# Patient Record
Sex: MALE | Race: WHITE | Employment: FULL TIME | URBAN - METROPOLITAN AREA
[De-identification: names, ages, dates, MRNs, and addresses within clinical notes are randomized per-mention and may not be internally consistent; named-entity substitution may affect disease eponyms.]

---

## 2018-05-16 ENCOUNTER — OFFICE VISIT (OUTPATIENT)
Dept: FAMILY MEDICINE CLINIC | Facility: CLINIC | Age: 63
End: 2018-05-16

## 2018-05-16 VITALS
WEIGHT: 236.81 LBS | HEIGHT: 74 IN | HEART RATE: 71 BPM | BODY MASS INDEX: 30.39 KG/M2 | OXYGEN SATURATION: 97 % | DIASTOLIC BLOOD PRESSURE: 72 MMHG | SYSTOLIC BLOOD PRESSURE: 124 MMHG

## 2018-05-16 DIAGNOSIS — Z51.81 ENCOUNTER FOR THERAPEUTIC DRUG MONITORING: ICD-10-CM

## 2018-05-16 DIAGNOSIS — Z95.2 HISTORY OF AORTIC VALVE REPLACEMENT: ICD-10-CM

## 2018-05-16 DIAGNOSIS — Z12.5 PROSTATE CANCER SCREENING: ICD-10-CM

## 2018-05-16 DIAGNOSIS — R10.84 GENERALIZED ABDOMINAL PAIN: Primary | ICD-10-CM

## 2018-05-16 DIAGNOSIS — Z79.01 REQUIRES LIFELONG WARFARIN THERAPY: ICD-10-CM

## 2018-05-16 PROCEDURE — 99203 OFFICE O/P NEW LOW 30 MIN: CPT | Performed by: FAMILY MEDICINE

## 2018-05-16 RX ORDER — LAMOTRIGINE 100 MG/1
100 TABLET ORAL EVERY MORNING
COMMUNITY

## 2018-05-16 RX ORDER — WARFARIN SODIUM 10 MG/1
10 TABLET ORAL NIGHTLY
COMMUNITY
End: 2019-03-24

## 2018-05-16 RX ORDER — FOLIC ACID 1 MG/1
TABLET ORAL DAILY
COMMUNITY
End: 2020-01-15

## 2018-05-16 RX ORDER — AMOXICILLIN 250 MG
500 CAPSULE ORAL DAILY
COMMUNITY
End: 2020-06-17 | Stop reason: DRUGHIGH

## 2018-05-16 RX ORDER — METOPROLOL TARTRATE 37.5 MG/1
TABLET, FILM COATED ORAL
COMMUNITY
End: 2019-01-02

## 2018-05-16 NOTE — PROGRESS NOTES
CC:  Patient presents with:  Stomach Pain: x 1 week   Establish Care      HPI: 61year old male here to establish care and with stomach pain x 1 week. Previous PCP was Dr. Rosemarie Quijano, but switching over here.    Reports he has had stomach pain in his lower abd no dysuria, no hematuria  Dermatologic:  No rashes    Past Medical History:   Diagnosis Date   • Actinic keratosis    • Cyclothymia    • S/P AVR (aortic valve replacement) 2001     Past Surgical History:   Procedure Laterality Date   • Cath transcatheter a palpation, no masses, no guarding, no rebound  Dermatologic:  No rashes or lesions    Assessment and Plan: 61year old male here with improving, generalized abdominal pain likely due to muscle wall strain and also needing blood work for routine monitoring

## 2018-05-21 ENCOUNTER — TELEPHONE (OUTPATIENT)
Dept: FAMILY MEDICINE CLINIC | Facility: CLINIC | Age: 63
End: 2018-05-21

## 2018-05-21 NOTE — TELEPHONE ENCOUNTER
Spoke with patient about his ammonia elevation, notes history of elevation while on Depakote but no known liver disease. Plan to check CMP at his physical and he will also bring old GI records as he was evaluated for this previously.

## 2018-06-12 ENCOUNTER — OFFICE VISIT (OUTPATIENT)
Dept: FAMILY MEDICINE CLINIC | Facility: CLINIC | Age: 63
End: 2018-06-12

## 2018-06-12 VITALS
HEIGHT: 74 IN | WEIGHT: 232 LBS | DIASTOLIC BLOOD PRESSURE: 70 MMHG | HEART RATE: 61 BPM | OXYGEN SATURATION: 98 % | SYSTOLIC BLOOD PRESSURE: 118 MMHG | BODY MASS INDEX: 29.77 KG/M2

## 2018-06-12 DIAGNOSIS — G62.9 NEUROPATHY: ICD-10-CM

## 2018-06-12 DIAGNOSIS — Z11.59 ENCOUNTER FOR HEPATITIS C SCREENING TEST FOR LOW RISK PATIENT: ICD-10-CM

## 2018-06-12 DIAGNOSIS — Z51.81 THERAPEUTIC DRUG MONITORING: ICD-10-CM

## 2018-06-12 DIAGNOSIS — R79.89 INCREASED AMMONIA LEVEL: ICD-10-CM

## 2018-06-12 DIAGNOSIS — Z00.01 ENCOUNTER FOR ROUTINE ADULT HEALTH EXAMINATION WITH ABNORMAL FINDINGS: Primary | ICD-10-CM

## 2018-06-12 DIAGNOSIS — Z11.3 VENEREAL DISEASE SCREENING: ICD-10-CM

## 2018-06-12 PROBLEM — J30.9 ALLERGIC RHINITIS: Status: ACTIVE | Noted: 2018-06-12

## 2018-06-12 PROCEDURE — 99396 PREV VISIT EST AGE 40-64: CPT | Performed by: FAMILY MEDICINE

## 2018-06-12 RX ORDER — SILDENAFIL 100 MG/1
100 TABLET, FILM COATED ORAL
COMMUNITY
End: 2018-12-26

## 2018-06-12 RX ORDER — WARFARIN SODIUM 5 MG/1
TABLET ORAL
Refills: 0 | COMMUNITY
Start: 2018-06-12 | End: 2018-07-10

## 2018-06-12 NOTE — PATIENT INSTRUCTIONS
Prevention Guidelines, Men Ages 48 to 59  Screening tests and vaccines are an important part of managing your health. Health counseling is essential, too. Below are guidelines for these, for men ages 48 to 59.  Talk with your healthcare provider to make s Prostate cancer Starting at age 39, talk to healthcare provider about risks and benefits of digital rectal exam (BLAIR) and prostate-specific antigen (PSA) screening1 At routine exams   Syphilis Men at increased risk for infection – talk with your healthcare pertussis (Td/Tdap) booster All men in this age group Td every 10 years, or a one-time dose of Tdap instead of a Td booster after age 25, then Td every 8 years   Zoster All men ages 61 and older 1 dose   Counseling Who needs it How often   Diet and exerci

## 2018-06-12 NOTE — PROGRESS NOTES
Suzzanna Severin is a 61year old male who presents for a complete physical exam.   HPI:   Patient presents with:  Physical: blood work      Concerns: Often has to pee again right after he pees and goes more often than others he is with.  Notes he pees 2-4 cyrus history of cyclothymia, erratic sleep   Endo: polyuria but no polydipsia       Current Outpatient Prescriptions:  Sildenafil Citrate 100 MG Oral Tab Take 100 mg by mouth daily as needed for Erectile Dysfunction.  Disp:  Rfl:    Warfarin Sodium 5 MG Oral Tab GENERAL: well developed, well nourished, in no apparent distress  SKIN: no rashes, flesh-colored nodule on forehead  HEENT: atraumatic, normocephalic  EYES: PERRLA, EOMI  NECK: supple, no adenopathy   LUNGS: clear to auscultation  CARDIO: RRR without mur cancer screening-up to date  -Prevention of skin cancer  -Healthy diet including adequate intake of vegetables and fruits, appropriate portion sizes, minimizing highly concentrated carbohydrate foods  -Exercising 30 minutes a day most days of the week   -I

## 2018-06-22 ENCOUNTER — TELEPHONE (OUTPATIENT)
Dept: FAMILY MEDICINE CLINIC | Facility: CLINIC | Age: 63
End: 2018-06-22

## 2018-06-22 DIAGNOSIS — Z95.2 H/O AORTIC VALVE REPLACEMENT: Primary | ICD-10-CM

## 2018-06-22 NOTE — TELEPHONE ENCOUNTER
Patient at lab and needs I&R order sent to [facility] at [address] [facility]

## 2018-06-26 ENCOUNTER — PATIENT MESSAGE (OUTPATIENT)
Dept: FAMILY MEDICINE CLINIC | Facility: CLINIC | Age: 63
End: 2018-06-26

## 2018-06-26 DIAGNOSIS — E72.20 HYPERAMMONEMIA (HCC): ICD-10-CM

## 2018-06-26 DIAGNOSIS — Z79.01 ENCOUNTER FOR MONITORING COUMADIN THERAPY: Primary | ICD-10-CM

## 2018-06-26 DIAGNOSIS — Z51.81 ENCOUNTER FOR MONITORING COUMADIN THERAPY: Primary | ICD-10-CM

## 2018-06-27 NOTE — TELEPHONE ENCOUNTER
From: Lisa Zurita  To: Chelle Guerrero DO  Sent: 6/26/2018 10:34 PM CDT  Subject: Test Results Question    I have a question about PROTHROMBIN TIME (PT)     I left my dosage the way it was before.  But I haven't been eating anywhere near the normal amount o

## 2018-07-10 ENCOUNTER — OFFICE VISIT (OUTPATIENT)
Dept: GASTROENTEROLOGY | Facility: CLINIC | Age: 63
End: 2018-07-10

## 2018-07-10 VITALS
HEART RATE: 65 BPM | BODY MASS INDEX: 29.65 KG/M2 | SYSTOLIC BLOOD PRESSURE: 104 MMHG | WEIGHT: 231 LBS | DIASTOLIC BLOOD PRESSURE: 62 MMHG | HEIGHT: 74 IN

## 2018-07-10 DIAGNOSIS — E72.20 HYPERAMMONEMIA (HCC): Primary | ICD-10-CM

## 2018-07-10 PROCEDURE — 99243 OFF/OP CNSLTJ NEW/EST LOW 30: CPT | Performed by: INTERNAL MEDICINE

## 2018-07-10 RX ORDER — LACTULOSE 10 G/15ML
20 SOLUTION ORAL 2 TIMES DAILY
Qty: 1892 ML | Refills: 0 | Status: SHIPPED | OUTPATIENT
Start: 2018-07-10 | End: 2020-02-10 | Stop reason: ALTCHOICE

## 2018-07-10 NOTE — PATIENT INSTRUCTIONS
Elevated ammonia  - LFT have been ok - would advise liver workup and I have placed order for labs and ultrasound  - lactulose 2 x day and recheck with ammonia level in 1 month

## 2018-07-13 NOTE — PROGRESS NOTES
Bria Membreno is a 61year old male. HPI:   Patient presents with:  Elevated Liver Chemistry: No current complaints. C/o twitches in legs at night and shaking in fingers in the morning.     The patient is a 80-year-old male who has a history of aortic va Disp:  Rfl:    Divalproex Sodium (DEPAKOTE OR) Take 1,000 mg by mouth. Disp:  Rfl:    Cobalamine Combinations (B-12) 100-5000 MCG Sublingual SL Tab Place under the tongue. Disp:  Rfl:    Metoprolol Tartrate 37.5 MG Oral Tab Take by mouth.  Take 25 mg qam an ammonia.        Orders This Visit:    Orders Placed This Encounter      Ammonia, Plasma      Alpha-1-antirypsin, serum      Actin (Smooth Muscle) Antibody      Ferritin      Ceruloplasmin      Hepatitis B Surface Antigen      Liver-Kidney Microsomal Ab

## 2018-10-13 ENCOUNTER — PATIENT MESSAGE (OUTPATIENT)
Dept: FAMILY MEDICINE CLINIC | Facility: CLINIC | Age: 63
End: 2018-10-13

## 2018-11-26 ENCOUNTER — TELEPHONE (OUTPATIENT)
Dept: GASTROENTEROLOGY | Facility: CLINIC | Age: 63
End: 2018-11-26

## 2018-11-26 NOTE — TELEPHONE ENCOUNTER
I mailed an overdue results letter to te pt home for the following labs:    Ammonia,Serum. I also sent a letter to the pt via [address]

## 2018-12-11 ENCOUNTER — MED REC SCAN ONLY (OUTPATIENT)
Dept: FAMILY MEDICINE CLINIC | Facility: CLINIC | Age: 63
End: 2018-12-11

## 2018-12-17 ENCOUNTER — PATIENT MESSAGE (OUTPATIENT)
Dept: FAMILY MEDICINE CLINIC | Facility: CLINIC | Age: 63
End: 2018-12-17

## 2018-12-17 DIAGNOSIS — Z51.81 ENCOUNTER FOR THERAPEUTIC DRUG MONITORING: Primary | ICD-10-CM

## 2018-12-17 DIAGNOSIS — Z00.01 ENCOUNTER FOR GENERAL ADULT MEDICAL EXAMINATION WITH ABNORMAL FINDINGS: ICD-10-CM

## 2018-12-18 NOTE — TELEPHONE ENCOUNTER
From: Bria Membreno  To: Zenia Trimble DO  Sent: 12/17/2018 5:12 PM CST  Subject: Non-Urgent Medical Question    Can you please talk to [facility] and put in an order for an INR, ammonia, liver function, and valproic acid?   [facility] on [address] [facility]

## 2018-12-26 ENCOUNTER — TELEPHONE (OUTPATIENT)
Dept: FAMILY MEDICINE CLINIC | Facility: CLINIC | Age: 63
End: 2018-12-26

## 2018-12-26 RX ORDER — DIVALPROEX SODIUM 500 MG/1
500 TABLET, EXTENDED RELEASE ORAL 2 TIMES DAILY
Refills: 8 | COMMUNITY
Start: 2018-12-03 | End: 2020-06-17 | Stop reason: DRUGHIGH

## 2018-12-26 RX ORDER — SILDENAFIL 100 MG/1
100 TABLET, FILM COATED ORAL
Qty: 15 TABLET | Refills: 1 | Status: SHIPPED | OUTPATIENT
Start: 2018-12-26 | End: 2020-06-17

## 2018-12-26 RX ORDER — KETOCONAZOLE 20 MG/G
1 CREAM TOPICAL 2 TIMES DAILY
Refills: 0 | COMMUNITY
Start: 2018-10-05 | End: 2020-01-15

## 2018-12-26 NOTE — TELEPHONE ENCOUNTER
[address] [facility] [phone] calling requesting a refill on viagra

## 2018-12-31 ENCOUNTER — PATIENT MESSAGE (OUTPATIENT)
Dept: FAMILY MEDICINE CLINIC | Facility: CLINIC | Age: 63
End: 2018-12-31

## 2018-12-31 ENCOUNTER — TELEPHONE (OUTPATIENT)
Dept: FAMILY MEDICINE CLINIC | Facility: CLINIC | Age: 63
End: 2018-12-31

## 2018-12-31 NOTE — TELEPHONE ENCOUNTER
Called and LM for [facility] at [phone] to see if an ammonia level was drawn and if not, if they can add it on. Requested a c/b to the office to let us know.

## 2019-01-02 ENCOUNTER — TELEPHONE (OUTPATIENT)
Dept: FAMILY MEDICINE CLINIC | Facility: CLINIC | Age: 64
End: 2019-01-02

## 2019-01-02 RX ORDER — METOPROLOL TARTRATE 37.5 MG/1
37.5 TABLET, FILM COATED ORAL DAILY
Qty: 90 TABLET | Refills: 1 | Status: SHIPPED | OUTPATIENT
Start: 2019-01-02 | End: 2019-06-24

## 2019-01-02 NOTE — TELEPHONE ENCOUNTER
From: America Bauer  To: Chelo Burt DO  Sent: 12/31/2018 2:06 PM CST  Subject: Prescription Question    Nasreen is saying my prescriptions are delayed waiting on doctor approval....     I'm going on vacation next week and need them before I leave     T

## 2019-01-04 ENCOUNTER — TELEPHONE (OUTPATIENT)
Dept: FAMILY MEDICINE CLINIC | Facility: CLINIC | Age: 64
End: 2019-01-04

## 2019-01-04 NOTE — TELEPHONE ENCOUNTER
Spoke with pt and he stated he has currently enough of the Warfarin, no need for refill yet.  Pt will have INR tested within 1-2 weeks

## 2019-02-19 ENCOUNTER — TELEPHONE (OUTPATIENT)
Dept: FAMILY MEDICINE CLINIC | Facility: CLINIC | Age: 64
End: 2019-02-19

## 2019-02-19 NOTE — TELEPHONE ENCOUNTER
Spoke with Benitez Fam at the main office of [address] [place] [address] at [phone] as per local [facility] lab they can't add any labs, it has to go thorough main office ([address], [place], [address], Phone: [phone]).  Per Benitez Fam, no Ammonia has been drawn on

## 2019-02-19 NOTE — TELEPHONE ENCOUNTER
Spoke with pt who states he tried to have Ammonia levels drawn during last blood draw but because he refused other lab tests that were listed along the ammonia, pt was told that [facility] could not do it and threw them out per pt.  New ammonia lab order brijesh

## 2019-02-20 ENCOUNTER — TELEPHONE (OUTPATIENT)
Dept: GASTROENTEROLOGY | Facility: CLINIC | Age: 64
End: 2019-02-20

## 2019-02-20 NOTE — TELEPHONE ENCOUNTER
2nd overdue letter mailed with additional labs/US added.     Labs:  Ammonia, Plasma      Alpha-1-antirypsin, serum      Actin (Smooth Muscle) Antibody      Ferritin      Ceruloplasmin      Hepatitis B Surface Antigen      Liver-Kidney Microsomal Ab      Freddy

## 2019-02-23 LAB
AMMONIA (P): 122 UMOL/L
INR: 1.6
PT: 15.9 SEC (ref 9–11.5)

## 2019-02-25 ENCOUNTER — PATIENT MESSAGE (OUTPATIENT)
Dept: FAMILY MEDICINE CLINIC | Facility: CLINIC | Age: 64
End: 2019-02-25

## 2019-02-26 ENCOUNTER — PATIENT MESSAGE (OUTPATIENT)
Dept: GASTROENTEROLOGY | Facility: CLINIC | Age: 64
End: 2019-02-26

## 2019-02-27 NOTE — PROGRESS NOTES
RN to have the pt follow up in the office to discuss his elevated ammonia level.  Please expedite follow up    Elevated ammonia likely related to his use of chronic Depakote therapy - see my chart communication

## 2019-02-27 NOTE — TELEPHONE ENCOUNTER
I left a complete message on voicemail to call back for results--that Dr Gale Fitch wants to see him in office, and that I put him in for appt tomorrow 2/28 with arrival time 1:30pm and [facility]        Reina Wesley MD 15 hours ago (6:12 PM

## 2019-02-27 NOTE — TELEPHONE ENCOUNTER
From: Andreia Mckeon  To: Kavita Avery MD  Sent: 2/26/2019 9:25 AM CST  Subject: Test Results Question    Hi Dr. Debora Garcia, I forgot to tell you. My liver function was normal in December according to Dr. Florian Tom.  I don't know if you have access to those tests

## 2019-02-28 ENCOUNTER — OFFICE VISIT (OUTPATIENT)
Dept: GASTROENTEROLOGY | Facility: CLINIC | Age: 64
End: 2019-02-28
Payer: COMMERCIAL

## 2019-02-28 VITALS
SYSTOLIC BLOOD PRESSURE: 125 MMHG | HEIGHT: 74 IN | HEART RATE: 71 BPM | DIASTOLIC BLOOD PRESSURE: 76 MMHG | WEIGHT: 237.19 LBS | BODY MASS INDEX: 30.44 KG/M2

## 2019-02-28 DIAGNOSIS — E72.20 HYPERAMMONEMIA (HCC): Primary | ICD-10-CM

## 2019-02-28 PROCEDURE — 99212 OFFICE O/P EST SF 10 MIN: CPT | Performed by: INTERNAL MEDICINE

## 2019-02-28 PROCEDURE — 99213 OFFICE O/P EST LOW 20 MIN: CPT | Performed by: INTERNAL MEDICINE

## 2019-02-28 RX ORDER — DIVALPROEX SODIUM 250 MG/1
1250 TABLET, DELAYED RELEASE ORAL NIGHTLY
COMMUNITY

## 2019-02-28 NOTE — PROGRESS NOTES
Brenden Sainz is a 59year old male. HPI:   Patient presents with:  Abnormal Labs  Constipation: bloating    The patient is a 79-year-old who has been on Depakote for control of psychiatric issues who has known elevation in his ammonia going back years. 2 (two) times daily. Disp:  Rfl:    lamoTRIgine 100 MG Oral Tab Take 100 mg by mouth daily. Disp:  Rfl:    Cobalamine Combinations (B-12) 100-5000 MCG Sublingual SL Tab Place under the tongue. Disp:  Rfl:    folic acid 1 MG Oral Tab Take by mouth daily. trial of lactulose were reviewed.     Plan  - repeat ammonia in March - if continued increase then consider changing out the Depakote for alternative medication  - lab work up   - liver ultrasound  - follow up after above testing         Orders This Visit:

## 2019-02-28 NOTE — PATIENT INSTRUCTIONS
Elevated ammonia  - thought to be related to effect of Depakote and not liver   - rule out underlying liver issue - lab work and check on getting ultrasound  - repeat ammonia in March  - consider Lactulose  - consider changing out Depakote if symptomatic a

## 2019-03-01 ENCOUNTER — TELEPHONE (OUTPATIENT)
Dept: GASTROENTEROLOGY | Facility: CLINIC | Age: 64
End: 2019-03-01

## 2019-03-01 NOTE — TELEPHONE ENCOUNTER
Pt is at [facility] and he states he was told that Dr. Eddy Brown was going to send new labs orders for liver testing . Orders that are at [facility] are the same labs he had done in December. Call transferred to RN.

## 2019-03-01 NOTE — TELEPHONE ENCOUNTER
Patient calling to compare what  Dr. Tayla Henderson ordered back in July in 2018 versus what he ordered yesterday. Information given.

## 2019-03-11 NOTE — TELEPHONE ENCOUNTER
From: Rohit iMnor  To: Elisa Peter DO  Sent: 2/25/2019 7:50 PM CST  Subject: Non-Urgent Medical Question    I have a question about PROTHROMBIN TIME (PT) resulted on 2/23/19, 12:00 PM.    10 mg per day except 5 mg on Monday and Friday    I've increased

## 2019-03-11 NOTE — PROGRESS NOTES
I can see your lab work in the lab section, I had advised liver workup to make sure no underlying liver disease as contributing to the ammonia elevation but my research on this shows due to medication     He had questions about possible duplication of lab

## 2019-03-15 NOTE — TELEPHONE ENCOUNTER
Patient was seen in office 2/28/19. Per Dr. Chun Reasoner documentation, the elevated ammonia levels were addressed.

## 2019-03-16 ENCOUNTER — PATIENT MESSAGE (OUTPATIENT)
Dept: GASTROENTEROLOGY | Facility: CLINIC | Age: 64
End: 2019-03-16

## 2019-03-16 DIAGNOSIS — E72.20 HYPERAMMONEMIA (HCC): Primary | ICD-10-CM

## 2019-03-19 NOTE — TELEPHONE ENCOUNTER
Dr Tee Navas, excuse message below. The only portion I need to know from you is if you want Ammonia repeated, as Dr Walter Grew had one 2/22, but looks like your 2/28 OV says repeat again in March  as it is increasing.   [facility] has specimen left so will also add liver

## 2019-03-19 NOTE — TELEPHONE ENCOUNTER
From: Helena Cordova  To: Rock Kan MD  Sent: 3/16/2019 12:35 PM CDT  Subject: Non-Urgent Medical Question    Sending this to both Birgit Drake and Romario….  (Wish the joe had the capability to send messages to two offices at [facility])    I was trying to sort ou

## 2019-03-20 NOTE — TELEPHONE ENCOUNTER
Dr Emmett Branham states he does want ammonia level drawn. Left message on pt's voicemail to CALL rather than MyChart to review.

## 2019-03-22 ENCOUNTER — PATIENT MESSAGE (OUTPATIENT)
Dept: FAMILY MEDICINE CLINIC | Facility: CLINIC | Age: 64
End: 2019-03-22

## 2019-03-22 LAB
ACTIN (SMOOTH MUSCLE)$ANTIBODY (IGG): <20 U
ALPHA-1-ANTITRYPSIN QN: 131 MG/DL (ref 83–199)
CERULOPLASMIN: 23 MG/DL (ref 18–36)
FERRITIN: 114 NG/ML (ref 20–380)
LKM-1 ANTIBODY (IGG): <20 U
MITOCHONDRIAL AB SCREEN: NEGATIVE

## 2019-03-22 NOTE — TELEPHONE ENCOUNTER
LMTCB. Please transfer to triage. Per Betty's message Dr. Britta Sanches does want ammonia level drawn. Ammonia order generated. Please advise on what diagnosis to use and send. Thank you.

## 2019-03-25 RX ORDER — WARFARIN SODIUM 10 MG/1
TABLET ORAL
Qty: 90 TABLET | Refills: 0 | Status: SHIPPED | OUTPATIENT
Start: 2019-03-25 | End: 2019-06-24

## 2019-03-25 NOTE — TELEPHONE ENCOUNTER
A refill request was received for:  Requested Prescriptions     Pending Prescriptions Disp Refills   • WARFARIN SODIUM 10 MG Oral Tab [Pharmacy Med Name: WARFARIN SOD 10MG (TEN MG) TB WHITE] 90 tablet 0     Sig: TAKE 1 TABLET BY MOUTH ONCE DAILY     Last r

## 2019-03-25 NOTE — TELEPHONE ENCOUNTER
From: Joanne Godwin  To: Leigh Gresham DO  Sent: 3/22/2019 5:32 PM CDT  Subject: Non-Urgent Medical Question    The other thing they keep telling me time and time again is that there is no standing order sent from you guys.  [facility] has been so difficult to d

## 2019-03-26 NOTE — TELEPHONE ENCOUNTER
RN spoke with pt and informed him that [facility] RN faxed standing PT order to [facility]. Copy of standing order mailed to pt today. Advised pt to keep standing order with him when going to [facility] as it appears that they don't keep standing orders on file.  Pt verbal

## 2019-03-26 NOTE — TELEPHONE ENCOUNTER
Patient called back and message from Dr. Aubrey Mera given. Patient would like order mailed to his home. Address confirmed. Order mailed.

## 2019-04-08 RX ORDER — LAMOTRIGINE 100 MG/1
TABLET ORAL
Qty: 30 TABLET | Refills: 0 | OUTPATIENT
Start: 2019-04-08

## 2019-04-08 NOTE — TELEPHONE ENCOUNTER
A refill request was received for:  Requested Prescriptions     Pending Prescriptions Disp Refills   • LAMOTRIGINE 100 MG Oral Tab [Pharmacy Med Name: LAMOTRIGINE 100MG TABLETS] 30 tablet 0     Sig: TAKE 1 TABLET BY MOUTH DAILY     Last refill date:never

## 2019-04-09 NOTE — TELEPHONE ENCOUNTER
Medication declined and mychart message sent as this medication has not bee prescribed by me before and was previously prescribed by his psychiatrist.

## 2019-04-12 ENCOUNTER — TELEPHONE (OUTPATIENT)
Dept: FAMILY MEDICINE CLINIC | Facility: CLINIC | Age: 64
End: 2019-04-12

## 2019-04-12 DIAGNOSIS — Z79.01 REQUIRES LIFELONG WARFARIN THERAPY: Primary | ICD-10-CM

## 2019-04-12 NOTE — TELEPHONE ENCOUNTER
Fax over new orders   The orders they have are expiring  [facility]    Fax [phone]    IF she doesn't get this now she can't do the test today

## 2019-04-29 ENCOUNTER — TELEPHONE (OUTPATIENT)
Dept: GASTROENTEROLOGY | Facility: CLINIC | Age: 64
End: 2019-04-29

## 2019-04-29 ENCOUNTER — PATIENT MESSAGE (OUTPATIENT)
Dept: GASTROENTEROLOGY | Facility: CLINIC | Age: 64
End: 2019-04-29

## 2019-04-29 NOTE — TELEPHONE ENCOUNTER
Message left on patient's personal voicemail. Ammonia level has improved. Thought that this is related to his medication use/depakote. No signs of underlying primary liver disease. His ammonia level has been stable. Patient to call back to review.

## 2019-05-01 NOTE — TELEPHONE ENCOUNTER
From: Lydia Duvall  To: Kwesi Mckenzie MD  Sent: 4/29/2019 6:49 PM CDT  Subject: Non-Urgent Medical Question    I have a question about AMMONIA, PLASMA resulted on 4/13/19, 2:48 PM.    Thanks. .. I thought the guideline was 48. Did that change?

## 2019-05-01 NOTE — TELEPHONE ENCOUNTER
Attempted to contact pt to inquire on how he is feeling and if he is still taking lactulose. [facility] documentation pasted below:    Message left on patient's personal voicemail. Ammonia level has improved.   Thought that this is related to his medication use/

## 2019-06-24 DIAGNOSIS — Z79.01 LONG TERM (CURRENT) USE OF ANTICOAGULANTS: Primary | ICD-10-CM

## 2019-06-24 RX ORDER — AMOXICILLIN 500 MG/1
4 CAPSULE ORAL AS NEEDED
Refills: 0 | COMMUNITY
Start: 2019-05-21 | End: 2019-09-13

## 2019-06-24 RX ORDER — WARFARIN SODIUM 10 MG/1
TABLET ORAL
Qty: 90 TABLET | Refills: 0 | Status: SHIPPED | OUTPATIENT
Start: 2019-06-24 | End: 2019-07-15

## 2019-06-27 NOTE — TELEPHONE ENCOUNTER
Pt notified of need to repeat INR, [facility] and pt states they always say the order is . The current order was written to be standing every 2 weeks but this RN will re-issue an order so there is no complication.  Pt scheduled to see AS for

## 2019-07-08 ENCOUNTER — TELEPHONE (OUTPATIENT)
Dept: FAMILY MEDICINE CLINIC | Facility: CLINIC | Age: 64
End: 2019-07-08

## 2019-07-08 DIAGNOSIS — E72.20 HYPERAMMONEMIA (HCC): Primary | ICD-10-CM

## 2019-07-08 NOTE — TELEPHONE ENCOUNTER
Pt has a standing order for Prothrombin time with [facility] and needs an order sent over for a repeat Ammonia Plasma level that is done every 3 months. Pt goes to the [address] [place] [address] on [address] in [facility]. Order placed and faxed to pt's requested lab.

## 2019-07-09 LAB
AMMONIA (P): 59 UMOL/L
INR: 1.6
PT: 16.5 SEC (ref 9–11.5)

## 2019-07-10 ENCOUNTER — TELEPHONE (OUTPATIENT)
Dept: FAMILY MEDICINE CLINIC | Facility: CLINIC | Age: 64
End: 2019-07-10

## 2019-07-15 RX ORDER — WARFARIN SODIUM 10 MG/1
TABLET ORAL
Qty: 90 TABLET | Refills: 0 | Status: SHIPPED | OUTPATIENT
Start: 2019-07-15 | End: 2019-12-18

## 2019-07-15 NOTE — TELEPHONE ENCOUNTER
A refill request was received for:  Requested Prescriptions     Pending Prescriptions Disp Refills   • METOPROLOL TARTRATE 25 MG Oral Tab [Pharmacy Med Name: METOPROLOL TARTRATE 25MG TABLETS] 135 tablet 0     Sig: TAKE 1 TABLET BY MOUTH EVERY MORNING AND 1

## 2019-07-29 ENCOUNTER — OFFICE VISIT (OUTPATIENT)
Dept: FAMILY MEDICINE CLINIC | Facility: CLINIC | Age: 64
End: 2019-07-29
Payer: COMMERCIAL

## 2019-07-29 VITALS
SYSTOLIC BLOOD PRESSURE: 120 MMHG | WEIGHT: 224 LBS | HEART RATE: 52 BPM | HEIGHT: 74 IN | BODY MASS INDEX: 28.75 KG/M2 | OXYGEN SATURATION: 98 % | DIASTOLIC BLOOD PRESSURE: 70 MMHG

## 2019-07-29 DIAGNOSIS — Z00.01 ENCOUNTER FOR ROUTINE ADULT HEALTH EXAMINATION WITH ABNORMAL FINDINGS: Primary | ICD-10-CM

## 2019-07-29 DIAGNOSIS — Z95.2 HISTORY OF AORTIC VALVE REPLACEMENT: ICD-10-CM

## 2019-07-29 DIAGNOSIS — M21.42 FLAT FEET: ICD-10-CM

## 2019-07-29 DIAGNOSIS — G25.81 RESTLESS LEGS: ICD-10-CM

## 2019-07-29 DIAGNOSIS — M21.41 FLAT FEET: ICD-10-CM

## 2019-07-29 DIAGNOSIS — N40.1 BENIGN PROSTATIC HYPERPLASIA WITH INCOMPLETE BLADDER EMPTYING: ICD-10-CM

## 2019-07-29 DIAGNOSIS — R39.14 BENIGN PROSTATIC HYPERPLASIA WITH INCOMPLETE BLADDER EMPTYING: ICD-10-CM

## 2019-07-29 DIAGNOSIS — R25.1 TREMOR: ICD-10-CM

## 2019-07-29 PROCEDURE — 99396 PREV VISIT EST AGE 40-64: CPT | Performed by: FAMILY MEDICINE

## 2019-07-29 PROCEDURE — 99213 OFFICE O/P EST LOW 20 MIN: CPT | Performed by: FAMILY MEDICINE

## 2019-07-29 NOTE — PATIENT INSTRUCTIONS
-Try Methylcobalamin (B12) 1,000 mcg daily      Prevention Guidelines, Men Ages 48 to 59  Screening tests and vaccines are an important part of managing your health.  A screening test is done to find possible disorders or diseases in people who don't have a least once for hepatitis C.    High cholesterol or triglycerides All men in this age group At least every 5 years   HIV Men at increased risk for infection – talk with your healthcare provider At routine exams   Lung cancer Adults age 54 to [de-identified] who have smok Pneumococcal conjugate vaccine (PCV13) and pneumococcal polysaccharide vaccine (PPSV23) Men at increased risk for infection – talk with your healthcare provider PCV13: 1 dose ages 23 to 72 (protects against 13 types of pneumococcal bacteria)     PPSV23:

## 2019-07-29 NOTE — PROGRESS NOTES
Lynne Bravo is a 59year old male who presents for a complete physical exam.   HPI:   Patient presents with: Annual: pain in lower abdomen    Concerns: Recently missed a few doses of Warfarin but that is rare.  Had been taking 10 mg daily except for 2 da Take 1,200 mg by mouth 2 (two) times daily. Disp:  Rfl:    lamoTRIgine 100 MG Oral Tab Take 100 mg by mouth daily. Disp:  Rfl:    folic acid 1 MG Oral Tab Take by mouth daily. Disp:  Rfl:    Melatonin 3 MG Oral Cap Take by mouth.  Disp:  Rfl:    Clemencia Hernadez clear to auscultation  CARDIO: RRR without murmur  GI: good bowel sounds,no masses, HSM or tenderness  : Mildly enlarged prostate but no discreet masses or nodules, no inguinal hernias   EXTREMITIES: no cyanosis, clubbing or edema  NEURO: Oriented times exercise and weight loss  -Diabetes screening which he desires  -Cholesterol screening which he desires  -Recommendation for yearly influenza vaccine  -Need for Tdap once as an adult and Td booster every 10 years  -Need for Zoster vaccine for patients >= 5

## 2019-08-21 ENCOUNTER — TELEPHONE (OUTPATIENT)
Dept: FAMILY MEDICINE CLINIC | Facility: CLINIC | Age: 64
End: 2019-08-21

## 2019-08-21 NOTE — TELEPHONE ENCOUNTER
Patient requesting an call back states had an head injury but now neck is bothering him will like to discuss
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
## 2019-09-06 LAB
CHOLEST SERPL-MCNC: 193 MG/DL
CHOLEST/HDLC SERPL: NORMAL {RATIO}
HDLC SERPL-MCNC: 40 MG/DL
LDLC SERPL CALC-MCNC: 128 MG/DL
LENGTH OF FAST TIME PATIENT: NORMAL H
NONHDLC SERPL-MCNC: 153 MG/DL
TRIGL SERPL-MCNC: 132 MG/DL
VLDLC SERPL CALC-MCNC: NORMAL MG/DL

## 2019-09-07 LAB
ABSOLUTE BASOPHILS: 42 CELLS/UL (ref 0–200)
ABSOLUTE EOSINOPHILS: 130 CELLS/UL (ref 15–500)
ABSOLUTE LYMPHOCYTES: 1525 CELLS/UL (ref 850–3900)
ABSOLUTE MONOCYTES: 538 CELLS/UL (ref 200–950)
ABSOLUTE NEUTROPHILS: 1966 CELLS/UL (ref 1500–7800)
ALBUMIN/GLOBULIN RATIO: 1.7 (CALC) (ref 1–2.5)
ALBUMIN: 3.9 G/DL (ref 3.6–5.1)
ALKALINE PHOSPHATASE: 73 U/L (ref 40–115)
ALT: 19 U/L (ref 9–46)
AST: 22 U/L (ref 10–35)
BASOPHILS: 1 %
BILIRUBIN, TOTAL: 0.5 MG/DL (ref 0.2–1.2)
BUN: 15 MG/DL (ref 7–25)
CALCIUM: 9.1 MG/DL (ref 8.6–10.3)
CARBON DIOXIDE: 27 MMOL/L (ref 20–32)
CHLORIDE: 107 MMOL/L (ref 98–110)
CHOL/HDLC RATIO: 4.8 (CALC)
CHOLESTEROL, TOTAL: 193 MG/DL
CREATININE: 0.73 MG/DL (ref 0.7–1.25)
EGFR IF AFRICN AM: 114 ML/MIN/1.73M2
EGFR IF NONAFRICN AM: 98 ML/MIN/1.73M2
EOSINOPHILS: 3.1 %
GLOBULIN: 2.3 G/DL (CALC) (ref 1.9–3.7)
GLUCOSE: 86 MG/DL (ref 65–99)
HDL CHOLESTEROL: 40 MG/DL
HEMATOCRIT: 38.6 % (ref 38.5–50)
HEMOGLOBIN A1C: 5.2 % OF TOTAL HGB
HEMOGLOBIN: 13.5 G/DL (ref 13.2–17.1)
INR: 2.6
LDL-CHOLESTEROL: 128 MG/DL (CALC)
LYMPHOCYTES: 36.3 %
MCH: 31.4 PG (ref 27–33)
MCHC: 35 G/DL (ref 32–36)
MCV: 89.8 FL (ref 80–100)
MONOCYTES: 12.8 %
MPV: 12.6 FL (ref 7.5–12.5)
NEUTROPHILS: 46.8 %
NON-HDL CHOLESTEROL: 153 MG/DL (CALC)
PLATELET COUNT: 154 THOUSAND/UL (ref 140–400)
POTASSIUM: 4.3 MMOL/L (ref 3.5–5.3)
PROTEIN, TOTAL: 6.2 G/DL (ref 6.1–8.1)
PT: 25.7 SEC (ref 9–11.5)
RDW: 12.8 % (ref 11–15)
RED BLOOD CELL COUNT: 4.3 MILLION/UL (ref 4.2–5.8)
SODIUM: 139 MMOL/L (ref 135–146)
TRIGLYCERIDES: 132 MG/DL
WHITE BLOOD CELL COUNT: 4.2 THOUSAND/UL (ref 3.8–10.8)

## 2019-09-10 DIAGNOSIS — Z79.01 REQUIRES LIFELONG WARFARIN THERAPY: Primary | ICD-10-CM

## 2019-09-13 ENCOUNTER — OFFICE VISIT (OUTPATIENT)
Dept: FAMILY MEDICINE CLINIC | Facility: CLINIC | Age: 64
End: 2019-09-13
Payer: COMMERCIAL

## 2019-09-13 VITALS
HEIGHT: 74 IN | DIASTOLIC BLOOD PRESSURE: 62 MMHG | SYSTOLIC BLOOD PRESSURE: 122 MMHG | WEIGHT: 232 LBS | BODY MASS INDEX: 29.77 KG/M2 | HEART RATE: 70 BPM | OXYGEN SATURATION: 98 %

## 2019-09-13 DIAGNOSIS — R10.32: Primary | ICD-10-CM

## 2019-09-13 DIAGNOSIS — Z23 NEED FOR VACCINATION: ICD-10-CM

## 2019-09-13 DIAGNOSIS — R39.89 GENITAL SORE: ICD-10-CM

## 2019-09-13 DIAGNOSIS — M54.2 NECK PAIN: ICD-10-CM

## 2019-09-13 PROCEDURE — 90471 IMMUNIZATION ADMIN: CPT | Performed by: FAMILY MEDICINE

## 2019-09-13 PROCEDURE — 90686 IIV4 VACC NO PRSV 0.5 ML IM: CPT | Performed by: FAMILY MEDICINE

## 2019-09-13 PROCEDURE — 99214 OFFICE O/P EST MOD 30 MIN: CPT | Performed by: FAMILY MEDICINE

## 2019-09-13 NOTE — PATIENT INSTRUCTIONS
Neck Pain  There are several possible causes of neck pain when there is no injury:  · You can get a minor ligament sprain or muscle strain from a sudden minor neck movement. Sleeping with your neck in an awkward position can also cause this.   · Some peop · Some people find relief with heat. Heat can be applied with either a warm shower or bath or a moist towel heated in the microwave and massage. Others prefer cold packs.  You can make an ice pack by filling a plastic bag that seals at the top with ice cube © 7254-8359 [employer] [address]. [address] [facility], [address] [place]. All rights reserved. This information is not intended as a substitute for professional medical care. Always follow your healthcare professional's instructions.         Know Ivan Simons
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
## 2019-10-02 ENCOUNTER — PATIENT MESSAGE (OUTPATIENT)
Dept: FAMILY MEDICINE CLINIC | Facility: CLINIC | Age: 64
End: 2019-10-02

## 2019-10-02 NOTE — TELEPHONE ENCOUNTER
From: Kieran Diggs  To: Raman Thapa DO  Sent: 10/2/2019 10:57 AM CDT  Subject: Other    I let the urgent care physician know the medication is indicated for twice a day and she responded , \"I will call them and see if they will give you the additional

## 2019-12-18 RX ORDER — WARFARIN SODIUM 10 MG/1
TABLET ORAL
Qty: 90 TABLET | Refills: 0 | Status: SHIPPED | OUTPATIENT
Start: 2019-12-18 | End: 2020-02-10

## 2019-12-18 NOTE — TELEPHONE ENCOUNTER
A refill request was received for:  Requested Prescriptions     Pending Prescriptions Disp Refills   • WARFARIN SODIUM 10 MG Oral Tab [Pharmacy Med Name: WARFARIN SOD 10MG (TEN MG) TB WHITE] 90 tablet 0     Sig: TAKE 1 TABLET BY MOUTH EVERY DAY     Last re

## 2020-01-15 ENCOUNTER — LAB ENCOUNTER (OUTPATIENT)
Dept: LAB | Facility: REFERENCE LAB | Age: 65
End: 2020-01-15
Attending: FAMILY MEDICINE
Payer: MEDICARE

## 2020-01-15 ENCOUNTER — OFFICE VISIT (OUTPATIENT)
Dept: FAMILY MEDICINE CLINIC | Facility: CLINIC | Age: 65
End: 2020-01-15
Payer: MEDICARE

## 2020-01-15 VITALS
HEIGHT: 74 IN | BODY MASS INDEX: 30.03 KG/M2 | DIASTOLIC BLOOD PRESSURE: 74 MMHG | SYSTOLIC BLOOD PRESSURE: 120 MMHG | WEIGHT: 234 LBS | HEART RATE: 63 BPM | OXYGEN SATURATION: 98 %

## 2020-01-15 DIAGNOSIS — I48.0 PAROXYSMAL ATRIAL FIBRILLATION (HCC): ICD-10-CM

## 2020-01-15 DIAGNOSIS — Z95.2 HISTORY OF AORTIC VALVE REPLACEMENT: ICD-10-CM

## 2020-01-15 DIAGNOSIS — Z79.01 REQUIRES LIFELONG WARFARIN THERAPY: ICD-10-CM

## 2020-01-15 DIAGNOSIS — K62.5 RECTAL BLEEDING: Primary | ICD-10-CM

## 2020-01-15 DIAGNOSIS — B95.5 PERIANAL STREPTOCOCCAL DERMATITIS: ICD-10-CM

## 2020-01-15 DIAGNOSIS — L08.9 PERIANAL STREPTOCOCCAL DERMATITIS: ICD-10-CM

## 2020-01-15 DIAGNOSIS — G25.2 ACTION TREMOR: ICD-10-CM

## 2020-01-15 DIAGNOSIS — R10.32 ABDOMINAL WALL PAIN IN LEFT LOWER QUADRANT: ICD-10-CM

## 2020-01-15 LAB
INR BLD: 3.16 (ref 0.9–1.2)
PROTHROMBIN TIME: 33.1 SECONDS (ref 11.8–14.5)

## 2020-01-15 PROCEDURE — 99214 OFFICE O/P EST MOD 30 MIN: CPT | Performed by: FAMILY MEDICINE

## 2020-01-15 PROCEDURE — 36415 COLL VENOUS BLD VENIPUNCTURE: CPT

## 2020-01-15 PROCEDURE — 85610 PROTHROMBIN TIME: CPT

## 2020-01-15 RX ORDER — MELATONIN
400 DAILY
COMMUNITY

## 2020-01-15 NOTE — PROGRESS NOTES
CC:  Patient presents with:  Rectal Bleeding: on and off after long walks, had hemerrhoids in the past  Rash: bug bites in the past 2 weeks on his arms, legs and back of his neck  Muscle Pain: on his abdominal area that has been going on for a long time an hematuria, rectal bleeding with wiping and itching, no hematochezia or melena  Dermatologic:  No rashes  Neuro: bilateral restless legs, tremor in the fingers of both hands with typing, no resting tremor, no gait abnormalities     Past Medical History:   D Not Asked        Seat Belt: Not Asked        Special Diet: Not Asked        Stress Concern: Not Asked        Weight Concern: Not Asked    Social History Narrative      Not on file      Current Outpatient Medications   Medication Sig Dispense Refill   • fol entry  GI:  Soft, positive bowel sounds, mild left lower abdominal wall tenderness and possible small subcutaneous nodule, no masses, no guarding, no rebound  /rectal: Perianal erythema but no hemorrhoids or fissures, no rectal masses, no prostate enlarg replacement    - Referral to new cardiologist given for routine follow-up and monitoring   - CARDIO - INTERNAL    7.  Paroxysmal atrial fibrillation (HCC)    - Referral to cardiologist given   - CARDIO - INTERNAL    A total of 30 minutes of this visit were

## 2020-01-17 NOTE — PROGRESS NOTES
LM, ok per hippa release, that his INR was a little above goal and that Dr. Jessica Pitts released the results to [facility] with the recommendation to cut back a little on his Warfarin or modify his diet slightly.   Pt encouraged to call the office with more questions

## 2020-02-04 ENCOUNTER — PATIENT MESSAGE (OUTPATIENT)
Dept: FAMILY MEDICINE CLINIC | Facility: CLINIC | Age: 65
End: 2020-02-04

## 2020-02-10 ENCOUNTER — OFFICE VISIT (OUTPATIENT)
Dept: FAMILY MEDICINE CLINIC | Facility: CLINIC | Age: 65
End: 2020-02-10
Payer: MEDICARE

## 2020-02-10 ENCOUNTER — PATIENT MESSAGE (OUTPATIENT)
Dept: GASTROENTEROLOGY | Facility: CLINIC | Age: 65
End: 2020-02-10

## 2020-02-10 ENCOUNTER — TELEPHONE (OUTPATIENT)
Dept: FAMILY MEDICINE CLINIC | Facility: CLINIC | Age: 65
End: 2020-02-10

## 2020-02-10 ENCOUNTER — HOSPITAL ENCOUNTER (OUTPATIENT)
Dept: CT IMAGING | Facility: HOSPITAL | Age: 65
Discharge: HOME | DRG: 392 | End: 2020-02-10
Attending: FAMILY MEDICINE
Payer: MEDICARE

## 2020-02-10 ENCOUNTER — LAB ENCOUNTER (OUTPATIENT)
Dept: LAB | Facility: REFERENCE LAB | Age: 65
DRG: 392 | End: 2020-02-10
Attending: FAMILY MEDICINE
Payer: MEDICARE

## 2020-02-10 VITALS
OXYGEN SATURATION: 98 % | DIASTOLIC BLOOD PRESSURE: 76 MMHG | HEART RATE: 80 BPM | HEIGHT: 74 IN | BODY MASS INDEX: 29.52 KG/M2 | WEIGHT: 230 LBS | SYSTOLIC BLOOD PRESSURE: 120 MMHG

## 2020-02-10 DIAGNOSIS — K57.92 ACUTE DIVERTICULITIS: Primary | ICD-10-CM

## 2020-02-10 DIAGNOSIS — K57.92 ACUTE DIVERTICULITIS: ICD-10-CM

## 2020-02-10 DIAGNOSIS — Z79.01 REQUIRES LIFELONG WARFARIN THERAPY: ICD-10-CM

## 2020-02-10 LAB
ALBUMIN SERPL-MCNC: 3.2 G/DL
ALBUMIN SERPL-MCNC: 3.2 G/DL (ref 3.4–5)
ALBUMIN/GLOB SERPL: 0.8 {RATIO}
ALBUMIN/GLOB SERPL: 0.8 {RATIO} (ref 1–2)
ALP LIVER SERPL-CCNC: 56 U/L (ref 45–117)
ALP SERPL-CCNC: 56 U/L
ALT SERPL-CCNC: 16 U/L
ALT SERPL-CCNC: 16 U/L (ref 16–61)
ANION GAP SERPL CALC-SCNC: 7 MMOL/L
ANION GAP SERPL CALC-SCNC: 7 MMOL/L (ref 0–18)
AST SERPL-CCNC: 19 U/L
AST SERPL-CCNC: 19 U/L (ref 15–37)
BASOPHILS # BLD AUTO: 0.03 X10(3) UL (ref 0–0.2)
BASOPHILS NFR BLD AUTO: 0.5 %
BILIRUB SERPL-MCNC: 0.4 MG/DL
BILIRUB SERPL-MCNC: 0.4 MG/DL (ref 0.1–2)
BUN BLD-MCNC: 8 MG/DL (ref 7–18)
BUN SERPL-MCNC: 8 MG/DL
BUN/CREAT SERPL: 11.8
BUN/CREAT SERPL: 11.8 (ref 10–20)
CALCIUM BLD-MCNC: 8.9 MG/DL (ref 8.5–10.1)
CALCIUM SERPL-MCNC: 8.9 MG/DL
CHLORIDE SERPL-SCNC: 104 MMOL/L
CHLORIDE SERPL-SCNC: 104 MMOL/L (ref 98–112)
CO2 SERPL-SCNC: 28 MMOL/L
CO2 SERPL-SCNC: 28 MMOL/L (ref 21–32)
CREAT BLD-MCNC: 0.68 MG/DL (ref 0.7–1.3)
CREAT BLD-MCNC: 0.7 MG/DL (ref 0.7–1.3)
CREAT SERPL-MCNC: 0.68 MG/DL
DEPRECATED RDW RBC AUTO: 41.5 FL (ref 35.1–46.3)
EOSINOPHIL # BLD AUTO: 0.06 X10(3) UL (ref 0–0.7)
EOSINOPHIL NFR BLD AUTO: 0.9 %
ERYTHROCYTE [DISTWIDTH] IN BLOOD BY AUTOMATED COUNT: 12.5 % (ref 11–15)
GLOBULIN PLAS-MCNC: 3.8 G/DL (ref 2.8–4.4)
GLOBULIN SER-MCNC: 3.8 G/DL
GLUCOSE BLD-MCNC: 87 MG/DL (ref 70–99)
GLUCOSE SERPL-MCNC: 87 MG/DL
HCT VFR BLD AUTO: 38.5 % (ref 39–53)
HGB BLD-MCNC: 13 G/DL (ref 13–17.5)
IMM GRANULOCYTES # BLD AUTO: 0.02 X10(3) UL (ref 0–1)
IMM GRANULOCYTES NFR BLD: 0.3 %
INR BLD: 5.57 (ref 0.9–1.2)
LYMPHOCYTES # BLD AUTO: 1.38 X10(3) UL (ref 1–4)
LYMPHOCYTES NFR BLD AUTO: 21.7 %
M PROTEIN MFR SERPL ELPH: 7 G/DL (ref 6.4–8.2)
MCH RBC QN AUTO: 30.8 PG (ref 26–34)
MCHC RBC AUTO-ENTMCNC: 33.8 G/DL (ref 31–37)
MCV RBC AUTO: 91.2 FL (ref 80–100)
MONOCYTES # BLD AUTO: 0.93 X10(3) UL (ref 0.1–1)
MONOCYTES NFR BLD AUTO: 14.6 %
NEUTROPHILS # BLD AUTO: 3.94 X10 (3) UL (ref 1.5–7.7)
NEUTROPHILS # BLD AUTO: 3.94 X10(3) UL (ref 1.5–7.7)
NEUTROPHILS NFR BLD AUTO: 62 %
OSMOLALITY SERPL CALC.SUM OF ELEC: 286 MOSM/KG (ref 275–295)
PATIENT FASTING Y/N/NP: YES
PLATELET # BLD AUTO: 252 10(3)UL (ref 150–450)
POTASSIUM SERPL-SCNC: 3.6 MMOL/L
POTASSIUM SERPL-SCNC: 3.6 MMOL/L (ref 3.5–5.1)
PROT SERPL-MCNC: 7 G/DL
PROTHROMBIN TIME: 52.5 SECONDS (ref 11.8–14.5)
RBC # BLD AUTO: 4.22 X10(6)UL (ref 3.8–5.8)
SODIUM SERPL-SCNC: 139 MMOL/L
SODIUM SERPL-SCNC: 139 MMOL/L (ref 136–145)
WBC # BLD AUTO: 6.4 X10(3) UL (ref 4–11)

## 2020-02-10 PROCEDURE — 85610 PROTHROMBIN TIME: CPT

## 2020-02-10 PROCEDURE — 82565 ASSAY OF CREATININE: CPT

## 2020-02-10 PROCEDURE — 74177 CT ABD & PELVIS W/CONTRAST: CPT | Performed by: FAMILY MEDICINE

## 2020-02-10 PROCEDURE — 80053 COMPREHEN METABOLIC PANEL: CPT

## 2020-02-10 PROCEDURE — 99213 OFFICE O/P EST LOW 20 MIN: CPT | Performed by: FAMILY MEDICINE

## 2020-02-10 PROCEDURE — 36415 COLL VENOUS BLD VENIPUNCTURE: CPT

## 2020-02-10 PROCEDURE — 85025 COMPLETE CBC W/AUTO DIFF WBC: CPT

## 2020-02-10 RX ORDER — AMOXICILLIN AND CLAVULANATE POTASSIUM 875; 125 MG/1; MG/1
1 TABLET, FILM COATED ORAL 2 TIMES DAILY
Status: ON HOLD | COMMUNITY
Start: 2020-02-04 | End: 2020-02-13

## 2020-02-10 RX ORDER — WARFARIN SODIUM 10 MG/1
10 TABLET ORAL
Qty: 90 TABLET | Refills: 0 | Status: SHIPPED | OUTPATIENT
Start: 2020-02-10 | End: 2020-03-13 | Stop reason: DRUGHIGH

## 2020-02-10 NOTE — PATIENT INSTRUCTIONS
Diverticulitis    Some people get pouches along the wall of the colon as they get older. The pouches, called diverticuli, usually cause no symptoms. If the pouches become blocked, you can get an infection. This infection is called diverticulitis.  It caus Once you have an episode of diverticulitis, you are at risk for having it again. After you have recovered from this episode, you may be able to lower your risk by eating a high-fiber diet (20 gm/day to 35 gm/day of fiber).  This cleans out the colon pouches © 4783-9409 [employer] [address]. [address] [facility], [facility] [place]. All rights reserved. This information is not intended as a substitute for professional medical care. Always follow your healthcare professional's instructions.

## 2020-02-10 NOTE — TELEPHONE ENCOUNTER
Spoke with patient about needing to refill his medications now at [facility] instead. Sent Metoprolol and Warfarin to pharmacy.

## 2020-02-10 NOTE — TELEPHONE ENCOUNTER
Per pt, he no longer uses the pharmacy that requested the med refills. Pt stating he is currently \"investigating\" to find a pharmacy that takes his insurance. This RN informed pt that [facility] can refill his meds today;  Warfarin dosage needs to be discussed wi

## 2020-02-10 NOTE — PROGRESS NOTES
CC:  Patient presents with:  Diverticulitis: follow up      HPI: 72year old male here to follow-up on acute diverticulitis. First developed abdominal pain a week ago and then was having abdominal bloating.  Has had fevers off and on as well and some mild on file      Food insecurity:        Worry: Not on file        Inability: Not on file      Transportation needs:        Medical: Not on file        Non-medical: Not on file    Tobacco Use      Smoking status: Never Smoker      Smokeless tobacco: Never Used daily.     • divalproex Sodium  MG Oral Tablet 24 Hr Take 500 mg by mouth 2 (two) times daily. 8   • Sildenafil Citrate 100 MG Oral Tab Take 1 tablet (100 mg total) by mouth daily as needed for Erectile Dysfunction.  15 tablet 1   • Acetylcysteine

## 2020-02-11 ENCOUNTER — PATIENT MESSAGE (OUTPATIENT)
Dept: GASTROENTEROLOGY | Facility: CLINIC | Age: 65
End: 2020-02-11

## 2020-02-11 ENCOUNTER — HOSPITAL ENCOUNTER (INPATIENT)
Facility: HOSPITAL | Age: 65
LOS: 2 days | Discharge: HOME | DRG: 392 | End: 2020-02-13
Attending: HOSPITALIST | Admitting: HOSPITALIST
Payer: MEDICARE

## 2020-02-11 LAB
ALBUMIN SERPL-MCNC: 2.9 G/DL (ref 3.4–5)
ALP LIVER SERPL-CCNC: 53 U/L (ref 45–117)
ALT SERPL-CCNC: 15 U/L (ref 16–61)
ANION GAP SERPL CALC-SCNC: 6 MMOL/L (ref 0–18)
AST SERPL-CCNC: 21 U/L (ref 15–37)
BASOPHILS # BLD AUTO: 0.04 X10(3) UL (ref 0–0.2)
BASOPHILS NFR BLD AUTO: 0.7 %
BILIRUB DIRECT SERPL-MCNC: 0.1 MG/DL (ref 0–0.2)
BILIRUB SERPL-MCNC: 0.5 MG/DL (ref 0.1–2)
BUN BLD-MCNC: 7 MG/DL (ref 7–18)
BUN/CREAT SERPL: 10.3 (ref 10–20)
CALCIUM BLD-MCNC: 8.4 MG/DL (ref 8.5–10.1)
CHLORIDE SERPL-SCNC: 105 MMOL/L (ref 98–112)
CO2 SERPL-SCNC: 29 MMOL/L (ref 21–32)
CREAT BLD-MCNC: 0.68 MG/DL (ref 0.7–1.3)
DEPRECATED RDW RBC AUTO: 42 FL (ref 35.1–46.3)
EOSINOPHIL # BLD AUTO: 0.11 X10(3) UL (ref 0–0.7)
EOSINOPHIL NFR BLD AUTO: 1.8 %
ERYTHROCYTE [DISTWIDTH] IN BLOOD BY AUTOMATED COUNT: 12.5 % (ref 11–15)
GLUCOSE BLD-MCNC: 91 MG/DL (ref 70–99)
HAV IGM SER QL: 2 MG/DL (ref 1.6–2.6)
HCT VFR BLD AUTO: 35.3 % (ref 39–53)
HGB BLD-MCNC: 12.2 G/DL (ref 13–17.5)
IMM GRANULOCYTES # BLD AUTO: 0.02 X10(3) UL (ref 0–1)
IMM GRANULOCYTES NFR BLD: 0.3 %
INR BLD: 5.71 (ref 0.9–1.2)
LACTATE SERPL-SCNC: 0.8 MMOL/L (ref 0.4–2)
LYMPHOCYTES # BLD AUTO: 1.56 X10(3) UL (ref 1–4)
LYMPHOCYTES NFR BLD AUTO: 25.8 %
M PROTEIN MFR SERPL ELPH: 6.6 G/DL (ref 6.4–8.2)
MCH RBC QN AUTO: 31.8 PG (ref 26–34)
MCHC RBC AUTO-ENTMCNC: 34.6 G/DL (ref 31–37)
MCV RBC AUTO: 91.9 FL (ref 80–100)
MONOCYTES # BLD AUTO: 0.93 X10(3) UL (ref 0.1–1)
MONOCYTES NFR BLD AUTO: 15.4 %
NEUTROPHILS # BLD AUTO: 3.38 X10 (3) UL (ref 1.5–7.7)
NEUTROPHILS # BLD AUTO: 3.38 X10(3) UL (ref 1.5–7.7)
NEUTROPHILS NFR BLD AUTO: 56 %
OSMOLALITY SERPL CALC.SUM OF ELEC: 288 MOSM/KG (ref 275–295)
PLATELET # BLD AUTO: 235 10(3)UL (ref 150–450)
POTASSIUM SERPL-SCNC: 3.4 MMOL/L (ref 3.5–5.1)
PROTHROMBIN TIME: 53.6 SECONDS (ref 11.8–14.5)
RBC # BLD AUTO: 3.84 X10(6)UL (ref 3.8–5.8)
SODIUM SERPL-SCNC: 140 MMOL/L (ref 136–145)
WBC # BLD AUTO: 6 X10(3) UL (ref 4–11)

## 2020-02-11 PROCEDURE — 99223 1ST HOSP IP/OBS HIGH 75: CPT | Performed by: HOSPITALIST

## 2020-02-11 PROCEDURE — 99222 1ST HOSP IP/OBS MODERATE 55: CPT | Performed by: SURGERY

## 2020-02-11 RX ORDER — FLUCONAZOLE 2 MG/ML
200 INJECTION, SOLUTION INTRAVENOUS EVERY 24 HOURS
Status: DISCONTINUED | OUTPATIENT
Start: 2020-02-11 | End: 2020-02-13

## 2020-02-11 RX ORDER — MORPHINE SULFATE 2 MG/ML
2 INJECTION, SOLUTION INTRAMUSCULAR; INTRAVENOUS EVERY 2 HOUR PRN
Status: DISCONTINUED | OUTPATIENT
Start: 2020-02-11 | End: 2020-02-13

## 2020-02-11 RX ORDER — MORPHINE SULFATE 4 MG/ML
4 INJECTION, SOLUTION INTRAMUSCULAR; INTRAVENOUS EVERY 2 HOUR PRN
Status: DISCONTINUED | OUTPATIENT
Start: 2020-02-11 | End: 2020-02-13

## 2020-02-11 RX ORDER — LAMOTRIGINE 100 MG/1
100 TABLET ORAL DAILY
Status: DISCONTINUED | OUTPATIENT
Start: 2020-02-11 | End: 2020-02-13

## 2020-02-11 RX ORDER — POTASSIUM CHLORIDE 20 MEQ/1
40 TABLET, EXTENDED RELEASE ORAL EVERY 4 HOURS
Status: COMPLETED | OUTPATIENT
Start: 2020-02-11 | End: 2020-02-11

## 2020-02-11 RX ORDER — DIVALPROEX SODIUM 250 MG/1
250 TABLET, EXTENDED RELEASE ORAL NIGHTLY
Status: DISCONTINUED | OUTPATIENT
Start: 2020-02-11 | End: 2020-02-12 | Stop reason: ALTCHOICE

## 2020-02-11 RX ORDER — DIVALPROEX SODIUM 500 MG/1
1000 TABLET, EXTENDED RELEASE ORAL NIGHTLY
Status: DISCONTINUED | OUTPATIENT
Start: 2020-02-11 | End: 2020-02-12 | Stop reason: ALTCHOICE

## 2020-02-11 RX ORDER — DIVALPROEX SODIUM 500 MG/1
1000 TABLET, DELAYED RELEASE ORAL NIGHTLY
Status: DISCONTINUED | OUTPATIENT
Start: 2020-02-11 | End: 2020-02-11 | Stop reason: SDUPTHER

## 2020-02-11 RX ORDER — ONDANSETRON 2 MG/ML
4 INJECTION INTRAMUSCULAR; INTRAVENOUS EVERY 6 HOURS PRN
Status: DISCONTINUED | OUTPATIENT
Start: 2020-02-11 | End: 2020-02-13

## 2020-02-11 RX ORDER — DIVALPROEX SODIUM 500 MG/1
500 TABLET, EXTENDED RELEASE ORAL 2 TIMES DAILY
Status: DISCONTINUED | OUTPATIENT
Start: 2020-02-11 | End: 2020-02-11

## 2020-02-11 RX ORDER — IBUPROFEN 400 MG/1
400 TABLET ORAL EVERY 6 HOURS PRN
Status: DISCONTINUED | OUTPATIENT
Start: 2020-02-11 | End: 2020-02-13

## 2020-02-11 RX ORDER — DEXTROSE, SODIUM CHLORIDE, AND POTASSIUM CHLORIDE 5; .45; .15 G/100ML; G/100ML; G/100ML
INJECTION INTRAVENOUS CONTINUOUS
Status: DISCONTINUED | OUTPATIENT
Start: 2020-02-11 | End: 2020-02-13

## 2020-02-11 NOTE — TELEPHONE ENCOUNTER
Pt currently admitted as of 2/11/2020. Please refer to 2/11/2020 [employer] message TE. Thank you.

## 2020-02-11 NOTE — TELEPHONE ENCOUNTER
From: Payal Newman  To: Chris Munoz DO  Sent: 2/4/2020 3:17 PM CST  Subject: Prescription Question    Can we make the INR prescription once every two weeks instead of every week?  I think it would be easier    I got a call from them and I am looking to se

## 2020-02-11 NOTE — PROGRESS NOTES
Surgery Consult    # Recurrent Diverticulitis with small abscess  - IV abx, may need PICC and prolonged Abx  -I will discuss elective resection  -Full consult to follow'      Thanks    RONNIE Lagos md

## 2020-02-11 NOTE — TELEPHONE ENCOUNTER
RN to contact pt to set up colonoscopy - I would like to have him follow up in clinic to arrange as outpatient, he is on coumadin and would need input from cardiology regarding holding /anticoagulation around the time of colonoscopy.

## 2020-02-11 NOTE — TELEPHONE ENCOUNTER
From: Donya Whatley  To: Allen Waggoner.  Rebecca Hackett MD  Sent: 2/11/2020 3:48 AM CST  Subject: Other    Hi Dr Uvaldo French appreciated working with you in the past. Is there anyway I can get you to participate in my treatment here at [place]? I was admitted [facility]

## 2020-02-11 NOTE — PLAN OF CARE
Patient a direct admit to the floor from home. Admitting orders received. Peripheral IV initiated to L FA, tolerated well. Patient NPO, with sips with meds. IVF initiated. Diflucan and Zosyn administered. No complaints of pain. Ambulating indpendently.  Voi

## 2020-02-11 NOTE — TELEPHONE ENCOUNTER
RN to contact pt directly and inform how our inpatient coverage works, the primary doctor must request a consulation from [place] [phone] , Dr. Babita Burger on call this week

## 2020-02-11 NOTE — TELEPHONE ENCOUNTER
From: Sarika Hood  To: Randolph Noriega. Ramon Padron MD  Sent: 2/10/2020 4:02 PM CST  Subject: Other    I've had a reoccurrence of diverticulitis. Last one I had was in October. My doctor wants to have a colonoscopy as soon as I can. When can I get on the schedule?

## 2020-02-11 NOTE — TELEPHONE ENCOUNTER
Dr. Ramon Padron, noted you saw pt in office on 2/28/2019 d/t Hyperammonemia. Please advise if ok to schedule hosp follow up for pt with [facility]. Thank you.   Pt made aware Dr. Hira Whittaker is hospital on call and will see him in the hospital. Please refer to second

## 2020-02-11 NOTE — TELEPHONE ENCOUNTER
Released results on MyChart and patient directly admitted for diverticulitis with abscess formation and greatly elevated INR.

## 2020-02-11 NOTE — CONSULTS
[facility]    PATIENT'S NAME: Genesis Martinez   ATTENDING PHYSICIAN: Max Corey MD   CONSULTING PHYSICIAN: Jayson Malloy MD   PATIENT ACCOUNT#:   [de-identified]    LOCATION:  [address] [place] [address] [address] RECORD #:   [id number]       DATE OF BIRTH:  [birth year] discuss it further. Thank you for this consultation.     Dictated By Chace Dale MD  d: 02/11/2020 14:42:16  t: 02/11/2020 15:04:31  [facility] [id number]  JLEX/

## 2020-02-11 NOTE — H&P
[address] C[address] Patient Status:  Inpatient    1/15/1955 MRN [id number]   Location [facility] 4W/SW/SE Attending Lola Shabazz MD   Hosp Day # 0 PCP Emily Dawkins DO     Date:  2020  Date of Allergies    Home Medications:  Prior to Admission Medications   Prescriptions Last Dose Informant Patient Reported? Taking? Acetylcysteine (NAC OR) Past Week at Unknown time  Yes Yes   Sig: Take 1,200 mg by mouth 2 (two) times daily.      Amoxicillin-Pot Negative. ROS reviewed as documented in chart    Physical Exam:  Temp:  [98.1 °F (36.7 °C)-98.3 °F (36.8 °C)] 98.1 °F (36.7 °C)  Pulse:  [63-74] 63  Resp:  [18] 18  BP: (129-133)/(81-84) 129/81    General:  Alert and oriented.   Diffuse skin problem:  None is evidence for 2 cm diameter abscess formation along the undersurface of the mid sigmoid colon abutting the posterior midline wall of the  urinary bladder.   There is also prominent fat stranding around the diverticulitis and there is fat stranding with st

## 2020-02-11 NOTE — TELEPHONE ENCOUNTER
Dr. Krzysztof Ceballos, please see pts MyChart message below. Pt is currently admitted in [facility]. Thank you.

## 2020-02-12 ENCOUNTER — TELEPHONE (OUTPATIENT)
Dept: GASTROENTEROLOGY | Facility: CLINIC | Age: 65
End: 2020-02-12

## 2020-02-12 LAB
ANION GAP SERPL CALC-SCNC: 2 MMOL/L (ref 0–18)
BUN BLD-MCNC: 3 MG/DL (ref 7–18)
BUN/CREAT SERPL: 4.3 (ref 10–20)
CALCIUM BLD-MCNC: 8.3 MG/DL (ref 8.5–10.1)
CHLORIDE SERPL-SCNC: 112 MMOL/L (ref 98–112)
CO2 SERPL-SCNC: 28 MMOL/L (ref 21–32)
CREAT BLD-MCNC: 0.7 MG/DL (ref 0.7–1.3)
DEPRECATED RDW RBC AUTO: 44.1 FL (ref 35.1–46.3)
ERYTHROCYTE [DISTWIDTH] IN BLOOD BY AUTOMATED COUNT: 12.8 % (ref 11–15)
GLUCOSE BLD-MCNC: 98 MG/DL (ref 70–99)
HCT VFR BLD AUTO: 36 % (ref 39–53)
HGB BLD-MCNC: 12.3 G/DL (ref 13–17.5)
INR BLD: 4.99 (ref 0.9–1.2)
MCH RBC QN AUTO: 31.9 PG (ref 26–34)
MCHC RBC AUTO-ENTMCNC: 34.2 G/DL (ref 31–37)
MCV RBC AUTO: 93.5 FL (ref 80–100)
OSMOLALITY SERPL CALC.SUM OF ELEC: 291 MOSM/KG (ref 275–295)
PLATELET # BLD AUTO: 243 10(3)UL (ref 150–450)
POTASSIUM SERPL-SCNC: 3.9 MMOL/L (ref 3.5–5.1)
PROTHROMBIN TIME: 48 SECONDS (ref 11.8–14.5)
RBC # BLD AUTO: 3.85 X10(6)UL (ref 3.8–5.8)
SODIUM SERPL-SCNC: 142 MMOL/L (ref 136–145)
WBC # BLD AUTO: 4.7 X10(3) UL (ref 4–11)

## 2020-02-12 PROCEDURE — 99223 1ST HOSP IP/OBS HIGH 75: CPT | Performed by: INTERNAL MEDICINE

## 2020-02-12 PROCEDURE — 99233 SBSQ HOSP IP/OBS HIGH 50: CPT | Performed by: HOSPITALIST

## 2020-02-12 NOTE — CONSULTS
[facility]   Gastroenterology Consultation Note    Lisa Zurita  Patient Status:    Inpatient  Date of Admission:         2/11/2020, Hospital day #1  Attending:   Filippo Tello MD  PCP:     Abdelrahman Brooks DO    Reason for Consultation: Onset   • Cancer Father         lung cancer   • Stroke Mother       reports that he has never smoked. He has never used smokeless tobacco. He reports that he does not drink alcohol or use drugs.     Allergies:  No Known Allergies    Medications:    Current psychotic behavior    Physical Exam:    Blood pressure 123/79, pulse 56, temperature 97.4 °F (36.3 °C), temperature source Oral, resp. rate 17, weight 223 lb 3.2 oz (101.2 kg), SpO2 100 %. Body mass index is 28.66 kg/m².     Gen: patient appears comfortable 4.  Bilateral renal scar. 5.  Cholelithiasis. No biliary ductal dilatation.     Dictated by (CST): Deann Carver MD on 2/10/2020 at 18:39     Approved by (CST): Deann Carver MD on 2/10/2020 at 18:45            Assessment & Plan   Recurrent complicated diver

## 2020-02-12 NOTE — TELEPHONE ENCOUNTER
Dr Jayda Li, I contacted pt--Dr Zack Harrison was in his hospital room at the time--she said to hold off on below, she is going to contact you. Thanks. Please close this encounter when done.     Lesia Ruelas MD           2/11/20 5:43 PM   Note      RN to contact admitted tonight because I had an episode of diverticulitis that did not get better with just the oral anabiotic's. When they did the CT scan they found an abscess. So the theory is we would do an IV antibiotic.      I am hoping you can help me    Edyta Armas

## 2020-02-12 NOTE — PROGRESS NOTES
[facility]    Progress Note    Barbara Weiner Patient Status:  Inpatient    1/15/1955 MRN [id number]   Location [facility] 4W/SW/SE Attending Jamil Sorensen MD   [address] Day # 1 PCP Omari Vásquez DO       Subjective:   Barbara Weiner sits melatonin  5 mg Oral Nightly   • metoprolol Tartrate  25 mg Oral QAM   • metoprolol Tartrate  12.5 mg Oral QPM       Current PRN Inpatient Meds:      ondansetron HCl, morphINE sulfate **OR** morphINE sulfate, ibuprofen    Results:     Lab Results   Compone diverticulitis involving the proximal-mid sigmoid colon. There is evidence for 2 cm diameter abscess formation along the undersurface of the mid sigmoid colon abutting the posterior midline wall of the  urinary bladder.   There is also prominent fat strand

## 2020-02-12 NOTE — PROGRESS NOTES
Patient alert and oriented X4, vitals stable. Morphine this am for headache, now improved. Ambulating independently in room. Clear liquid diet initiated and tolerating it well. Voiding freely. Continues with IVFs and IV abx. No distress noted.

## 2020-02-12 NOTE — PLAN OF CARE
Clears tolerated. IVF/IV abx infusing. No complaints of pain. VSS. Ambulating independently throughout room. Will continue to monitor.      Problem: Patient/Family Goals  Goal: Patient/Family Long Term Goal  Description  Patient's Long Term Goal: Patient wi

## 2020-02-12 NOTE — PROGRESS NOTES
Surgery    No new issues. Continues to improve. Labs- normal on Zosyn. Discussed with Dr Trisha Patel. No Cscope to be done as his last was <5 years. At some point will need repeat CT to see if abscess is resolved prior to surgery.     Plan-  Advance to full

## 2020-02-12 NOTE — TELEPHONE ENCOUNTER
Noted, nothing has been scheduled. Pt contacted, relayed information. He will keep his 3/26 office appt with Dr Alis Mackay.

## 2020-02-12 NOTE — PROGRESS NOTES
Lulu Freire made visit at patient request. Patient alert and sitting on bed  O- Patient requested guided imagery, he shared his Worship beliefs   A-  offered a guided imagery meditation for patient  P- Patient was grateful, follow-up at patient r

## 2020-02-12 NOTE — TELEPHONE ENCOUNTER
Discussed with Dr. Jose Tavera, she reports colonoscopy done within the last 3 years and does not feel needs to be repeated. Ok to cancel.

## 2020-02-12 NOTE — PLAN OF CARE
Alert and orientated. VSS, tolerating soft diet, denies pain, ambulating independently. IVF continued. frequent rounding. All needs met at this time.  Problem: Patient/Family Goals  Goal: Patient/Family Long Term Goal  Description  Patient's Long Term Goal: activity and pre-medicate as appropriate  Outcome: Progressing     Problem: RISK FOR INFECTION - ADULT  Goal: Absence of fever/infection during anticipated neutropenic period  Description  INTERVENTIONS  - Monitor WBC  - Administer growth factors as ordere vomiting  Description  INTERVENTIONS:  - Maintain adequate hydration with IV or PO as ordered and tolerated  - Nasogastric tube to low intermittent suction as ordered  - Evaluate effectiveness of ordered antiemetic medications  - Provide nonpharmacologic c

## 2020-02-13 ENCOUNTER — TELEPHONE (OUTPATIENT)
Dept: INTERNAL MEDICINE UNIT | Facility: HOSPITAL | Age: 65
End: 2020-02-13

## 2020-02-13 VITALS
TEMPERATURE: 97 F | BODY MASS INDEX: 29 KG/M2 | SYSTOLIC BLOOD PRESSURE: 112 MMHG | HEART RATE: 58 BPM | DIASTOLIC BLOOD PRESSURE: 73 MMHG | WEIGHT: 223.19 LBS | OXYGEN SATURATION: 96 % | RESPIRATION RATE: 16 BRPM

## 2020-02-13 PROBLEM — K57.80 DIVERTICULITIS OF INTESTINE WITH ABSCESS: Status: ACTIVE | Noted: 2020-02-13

## 2020-02-13 LAB
ANION GAP SERPL CALC-SCNC: 4 MMOL/L (ref 0–18)
ANION GAP SERPL CALC-SCNC: NORMAL MMOL/L
BUN BLD-MCNC: 3 MG/DL (ref 7–18)
BUN SERPL-MCNC: 3 MG/DL
BUN/CREAT SERPL: 4.1 (ref 10–20)
BUN/CREAT SERPL: NORMAL
CALCIUM BLD-MCNC: 8.7 MG/DL (ref 8.5–10.1)
CALCIUM SERPL-MCNC: 8.7 MG/DL
CHLORIDE SERPL-SCNC: 112 MMOL/L
CHLORIDE SERPL-SCNC: 112 MMOL/L (ref 98–112)
CO2 SERPL-SCNC: 27 MMOL/L (ref 21–32)
CO2 SERPL-SCNC: NORMAL MMOL/L
CREAT BLD-MCNC: 0.74 MG/DL (ref 0.7–1.3)
CREAT SERPL-MCNC: 0.74 MG/DL
DEPRECATED RDW RBC AUTO: 43.7 FL (ref 35.1–46.3)
ERYTHROCYTE [DISTWIDTH] IN BLOOD BY AUTOMATED COUNT: 12.8 % (ref 11–15)
GLUCOSE BLD-MCNC: 96 MG/DL (ref 70–99)
GLUCOSE SERPL-MCNC: 96 MG/DL
HCT VFR BLD AUTO: 39.1 % (ref 39–53)
HGB BLD-MCNC: 12.8 G/DL (ref 13–17.5)
INR BLD: 3.68 (ref 0.9–1.2)
LENGTH OF FAST TIME PATIENT: NORMAL H
MCH RBC QN AUTO: 30.4 PG (ref 26–34)
MCHC RBC AUTO-ENTMCNC: 32.7 G/DL (ref 31–37)
MCV RBC AUTO: 92.9 FL (ref 80–100)
OSMOLALITY SERPL CALC.SUM OF ELEC: 292 MOSM/KG (ref 275–295)
PLATELET # BLD AUTO: 264 10(3)UL (ref 150–450)
POTASSIUM SERPL-SCNC: 4.1 MMOL/L
POTASSIUM SERPL-SCNC: 4.1 MMOL/L (ref 3.5–5.1)
PROTHROMBIN TIME: 37.5 SECONDS (ref 11.8–14.5)
RBC # BLD AUTO: 4.21 X10(6)UL (ref 3.8–5.8)
SODIUM SERPL-SCNC: 143 MMOL/L
SODIUM SERPL-SCNC: 143 MMOL/L (ref 136–145)
WBC # BLD AUTO: 5.6 X10(3) UL (ref 4–11)

## 2020-02-13 PROCEDURE — 99231 SBSQ HOSP IP/OBS SF/LOW 25: CPT | Performed by: SURGERY

## 2020-02-13 PROCEDURE — 99239 HOSP IP/OBS DSCHRG MGMT >30: CPT | Performed by: HOSPITALIST

## 2020-02-13 RX ORDER — METRONIDAZOLE 500 MG/1
500 TABLET ORAL 3 TIMES DAILY
Qty: 42 TABLET | Refills: 0 | Status: SHIPPED | OUTPATIENT
Start: 2020-02-13 | End: 2020-02-27

## 2020-02-13 RX ORDER — CIPROFLOXACIN 500 MG/1
750 TABLET, FILM COATED ORAL 2 TIMES DAILY
Qty: 42 TABLET | Refills: 0 | Status: SHIPPED | OUTPATIENT
Start: 2020-02-13 | End: 2020-02-27

## 2020-02-13 NOTE — TELEPHONE ENCOUNTER
Call from Anderson Clark to alert Dr Virginie Miguel of potential interaction between Warfarin, ciprofloxacin and metronidazole (potential for increased risk of bleeding).  RN notified Dr Belen Daniel whom states he spoke to the patient and advised close INR follow up wi

## 2020-02-13 NOTE — PROGRESS NOTES
[facility]    Progress Note    Payal Spore Patient Status:  Inpatient    1/15/1955 MRN [id number]   Location [facility] 4W/SW/SE Attending Ilene Burns MD   [facility] Day # 2 PCP Chris Munoz DO     Assessment and Plan:     Mild pain

## 2020-02-13 NOTE — DISCHARGE SUMMARY
[facility]    Discharge Summary    Nazia Gamboa Patient Status:  Inpatient    1/15/1955 MRN [id number]   Location [facility] 4W/SW/SE Attending Gloria Rosen MD   [facility] Day # 2 PCP Darvin Ahumada, DO     Date of Admission: 2020 Diflucan (2/11-2/13) -> Cipro and Flagyl 2 weeks at discharge  - Patient afebrile without leukocytosis, pain much improved.  He is agreeable to go home and follow up Dr. Shashi Zayas from Surgery in 2 weeks.     #Aortic valve replacement, metal valve, 2000  #Supra with acute/recent diverticulitis involving the proximal-mid sigmoid colon. There is evidence for 2 cm diameter abscess formation along the undersurface of the mid sigmoid colon abutting the posterior midline wall of the  urinary bladder.   There is also pr 7 3* 3*   CREATSERUM 0.68*  --  0.68* 0.70 0.74   GFRAA 116   < > 116 114 112   GFRNAA 100   < > 100 99 97   CA 8.9  --  8.4* 8.3* 8.7   ALB 3.2*  --  2.9*  --   --      --  140 142 143   K 3.6  --  3.4* 3.9 4.1     --  105 112 112   CO2 28.0 known as:  LOPRESSOR      TAKE 1 TABLET BY MOUTH EVERY MORNING AND ONE-HALF TABLET BY MOUTH EVERY EVENING   Quantity:  135 tablet  Refills:  2     mupirocin 2 % Oint  Commonly known as:  BACTROBAN      Apply 1 Application topically 2 (two) times daily.    Q

## 2020-02-13 NOTE — DIETARY NOTE
NUTRITION EDUCATION NOTE    Received consult for nutrition education. Appropriate education and handout provided. See education section of Epic for specifics.     Carole Smith RDN, LDN   Clinical Nutrition  Ext [phone]

## 2020-02-13 NOTE — PLAN OF CARE
Alert and orientated. VSS. Denies pain. Tolerating LFS diet. Ambulating independently. No surgery at this time. Patient to discharge home with oral ABT. Patient to have PT/INR drawn every other day per MD. Patient is aware.  Patient informed to f/u with PCP response  - Implement non-pharmacological measures as appropriate and evaluate response  - Consider cultural and social influences on pain and pain management  - Manage/alleviate anxiety  - Utilize distraction and/or relaxation techniques  - Monitor for op to be responsible for managing their own health  - Refer to Case Management Department for coordinating discharge planning if the patient needs post-hospital services based on physician/LIP order or complex needs related to functional status, cognitive jessie Monitor I&O, WT and lab values  - Obtain nutritional consult as needed  - Evaluate psychosocial factors contributing to over-consumption  Outcome: Adequate for Discharge

## 2020-02-13 NOTE — PROGRESS NOTES
[facility] Pharmacy Note: Antimicrobial Weight Based Dose Adjustment for: piperacillin/tazobactam (Sophia Garcia)    Donya Whatley is a 72year old male who has been prescribed piperacillin/tazobactam (ZOSYN) 3375 mg every 8 hours. Estimated Creatinine Clearance: 115.

## 2020-02-13 NOTE — PLAN OF CARE
Problem: Patient/Family Goals  Goal: Patient/Family Long Term Goal  Description  Patient's Long Term Goal: Patient will return home and tolerating at least a soft diet    Interventions:  - IV abx  -IVF  -Monitor labs  - See additional Care Plan goals for anticipated neutropenic period  Description  INTERVENTIONS  - Monitor WBC  - Administer growth factors as ordered  - Implement neutropenic guidelines  Outcome: Progressing     Problem: SAFETY ADULT - FALL  Goal: Free from fall injury  Description  INTERVEN Evaluate effectiveness of ordered antiemetic medications  - Provide nonpharmacologic comfort measures as appropriate  - Advance diet as tolerated, if ordered  - Obtain nutritional consult as needed  - Evaluate fluid balance  Outcome: Progressing  Goal: Audelia Castaneda

## 2020-02-14 ENCOUNTER — TELEPHONE (OUTPATIENT)
Dept: FAMILY MEDICINE CLINIC | Facility: CLINIC | Age: 65
End: 2020-02-14

## 2020-02-14 ENCOUNTER — PATIENT OUTREACH (OUTPATIENT)
Dept: CASE MANAGEMENT | Age: 65
End: 2020-02-14

## 2020-02-14 DIAGNOSIS — Z02.9 ENCOUNTERS FOR UNSPECIFIED ADMINISTRATIVE PURPOSE: ICD-10-CM

## 2020-02-14 DIAGNOSIS — Z79.01 REQUIRES LIFELONG WARFARIN THERAPY: Primary | ICD-10-CM

## 2020-02-14 DIAGNOSIS — Z79.01 LONG TERM (CURRENT) USE OF ANTICOAGULANTS: ICD-10-CM

## 2020-02-14 DIAGNOSIS — K57.92 ACUTE DIVERTICULITIS: ICD-10-CM

## 2020-02-14 DIAGNOSIS — K62.5 RECTAL BLEEDING: ICD-10-CM

## 2020-02-14 DIAGNOSIS — K57.20 DIVERTICULITIS OF LARGE INTESTINE WITH ABSCESS WITHOUT BLEEDING: ICD-10-CM

## 2020-02-14 PROCEDURE — 1111F DSCHRG MED/CURRENT MED MERGE: CPT

## 2020-02-14 NOTE — TELEPHONE ENCOUNTER
Pt can have his TCM/HFU at his [facility] on 2/19/2020.   Standing order for INR checks place with [facility] and e-faxed    [address]  [phone]    Message left for pt that standing lab orders were placed and faxed and that we wi

## 2020-02-14 NOTE — PROGRESS NOTES
Initial Post Discharge Follow Up   Discharge Date: 2/13/20  Contact Date: 2/14/2020    Consent Verification:  Assessment Completed With: Patient  HIPAA Verified?   Yes    Discharge Dx:     Diverticulitis with abscess  Hx aortic valve replacement    Was T diagnoses explained to you? Yes  • Do you have any questions about your diagnoses? No  • Are you able to perform normal daily activities of living as you have prior to your hospital stay (dressing, bathing, ambulating to the bathroom, etc)?  yes  • (NCM) Wa purpose & side effects reviewed? yes  o Do you have any questions about your new medication?  No  • Did you  your discharge medications when you left the hospital? Yes  • May I go over your medications with you to make sure we are not missing anythin physician office to cancel.     Please verify with your primary care provider if your insurance requires a referral.          [facility], [facility], [address]  [phone] [place] CCM referral placed:  No    BOOK BY DATE: 2/27/2020    [x]  Discharge Summary, Discharge medications reviewed/discussed/and reconciled against outpatient medications with patient,  and orders reviewed and discussed.  Any changes or updates to medication

## 2020-02-14 NOTE — TELEPHONE ENCOUNTER
Spoke to pt for TCM today. Pt does not have HFU appt scheduled at this time. TCM/HFU appt recommended by 2/27/2020 as pt is a moderate risk for readmission. The patient is scheduled for a welcome to medicare physical on 2/19/2020. Please advise.     BOOK

## 2020-02-17 ENCOUNTER — TELEPHONE (OUTPATIENT)
Dept: FAMILY MEDICINE CLINIC | Facility: CLINIC | Age: 65
End: 2020-02-17

## 2020-02-17 NOTE — TELEPHONE ENCOUNTER
Checking INR at home now and recent level of 1.4 so will resume 5 mg daily and repeat when he sees me in a few days. Will need to postpone his Welcome to Medicare Visit as can not do that and TCM hospital follow-up visit in the same day.

## 2020-02-17 NOTE — TELEPHONE ENCOUNTER
Phone call from 1:57PM 2/16/2020. INR done at home is 1.4. Was off coumadin because of elevated INR. Was taking 10mg daily. Diet has changed as he can only tolerate liquids. Will restart at 5 mg daily. Recheck INR in 3 days.     [address]

## 2020-02-19 ENCOUNTER — OFFICE VISIT (OUTPATIENT)
Dept: FAMILY MEDICINE CLINIC | Facility: CLINIC | Age: 65
End: 2020-02-19
Payer: MEDICARE

## 2020-02-19 ENCOUNTER — PATIENT MESSAGE (OUTPATIENT)
Dept: GASTROENTEROLOGY | Facility: CLINIC | Age: 65
End: 2020-02-19

## 2020-02-19 VITALS
BODY MASS INDEX: 28.88 KG/M2 | DIASTOLIC BLOOD PRESSURE: 62 MMHG | HEART RATE: 72 BPM | SYSTOLIC BLOOD PRESSURE: 110 MMHG | WEIGHT: 225 LBS | HEIGHT: 74 IN

## 2020-02-19 DIAGNOSIS — K57.20 DIVERTICULITIS OF LARGE INTESTINE WITH ABSCESS WITHOUT BLEEDING: Primary | ICD-10-CM

## 2020-02-19 DIAGNOSIS — Z79.01 REQUIRES LIFELONG WARFARIN THERAPY: ICD-10-CM

## 2020-02-19 PROBLEM — Z95.2 S/P AORTIC VALVE REPLACEMENT WITH PROSTHETIC VALVE: Status: ACTIVE | Noted: 2020-02-19

## 2020-02-19 PROBLEM — Z95.2 HISTORY OF AORTIC VALVE REPLACEMENT: Status: RESOLVED | Noted: 2018-05-16 | Resolved: 2020-02-19

## 2020-02-19 PROCEDURE — 1111F DSCHRG MED/CURRENT MED MERGE: CPT | Performed by: FAMILY MEDICINE

## 2020-02-19 PROCEDURE — 99495 TRANSJ CARE MGMT MOD F2F 14D: CPT | Performed by: FAMILY MEDICINE

## 2020-02-19 NOTE — PROGRESS NOTES
HPI:    Lilli Colon is a 72year old male here today for hospital follow up.    He was discharged from Inpatient hospital, [facility]  to Home   Admission Date: 2/11/20   Discharge Date: 2/13/20  Hospital Discharge Diagnoses (since 1/20/2020)   N Aramis on Monday for a second opinion, Dr. Porsha Tobias. Allergies:  He is allergic to cat hair extract and dog epithelium.     Current Meds:  Ciprofloxacin HCl 500 MG Oral Tab, Take 1.5 tablets (750 mg total) by mouth 2 (two) times daily for 1 ROS:   GENERAL: weight stable, energy stable, no sweating, no fevers, normal appetite   SKIN: denies any unusual skin lesions  EYES: denies blurred vision or double vision  HEENT: denies nasal congestion, sinus pain or ST  LUNGS: denies shortness of br & Consults:  None      Still with mild LLQ and suprapubic pain but feels this is more due to his chronic muscle wall pain as his diverticulitis pain is much improved. Afebrile, normal appetite, and having normal bowel movements.  Will continue low fiber die

## 2020-02-21 ENCOUNTER — TELEPHONE (OUTPATIENT)
Dept: GASTROENTEROLOGY | Facility: CLINIC | Age: 65
End: 2020-02-21

## 2020-02-21 NOTE — TELEPHONE ENCOUNTER
Please have the pt see me or [facility] in office. I responded to his msg via email feature of Epic but please call to get him into the office.    Dr. Esdras Schaffer

## 2020-02-21 NOTE — TELEPHONE ENCOUNTER
From: Payal Newman  To: Michael Solorzano. Padmini Perez MD  Sent: 2/19/2020 11:06 PM CST  Subject: Other    Hi Doc Demond,    Just got back from three days in the hospital last Thursday from complications from my diverticulitis.  So it's been a week that I've been on a s

## 2020-02-21 NOTE — TELEPHONE ENCOUNTER
Dr. Esdras Schaffer-    Please advise on pt's diet regarding My chart message below from pt. Pt has OV appt w/ you on 3/26/2020. Thank You.

## 2020-02-21 NOTE — TELEPHONE ENCOUNTER
Patient states has an appointment with Dr. Tsering Rodriguez on 03/26/2020 for f/u on  diverticulitis but would like to be seen before 03/04/2020 when he sees Cipriano Buck to discuss upcoming colon surgery.   Patient can come in between 10:30 am and noon on

## 2020-02-24 NOTE — TELEPHONE ENCOUNTER
Pt transferred from phone room, unable to accept appt tomorrow with Madina CALDWELL. Accepted appt with Madina on Wed 2/26 instructed to arrive by 11:15am and given detailed directions to [facility].

## 2020-02-24 NOTE — TELEPHONE ENCOUNTER
We had a cancellation with Madina CALDWELL tomorrow, with an arrival of 8:15am at [facility]. This does not match with pt request below, but at this time only appt available.  I left a complete message on voicemail about this appt with arrival and detailed directions

## 2020-02-25 NOTE — H&P
[address] [facility] [address] Gastroenterology                                                                                                  Clinic History and Physical     Pa this matter with another surgeon in [facility]. He is interested in another opinion. Reports a 3-year history of intermittent left lower quadrant pain that only occurs when he has been out walking for 3-4 miles or lays in a certain position.   This is not Cancer Father         lung cancer   • Stroke Mother       Social History: Social History    Tobacco Use      Smoking status: Never Smoker      Smokeless tobacco: Never Used    Alcohol use: No    Drug use: No       Medications (Active prior to today's visit 2/26/2020 ) 1 Tube 0       Allergies:    Cat Hair Extract        ASTHMA  Dog Epithelium          WHEEZING    ROS:   CONSTITUTIONAL:  negative for fevers, rigors  EYES:  negative for diplopia   RESPIRATORY:  negative for severe shortness of breath  CARDIOVA f/u      1. Diverticulitis: The patient is doing well from a hospital follow-up perspective. His describe diverticulitis pain has resolved and he has 1 additional day of antibiotics remaining.   A follow-up CT scan will be discussed with his general surge colonoscopy w/ Dr. Rebecca Hackett with MAC related to cardiac history, 8 weeks post antibiotics  -Eligible for NE: No r/t cardiac history  -Prep: Split dose Colyte/TriLyte or equivalent  -Anti-platelets and anti-coagulants: Warfarin - hold 2 days prior to procedure Trilyte/generic equivalent if needed       Imaging & Referrals:  None       PAULETTE Ruiz  2/26/2020

## 2020-02-26 ENCOUNTER — TELEPHONE (OUTPATIENT)
Dept: GASTROENTEROLOGY | Facility: CLINIC | Age: 65
End: 2020-02-26

## 2020-02-26 ENCOUNTER — OFFICE VISIT (OUTPATIENT)
Dept: GASTROENTEROLOGY | Facility: CLINIC | Age: 65
End: 2020-02-26
Payer: MEDICARE

## 2020-02-26 VITALS
SYSTOLIC BLOOD PRESSURE: 107 MMHG | DIASTOLIC BLOOD PRESSURE: 67 MMHG | BODY MASS INDEX: 29 KG/M2 | HEART RATE: 60 BPM | WEIGHT: 226 LBS

## 2020-02-26 DIAGNOSIS — K57.92 DIVERTICULITIS: Primary | ICD-10-CM

## 2020-02-26 DIAGNOSIS — R10.32 LEFT LOWER QUADRANT PAIN: ICD-10-CM

## 2020-02-26 PROCEDURE — G0463 HOSPITAL OUTPT CLINIC VISIT: HCPCS | Performed by: NURSE PRACTITIONER

## 2020-02-26 PROCEDURE — 99214 OFFICE O/P EST MOD 30 MIN: CPT | Performed by: NURSE PRACTITIONER

## 2020-02-26 NOTE — TELEPHONE ENCOUNTER
Scheduled for:  Colonoscopy [address]39  Provider Name:    Date:  5/15/20  Location:  [facility]  Sedation:  MAC  Time:  915am pt told to arrive at 815am  Prep:  colyte  Meds/Allergies Reconciled?:  Loco skinner reviewed    Diagnosis with codes:  Diverticulit

## 2020-02-26 NOTE — PATIENT INSTRUCTIONS
Recommend:  -Schedule colonoscopy w/ Dr. Birgit Drake with MAC related to cardiac history, 8 weeks post antibiotics  Dx: diverticulitis   -Eligible for NE: No r/t cardiac history  -Prep: Split dose Colyte/TriLyte or equivalent  -Anti-platelets and anti-coagulants

## 2020-02-27 ENCOUNTER — HOSPITAL ENCOUNTER (OUTPATIENT)
Facility: HOSPITAL | Age: 65
Setting detail: HOSPITAL OUTPATIENT SURGERY
DRG: 330 | End: 2020-02-27
Attending: INTERNAL MEDICINE | Admitting: INTERNAL MEDICINE
Payer: MEDICARE

## 2020-03-04 ENCOUNTER — OFFICE VISIT (OUTPATIENT)
Dept: SURGERY | Facility: CLINIC | Age: 65
End: 2020-03-04
Payer: MEDICARE

## 2020-03-04 VITALS — TEMPERATURE: 98 F | WEIGHT: 221 LBS | HEIGHT: 74 IN | BODY MASS INDEX: 28.36 KG/M2

## 2020-03-04 DIAGNOSIS — K57.20 DIVERTICULITIS OF LARGE INTESTINE WITH ABSCESS, UNSPECIFIED BLEEDING STATUS: Primary | ICD-10-CM

## 2020-03-04 PROCEDURE — 99214 OFFICE O/P EST MOD 30 MIN: CPT | Performed by: SURGERY

## 2020-03-04 NOTE — PATIENT INSTRUCTIONS
Plan laparoscopic sigmoid colon resection in May. We will need to coordinate his anticoagulation at that time. I will discuss timing of colonoscopy with GI. Please call me if worsening abdominal pain, fevers.

## 2020-03-04 NOTE — PROGRESS NOTES
Benjamín Bailey , [address] [facility] GENERAL SURGERY    Progress Note    Joanne Godwin Patient Status:  Outpatient    1/15/1955 MRN [id number]   Location [facility], [address] [facility] Brandan Attending No att. providers found   Blessing Mendoza

## 2020-03-08 ENCOUNTER — PATIENT MESSAGE (OUTPATIENT)
Dept: GASTROENTEROLOGY | Facility: CLINIC | Age: 65
End: 2020-03-08

## 2020-03-09 ENCOUNTER — PATIENT MESSAGE (OUTPATIENT)
Dept: GASTROENTEROLOGY | Facility: CLINIC | Age: 65
End: 2020-03-09

## 2020-03-09 ENCOUNTER — TELEPHONE (OUTPATIENT)
Dept: GASTROENTEROLOGY | Facility: CLINIC | Age: 65
End: 2020-03-09

## 2020-03-09 NOTE — TELEPHONE ENCOUNTER
From: Dash Tidwell  To: Verónica Nicolas. Bria Gonzalez MD  Sent: 3/9/2020 11:30 AM CDT  Subject: Visit Follow-up Question    Bennett Desiree said he would want to do the surgery the day after the colonoscopy. He said they were office would coordinate with your office.  An

## 2020-03-09 NOTE — TELEPHONE ENCOUNTER
From: Bria Membreno  To: Hetal Gonsalez. Aubrey Mera MD  Sent: 3/9/2020 12:35 PM CDT  Subject: Other    Thanks for your response Allison    Tomato-based pasta sauce would be a great thing to be able to add to my diet.  When I look at it in the store it seems to still h

## 2020-03-09 NOTE — TELEPHONE ENCOUNTER
Left message to call back. Patient has many questions in [facility] for diet/procedure. Attempting to clarify.

## 2020-03-09 NOTE — TELEPHONE ENCOUNTER
Spoke to patient and reviewed diverticular and constipating diets. He verbalized understanding. We reviewed the Dr. Vania Oliva will be coordinating care w/ Dr. Carlita Crockett in May for procedures patient verbalized understanding.  No further questions at

## 2020-03-09 NOTE — TELEPHONE ENCOUNTER
From: May Rea  To: Vijaya Macedo. Lora Giang MD  Sent: 3/8/2020 12:18 PM CDT  Subject: Visit Follow-up Question    Hi Dr. Lora Giang,    The lower half of my abdomen is uncomfortable.  It feels like constipation but even if I have a bowel movement it doesn't go aw

## 2020-03-10 RX ORDER — MULTIVITAMIN WITH FOLIC ACID 400 MCG
1 TABLET ORAL DAILY
COMMUNITY

## 2020-03-10 RX ORDER — AMOXICILLIN 250 MG
500 CAPSULE ORAL DAILY
COMMUNITY

## 2020-03-10 RX ORDER — SILDENAFIL 100 MG/1
100 TABLET, FILM COATED ORAL PRN
COMMUNITY
Start: 2014-08-15

## 2020-03-10 RX ORDER — WARFARIN SODIUM 10 MG/1
10 TABLET ORAL DAILY
COMMUNITY
Start: 2016-09-16 | End: 2020-05-13

## 2020-03-10 RX ORDER — DIVALPROEX SODIUM 250 MG/1
250 TABLET, DELAYED RELEASE ORAL DAILY
COMMUNITY

## 2020-03-10 RX ORDER — LANOLIN ALCOHOL/MO/W.PET/CERES
400 CREAM (GRAM) TOPICAL DAILY
COMMUNITY

## 2020-03-10 RX ORDER — LAMOTRIGINE 100 MG/1
100 TABLET ORAL DAILY
COMMUNITY
Start: 2016-08-26

## 2020-03-10 RX ORDER — WARFARIN SODIUM 7.5 MG/1
7.5 TABLET ORAL DAILY
COMMUNITY
Start: 2020-02-23 | End: 2020-06-25 | Stop reason: SDUPTHER

## 2020-03-10 RX ORDER — DIGOXIN 250 MCG
0.25 TABLET ORAL DAILY
COMMUNITY
Start: 2017-02-01 | End: 2020-03-11

## 2020-03-10 RX ORDER — ALBUTEROL SULFATE 90 UG/1
2 AEROSOL, METERED RESPIRATORY (INHALATION) PRN
COMMUNITY
End: 2020-05-13

## 2020-03-11 ENCOUNTER — OFFICE VISIT (OUTPATIENT)
Dept: CARDIOLOGY | Age: 65
End: 2020-03-11

## 2020-03-11 VITALS
SYSTOLIC BLOOD PRESSURE: 122 MMHG | OXYGEN SATURATION: 94 % | HEART RATE: 87 BPM | WEIGHT: 222 LBS | DIASTOLIC BLOOD PRESSURE: 70 MMHG | RESPIRATION RATE: 18 BRPM | HEIGHT: 74 IN | BODY MASS INDEX: 28.49 KG/M2

## 2020-03-11 DIAGNOSIS — Z95.2 HISTORY OF MECHANICAL AORTIC VALVE REPLACEMENT: Primary | ICD-10-CM

## 2020-03-11 DIAGNOSIS — I47.10 PAROXYSMAL SUPRAVENTRICULAR TACHYCARDIA: ICD-10-CM

## 2020-03-11 PROCEDURE — 99204 OFFICE O/P NEW MOD 45 MIN: CPT | Performed by: INTERNAL MEDICINE

## 2020-03-11 ASSESSMENT — PATIENT HEALTH QUESTIONNAIRE - PHQ9
SUM OF ALL RESPONSES TO PHQ9 QUESTIONS 1 AND 2: 0
2. FEELING DOWN, DEPRESSED OR HOPELESS: NOT AT ALL
SUM OF ALL RESPONSES TO PHQ9 QUESTIONS 1 AND 2: 0
1. LITTLE INTEREST OR PLEASURE IN DOING THINGS: NOT AT ALL

## 2020-03-13 ENCOUNTER — HOSPITAL ENCOUNTER (OUTPATIENT)
Dept: CT IMAGING | Age: 65
Discharge: HOME | End: 2020-03-13
Attending: INTERNAL MEDICINE

## 2020-03-13 DIAGNOSIS — Z13.9 ENCOUNTER FOR SCREENING: ICD-10-CM

## 2020-03-13 RX ORDER — WARFARIN SODIUM 7.5 MG/1
7.5 TABLET ORAL DAILY
Qty: 90 TABLET | Refills: 0 | COMMUNITY
Start: 2020-03-13 | End: 2020-05-01

## 2020-03-17 ENCOUNTER — TELEPHONE (OUTPATIENT)
Dept: CARDIOLOGY | Age: 65
End: 2020-03-17

## 2020-03-17 DIAGNOSIS — R93.1 ELEVATED CORONARY ARTERY CALCIUM SCORE: Primary | ICD-10-CM

## 2020-03-23 PROBLEM — I47.1 PAROXYSMAL SUPRAVENTRICULAR TACHYCARDIA (HCC): Status: ACTIVE | Noted: 2020-03-11

## 2020-03-23 PROBLEM — I47.10 PAROXYSMAL SUPRAVENTRICULAR TACHYCARDIA (HCC): Status: ACTIVE | Noted: 2020-03-11

## 2020-03-23 PROBLEM — I47.10 PAROXYSMAL SUPRAVENTRICULAR TACHYCARDIA: Status: ACTIVE | Noted: 2020-03-11

## 2020-03-25 ENCOUNTER — TELEPHONE (OUTPATIENT)
Dept: CARDIOLOGY | Age: 65
End: 2020-03-25

## 2020-03-30 ENCOUNTER — PATIENT MESSAGE (OUTPATIENT)
Dept: GASTROENTEROLOGY | Facility: CLINIC | Age: 65
End: 2020-03-30

## 2020-03-30 DIAGNOSIS — Z87.19 HISTORY OF DIVERTICULITIS: ICD-10-CM

## 2020-03-30 DIAGNOSIS — R10.32 LEFT LOWER QUADRANT ABDOMINAL PAIN: Primary | ICD-10-CM

## 2020-03-31 RX ORDER — AMOXICILLIN AND CLAVULANATE POTASSIUM 875; 125 MG/1; MG/1
1 TABLET, FILM COATED ORAL 2 TIMES DAILY
Qty: 28 TABLET | Refills: 0 | Status: SHIPPED | OUTPATIENT
Start: 2020-03-31 | End: 2020-04-13

## 2020-03-31 NOTE — TELEPHONE ENCOUNTER
The patient was contacted, he calls with recurrent left lower quadrant abdominal pain. He states the pain is in a very localized area with no other symptoms. Is been ongoing for last 2 days and notices with some position changes and movement.   No fevers

## 2020-03-31 NOTE — TELEPHONE ENCOUNTER
Attempted to call patient to help him schedule his CT scan per below message from Dr. Eddy Brown. There was no answer and the mailbox was full so I could not leave a message. Will try to contact patient again at a later time.

## 2020-03-31 NOTE — TELEPHONE ENCOUNTER
Patient given information to schedule CT scan as ordered. Aware that prescription was called into his pharmacy by Dr. Ramon Padron. Patient told to call if any questions/concerns/difficulty scheduling CT scan.   Advised to contact us with worsening symptoms or g

## 2020-03-31 NOTE — TELEPHONE ENCOUNTER
From: Nhung Mederos  To: Hetal Gonsalez. Aubrey Mera MD  Sent: 3/30/2020 6:34 PM CDT  Subject: Other    Hi Dr. Kristian Baires started to have pain just above my pelvic bone in the soft area and to the left of there.  It feels just like the pain I had left over when

## 2020-03-31 NOTE — TELEPHONE ENCOUNTER
Dr. Zeke Jon    Patient started having pain in left lower quadrant above pelvic bone. Reports pain went away after being treated in the hospital for diverticulitis/abscess and came back 2 days ago.   Reports pain 4/10 if pushing on left lower quadrant above p

## 2020-03-31 NOTE — TELEPHONE ENCOUNTER
Attempted to call patient to get more details to triage patient and route to physician. Left message for patient to call back.

## 2020-03-31 NOTE — TELEPHONE ENCOUNTER
GI staff please note NO PA required for CT of ABD for pt. Pt. has primary Medicare and [facility] Supplemental plan  No Prior auth required for   CT ABDOMEN+PELVIS(CONTRAST ONLY)(CPT=74177).  Referral # [id number] authorized

## 2020-04-01 NOTE — TELEPHONE ENCOUNTER
RN  To contact pt and make sure got started on abx, also set up televisit for Friday 9 - noon open up

## 2020-04-01 NOTE — TELEPHONE ENCOUNTER
LEXIE Rollins    Patient prefers an 11:00am telephone visit with Dr. Ifrah Rollins on 4/3/2020 because he is scheduled for his CT scan that day. Patient accepted telephone/virtual visit 11:00am 4/3/2020 best number to reach patient [phone].     Patient star

## 2020-04-03 ENCOUNTER — HOSPITAL ENCOUNTER (OUTPATIENT)
Dept: CT IMAGING | Age: 65
Discharge: HOME | End: 2020-04-03
Attending: INTERNAL MEDICINE
Payer: MEDICARE

## 2020-04-03 ENCOUNTER — TELEPHONE (OUTPATIENT)
Dept: GASTROENTEROLOGY | Facility: CLINIC | Age: 65
End: 2020-04-03

## 2020-04-03 ENCOUNTER — VIRTUAL PHONE E/M (OUTPATIENT)
Dept: GASTROENTEROLOGY | Facility: CLINIC | Age: 65
End: 2020-04-03

## 2020-04-03 DIAGNOSIS — R10.32 LEFT LOWER QUADRANT PAIN: Primary | ICD-10-CM

## 2020-04-03 DIAGNOSIS — Z87.19 HISTORY OF DIVERTICULITIS: ICD-10-CM

## 2020-04-03 DIAGNOSIS — R10.32 LEFT LOWER QUADRANT ABDOMINAL PAIN: ICD-10-CM

## 2020-04-03 PROCEDURE — G2012 BRIEF CHECK IN BY MD/QHP: HCPCS | Performed by: INTERNAL MEDICINE

## 2020-04-03 PROCEDURE — 82565 ASSAY OF CREATININE: CPT

## 2020-04-03 PROCEDURE — 74177 CT ABD & PELVIS W/CONTRAST: CPT | Performed by: INTERNAL MEDICINE

## 2020-04-03 NOTE — TELEPHONE ENCOUNTER
CT scan results reviewed with the patient, he does have mild sigmoid diverticulitis without evidence of abscess. We will continue on the oral antibiotics for now, Augmentin twice a day and low residue diet to call next week with an update.

## 2020-04-03 NOTE — PROGRESS NOTES
Virtual/Telephone Check-In    Ashkan Cuellar verbally consents to a Virtual/Telephone Check-In service on 04/03/20. Patient understands and accepts financial responsibility for any deductible, co-insurance and/or co-pays associated with this service.

## 2020-04-06 ENCOUNTER — TELEPHONE (OUTPATIENT)
Dept: GASTROENTEROLOGY | Facility: CLINIC | Age: 65
End: 2020-04-06

## 2020-04-06 NOTE — TELEPHONE ENCOUNTER
Patient reports feeling much better. Reports pain not 100% gone, but improved. Reports that it is just a soreness in the same area above the pelvis, not a sharp pain like before taking the antibiotics. Patient feeling constipated.   Reports that he h

## 2020-04-06 NOTE — TELEPHONE ENCOUNTER
I reviewed the below information with the patient. He will try some miralax and call us back on Thursday or Friday with an update. Thank you.

## 2020-04-06 NOTE — TELEPHONE ENCOUNTER
For the constipation I would advise Miralax as a gentle stool softener, he may improve as he advances his diet as he had been on liquid diet only. Please have him give us a call by the end of the week - Thurs/Friday to see how he is doing.

## 2020-04-10 NOTE — TELEPHONE ENCOUNTER
I reviewed the below recommendations from Dr. Esdras Schaffer with the patient and patient voiced understanding.

## 2020-04-10 NOTE — TELEPHONE ENCOUNTER
Diverticulitis resolved   Constipation - have him continue on fluids and Miralax daily. Monitor for now as I do not want to add any further laxatives ( ie dulcolax ) as this can irritate the colon and may cause more pain/symptoms.      Dr. Jose Madrigal

## 2020-04-10 NOTE — TELEPHONE ENCOUNTER
Dr. Bria Gonzalez    I called the patient to see how he was doing. He reports that the diverticulitis pain is gone. However, he is having discomfort because he feels constipated. Patient has been taking miralax daily as ordered.   He also walks frequently an

## 2020-04-13 ENCOUNTER — PATIENT MESSAGE (OUTPATIENT)
Dept: GASTROENTEROLOGY | Facility: CLINIC | Age: 65
End: 2020-04-13

## 2020-04-13 RX ORDER — AMOXICILLIN AND CLAVULANATE POTASSIUM 875; 125 MG/1; MG/1
1 TABLET, FILM COATED ORAL 2 TIMES DAILY
Qty: 14 TABLET | Refills: 0 | Status: SHIPPED | OUTPATIENT
Start: 2020-04-13 | End: 2020-04-22

## 2020-04-13 NOTE — TELEPHONE ENCOUNTER
Reviewed- residual symptoms noted, will plan 1 more week of treatment with Augmentin. Sent to pharmacy. Would advise he go to 3x week on the Miralax. I do not want to have him rebound and get constipated.      RN to inform pt of above plan, to call whe

## 2020-04-13 NOTE — TELEPHONE ENCOUNTER
Dr. Jose Madrigal    Please see below MyChart message from patient. He is requesting to stay on Augmentin because he now reports some tenderness when he hits a bump on his bike, sits, or stands up.       I spoke with this patent on Friday and he was having some

## 2020-04-13 NOTE — TELEPHONE ENCOUNTER
I reviewed below message from Dr. Elizabeth Reid with patient. He voiced understanding. He will call back with update when he completes the Augmentin.

## 2020-04-13 NOTE — TELEPHONE ENCOUNTER
From: Bruna Pteer  To: Pastora Page. Tayla Henderson MD  Sent: 4/13/2020 1:03 PM CDT  Subject: Visit Follow-up Question    Can I stay on the Augmenten for more time to make sure I don't have a relapse? I'm scared to go off it (today is my last day for the Rx).  But

## 2020-04-19 ENCOUNTER — PATIENT MESSAGE (OUTPATIENT)
Dept: GASTROENTEROLOGY | Facility: CLINIC | Age: 65
End: 2020-04-19

## 2020-04-20 ENCOUNTER — TELEPHONE (OUTPATIENT)
Dept: GASTROENTEROLOGY | Facility: CLINIC | Age: 65
End: 2020-04-20

## 2020-04-20 DIAGNOSIS — R10.32 LEFT LOWER QUADRANT ABDOMINAL PAIN: Primary | ICD-10-CM

## 2020-04-20 NOTE — TELEPHONE ENCOUNTER
Dr. Veda Gómez    Patient concerned because he is still having pain (also see MyChart message). His last dose of Augmentin is tonight. Reports pain is usually in the same spot by his pelvic bone and goes across to the left side.   Patient reports he is unsur

## 2020-04-20 NOTE — TELEPHONE ENCOUNTER
Dr. Geno Dukes    Patient is on his last day of Augmentin. Provided below update. Still having pain. Please advise.     Thank you

## 2020-04-20 NOTE — TELEPHONE ENCOUNTER
The patient was contacted, he has had chronic issues with left lower quadrant pain, this seems to occur with physical activity.   No fevers chills or other issues, does not appear to be related to meals, his bowels are moving but he may be more slightly con

## 2020-04-20 NOTE — TELEPHONE ENCOUNTER
Pt states that he finished Augmentin tonight and he is still having pain above pelvis area and pain in rectum when he sits down. Pt is concerned it is diverticulitis pain. Please call.

## 2020-04-20 NOTE — TELEPHONE ENCOUNTER
From: Caro Kulkarni  To: Eufemia Browning. Krzysztof Ceballos MD  Sent: 4/19/2020 3:50 PM CDT  Subject: Visit Follow-up Question    Update: I'm having pain above my pelvic bone in that spot that has been a problem. What I can't figure out is what's causing it.  It's taking

## 2020-04-20 NOTE — TELEPHONE ENCOUNTER
Dr. Ed Garrido    Patient accepted office visit at 3:00pm tomorrow and given directions for [facility]. Aware that they are screening at the door for COVID 19. Patient given instructions on how to contact central scheduling for CT scan.   Aware not to proceed wi

## 2020-04-21 ENCOUNTER — OFFICE VISIT (OUTPATIENT)
Dept: GASTROENTEROLOGY | Facility: CLINIC | Age: 65
End: 2020-04-21
Payer: MEDICARE

## 2020-04-21 VITALS
HEART RATE: 65 BPM | WEIGHT: 222 LBS | HEIGHT: 74 IN | DIASTOLIC BLOOD PRESSURE: 84 MMHG | SYSTOLIC BLOOD PRESSURE: 121 MMHG | BODY MASS INDEX: 28.49 KG/M2

## 2020-04-21 DIAGNOSIS — K57.92 DIVERTICULITIS: Primary | ICD-10-CM

## 2020-04-21 PROCEDURE — G0463 HOSPITAL OUTPT CLINIC VISIT: HCPCS | Performed by: INTERNAL MEDICINE

## 2020-04-21 PROCEDURE — 99213 OFFICE O/P EST LOW 20 MIN: CPT | Performed by: INTERNAL MEDICINE

## 2020-04-21 NOTE — PROGRESS NOTES
Matt Blum is a 72year old male. HPI:   Patient presents with:   Follow - Up: diverticulitis    The patient is a 77-year-old male who has a history of aortic valve replacement, SVT, diverticulitis with recurrent episode follows up in the office tod MG Oral Tab Take 1 tablet (7.5 mg total) by mouth daily. 90 tablet 0   • melatonin 5 MG Oral Cap Take 5 mg by mouth nightly.      • metoprolol Tartrate 25 MG Oral Tab TAKE 1 TABLET BY MOUTH EVERY MORNING AND ONE-HALF TABLET BY MOUTH EVERY EVENING 135 tablet noted in the suprapubic region on deep palpation, no specific hernia noted although a focal area of potential subcutaneous fat irregularity noted in the left lower quadrant.   Ext- no clubbing or cyanosis  Skin- no rashes or lesions  Neuro- appropriate resp Yes

## 2020-04-21 NOTE — TELEPHONE ENCOUNTER
4-    Phone call w/pt 4-21-20 @12:15. Patient has appt today w/Dr. Nupur Collins. He has appt w/Radiology tomorrow for CT. Patient will talk with Dr. Prudencio Saab about colonoscopy date. Patient thinks it may need to be changed.       Patient will nee

## 2020-04-22 ENCOUNTER — TELEPHONE (OUTPATIENT)
Dept: GASTROENTEROLOGY | Facility: CLINIC | Age: 65
End: 2020-04-22

## 2020-04-22 ENCOUNTER — HOSPITAL ENCOUNTER (OUTPATIENT)
Dept: CT IMAGING | Age: 65
Discharge: HOME | End: 2020-04-22
Attending: INTERNAL MEDICINE
Payer: MEDICARE

## 2020-04-22 DIAGNOSIS — R10.32 LEFT LOWER QUADRANT ABDOMINAL PAIN: ICD-10-CM

## 2020-04-22 DIAGNOSIS — K57.92 DIVERTICULITIS: Primary | ICD-10-CM

## 2020-04-22 PROCEDURE — 82565 ASSAY OF CREATININE: CPT

## 2020-04-22 PROCEDURE — 74177 CT ABD & PELVIS W/CONTRAST: CPT | Performed by: INTERNAL MEDICINE

## 2020-04-22 RX ORDER — CIPROFLOXACIN 500 MG/1
500 TABLET, FILM COATED ORAL 2 TIMES DAILY
Qty: 20 TABLET | Refills: 0 | Status: SHIPPED | OUTPATIENT
Start: 2020-04-22 | End: 2020-05-21 | Stop reason: ALTCHOICE

## 2020-04-22 RX ORDER — METRONIDAZOLE 500 MG/1
500 TABLET ORAL EVERY 8 HOURS
Qty: 30 TABLET | Refills: 0 | Status: SHIPPED | OUTPATIENT
Start: 2020-04-22 | End: 2020-05-21 | Stop reason: ALTCHOICE

## 2020-04-22 NOTE — TELEPHONE ENCOUNTER
1. Dr. Zara Dandy-  Please clarify if you need patient to HOLD coumadin and duration that it needs to be held prior to said procedures? Please advise on full colonoscopy orders including sedation, diagnosis, and prep so schedulers may coordinate.

## 2020-04-22 NOTE — TELEPHONE ENCOUNTER
I hold coumadin 2 days before procedure and check stat PT/INR day of procedure in the lab - please place order    colyte prep and MAC sedation    Dx history of diverticulitis    Coordinate with surgery

## 2020-04-22 NOTE — TELEPHONE ENCOUNTER
I called patient. I reached his voice mail box. If he calls back please schedule an appt w/Dr. Iglesia Zavala.       humax dodie

## 2020-04-22 NOTE — TELEPHONE ENCOUNTER
Dr. Sudheer Jacinto spoke with Abraham Miguel at [facility]. Informed me that both Cipro and Metronidazole interfere with warfarin. Asking if you would like to prescribe something else.     Please advise    Thank you

## 2020-04-22 NOTE — TELEPHONE ENCOUNTER
I spoke with Paxton Crews the pharmacist and reviewed below note from Dr. Rebecca Hackett with her and will fill the prescriptions as ordered.

## 2020-04-22 NOTE — TELEPHONE ENCOUNTER
Noted.  Thank you. Will route to [employer] and surgical schedulers to assist with scheduling this patient.      Thank you

## 2020-04-22 NOTE — TELEPHONE ENCOUNTER
Interaction noted and discussed with the pt - he has a home testing INR kit and will closely follow INR. Had been on both 2 months ago and did ok with adjusting coumadin levels as outpatient.

## 2020-04-22 NOTE — TELEPHONE ENCOUNTER
The patient was contacted - CT scan reviewed, suspect a smoldering type diverticulitis, he still has some residual inflammation. His pain is is better but not completely gone.   I would plan another 10 days of antibiotics and I have asked advised that we s

## 2020-04-22 NOTE — TELEPHONE ENCOUNTER
[facility]Pharmacy calling regarding drug interaction rx: Warfarin and rx: Antibotic, please call at:[phone],thanks.

## 2020-04-27 ENCOUNTER — TELEPHONE (OUTPATIENT)
Dept: SURGERY | Facility: CLINIC | Age: 65
End: 2020-04-27

## 2020-04-27 ENCOUNTER — OFFICE VISIT (OUTPATIENT)
Dept: SURGERY | Facility: CLINIC | Age: 65
End: 2020-04-27
Payer: MEDICARE

## 2020-04-27 VITALS — WEIGHT: 220 LBS | BODY MASS INDEX: 28 KG/M2

## 2020-04-27 DIAGNOSIS — K57.32 SIGMOID DIVERTICULITIS: Primary | ICD-10-CM

## 2020-04-27 PROCEDURE — G0463 HOSPITAL OUTPT CLINIC VISIT: HCPCS | Performed by: SURGERY

## 2020-04-27 PROCEDURE — 99213 OFFICE O/P EST LOW 20 MIN: CPT | Performed by: SURGERY

## 2020-04-27 NOTE — TELEPHONE ENCOUNTER
I spoke with Jovanny Ribeiro the Surgery Scheduler for Dr. Orville Colunga which she stated that once he OV is complete she will contact me and decide on coordinating his procedure with Klever Pinedo. Xolair Counseling:  Patient informed of potential adverse effects including but not limited to fever, muscle aches, rash and allergic reactions.  The patient verbalized understanding of the proper use and possible adverse effects of Xolair.  All of the patient's questions and concerns were addressed.

## 2020-04-27 NOTE — TELEPHONE ENCOUNTER
I spoke with Cristina Roper from surg sched for Gen Surg with Dr. Feliberto Unger. Since Dr. Boubacar Torrez is off today we will make all the arrangements tomorrow to make sure we are all on the same page with coordinating both surgeries.

## 2020-04-27 NOTE — PATIENT INSTRUCTIONS
Gastroenterology to call with the date for colonoscopy to be on or near May 21. Start the bowel prep the day before surgery on May 20 you may have clear liquids all day long.   Nothing after midnight to eat or drink    Contact your cardiologist or Dr. Dariel Rene

## 2020-04-27 NOTE — TELEPHONE ENCOUNTER
Patient seen today by Dr. Elizabeth Tang in General Surgery. Surgery date for GL- 5-, Laparoscopic Sigmoid Colon Resection. Patient agreed to surgery date, prep details provided. He will need to stop Blood thinners X 5 days prior to surgery. Informed Miriam multani/Dr. Samir Silva. She will follow up after confirming with GI, Dr. Samir Silva.       ROULA for GL

## 2020-04-27 NOTE — PROGRESS NOTES
[facility] SURGERY    Progress Note    Barrett Friends Patient Status:  Outpatient    1/15/1955 MRN [id number]   Location [address] [facility] Attending No att. providers found   Hosp Day # 0 PCP Wanda Saha,

## 2020-04-28 ENCOUNTER — TELEPHONE (OUTPATIENT)
Dept: CARDIOLOGY | Age: 65
End: 2020-04-28

## 2020-04-28 ENCOUNTER — TELEPHONE (OUTPATIENT)
Dept: SURGERY | Facility: CLINIC | Age: 65
End: 2020-04-28

## 2020-04-28 DIAGNOSIS — I47.10 PAROXYSMAL SUPRAVENTRICULAR TACHYCARDIA: ICD-10-CM

## 2020-04-28 DIAGNOSIS — Z95.2 HISTORY OF MECHANICAL AORTIC VALVE REPLACEMENT: Primary | ICD-10-CM

## 2020-04-29 LAB — INR PPP: 2.9

## 2020-04-29 NOTE — TELEPHONE ENCOUNTER
I also contacted the OR to check if they can add the colonoscopy on to Dr. Krys Mcclain procedure but they would like me to fill out the OR form

## 2020-04-29 NOTE — TELEPHONE ENCOUNTER
I spoke to [facility] in Dr. Jossy Allen office which she stated that I would have to contact the OR and add on Dr. Arabella Ellis case which she gave me the number of [id number] which I spoke with [facility].  He stated that he would send me a form to fill out just informing t

## 2020-04-30 ENCOUNTER — ANTI-COAG (OUTPATIENT)
Dept: CARDIOLOGY | Age: 65
End: 2020-04-30

## 2020-04-30 DIAGNOSIS — Z95.2 HISTORY OF MECHANICAL AORTIC VALVE REPLACEMENT: ICD-10-CM

## 2020-04-30 NOTE — TELEPHONE ENCOUNTER
Noted.  PT/INR entered into epic per below order from Dr. Eddy Brown to be done when he arrives for procedure.

## 2020-04-30 NOTE — TELEPHONE ENCOUNTER
Scheduled for:  Colonoscopy 93599  Provider Name:  Dr. Aubrey Mera   Date:  5/21/20  Location:    OR  Sedation:  General  Time:  7006 (pt is aware to arrive at 0630)   Prep:  Miralax/Gatorade, given by Dr. Carey Ugalde?:  Ph

## 2020-04-30 NOTE — TELEPHONE ENCOUNTER
Dr. Birgit Drake--    This procedure has been scheduled, please see TE 4/22/20 for scheduling information.

## 2020-05-01 ENCOUNTER — TELEPHONE (OUTPATIENT)
Dept: FAMILY MEDICINE CLINIC | Facility: CLINIC | Age: 65
End: 2020-05-01

## 2020-05-01 RX ORDER — WARFARIN SODIUM 7.5 MG/1
TABLET ORAL
Qty: 90 TABLET | Refills: 0 | Status: SHIPPED | OUTPATIENT
Start: 2020-05-01

## 2020-05-01 RX ORDER — WARFARIN SODIUM 10 MG/1
TABLET ORAL
Qty: 90 TABLET | Refills: 0 | Status: SHIPPED | OUTPATIENT
Start: 2020-05-01 | End: 2020-06-17 | Stop reason: DRUGHIGH

## 2020-05-01 NOTE — TELEPHONE ENCOUNTER
A refill request was received for:  Requested Prescriptions     Pending Prescriptions Disp Refills   • WARFARIN SODIUM 10 MG Oral Tab [Pharmacy Med Name: WARFARIN SODIUM 10 MG TABLET] 90 tablet 0     Sig: TAKE ONE TABLET BY MOUTH DAILY   • WARFARIN SODIUM

## 2020-05-01 NOTE — TELEPHONE ENCOUNTER
Amber Julio DO routed this conversation to Betsey Padilla              5/1/20 9:11 AM   Betsey Padilla routed this conversation to DO Betsey Franco           5/1/20 9:11 AM   Note      A refill request was received for:  Requested Prescr

## 2020-05-04 SDOH — HEALTH STABILITY: MENTAL HEALTH: HOW OFTEN DO YOU HAVE A DRINK CONTAINING ALCOHOL?: NEVER

## 2020-05-06 PROBLEM — Z71.89 ENCOUNTER FOR EDUCATION ABOUT INJECTION: Status: ACTIVE | Noted: 2020-05-06

## 2020-05-08 ENCOUNTER — APPOINTMENT (OUTPATIENT)
Dept: CARDIOLOGY | Age: 65
End: 2020-05-08

## 2020-05-12 RX ORDER — METRONIDAZOLE 500 MG/1
TABLET ORAL
COMMUNITY
End: 2020-05-13

## 2020-05-12 RX ORDER — AMOXICILLIN AND CLAVULANATE POTASSIUM 875; 125 MG/1; MG/1
TABLET, FILM COATED ORAL
COMMUNITY
End: 2021-04-14

## 2020-05-12 RX ORDER — CIPROFLOXACIN 500 MG/1
TABLET, FILM COATED ORAL
COMMUNITY
End: 2020-05-13

## 2020-05-13 ENCOUNTER — OFFICE VISIT (OUTPATIENT)
Dept: CARDIOLOGY | Age: 65
End: 2020-05-13

## 2020-05-13 ENCOUNTER — TELEPHONE (OUTPATIENT)
Dept: GASTROENTEROLOGY | Facility: CLINIC | Age: 65
End: 2020-05-13

## 2020-05-13 ENCOUNTER — TELEPHONE (OUTPATIENT)
Dept: CARDIOLOGY | Age: 65
End: 2020-05-13

## 2020-05-13 ENCOUNTER — APPOINTMENT (OUTPATIENT)
Dept: LAB | Facility: HOSPITAL | Age: 65
End: 2020-05-13
Attending: INTERNAL MEDICINE
Payer: MEDICARE

## 2020-05-13 ENCOUNTER — ANTI-COAG (OUTPATIENT)
Dept: CARDIOLOGY | Age: 65
End: 2020-05-13

## 2020-05-13 VITALS
HEART RATE: 63 BPM | BODY MASS INDEX: 29 KG/M2 | SYSTOLIC BLOOD PRESSURE: 122 MMHG | WEIGHT: 226 LBS | HEIGHT: 74 IN | OXYGEN SATURATION: 97 % | DIASTOLIC BLOOD PRESSURE: 68 MMHG

## 2020-05-13 DIAGNOSIS — K57.92 DIVERTICULITIS: ICD-10-CM

## 2020-05-13 DIAGNOSIS — Z71.89 ENCOUNTER FOR EDUCATION ABOUT INJECTION: Primary | ICD-10-CM

## 2020-05-13 DIAGNOSIS — Z95.2 HISTORY OF MECHANICAL AORTIC VALVE REPLACEMENT: ICD-10-CM

## 2020-05-13 LAB — INR PPP: 3.04

## 2020-05-13 PROCEDURE — 99213 OFFICE O/P EST LOW 20 MIN: CPT | Performed by: NURSE PRACTITIONER

## 2020-05-13 PROCEDURE — 36415 COLL VENOUS BLD VENIPUNCTURE: CPT

## 2020-05-13 PROCEDURE — 85610 PROTHROMBIN TIME: CPT

## 2020-05-13 RX ORDER — ENOXAPARIN SODIUM 100 MG/ML
100 INJECTION SUBCUTANEOUS EVERY 12 HOURS
Qty: 10 SYRINGE | Refills: 1 | Status: ON HOLD | OUTPATIENT
Start: 2020-05-13 | End: 2021-11-07 | Stop reason: ALTCHOICE

## 2020-05-13 RX ORDER — DIVALPROEX SODIUM 500 MG/1
1000 TABLET, DELAYED RELEASE ORAL SEE ADMIN INSTRUCTIONS
COMMUNITY

## 2020-05-13 SDOH — HEALTH STABILITY: MENTAL HEALTH: HOW OFTEN DO YOU HAVE A DRINK CONTAINING ALCOHOL?: NEVER

## 2020-05-13 ASSESSMENT — PATIENT HEALTH QUESTIONNAIRE - PHQ9
SUM OF ALL RESPONSES TO PHQ9 QUESTIONS 1 AND 2: 0
SUM OF ALL RESPONSES TO PHQ9 QUESTIONS 1 AND 2: 0
2. FEELING DOWN, DEPRESSED OR HOPELESS: NOT AT ALL
2. FEELING DOWN, DEPRESSED OR HOPELESS: NOT AT ALL
1. LITTLE INTEREST OR PLEASURE IN DOING THINGS: NOT AT ALL
1. LITTLE INTEREST OR PLEASURE IN DOING THINGS: NOT AT ALL
SUM OF ALL RESPONSES TO PHQ9 QUESTIONS 1 AND 2: 0

## 2020-05-13 NOTE — TELEPHONE ENCOUNTER
Dr. Gale Fitch    Received call from [facility] in lab     PT-32.1  INR- 3.04    It looks like patient is being managed by Dr. Carlos Heart to bridge lovenox prior to procedure because he is on coumadin.       Unsure why this lab was drawn today-says to draw prio

## 2020-05-13 NOTE — TELEPHONE ENCOUNTER
Please forward to doctor who is managing, do we need to do something with this, we may have placed for pre- colonoscopy but let the doctor know who is taking care of it so they can plan around timing of colon then surgery next thursday

## 2020-05-13 NOTE — TELEPHONE ENCOUNTER
Thank you. I also forwarded message to Dr. Ivan Austin who ordered PT/INR. I also faxed results to Dr. Marie Colunga office Fax #[phone].

## 2020-05-14 ENCOUNTER — TELEPHONE (OUTPATIENT)
Dept: CARDIOLOGY | Age: 65
End: 2020-05-14

## 2020-05-14 ENCOUNTER — PATIENT MESSAGE (OUTPATIENT)
Dept: GASTROENTEROLOGY | Facility: CLINIC | Age: 65
End: 2020-05-14

## 2020-05-14 ENCOUNTER — ANTI-COAG (OUTPATIENT)
Dept: CARDIOLOGY | Age: 65
End: 2020-05-14

## 2020-05-14 DIAGNOSIS — Z95.2 HISTORY OF MECHANICAL AORTIC VALVE REPLACEMENT: ICD-10-CM

## 2020-05-14 NOTE — TELEPHONE ENCOUNTER
Patient seeing Dr. Dali Abdi for management of Coumadin and bridging to Lovenox. Had visit to discuss this today, 5/14.

## 2020-05-14 NOTE — TELEPHONE ENCOUNTER
From: Matt Blum  To: Allen Waggoner. Rebecca Hackett MD  Sent: 5/14/2020 11:58 AM CDT  Subject: Other    I met with Brenda Tanner, the nurse practitioner over at   [facility] office yesterday. I have my instructions for the Lovenox.     My intestinal pain has been good since g

## 2020-05-14 NOTE — TELEPHONE ENCOUNTER
Dr. Jeffy Daugherty    Would it be ok for patient to mix his prep with water? I know we suggest the gatorade for the electrolytes.     Please advise    Thank you

## 2020-05-17 ENCOUNTER — PATIENT MESSAGE (OUTPATIENT)
Dept: SURGERY | Facility: CLINIC | Age: 65
End: 2020-05-17

## 2020-05-18 ENCOUNTER — ANTI-COAG (OUTPATIENT)
Dept: CARDIOLOGY | Age: 65
End: 2020-05-18

## 2020-05-18 ENCOUNTER — TELEPHONE (OUTPATIENT)
Dept: CARDIOLOGY | Age: 65
End: 2020-05-18

## 2020-05-18 DIAGNOSIS — Z95.2 HISTORY OF MECHANICAL AORTIC VALVE REPLACEMENT: ICD-10-CM

## 2020-05-18 LAB — INR PPP: 3.2

## 2020-05-18 NOTE — TELEPHONE ENCOUNTER
Phone call to patient, reached his VM box. I LM with the phone number to call back for questions regarding prep and recovery. Also I left a message that we recommend patients have family member or friend drive them home after discharge.     ROULA

## 2020-05-19 ENCOUNTER — PATIENT MESSAGE (OUTPATIENT)
Dept: GASTROENTEROLOGY | Facility: CLINIC | Age: 65
End: 2020-05-19

## 2020-05-19 ENCOUNTER — TELEPHONE (OUTPATIENT)
Dept: SURGERY | Facility: CLINIC | Age: 65
End: 2020-05-19

## 2020-05-19 ENCOUNTER — ANTI-COAG (OUTPATIENT)
Dept: CARDIOLOGY | Age: 65
End: 2020-05-19

## 2020-05-19 ENCOUNTER — TELEPHONE (OUTPATIENT)
Dept: GASTROENTEROLOGY | Facility: CLINIC | Age: 65
End: 2020-05-19

## 2020-05-19 DIAGNOSIS — Z95.2 HISTORY OF MECHANICAL AORTIC VALVE REPLACEMENT: ICD-10-CM

## 2020-05-19 DIAGNOSIS — K57.32 DIVERTICULITIS, COLON: Primary | ICD-10-CM

## 2020-05-19 LAB — INR PPP: 2.4

## 2020-05-19 NOTE — TELEPHONE ENCOUNTER
LEXIE Giang    Please see note from patient. He accidentally took his coumadin and needs to reschedule his surgery/colonoscopy. Per patient Dr. Charlene Benson office will contact us to help reschedule.     Thank you

## 2020-05-19 NOTE — TELEPHONE ENCOUNTER
[facility] General Surgery Schedulers    This patient was scheduled for colonoscopy in OR to be coordinated with Dr. Alberto Garcia surgery for diverticulitis on 5/21/2020.   Patient accidentally took coumadin on Sunday and needs to reschedule per Dr. Alberto Garcia b

## 2020-05-19 NOTE — TELEPHONE ENCOUNTER
From: Ashkan Cuellar  To: Clarita Birmingham. Zeke Jon MD  Sent: 5/19/2020 10:50 AM CDT  Subject: Other    Hi Dr Zeke Jon,    I accidentally went ahead and took my Coumadin on Saturday and Sunday.  Dr. Misty Alston office said we need to reschedule the surgery because

## 2020-05-19 NOTE — TELEPHONE ENCOUNTER
Pt states he received a [employer] message  that he need to reschedule procedure because of the coumadin calling to reschedule please advise

## 2020-05-19 NOTE — TELEPHONE ENCOUNTER
You routed this conversation to Lori Melvin MD   Me           5/19/20 11:00 AM   Note      FYI Dr. Jose Madrigal     Please see note from patient. He accidentally took his coumadin and needs to reschedule his surgery/colonoscopy.   Per patient Dr. Samuel Rashida

## 2020-05-20 ENCOUNTER — TELEPHONE (OUTPATIENT)
Dept: SURGERY | Facility: CLINIC | Age: 65
End: 2020-05-20

## 2020-05-20 ENCOUNTER — TELEPHONE (OUTPATIENT)
Dept: FAMILY MEDICINE CLINIC | Facility: CLINIC | Age: 65
End: 2020-05-20

## 2020-05-20 ENCOUNTER — ANTI-COAG (OUTPATIENT)
Dept: CARDIOLOGY | Age: 65
End: 2020-05-20

## 2020-05-20 DIAGNOSIS — Z95.2 HISTORY OF MECHANICAL AORTIC VALVE REPLACEMENT: ICD-10-CM

## 2020-05-20 LAB — INR PPP: 1.3

## 2020-05-20 NOTE — TELEPHONE ENCOUNTER
Tommy Sharp--    I spoke with Dr. Ally Brock and he stated that 6/18/20 is good for him. He said we can schedule it at 0730 just like previously scheduled. Also, do I need to fill out another form to the OR or will the last form be appropriate.  Please adv

## 2020-05-20 NOTE — TELEPHONE ENCOUNTER
Pt called stating pt has to cancel surgery for 5-21-20 due to pt's coumadin and would need to reschedule. Call transferred to the nurse.

## 2020-05-20 NOTE — TELEPHONE ENCOUNTER
I spoke with this patient which he wanted to make sure that we are communicating and discussing when the next date of surgery will be scheduled.  I informed him that it took us at least a week to schedule the first surgery and to give us some time to coordi

## 2020-05-20 NOTE — TELEPHONE ENCOUNTER
Patient is currently bridging with Lovenox for upcoming colonoscopy/colon procedure. He is being followed by cardiology. Okay to close.

## 2020-05-20 NOTE — TELEPHONE ENCOUNTER
Pt needs to reschedule surgery for tomorrow. States he also has additional questions. Please call thank you.

## 2020-05-21 ENCOUNTER — ANTI-COAG (OUTPATIENT)
Dept: CARDIOLOGY | Age: 65
End: 2020-05-21

## 2020-05-21 DIAGNOSIS — Z95.2 HISTORY OF MECHANICAL AORTIC VALVE REPLACEMENT: ICD-10-CM

## 2020-05-21 LAB — INR PPP: 1.2

## 2020-05-21 RX ORDER — ENOXAPARIN SODIUM 100 MG/ML
100 INJECTION SUBCUTANEOUS EVERY 12 HOURS
Status: ON HOLD | COMMUNITY
Start: 2020-05-13 | End: 2020-06-22

## 2020-05-21 NOTE — TELEPHONE ENCOUNTER
Francois Vazquez--    Dr. Tee Navas is only available on 6/18/20 at 0730 since he has a meeting on the other date given. I have been in contact with the patient which the note regarding that conversation is below. Please advise.

## 2020-05-21 NOTE — TELEPHONE ENCOUNTER
Contact w/Miriam. Offered two dates for GL to rescheduled surgery Thursday 6- or 6-25-20. [place] agreed to reach out to COLTON Lagos confirm and call back. [place] will reach out to the patient.       Hold Pending date    DRW for MD Leti

## 2020-05-21 NOTE — TELEPHONE ENCOUNTER
Phone call to patient, he agrees on reschedule surgery to 6- (Thursday). Per [facility] in GI 6-18-20 is good for Dr. Vivienne Eisenmenger and this date is good for Dr. Jodi Matson. I will reschedule the Gen Surg part in Epic and inform the OR.     DRW for Dr. Alyssa Rosen.

## 2020-05-21 NOTE — TELEPHONE ENCOUNTER
Surgery has been rescheduled to 6-. We will use the same prep as stated below. I will mail the instructions with the revised dates to the patient at his home address. Patient aware of the prep.   He will also have Rashida PNP in the Coumadin assist h

## 2020-05-22 ENCOUNTER — ANTI-COAG (OUTPATIENT)
Dept: CARDIOLOGY | Age: 65
End: 2020-05-22

## 2020-05-22 DIAGNOSIS — Z95.2 HISTORY OF MECHANICAL AORTIC VALVE REPLACEMENT: ICD-10-CM

## 2020-05-22 LAB — INR PPP: 1.5

## 2020-05-22 NOTE — TELEPHONE ENCOUNTER
Wing Denny--    Please contact me regarding this case, I would like to ask a few questions regarding time of our procedure, thank you

## 2020-05-23 ENCOUNTER — TELEPHONE (OUTPATIENT)
Dept: CARDIOLOGY | Age: 65
End: 2020-05-23

## 2020-05-23 LAB — INR PPP: 2

## 2020-05-26 ENCOUNTER — ANTI-COAG (OUTPATIENT)
Dept: CARDIOLOGY | Age: 65
End: 2020-05-26

## 2020-05-26 DIAGNOSIS — Z95.2 HISTORY OF MECHANICAL AORTIC VALVE REPLACEMENT: ICD-10-CM

## 2020-05-26 NOTE — TELEPHONE ENCOUNTER
Lilia--    This patient is scheduled. I emailed a scheduling form to the [facility] in the OR for our part of the procedures. If you need anything else please let me know.

## 2020-05-26 NOTE — TELEPHONE ENCOUNTER
Scheduled for:  Colonoscopy 37204  Provider Name:  Dr. Maye Shoemaker  Date:  6/18/20  Jovanny Hernández  TBKB:  [id number] (YY is aware to arrive at 0630)   Prep:  Miralax/Gatorade, given by Dr. Pro Mendosa?:  Ph

## 2020-05-26 NOTE — TELEPHONE ENCOUNTER
PC to [facility] in [address]. Confirmed patient has been rescheduled to 6-.  (BOTH surgeries are in)   See message below from Jonathan multani/Dr. Ashanti Michael. He will start at 7:30 am and Dr. Paresh Bro will follow. PC to patient confirmed.  Clarified all

## 2020-05-26 NOTE — TELEPHONE ENCOUNTER
Scheduled for:  Colonoscopy 86615  Provider Name:  Dr. Jose Madrigal   Date:  5/21/20  Location:    OR  Sedation:  General  Time:  9188 (pt is aware to arrive at 0630)   Prep:  Miralax/Gatorade, given by Dr. Zuly Solano?:  Ph

## 2020-05-27 NOTE — TELEPHONE ENCOUNTER
Dr. Stanton Downing--    This patient has been scheduled as date and time requested. See Lilia's notes below for further information. Thank you.

## 2020-05-29 ENCOUNTER — ANTI-COAG (OUTPATIENT)
Dept: CARDIOLOGY | Age: 65
End: 2020-05-29

## 2020-05-29 DIAGNOSIS — Z95.2 HISTORY OF MECHANICAL AORTIC VALVE REPLACEMENT: ICD-10-CM

## 2020-06-01 LAB — INR PPP: 2.4

## 2020-06-03 ENCOUNTER — TELEPHONE (OUTPATIENT)
Dept: CARDIOLOGY | Age: 65
End: 2020-06-03

## 2020-06-03 ENCOUNTER — ANTI-COAG (OUTPATIENT)
Dept: CARDIOLOGY | Age: 65
End: 2020-06-03

## 2020-06-03 DIAGNOSIS — Z95.2 HISTORY OF MECHANICAL AORTIC VALVE REPLACEMENT: ICD-10-CM

## 2020-06-08 ENCOUNTER — ANTI-COAG (OUTPATIENT)
Dept: CARDIOLOGY | Age: 65
End: 2020-06-08

## 2020-06-08 DIAGNOSIS — Z95.2 HISTORY OF MECHANICAL AORTIC VALVE REPLACEMENT: ICD-10-CM

## 2020-06-08 LAB — INR PPP: 2.2

## 2020-06-10 DIAGNOSIS — Z01.812 PRE-PROCEDURAL LABORATORY EXAMINATION: Primary | ICD-10-CM

## 2020-06-11 ENCOUNTER — TELEPHONE (OUTPATIENT)
Dept: CARDIOLOGY | Age: 65
End: 2020-06-11

## 2020-06-14 ENCOUNTER — PATIENT MESSAGE (OUTPATIENT)
Dept: GASTROENTEROLOGY | Facility: CLINIC | Age: 65
End: 2020-06-14

## 2020-06-15 ENCOUNTER — ANTI-COAG (OUTPATIENT)
Dept: CARDIOLOGY | Age: 65
End: 2020-06-15

## 2020-06-15 DIAGNOSIS — Z95.2 HISTORY OF MECHANICAL AORTIC VALVE REPLACEMENT: ICD-10-CM

## 2020-06-15 LAB — INR PPP: 1.4

## 2020-06-15 NOTE — TELEPHONE ENCOUNTER
From: Karlos Smiley  To: Demond Banda. Alis Mackay MD  Sent: 6/14/2020 1:59 PM CDT  Subject: Other    I also have a question. During the prep day can I drink \"Evolve\" plant based protein drink instead of \"Ensure\"? 25g of protein, 1g of fiber, no dyes.     Matteo Baugh

## 2020-06-17 ENCOUNTER — APPOINTMENT (OUTPATIENT)
Dept: LAB | Facility: HOSPITAL | Age: 65
DRG: 330 | End: 2020-06-17
Attending: SURGERY
Payer: MEDICARE

## 2020-06-17 ENCOUNTER — LAB ENCOUNTER (OUTPATIENT)
Dept: LAB | Facility: HOSPITAL | Age: 65
End: 2020-06-17
Attending: SURGERY
Payer: MEDICARE

## 2020-06-17 DIAGNOSIS — Z01.818 PREOP TESTING: ICD-10-CM

## 2020-06-17 PROCEDURE — 36415 COLL VENOUS BLD VENIPUNCTURE: CPT

## 2020-06-17 PROCEDURE — 93005 ELECTROCARDIOGRAM TRACING: CPT

## 2020-06-17 PROCEDURE — 85025 COMPLETE CBC W/AUTO DIFF WBC: CPT

## 2020-06-17 PROCEDURE — 80048 BASIC METABOLIC PNL TOTAL CA: CPT

## 2020-06-17 PROCEDURE — 86901 BLOOD TYPING SEROLOGIC RH(D): CPT

## 2020-06-17 PROCEDURE — 86850 RBC ANTIBODY SCREEN: CPT

## 2020-06-17 PROCEDURE — 86900 BLOOD TYPING SEROLOGIC ABO: CPT

## 2020-06-17 PROCEDURE — 93010 ELECTROCARDIOGRAM REPORT: CPT | Performed by: SURGERY

## 2020-06-17 RX ORDER — MELATONIN
1000 DAILY
COMMUNITY

## 2020-06-18 ENCOUNTER — ANESTHESIA (OUTPATIENT)
Dept: SURGERY | Facility: HOSPITAL | Age: 65
DRG: 330 | End: 2020-06-18
Payer: MEDICARE

## 2020-06-18 ENCOUNTER — HOSPITAL ENCOUNTER (INPATIENT)
Facility: HOSPITAL | Age: 65
LOS: 4 days | Discharge: HOME | DRG: 330 | End: 2020-06-22
Attending: SURGERY | Admitting: SURGERY
Payer: MEDICARE

## 2020-06-18 ENCOUNTER — ANESTHESIA EVENT (OUTPATIENT)
Dept: SURGERY | Facility: HOSPITAL | Age: 65
DRG: 330 | End: 2020-06-18
Payer: MEDICARE

## 2020-06-18 DIAGNOSIS — K57.32 SIGMOID DIVERTICULITIS: ICD-10-CM

## 2020-06-18 DIAGNOSIS — Z01.818 PREOP TESTING: Primary | ICD-10-CM

## 2020-06-18 PROBLEM — K57.92 DIVERTICULITIS: Status: ACTIVE | Noted: 2020-06-18

## 2020-06-18 PROCEDURE — 0DBM4ZZ EXCISION OF DESCENDING COLON, PERCUTANEOUS ENDOSCOPIC APPROACH: ICD-10-PCS | Performed by: SURGERY

## 2020-06-18 PROCEDURE — 0DBL8ZX EXCISION OF TRANSVERSE COLON, VIA NATURAL OR ARTIFICIAL OPENING ENDOSCOPIC, DIAGNOSTIC: ICD-10-PCS | Performed by: INTERNAL MEDICINE

## 2020-06-18 PROCEDURE — 45380 COLONOSCOPY AND BIOPSY: CPT | Performed by: INTERNAL MEDICINE

## 2020-06-18 PROCEDURE — 44204 LAPARO PARTIAL COLECTOMY: CPT | Performed by: SURGERY

## 2020-06-18 PROCEDURE — 0DBK8ZX EXCISION OF ASCENDING COLON, VIA NATURAL OR ARTIFICIAL OPENING ENDOSCOPIC, DIAGNOSTIC: ICD-10-PCS | Performed by: INTERNAL MEDICINE

## 2020-06-18 PROCEDURE — 45385 COLONOSCOPY W/LESION REMOVAL: CPT | Performed by: INTERNAL MEDICINE

## 2020-06-18 PROCEDURE — 0DBN4ZZ EXCISION OF SIGMOID COLON, PERCUTANEOUS ENDOSCOPIC APPROACH: ICD-10-PCS | Performed by: SURGERY

## 2020-06-18 PROCEDURE — 99233 SBSQ HOSP IP/OBS HIGH 50: CPT | Performed by: HOSPITALIST

## 2020-06-18 RX ORDER — HYDROMORPHONE HYDROCHLORIDE 1 MG/ML
0.2 INJECTION, SOLUTION INTRAMUSCULAR; INTRAVENOUS; SUBCUTANEOUS EVERY 5 MIN PRN
Status: DISCONTINUED | OUTPATIENT
Start: 2020-06-18 | End: 2020-06-18 | Stop reason: HOSPADM

## 2020-06-18 RX ORDER — HEPARIN SODIUM AND DEXTROSE 10000; 5 [USP'U]/100ML; G/100ML
INJECTION INTRAVENOUS CONTINUOUS
Status: CANCELLED | OUTPATIENT
Start: 2020-06-18

## 2020-06-18 RX ORDER — ACETAMINOPHEN 500 MG
1000 TABLET ORAL EVERY 8 HOURS
Status: DISCONTINUED | OUTPATIENT
Start: 2020-06-18 | End: 2020-06-22

## 2020-06-18 RX ORDER — SODIUM CHLORIDE 9 MG/ML
INJECTION, SOLUTION INTRAVENOUS CONTINUOUS PRN
Status: DISCONTINUED | OUTPATIENT
Start: 2020-06-18 | End: 2020-06-18 | Stop reason: SURG

## 2020-06-18 RX ORDER — MORPHINE SULFATE 4 MG/ML
2 INJECTION, SOLUTION INTRAMUSCULAR; INTRAVENOUS EVERY 10 MIN PRN
Status: DISCONTINUED | OUTPATIENT
Start: 2020-06-18 | End: 2020-06-18 | Stop reason: HOSPADM

## 2020-06-18 RX ORDER — SODIUM CHLORIDE 0.9 % (FLUSH) 0.9 %
10 SYRINGE (ML) INJECTION AS NEEDED
Status: DISCONTINUED | OUTPATIENT
Start: 2020-06-18 | End: 2020-06-22

## 2020-06-18 RX ORDER — DEXAMETHASONE SODIUM PHOSPHATE 4 MG/ML
VIAL (ML) INJECTION AS NEEDED
Status: DISCONTINUED | OUTPATIENT
Start: 2020-06-18 | End: 2020-06-18 | Stop reason: SURG

## 2020-06-18 RX ORDER — PHENYLEPHRINE HCL 10 MG/ML
VIAL (ML) INJECTION AS NEEDED
Status: DISCONTINUED | OUTPATIENT
Start: 2020-06-18 | End: 2020-06-18 | Stop reason: SURG

## 2020-06-18 RX ORDER — MORPHINE SULFATE 10 MG/ML
6 INJECTION, SOLUTION INTRAMUSCULAR; INTRAVENOUS EVERY 10 MIN PRN
Status: DISCONTINUED | OUTPATIENT
Start: 2020-06-18 | End: 2020-06-18 | Stop reason: HOSPADM

## 2020-06-18 RX ORDER — MIDAZOLAM HYDROCHLORIDE 1 MG/ML
INJECTION INTRAMUSCULAR; INTRAVENOUS AS NEEDED
Status: DISCONTINUED | OUTPATIENT
Start: 2020-06-18 | End: 2020-06-18 | Stop reason: SURG

## 2020-06-18 RX ORDER — EPHEDRINE SULFATE 50 MG/ML
INJECTION, SOLUTION INTRAVENOUS AS NEEDED
Status: DISCONTINUED | OUTPATIENT
Start: 2020-06-18 | End: 2020-06-18 | Stop reason: SURG

## 2020-06-18 RX ORDER — ACETAMINOPHEN 500 MG
1000 TABLET ORAL ONCE
Status: COMPLETED | OUTPATIENT
Start: 2020-06-18 | End: 2020-06-18

## 2020-06-18 RX ORDER — HEPARIN SODIUM AND DEXTROSE 10000; 5 [USP'U]/100ML; G/100ML
INJECTION INTRAVENOUS CONTINUOUS
Status: DISCONTINUED | OUTPATIENT
Start: 2020-06-19 | End: 2020-06-18

## 2020-06-18 RX ORDER — HYDROCODONE BITARTRATE AND ACETAMINOPHEN 5; 325 MG/1; MG/1
1 TABLET ORAL AS NEEDED
Status: DISCONTINUED | OUTPATIENT
Start: 2020-06-18 | End: 2020-06-18 | Stop reason: HOSPADM

## 2020-06-18 RX ORDER — HEPARIN SODIUM AND DEXTROSE 10000; 5 [USP'U]/100ML; G/100ML
INJECTION INTRAVENOUS CONTINUOUS
Status: DISCONTINUED | OUTPATIENT
Start: 2020-06-19 | End: 2020-06-22

## 2020-06-18 RX ORDER — ONDANSETRON 2 MG/ML
4 INJECTION INTRAMUSCULAR; INTRAVENOUS EVERY 4 HOURS PRN
Status: DISCONTINUED | OUTPATIENT
Start: 2020-06-18 | End: 2020-06-22

## 2020-06-18 RX ORDER — OXYCODONE HYDROCHLORIDE 5 MG/1
5 TABLET ORAL EVERY 4 HOURS PRN
Status: DISCONTINUED | OUTPATIENT
Start: 2020-06-18 | End: 2020-06-22

## 2020-06-18 RX ORDER — SODIUM CHLORIDE, SODIUM LACTATE, POTASSIUM CHLORIDE, CALCIUM CHLORIDE 600; 310; 30; 20 MG/100ML; MG/100ML; MG/100ML; MG/100ML
INJECTION, SOLUTION INTRAVENOUS CONTINUOUS
Status: DISCONTINUED | OUTPATIENT
Start: 2020-06-18 | End: 2020-06-19

## 2020-06-18 RX ORDER — HYDROCODONE BITARTRATE AND ACETAMINOPHEN 5; 325 MG/1; MG/1
2 TABLET ORAL AS NEEDED
Status: DISCONTINUED | OUTPATIENT
Start: 2020-06-18 | End: 2020-06-18 | Stop reason: HOSPADM

## 2020-06-18 RX ORDER — WARFARIN SODIUM 7.5 MG/1
7.5 TABLET ORAL NIGHTLY
Status: DISCONTINUED | OUTPATIENT
Start: 2020-06-18 | End: 2020-06-22

## 2020-06-18 RX ORDER — HYDROMORPHONE HYDROCHLORIDE 1 MG/ML
0.2 INJECTION, SOLUTION INTRAMUSCULAR; INTRAVENOUS; SUBCUTANEOUS EVERY 2 HOUR PRN
Status: DISCONTINUED | OUTPATIENT
Start: 2020-06-18 | End: 2020-06-22

## 2020-06-18 RX ORDER — HYDROMORPHONE HYDROCHLORIDE 1 MG/ML
0.6 INJECTION, SOLUTION INTRAMUSCULAR; INTRAVENOUS; SUBCUTANEOUS EVERY 5 MIN PRN
Status: DISCONTINUED | OUTPATIENT
Start: 2020-06-18 | End: 2020-06-18 | Stop reason: HOSPADM

## 2020-06-18 RX ORDER — SODIUM CHLORIDE, SODIUM LACTATE, POTASSIUM CHLORIDE, CALCIUM CHLORIDE 600; 310; 30; 20 MG/100ML; MG/100ML; MG/100ML; MG/100ML
2 INJECTION, SOLUTION INTRAVENOUS CONTINUOUS
Status: DISCONTINUED | OUTPATIENT
Start: 2020-06-18 | End: 2020-06-19

## 2020-06-18 RX ORDER — SODIUM CHLORIDE, SODIUM LACTATE, POTASSIUM CHLORIDE, CALCIUM CHLORIDE 600; 310; 30; 20 MG/100ML; MG/100ML; MG/100ML; MG/100ML
INJECTION, SOLUTION INTRAVENOUS CONTINUOUS PRN
Status: DISCONTINUED | OUTPATIENT
Start: 2020-06-18 | End: 2020-06-18

## 2020-06-18 RX ORDER — OXYCODONE HYDROCHLORIDE 5 MG/1
10 TABLET ORAL EVERY 4 HOURS PRN
Status: DISCONTINUED | OUTPATIENT
Start: 2020-06-18 | End: 2020-06-22

## 2020-06-18 RX ORDER — MELATONIN
400 DAILY
Status: DISCONTINUED | OUTPATIENT
Start: 2020-06-18 | End: 2020-06-22

## 2020-06-18 RX ORDER — LAMOTRIGINE 100 MG/1
100 TABLET ORAL EVERY MORNING
Status: DISCONTINUED | OUTPATIENT
Start: 2020-06-18 | End: 2020-06-22

## 2020-06-18 RX ORDER — OXYCODONE HYDROCHLORIDE 5 MG/1
15 TABLET ORAL EVERY 4 HOURS PRN
Status: DISCONTINUED | OUTPATIENT
Start: 2020-06-18 | End: 2020-06-22

## 2020-06-18 RX ORDER — HEPARIN SODIUM AND DEXTROSE 10000; 5 [USP'U]/100ML; G/100ML
18 INJECTION INTRAVENOUS ONCE
Status: CANCELLED | OUTPATIENT
Start: 2020-06-18 | End: 2020-06-18

## 2020-06-18 RX ORDER — ENOXAPARIN SODIUM 100 MG/ML
40 INJECTION SUBCUTANEOUS DAILY
Status: DISCONTINUED | OUTPATIENT
Start: 2020-06-19 | End: 2020-06-18

## 2020-06-18 RX ORDER — HEPARIN SODIUM 1000 [USP'U]/ML
80 INJECTION, SOLUTION INTRAVENOUS; SUBCUTANEOUS ONCE
Status: DISCONTINUED | OUTPATIENT
Start: 2020-06-19 | End: 2020-06-18

## 2020-06-18 RX ORDER — MAGNESIUM OXIDE 400 MG (241.3 MG MAGNESIUM) TABLET
400 TABLET DAILY
Status: DISCONTINUED | OUTPATIENT
Start: 2020-06-18 | End: 2020-06-22

## 2020-06-18 RX ORDER — HYDROMORPHONE HYDROCHLORIDE 1 MG/ML
0.8 INJECTION, SOLUTION INTRAMUSCULAR; INTRAVENOUS; SUBCUTANEOUS EVERY 2 HOUR PRN
Status: DISCONTINUED | OUTPATIENT
Start: 2020-06-18 | End: 2020-06-22

## 2020-06-18 RX ORDER — BUPIVACAINE HYDROCHLORIDE 2.5 MG/ML
INJECTION, SOLUTION EPIDURAL; INFILTRATION; INTRACAUDAL AS NEEDED
Status: DISCONTINUED | OUTPATIENT
Start: 2020-06-18 | End: 2020-06-18 | Stop reason: HOSPADM

## 2020-06-18 RX ORDER — METRONIDAZOLE 500 MG/100ML
500 INJECTION, SOLUTION INTRAVENOUS EVERY 8 HOURS
Status: COMPLETED | OUTPATIENT
Start: 2020-06-18 | End: 2020-06-19

## 2020-06-18 RX ORDER — GLYCOPYRROLATE 0.2 MG/ML
INJECTION, SOLUTION INTRAMUSCULAR; INTRAVENOUS AS NEEDED
Status: DISCONTINUED | OUTPATIENT
Start: 2020-06-18 | End: 2020-06-18 | Stop reason: SURG

## 2020-06-18 RX ORDER — POLYETHYLENE GLYCOL 3350 17 G/17G
17 POWDER, FOR SOLUTION ORAL DAILY PRN
Status: DISCONTINUED | OUTPATIENT
Start: 2020-06-18 | End: 2020-06-22

## 2020-06-18 RX ORDER — LIDOCAINE HYDROCHLORIDE 10 MG/ML
INJECTION, SOLUTION EPIDURAL; INFILTRATION; INTRACAUDAL; PERINEURAL AS NEEDED
Status: DISCONTINUED | OUTPATIENT
Start: 2020-06-18 | End: 2020-06-18 | Stop reason: SURG

## 2020-06-18 RX ORDER — HEPARIN SODIUM AND DEXTROSE 10000; 5 [USP'U]/100ML; G/100ML
18 INJECTION INTRAVENOUS ONCE
Status: DISCONTINUED | OUTPATIENT
Start: 2020-06-19 | End: 2020-06-22

## 2020-06-18 RX ORDER — METOPROLOL TARTRATE 5 MG/5ML
2.5 INJECTION INTRAVENOUS ONCE
Status: DISCONTINUED | OUTPATIENT
Start: 2020-06-18 | End: 2020-06-18 | Stop reason: HOSPADM

## 2020-06-18 RX ORDER — ONDANSETRON 2 MG/ML
4 INJECTION INTRAMUSCULAR; INTRAVENOUS ONCE AS NEEDED
Status: COMPLETED | OUTPATIENT
Start: 2020-06-18 | End: 2020-06-18

## 2020-06-18 RX ORDER — NALOXONE HYDROCHLORIDE 0.4 MG/ML
80 INJECTION, SOLUTION INTRAMUSCULAR; INTRAVENOUS; SUBCUTANEOUS AS NEEDED
Status: DISCONTINUED | OUTPATIENT
Start: 2020-06-18 | End: 2020-06-18 | Stop reason: HOSPADM

## 2020-06-18 RX ORDER — POLYETHYLENE GLYCOL 3350 17 G/17G
238 POWDER, FOR SOLUTION ORAL ONCE
COMMUNITY
End: 2020-06-30

## 2020-06-18 RX ORDER — DOCUSATE SODIUM 100 MG/1
100 CAPSULE, LIQUID FILLED ORAL 2 TIMES DAILY
Status: DISCONTINUED | OUTPATIENT
Start: 2020-06-18 | End: 2020-06-22

## 2020-06-18 RX ORDER — CEFAZOLIN SODIUM/WATER 2 G/20 ML
2 SYRINGE (ML) INTRAVENOUS ONCE
Status: COMPLETED | OUTPATIENT
Start: 2020-06-18 | End: 2020-06-18

## 2020-06-18 RX ORDER — SODIUM CHLORIDE, SODIUM LACTATE, POTASSIUM CHLORIDE, CALCIUM CHLORIDE 600; 310; 30; 20 MG/100ML; MG/100ML; MG/100ML; MG/100ML
INJECTION, SOLUTION INTRAVENOUS CONTINUOUS
Status: DISCONTINUED | OUTPATIENT
Start: 2020-06-18 | End: 2020-06-18 | Stop reason: HOSPADM

## 2020-06-18 RX ORDER — KETAMINE HYDROCHLORIDE 50 MG/ML
INJECTION, SOLUTION, CONCENTRATE INTRAMUSCULAR; INTRAVENOUS AS NEEDED
Status: DISCONTINUED | OUTPATIENT
Start: 2020-06-18 | End: 2020-06-18 | Stop reason: SURG

## 2020-06-18 RX ORDER — ONDANSETRON 2 MG/ML
INJECTION INTRAMUSCULAR; INTRAVENOUS AS NEEDED
Status: DISCONTINUED | OUTPATIENT
Start: 2020-06-18 | End: 2020-06-18 | Stop reason: SURG

## 2020-06-18 RX ORDER — HEPARIN SODIUM 1000 [USP'U]/ML
80 INJECTION, SOLUTION INTRAVENOUS; SUBCUTANEOUS ONCE
Status: COMPLETED | OUTPATIENT
Start: 2020-06-19 | End: 2020-06-19

## 2020-06-18 RX ORDER — HYDROMORPHONE HYDROCHLORIDE 1 MG/ML
0.4 INJECTION, SOLUTION INTRAMUSCULAR; INTRAVENOUS; SUBCUTANEOUS EVERY 5 MIN PRN
Status: DISCONTINUED | OUTPATIENT
Start: 2020-06-18 | End: 2020-06-18 | Stop reason: HOSPADM

## 2020-06-18 RX ORDER — CEFAZOLIN SODIUM/WATER 2 G/20 ML
2 SYRINGE (ML) INTRAVENOUS EVERY 8 HOURS
Status: COMPLETED | OUTPATIENT
Start: 2020-06-18 | End: 2020-06-19

## 2020-06-18 RX ORDER — METRONIDAZOLE 500 MG/100ML
500 INJECTION, SOLUTION INTRAVENOUS ONCE
Status: COMPLETED | OUTPATIENT
Start: 2020-06-18 | End: 2020-06-18

## 2020-06-18 RX ORDER — HYDROMORPHONE HYDROCHLORIDE 1 MG/ML
0.4 INJECTION, SOLUTION INTRAMUSCULAR; INTRAVENOUS; SUBCUTANEOUS EVERY 2 HOUR PRN
Status: DISCONTINUED | OUTPATIENT
Start: 2020-06-18 | End: 2020-06-22

## 2020-06-18 RX ORDER — METOCLOPRAMIDE 10 MG/1
10 TABLET ORAL ONCE
Status: DISCONTINUED | OUTPATIENT
Start: 2020-06-18 | End: 2020-06-18 | Stop reason: HOSPADM

## 2020-06-18 RX ORDER — HALOPERIDOL 5 MG/ML
0.25 INJECTION INTRAMUSCULAR ONCE AS NEEDED
Status: DISCONTINUED | OUTPATIENT
Start: 2020-06-18 | End: 2020-06-18 | Stop reason: HOSPADM

## 2020-06-18 RX ORDER — MORPHINE SULFATE 4 MG/ML
4 INJECTION, SOLUTION INTRAMUSCULAR; INTRAVENOUS EVERY 10 MIN PRN
Status: DISCONTINUED | OUTPATIENT
Start: 2020-06-18 | End: 2020-06-18 | Stop reason: HOSPADM

## 2020-06-18 RX ORDER — ROCURONIUM BROMIDE 10 MG/ML
INJECTION, SOLUTION INTRAVENOUS AS NEEDED
Status: DISCONTINUED | OUTPATIENT
Start: 2020-06-18 | End: 2020-06-18 | Stop reason: SURG

## 2020-06-18 RX ORDER — FAMOTIDINE 20 MG/1
20 TABLET ORAL ONCE
Status: DISCONTINUED | OUTPATIENT
Start: 2020-06-18 | End: 2020-06-18 | Stop reason: HOSPADM

## 2020-06-18 RX ORDER — HEPARIN SODIUM AND DEXTROSE 10000; 5 [USP'U]/100ML; G/100ML
18 INJECTION INTRAVENOUS ONCE
Status: DISCONTINUED | OUTPATIENT
Start: 2020-06-19 | End: 2020-06-18

## 2020-06-18 RX ORDER — PROCHLORPERAZINE EDISYLATE 5 MG/ML
5 INJECTION INTRAMUSCULAR; INTRAVENOUS ONCE AS NEEDED
Status: DISCONTINUED | OUTPATIENT
Start: 2020-06-18 | End: 2020-06-18 | Stop reason: HOSPADM

## 2020-06-18 RX ADMIN — SODIUM CHLORIDE, SODIUM LACTATE, POTASSIUM CHLORIDE, CALCIUM CHLORIDE: 600; 310; 30; 20 INJECTION, SOLUTION INTRAVENOUS at 09:14:00

## 2020-06-18 RX ADMIN — EPHEDRINE SULFATE 5 MG: 50 INJECTION, SOLUTION INTRAVENOUS at 07:56:00

## 2020-06-18 RX ADMIN — KETAMINE HYDROCHLORIDE 10 MG: 50 INJECTION, SOLUTION, CONCENTRATE INTRAMUSCULAR; INTRAVENOUS at 10:07:00

## 2020-06-18 RX ADMIN — PHENYLEPHRINE HCL 100 MCG: 10 MG/ML VIAL (ML) INJECTION at 09:49:00

## 2020-06-18 RX ADMIN — SODIUM CHLORIDE, SODIUM LACTATE, POTASSIUM CHLORIDE, CALCIUM CHLORIDE: 600; 310; 30; 20 INJECTION, SOLUTION INTRAVENOUS at 10:26:00

## 2020-06-18 RX ADMIN — KETAMINE HYDROCHLORIDE 10 MG: 50 INJECTION, SOLUTION, CONCENTRATE INTRAMUSCULAR; INTRAVENOUS at 09:55:00

## 2020-06-18 RX ADMIN — EPHEDRINE SULFATE 5 MG: 50 INJECTION, SOLUTION INTRAVENOUS at 07:54:00

## 2020-06-18 RX ADMIN — ROCURONIUM BROMIDE 5 MG: 10 INJECTION, SOLUTION INTRAVENOUS at 07:49:00

## 2020-06-18 RX ADMIN — DEXAMETHASONE SODIUM PHOSPHATE 8 MG: 4 MG/ML VIAL (ML) INJECTION at 08:33:00

## 2020-06-18 RX ADMIN — EPHEDRINE SULFATE 10 MG: 50 INJECTION, SOLUTION INTRAVENOUS at 08:04:00

## 2020-06-18 RX ADMIN — ROCURONIUM BROMIDE 45 MG: 10 INJECTION, SOLUTION INTRAVENOUS at 07:57:00

## 2020-06-18 RX ADMIN — KETAMINE HYDROCHLORIDE 20 MG: 50 INJECTION, SOLUTION, CONCENTRATE INTRAMUSCULAR; INTRAVENOUS at 08:38:00

## 2020-06-18 RX ADMIN — MIDAZOLAM HYDROCHLORIDE 2 MG: 1 INJECTION INTRAMUSCULAR; INTRAVENOUS at 07:44:00

## 2020-06-18 RX ADMIN — LIDOCAINE HYDROCHLORIDE 50 MG: 10 INJECTION, SOLUTION EPIDURAL; INFILTRATION; INTRACAUDAL; PERINEURAL at 07:49:00

## 2020-06-18 RX ADMIN — CEFAZOLIN SODIUM/WATER 2 G: 2 G/20 ML SYRINGE (ML) INTRAVENOUS at 08:35:00

## 2020-06-18 RX ADMIN — SODIUM CHLORIDE: 9 INJECTION, SOLUTION INTRAVENOUS at 09:13:00

## 2020-06-18 RX ADMIN — EPHEDRINE SULFATE 5 MG: 50 INJECTION, SOLUTION INTRAVENOUS at 08:09:00

## 2020-06-18 RX ADMIN — SODIUM CHLORIDE: 9 INJECTION, SOLUTION INTRAVENOUS at 07:54:00

## 2020-06-18 RX ADMIN — ROCURONIUM BROMIDE 20 MG: 10 INJECTION, SOLUTION INTRAVENOUS at 08:32:00

## 2020-06-18 RX ADMIN — EPHEDRINE SULFATE 10 MG: 50 INJECTION, SOLUTION INTRAVENOUS at 08:18:00

## 2020-06-18 RX ADMIN — GLYCOPYRROLATE 0.2 MG: 0.2 INJECTION, SOLUTION INTRAMUSCULAR; INTRAVENOUS at 08:52:00

## 2020-06-18 RX ADMIN — SODIUM CHLORIDE, SODIUM LACTATE, POTASSIUM CHLORIDE, CALCIUM CHLORIDE: 600; 310; 30; 20 INJECTION, SOLUTION INTRAVENOUS at 08:44:00

## 2020-06-18 RX ADMIN — ROCURONIUM BROMIDE 10 MG: 10 INJECTION, SOLUTION INTRAVENOUS at 09:11:00

## 2020-06-18 RX ADMIN — ONDANSETRON 4 MG: 2 INJECTION INTRAMUSCULAR; INTRAVENOUS at 10:02:00

## 2020-06-18 RX ADMIN — METRONIDAZOLE 500 MG: 500 INJECTION, SOLUTION INTRAVENOUS at 08:03:00

## 2020-06-18 NOTE — H&P
History & Physical Examination    Patient Name: Tiffany Estrada  MRN: [id number]  [facility]: [id number]  YOB: 1955    Diagnosis: history of diverticulitis  Pre-operative colonoscopy       bisacodyl 5 MG Oral Tab EC, Take 10 mg by mouth once., Disp: Tartrate (LOPRESSOR) tab 25 mg, 25 mg, Oral, Once PRN  [MAR Hold] famoTIDine (PEPCID) tab 20 mg, 20 mg, Oral, Once  [MAR Hold] Metoclopramide HCl (REGLAN) tab 10 mg, 10 mg, Oral, Once    Rocuronium Bromide (ZEMURON) injection, , Intravenous, PRN  fentaNYL days.  Any changes noted above. Katy Lagos  6/18/2020  8:37 AM

## 2020-06-18 NOTE — ANESTHESIA PREPROCEDURE EVALUATION
Anesthesia PreOp Note    HPI:     Lora Valiente is a 72year old male who presents for preoperative consultation requested by: Jose A Drake MD    Date of Surgery: 6/18/2020    Procedure(s):  LAPAROSCOPIC COLON RESECTION - LEFT  COLONOSCOPY-SCREENING  I Bruce Goodson        bisacodyl 5 MG Oral Tab EC, Take 10 mg by mouth once., Disp: , Rfl: , 6/17/2020 at 1400  PEG 3350 Oral Powd Pack, Take 238 g by mouth once.  64oz night before surgery, Disp: , Rfl: , 6/17/2020 at tab 20 mg, 20 mg, Oral, Once, Rudy Bo MD  Metoclopramide HCl (REGLAN) tab 10 mg, 10 mg, Oral, Once, Rudy Bo MD  ceFAZolin sodium (ANCEF/KEFZOL) 2 GM/20ML premix IV syringe 2 g, 2 g, Intravenous, Once, Rudy Bo MD    And  metRONIDAZOLE i Caffeine Concern: Not Asked        Exercise: Not Asked        Seat Belt: Not Asked        Special Diet: Not Asked        Stress Concern: Not Asked        Weight Concern: Not Asked    Social History Narrative      Not on file      Available pre-op labs rev Consent Plan and Risks Discussed With:  Patient  Discussed plan with:  CRNA      I have informed Karlos Livers and/or legal guardian or family member of the nature of the anesthetic plan, benefits, risks including possible dental damage if relevant, hesham

## 2020-06-18 NOTE — INTERVAL H&P NOTE
Pre-op Diagnosis: Sigmoid diverticulitis [K57.32]    The above referenced H&P was reviewed by Glenroy Santos MD on 6/18/2020, the patient was examined and no significant changes have occurred in the patient's condition since the H&P was performed.   I discus

## 2020-06-18 NOTE — OPERATIVE REPORT
[facility] Endoscopy Report      Preoperative Diagnosis:  - history of diverticulitis      Postoperative Diagnosis:  - colon polyps x 2  - pan-diverticular disease  - internal hemorrhoids      Procedure:    Colonoscopy       Surgeon:  Pieter Santo

## 2020-06-18 NOTE — H&P
[facility] SURGERY    Progress Note    Karlos Livers Patient Status:  Outpatient    1/15/1955 MRN [id number]   Location [address] [facility] Attending No att. providers found   Hosp Day # 0 PCP Doug Soto,

## 2020-06-18 NOTE — ANESTHESIA POSTPROCEDURE EVALUATION
Patient: Nelia Metcalf    Procedure Summary     Date:  06/18/20 Room / Location:  [facility] MAIN OR 02 / [address] [facility] MAIN OR    Anesthesia Start:  6453 Anesthesia Stop:  1027    Procedures:       LAPAROSCOPIC COLON RESECTION - LEFT (N/A )      COLONOSCOPY-SCREENING (N

## 2020-06-18 NOTE — PLAN OF CARE
Problem: Patient Centered Care  Goal: Patient preferences are identified and integrated in the patient's plan of care  Description  Interventions:  - What would you like us to know as we care for you?  I have a history of bipolar disorder  - Provide timel toileting schedule  Outcome: Progressing     Problem: GASTROINTESTINAL - ADULT  Goal: Minimal or absence of nausea and vomiting  Description  INTERVENTIONS:  - Maintain adequate hydration with IV or PO as ordered and tolerated  - Nasogastric tube to low in admission. States he was diagnosed with bipolar disorder 20 years ago and it is well managed compared to what it was prior to diagnosis.  States he had suicidal thoughts briefly 3-4 days ago but has never had a plan put into place to go through with suicide

## 2020-06-18 NOTE — BRIEF OP NOTE
Pre-Operative Diagnosis: Sigmoid diverticulitis [K57.32]     Post-Operative Diagnosis: Sigmoid diverticulitis [K57.32]      Procedure Performed:   Procedure(s):  Laparoscopic sigmoid colon resection, proctosigmoidoscopy      Surgeon(s) and Role:  Panel 1:

## 2020-06-18 NOTE — PROGRESS NOTES
[facility]    Progress Note    Keiry Calero Patient Status:  Inpatient    1/15/1955 MRN [id number]   Location [address] UNIT Attending Thanh Nieto MD   [address] Day # 0 PCP DO Jarod Morley Schooling HEPARIN IN AM, START COUMADIN TO TONIGHT, DC HEPARIN WHEN INR 2-3. Paroxysmal supraventricular tachycardia (HCC)  CURRENT SR, MONITOR, CONT HOME MEDS.              Results:     Lab Results   Component Value Date    WBC 4.1 06/17/2020    HGB 13.9 06/17

## 2020-06-19 PROCEDURE — 99232 SBSQ HOSP IP/OBS MODERATE 35: CPT | Performed by: HOSPITALIST

## 2020-06-19 RX ORDER — POTASSIUM CHLORIDE 1.5 G/1.77G
40 POWDER, FOR SOLUTION ORAL EVERY 4 HOURS
Status: COMPLETED | OUTPATIENT
Start: 2020-06-19 | End: 2020-06-19

## 2020-06-19 RX ORDER — SODIUM CHLORIDE 9 MG/ML
INJECTION, SOLUTION INTRAVENOUS CONTINUOUS
Status: DISCONTINUED | OUTPATIENT
Start: 2020-06-19 | End: 2020-06-22

## 2020-06-19 NOTE — PROGRESS NOTES
[facility]    Progress Note    Westborough Shade Patient Status:  Inpatient    1/15/1955 MRN [id number]   Location [facility] 4W/SW/SE Attending Lazaro Santacruz MD   Hosp Day # 1 PCP Tisha Rodriguez DO     Assessment and Plan:     Pos Output (mL) ([REMOVED] Urethral Catheter) -- 2200 --    Drains  --  240  --    Output (mL) (Closed/Suction Drain Right Abdomen Bulb 7 Fr.) -- 240 --    Blood  --  50  --    Blood Output -- 50 --    Total Output -- 3090 200       Net I/O     -- 1801 -200

## 2020-06-19 NOTE — OPERATIVE REPORT
[facility]    PATIENT'S NAME: Slime Aurora   ATTENDING PHYSICIAN: Laura Harvey MD   OPERATING PHYSICIAN: Eric Grossman MD   PATIENT ACCOUNT#:   [id number]    LOCATION:  [address] [place] [address] RECORD #:   [id number]       DATE OF BIRTH:  0 wound protector for extraction and in performing the anastomosis. Abdomen irrigated. Proctosigmoidoscopy was performed. There was no evidence of an air leak. A Harvey-Connors drain was placed. Trocars removed. Fascia closed with 0 Vicryl.  Skin closed

## 2020-06-19 NOTE — PHYSICAL THERAPY NOTE
PT on hold today as PTT non-therapeutic range. Per RN pt to start heparin drip today. Will attempt tomorrow as appropriate.

## 2020-06-19 NOTE — PROGRESS NOTES
[facility]    Progress Note    Anny Almonte Patient Status:  Inpatient    1/15/1955 MRN [id number]   Location [address] [facility] Attending Agueda Cardona MD   [facility] Day # 1 PCP DO Mariola Davidson CONT COUMADIN  DC HEPARIN WHEN INR 2-3. Paroxysmal supraventricular tachycardia ([address]  CURRENT SR  CONT HOME MEDS.              Results:     Lab Results   Component Value Date    WBC 7.7 06/19/2020    HGB 12.2 (L) 06/19/2020    HCT 36.3 (L) 06/19/2020

## 2020-06-19 NOTE — PLAN OF CARE
Problem: Patient Centered Care  Goal: Patient preferences are identified and integrated in the patient's plan of care  Description  Interventions:  - What would you like us to know as we care for you?  I have a history of bipolar disorder  - Provide timel toileting schedule  Outcome: Progressing     Problem: GASTROINTESTINAL - ADULT  Goal: Minimal or absence of nausea and vomiting  Description  INTERVENTIONS:  - Maintain adequate hydration with IV or PO as ordered and tolerated  - Nasogastric tube to low in coumadin for dvt prophylaxis. Pt aware of POC.

## 2020-06-19 NOTE — PLAN OF CARE
Patient A/O x 4. VSS. Tolerating clear liquids. ERAS protocol. Singleton d/c'd at 0600 per MD order. Receiving IVF, Flagyl and Ancef. Right FARIDEH drain in place - dressing changed. Abdominal dressings CDI. Pain managed with scheduled tylenol XS and PRN oxycodone. deficits and behaviors that affect risk of falls.   - [place] fall precautions as indicated by assessment.  - Educate pt/family on patient safety including physical limitations  - Instruct pt to call for assistance with activity based on assessment  - Mod

## 2020-06-19 NOTE — CM/SW NOTE
ARMAND met with the pt. At bedside. The pt. Lives alone in an 8th floor apartment with elevator access. The pt. Reports being independent prior to admission with adls, ambulation and driving. The pt.  Has a supportive friend Shahab Monica that lives near him that

## 2020-06-20 PROCEDURE — 99024 POSTOP FOLLOW-UP VISIT: CPT | Performed by: SURGERY

## 2020-06-20 PROCEDURE — 99232 SBSQ HOSP IP/OBS MODERATE 35: CPT | Performed by: HOSPITALIST

## 2020-06-20 NOTE — OCCUPATIONAL THERAPY NOTE
OCCUPATIONAL THERAPY EVALUATION - INPATIENT     Room Number: 447B/447-B  Evaluation Date: 6/20/2020  Type of Evaluation: Initial  Presenting Problem: (Diverticulitis; s/p laporascopic sigmoid resection )    Physician Order: IP Consult to Occupational Thera impairment     GLASSES       Past Surgical History  Past Surgical History:   Procedure Laterality Date   • CATH TRANSCATHETER AORTIC VALVE REPLACEMENT  2001    [address]    • COLONOSCOPY-SCREENING N/A 6/18/2020    Performed by Rebecca Hackett, patient currently need…  -   Putting on and taking off regular lower body clothing?: None  -   Bathing (including washing, rinsing, drying)?: A Little  -   Toileting, which includes using toilet, bedpan or urinal? : None  -   Putting on and taking off regu

## 2020-06-20 NOTE — PLAN OF CARE
Pt alert x4, up in room and hallway amabulating well independent. [id number] for added pain relief, pt hesitant to increase diet, will confirm with surgeon. Afibrile. Right FARIDEH draining 30ml this am, emptied. Slight leaking at site. Dressing changed prn.     Ad precautions as indicated by assessment.  - Educate pt/family on patient safety including physical limitations  - Instruct pt to call for assistance with activity based on assessment  - Modify environment to reduce risk of injury  - Provide assistive device

## 2020-06-20 NOTE — PROGRESS NOTES
[facility]    Progress Note    Edmundo Greene Patient Status:  Inpatient    1/15/1955 MRN [id number]   Location [address] UNIT Attending Ralph Pike MD   [facility] Day # 2 PCP DO Arlyn Bella HEPARIN   CONT COUMADIN  DC HEPARIN WHEN INR 2-3. INR 1.3    Paroxysmal supraventricular tachycardia ([address]  CURRENT SR  CONT HOME MEDS.              Results:     Lab Results   Component Value Date    WBC 7.8 06/20/2020    HGB 13.3 06/20/2020    HCT 40.5 0

## 2020-06-20 NOTE — PROGRESS NOTES
[facility]    Progress Note    Brooke Cazares Patient Status:  Inpatient    1/15/1955 MRN [id number]   Location [facility] 4W/SW/SE Attending Leopold Mcalpine, MD   Hosp Day # 2 PCP Darvin Ahumada, DO     Assessment and Plan:       D 113/84 (BP Location: Right arm)   Pulse 71   Temp 97.3 °F (36.3 °C) (Oral)   Resp 18   Ht 6' 2\" (1.88 m)   Wt 221 lb (100.2 kg)   SpO2 96%   BMI 28.37 kg/m²   Gen:  NAD  Abd:  Soft, NT, ND, incisions C/D/I, FARIDEH serosanginous.     Results:     Lab Results

## 2020-06-20 NOTE — PLAN OF CARE
Problem: Patient Centered Care  Goal: Patient preferences are identified and integrated in the patient's plan of care  Description  Interventions:  - What would you like us to know as we care for you?  I have a history of bipolar disorder  - Provide timel toileting schedule  Outcome: Progressing     Problem: GASTROINTESTINAL - ADULT  Goal: Minimal or absence of nausea and vomiting  Description  INTERVENTIONS:  - Maintain adequate hydration with IV or PO as ordered and tolerated  - Nasogastric tube to low in

## 2020-06-20 NOTE — PHYSICAL THERAPY NOTE
PHYSICAL THERAPY QUICK EVALUATION - INPATIENT    Room Number: 447B/447-B  Evaluation Date: 6/20/2020  Presenting Problem: diverticulitis s/p laparoscopic sigmoid resection  Physician Order: PT Eval and Treat    Problem List  Principal Problem:    Lashawn Brian mechanics;Repositioning   RANGE OF MOTION AND STRENGTH ASSESSMENT  Upper extremity ROM and strength are within functional limits     Lower extremity ROM is within functional limits     Lower extremity strength is within functional limits     NEUROLOGICAL F rest, reports abdominal tightness in standing position. Reviewed abdominal precautions with emphasis on log roll technique for bed mobility, and transfer technique for car.  Patient able to perform bed mobility, transfers, and ambulation with Ivanna no device

## 2020-06-21 PROCEDURE — 99233 SBSQ HOSP IP/OBS HIGH 50: CPT | Performed by: HOSPITALIST

## 2020-06-21 RX ORDER — POTASSIUM CHLORIDE 20 MEQ/1
40 TABLET, EXTENDED RELEASE ORAL EVERY 4 HOURS
Status: COMPLETED | OUTPATIENT
Start: 2020-06-21 | End: 2020-06-21

## 2020-06-21 NOTE — PROGRESS NOTES
[facility]    Progress Note    [facility] Patient Status:  Inpatient    1/15/1955 MRN [id number]   Location [address] UNIT Attending Chhaya Ewing MD   [facility] Day # 3 PCP DO Felicia Huffman with prosthetic valve  CONT HEPARIN DRIP   CONT COUMADIN  DC HEPARIN WHEN INR 2-3. INR 1.4 today    Paroxysmal supraventricular tachycardia  CURRENT SR  CONT HOME MEDS.      Bipolar disorder  - cont home mood stabilzers          Results:     Lab Results

## 2020-06-21 NOTE — PROGRESS NOTES
Patient alert and oriented X4, vitals stable. Oxy IR for pain control. Continues with heparin drip which was restarted at 0730 this am after being held for 1 hour. Repeat ptt 84.3, next draw in the morning. Ambulating in room and carbone. Voiding freely.  Wolf moran

## 2020-06-21 NOTE — PROGRESS NOTES
[facility]    Progress Note    Sammuel Slice Patient Status:  Inpatient    1/15/1955 MRN [id number]   Location [facility] 4W/SW/SE Attending Pro Villarreal MD   [facility] Day # 3 PCP Joan Avila DO     Assessment and Plan:       Diver 06/21/2020     06/21/2020    K 3.6 06/21/2020     06/21/2020    CO2 29.0 06/21/2020    GLU 88 06/21/2020    CA 8.4 (L) 06/21/2020    ALB 2.9 (L) 02/11/2020    ALKPHO 53 02/11/2020    BILT 0.5 02/11/2020    TP 6.6 02/11/2020    AST 21 02/11/2020

## 2020-06-21 NOTE — PLAN OF CARE
Problem: Patient Centered Care  Goal: Patient preferences are identified and integrated in the patient's plan of care  Description  Interventions:  - What would you like us to know as we care for you?  I have a history of bipolar disorder  - Provide timely, toileting schedule  Outcome: Progressing     Problem: GASTROINTESTINAL - ADULT  Goal: Minimal or absence of nausea and vomiting  Description  INTERVENTIONS:  - Maintain adequate hydration with IV or PO as ordered and tolerated  - Nasogastric tube to low in

## 2020-06-22 ENCOUNTER — APPOINTMENT (OUTPATIENT)
Dept: CARDIOLOGY | Age: 65
End: 2020-06-22
Attending: INTERNAL MEDICINE

## 2020-06-22 ENCOUNTER — ANTI-COAG (OUTPATIENT)
Dept: CARDIOLOGY | Age: 65
End: 2020-06-22

## 2020-06-22 VITALS
WEIGHT: 221 LBS | RESPIRATION RATE: 16 BRPM | HEIGHT: 74 IN | OXYGEN SATURATION: 96 % | HEART RATE: 64 BPM | TEMPERATURE: 98 F | SYSTOLIC BLOOD PRESSURE: 110 MMHG | DIASTOLIC BLOOD PRESSURE: 84 MMHG | BODY MASS INDEX: 28.36 KG/M2

## 2020-06-22 DIAGNOSIS — Z95.2 HISTORY OF MECHANICAL AORTIC VALVE REPLACEMENT: ICD-10-CM

## 2020-06-22 PROCEDURE — 99239 HOSP IP/OBS DSCHRG MGMT >30: CPT | Performed by: HOSPITALIST

## 2020-06-22 RX ORDER — ENOXAPARIN SODIUM 100 MG/ML
100 INJECTION SUBCUTANEOUS EVERY 12 HOURS
Qty: 14 ML | Refills: 0 | Status: SHIPPED | OUTPATIENT
Start: 2020-06-22 | End: 2020-06-29

## 2020-06-22 RX ORDER — METOPROLOL SUCCINATE 25 MG/1
12.5 TABLET, EXTENDED RELEASE ORAL NIGHTLY
Refills: 0 | Status: SHIPPED | COMMUNITY
Start: 2020-06-22 | End: 2020-06-23

## 2020-06-22 RX ORDER — METOPROLOL SUCCINATE 25 MG/1
25 TABLET, EXTENDED RELEASE ORAL DAILY
Refills: 0 | Status: SHIPPED | COMMUNITY
Start: 2020-06-22 | End: 2020-06-23

## 2020-06-22 RX ORDER — OXYCODONE HYDROCHLORIDE 5 MG/1
5 TABLET ORAL EVERY 8 HOURS PRN
Qty: 15 TABLET | Refills: 0 | Status: SHIPPED | OUTPATIENT
Start: 2020-06-22 | End: 2020-06-27

## 2020-06-22 RX ORDER — NALOXONE HYDROCHLORIDE 4 MG/.1ML
4 SPRAY, METERED NASAL AS NEEDED
Qty: 1 KIT | Refills: 0 | Status: SHIPPED | OUTPATIENT
Start: 2020-06-22 | End: 2020-09-21

## 2020-06-22 RX ORDER — PSEUDOEPHEDRINE HCL 30 MG
100 TABLET ORAL 2 TIMES DAILY
Qty: 30 CAPSULE | Refills: 0 | Status: SHIPPED | OUTPATIENT
Start: 2020-06-22 | End: 2020-06-30

## 2020-06-22 RX ORDER — ENOXAPARIN SODIUM 100 MG/ML
1 INJECTION SUBCUTANEOUS EVERY 12 HOURS SCHEDULED
Status: DISCONTINUED | OUTPATIENT
Start: 2020-06-22 | End: 2020-06-22

## 2020-06-22 NOTE — DISCHARGE SUMMARY
[facility]    Discharge Summary    Matt Blum Patient Status:  Inpatient    1/15/1955 MRN [id number]   Location [facility] 4W/SW/SE Attending Donya Back MD   [facility] Day # 4 PCP Lazaro Leo DO     Date of Admission:  Lovenox.   He will speak with his cardiologist or his primary care physician to manage this.  -Planning laparoscopic sigmoid colon resection May 21  [facility] SUSAN Course:   Diverticulitis  RECURRENT SIGMOID DIVERTICULITIS  S/P Laparoscopic sigmoid colon res how and when to take each. Take 1 tablet (25 mg total) by mouth daily. Refills:  0        CONTINUE taking these medications      Instructions Prescription details   bisacodyl 5 MG Tbec  Commonly known as:  DULCOLAX      Take 10 mg by mouth once.    [facility] [address], [address] [phone], [phone]  [address] [place] [address], [address] [address]    Phone:  [phone]   · docusate sodium 100 MG Caps  · enoxaparin sodium 100 MG/ML Soln  · Naloxone HCl 4 M

## 2020-06-22 NOTE — PLAN OF CARE
Problem: Patient Centered Care  Goal: Patient preferences are identified and integrated in the patient's plan of care  Description  Interventions:  - What would you like us to know as we care for you?  I have a history of bipolar disorder  - Provide timel strengthening/mobility  - Encourage toileting schedule  Outcome: Adequate for Discharge     Problem: GASTROINTESTINAL - ADULT  Goal: Minimal or absence of nausea and vomiting  Description  INTERVENTIONS:  - Maintain adequate hydration with IV or PO as orde medications; incision care and when to seek medical attention. Escorted to car by RN. No distress noted.

## 2020-06-22 NOTE — PROGRESS NOTES
[facility]    Progress Note    Mayford Case Patient Status:  Inpatient    1/15/1955 MRN [id number]   Location [facility] 4W/SW/SE Attending Iasbela Miranda MD   [facility] Day # 4 PCP Akira Srivastava DO     Assessment and Plan:     Pos 02/11/2020    PTT 95.4 (H) 06/22/2020    INR 1.56 (H) 06/22/2020    PT 25.7 (H) 09/06/2019    MG 2.0 06/21/2020    PHOS 3.0 06/19/2020                     Bennett Ramírez MD  6/22/2020

## 2020-06-23 ENCOUNTER — TELEPHONE (OUTPATIENT)
Dept: FAMILY MEDICINE CLINIC | Facility: CLINIC | Age: 65
End: 2020-06-23

## 2020-06-23 ENCOUNTER — ANTI-COAG (OUTPATIENT)
Dept: CARDIOLOGY | Age: 65
End: 2020-06-23

## 2020-06-23 ENCOUNTER — PATIENT OUTREACH (OUTPATIENT)
Dept: CASE MANAGEMENT | Age: 65
End: 2020-06-23

## 2020-06-23 DIAGNOSIS — Z95.2 HISTORY OF MECHANICAL AORTIC VALVE REPLACEMENT: ICD-10-CM

## 2020-06-23 DIAGNOSIS — K57.20 DIVERTICULITIS OF LARGE INTESTINE WITH ABSCESS, UNSPECIFIED BLEEDING STATUS: ICD-10-CM

## 2020-06-23 DIAGNOSIS — Z02.9 ENCOUNTERS FOR UNSPECIFIED ADMINISTRATIVE PURPOSE: ICD-10-CM

## 2020-06-23 LAB — INR PPP: 2

## 2020-06-23 PROCEDURE — 1111F DSCHRG MED/CURRENT MED MERGE: CPT

## 2020-06-23 NOTE — TELEPHONE ENCOUNTER
Spoke to pt for TCM today to follow up on recent hospitalization and care s/p laparoscopic colon resection. Pt does not have HFU appt scheduled at this time. TCM/HFU appt recommended by 7/6/2020 as pt is a moderate risk for readmission. Please advise.

## 2020-06-23 NOTE — PROGRESS NOTES
Attempted to reach the patient to complete [facility] FU call. Left a message for the pt to call [facility] back at, [phone].

## 2020-06-23 NOTE — PROGRESS NOTES
The patient is requesting assistance with scheduling for the following specialist:       Follow up with Alberto Garcia MD   Surgery  [facility], [place] [address]  [facility] [address]  [phone]      Please contact the patient on the number liste

## 2020-06-23 NOTE — PROGRESS NOTES
Initial Post Discharge Follow Up   Discharge Date: 6/22/20  Contact Date: 6/23/2020    Consent Verification:  Assessment Completed With: Patient  HIPAA Verified?   Yes    Discharge Dx:     Diverticulitis  S/P Laparoscopic sigmoid colon resection  S/P aor 8/10  o Alleviating factors: rest  o Aggravating factors: twisting/ position changes   o Is the pain manageable at home? yes; The patient prefers to continue with medications as prescribed. If pain worsens, the patient will be contacting Dr. Krzysztof Ceballos.    • How 1 kit 0   • bisacodyl 5 MG Oral Tab EC Take 10 mg by mouth once. • PEG 3350 Oral Powd Pack Take 238 g by mouth once. 64oz night before surgery     • Multiple Vitamins-Minerals (MULTIVITAL) Oral Tab Take 1 tablet by mouth daily.      • Lactobacillus (PRO clinic. The patient has discontinued Lovenox injections per coumadin clinic instructions. If no, do you need help with this?    no    If yes, have you started these services? yes    DME ordered at D/C? Yes   What?  Incentive Spirometer, Lovenox Injection reviewed/discussed/and reconciled against outpatient medications with patient,  and orders reviewed and discussed. Any changes or updates to medications and or orders sent to PCP.

## 2020-06-24 ENCOUNTER — TELEPHONE (OUTPATIENT)
Dept: SURGERY | Facility: CLINIC | Age: 65
End: 2020-06-24

## 2020-06-24 ENCOUNTER — APPOINTMENT (OUTPATIENT)
Dept: CARDIOLOGY | Age: 65
End: 2020-06-24

## 2020-06-24 RX ORDER — WARFARIN SODIUM 10 MG/1
TABLET ORAL
Qty: 90 TABLET | Refills: 0 | OUTPATIENT
Start: 2020-06-24

## 2020-06-24 RX ORDER — WARFARIN SODIUM 7.5 MG/1
TABLET ORAL
Qty: 90 TABLET | Refills: 0 | OUTPATIENT
Start: 2020-06-24

## 2020-06-24 NOTE — TELEPHONE ENCOUNTER
age, Stephanie Blind, [place]  You 5 minutes ago (9:13 AM)      How about my TCM slot on 6/30? He really needs to be seen sooner. Routing comment      Called pt back and rescheduled for 06/30/20 and pt verbalized understanding.

## 2020-06-24 NOTE — TELEPHONE ENCOUNTER
Per pt is experiencing intense pain and drain is leaking. Prefers to speak to Dr. Rebecca Hackett.  Please advise

## 2020-06-24 NOTE — TELEPHONE ENCOUNTER
Appointment given for 6/29/2020 for a post op visit. Patient states there is minor drainage from the drain site, and his pain has gone away. Denies fevers.

## 2020-06-24 NOTE — TELEPHONE ENCOUNTER
Pharmacy called requesting clarification of Warfarin medication 7.5 mg x 5 days and 10 mg x 2 days. Asked Dr. Tatum Self and advised to call pt because currently being managed by Coumadin clinic. Left voicemail to call Clinic with information of Warfarin. Called Coumadin Clinic no answer.     Called Pharmacy and informed of medication that was ordered (see below) and to hold warfarin until this RN speaks with patient or Coumadin Clinic.  enoxaparin sodium 100 MG/ML Subcutaneous Solution 14 mL 0 6/22/2020 6/29/2020    Sig - Route: Inject 1 mL (100 mg total) into the skin Q12H for 7 days. - Subcutaneous    Patient not taking: Reported on 6/23/2020         Sent to pharmacy as: Enoxaparin Sodium 100 MG/ML Subcutaneous Solution (LOVENOX)    E-Prescribing Status: Receipt confirmed by pharmacy (6/22/2020 10:22 AM CDT)

## 2020-06-24 NOTE — TELEPHONE ENCOUNTER
Called pt and c/o wound drain is leaking, has a friend that helps and will return in about 4 hours. Asked pt if he needs home assistance and pt declined.   Pt currently in pain across abd from surgery feels knot in stomach and constipation but per pt all r

## 2020-06-25 RX ORDER — WARFARIN SODIUM 7.5 MG/1
7.5 TABLET ORAL DAILY
Qty: 90 TABLET | Refills: 1 | Status: SHIPPED | OUTPATIENT
Start: 2020-06-25 | End: 2020-11-02 | Stop reason: SDUPTHER

## 2020-06-27 ENCOUNTER — TELEPHONE (OUTPATIENT)
Dept: FAMILY MEDICINE CLINIC | Facility: CLINIC | Age: 65
End: 2020-06-27

## 2020-06-27 RX ORDER — OXYCODONE HYDROCHLORIDE 5 MG/1
5 TABLET ORAL EVERY 8 HOURS PRN
Qty: 15 TABLET | Refills: 0 | Status: SHIPPED | OUTPATIENT
Start: 2020-06-27 | End: 2020-06-30 | Stop reason: ALTCHOICE

## 2020-06-27 NOTE — TELEPHONE ENCOUNTER
Phone call from patient. Patient is postoperative day #8 approximately and was given oxycodone 5 mg #15 at discharge. He has had no ill effect from this medication. He ran out of the medicine and is having persistent pain.   According to patient he spoke

## 2020-06-28 ENCOUNTER — TELEPHONE (OUTPATIENT)
Dept: SURGERY | Facility: CLINIC | Age: 65
End: 2020-06-28

## 2020-06-29 ENCOUNTER — ANTI-COAG (OUTPATIENT)
Dept: CARDIOLOGY | Age: 65
End: 2020-06-29

## 2020-06-29 DIAGNOSIS — Z95.2 HISTORY OF MECHANICAL AORTIC VALVE REPLACEMENT: ICD-10-CM

## 2020-06-29 LAB — INR PPP: 3.1

## 2020-06-29 NOTE — TELEPHONE ENCOUNTER
Pt called 6/27/20 1400 requesting more narcotic. Surgery was 6/18/20 - sigmoid colectomy for diverticulitis, drainage from drain site has improved, no fever or nausea, more formed BM, no incisional redness or swelling.   I suggested tylenol or motrin, as n

## 2020-06-30 ENCOUNTER — OFFICE VISIT (OUTPATIENT)
Dept: SURGERY | Facility: CLINIC | Age: 65
End: 2020-06-30
Payer: MEDICARE

## 2020-06-30 ENCOUNTER — OFFICE VISIT (OUTPATIENT)
Dept: FAMILY MEDICINE CLINIC | Facility: CLINIC | Age: 65
End: 2020-06-30
Payer: MEDICARE

## 2020-06-30 VITALS
HEART RATE: 56 BPM | WEIGHT: 217 LBS | SYSTOLIC BLOOD PRESSURE: 122 MMHG | HEIGHT: 74 IN | OXYGEN SATURATION: 98 % | BODY MASS INDEX: 27.85 KG/M2 | DIASTOLIC BLOOD PRESSURE: 76 MMHG

## 2020-06-30 VITALS — BODY MASS INDEX: 28.36 KG/M2 | WEIGHT: 221 LBS | HEIGHT: 74 IN

## 2020-06-30 DIAGNOSIS — K57.32 SIGMOID DIVERTICULITIS: Primary | ICD-10-CM

## 2020-06-30 DIAGNOSIS — Z79.01 REQUIRES LIFELONG WARFARIN THERAPY: ICD-10-CM

## 2020-06-30 DIAGNOSIS — Z90.49 STATUS POST PARTIAL RESECTION OF COLON: ICD-10-CM

## 2020-06-30 DIAGNOSIS — Z98.890 POST-OPERATIVE STATE: Primary | ICD-10-CM

## 2020-06-30 PROCEDURE — 99495 TRANSJ CARE MGMT MOD F2F 14D: CPT | Performed by: FAMILY MEDICINE

## 2020-06-30 PROCEDURE — 99024 POSTOP FOLLOW-UP VISIT: CPT | Performed by: SURGERY

## 2020-06-30 RX ORDER — OXYCODONE HYDROCHLORIDE 5 MG/1
5 TABLET ORAL EVERY 6 HOURS PRN
Qty: 20 TABLET | Refills: 0 | Status: SHIPPED | OUTPATIENT
Start: 2020-06-30 | End: 2020-09-21

## 2020-06-30 RX ORDER — SENNA AND DOCUSATE SODIUM 50; 8.6 MG/1; MG/1
2 TABLET, FILM COATED ORAL 2 TIMES DAILY
COMMUNITY
End: 2020-06-30 | Stop reason: ALTCHOICE

## 2020-06-30 NOTE — PROGRESS NOTES
Postoperative Patient Follow-up      6/30/2020    HPI  Patient presents with:  Post-Op: Post op colon resection 6/18/2020. Nelia Metcalf is a 72year old male post laparoscopic left sigmoid colectomy. He is on anticoagulation for mechanical valve.

## 2020-06-30 NOTE — H&P
History and physical    Mr. Suleman Avendano is a 51-year-old gentleman with history of recurrent diverticulitis predominantly of the sigmoid and descending colon. He was seen and evaluated by GI as well as myself and we had recommended elective resection.   He pre

## 2020-06-30 NOTE — PROGRESS NOTES
HPI:    Topher Grijalva is a 72year old male here today for hospital follow up.    He was discharged from Inpatient hospital, [facility]  to Home   Admission Date: 6/18/20   Discharge Date: 6/22/20  Hospital Discharge Diagnoses (since 5/31/2020) milk of magnesia at times to help with bowel movements. Still eating a soft diet. Taking Warfarin 7.5 mg daily but was told to take 5 mg a few days a week this week as the INR was 3.1 recently.      Allergies:  He is allergic to cat hair extract and do He family history includes Cancer in his father; Stroke in his mother. He  reports that he has never smoked. He has never used smokeless tobacco. He reports that he does not drink alcohol or use drugs.      ROS:   GENERAL: weight stable, energy stable, intact, motor and sensory are grossly intact    ASSESSMENT/ PLAN:   Diagnoses and all orders for this visit:    Sigmoid diverticulitis    Status post partial resection of colon    Requires lifelong warfarin therapy        No orders of the defined types wer

## 2020-07-06 LAB — INR PPP: 3.8

## 2020-07-07 ENCOUNTER — ANTI-COAG (OUTPATIENT)
Dept: CARDIOLOGY | Age: 65
End: 2020-07-07

## 2020-07-07 DIAGNOSIS — Z95.2 HISTORY OF MECHANICAL AORTIC VALVE REPLACEMENT: ICD-10-CM

## 2020-07-13 ENCOUNTER — ANTI-COAG (OUTPATIENT)
Dept: CARDIOLOGY | Age: 65
End: 2020-07-13

## 2020-07-13 DIAGNOSIS — Z95.2 HISTORY OF MECHANICAL AORTIC VALVE REPLACEMENT: ICD-10-CM

## 2020-07-13 LAB — INR PPP: 2.6

## 2020-07-20 ENCOUNTER — ANTI-COAG (OUTPATIENT)
Dept: CARDIOLOGY | Age: 65
End: 2020-07-20

## 2020-07-20 DIAGNOSIS — Z95.2 HISTORY OF MECHANICAL AORTIC VALVE REPLACEMENT: ICD-10-CM

## 2020-07-20 LAB — INR PPP: 3

## 2020-08-01 LAB — INR PPP: 2.2

## 2020-08-03 ENCOUNTER — ANTI-COAG (OUTPATIENT)
Dept: CARDIOLOGY | Age: 65
End: 2020-08-03

## 2020-08-03 DIAGNOSIS — Z95.2 HISTORY OF MECHANICAL AORTIC VALVE REPLACEMENT: ICD-10-CM

## 2020-08-13 LAB — INR PPP: 2

## 2020-08-14 ENCOUNTER — ANTI-COAG (OUTPATIENT)
Dept: CARDIOLOGY | Age: 65
End: 2020-08-14

## 2020-08-14 DIAGNOSIS — Z95.2 HISTORY OF MECHANICAL AORTIC VALVE REPLACEMENT: ICD-10-CM

## 2020-08-29 LAB — INR PPP: 1.7

## 2020-08-31 ENCOUNTER — ANTI-COAG (OUTPATIENT)
Dept: CARDIOLOGY | Age: 65
End: 2020-08-31

## 2020-08-31 DIAGNOSIS — Z95.2 HISTORY OF MECHANICAL AORTIC VALVE REPLACEMENT: ICD-10-CM

## 2020-09-14 ENCOUNTER — ANTI-COAG (OUTPATIENT)
Dept: CARDIOLOGY | Age: 65
End: 2020-09-14

## 2020-09-14 DIAGNOSIS — Z95.2 HISTORY OF MECHANICAL AORTIC VALVE REPLACEMENT: ICD-10-CM

## 2020-09-14 LAB — INR PPP: 2.3

## 2020-09-21 ENCOUNTER — TELEPHONE (OUTPATIENT)
Dept: FAMILY MEDICINE CLINIC | Facility: CLINIC | Age: 65
End: 2020-09-21

## 2020-09-21 ENCOUNTER — OFFICE VISIT (OUTPATIENT)
Dept: FAMILY MEDICINE CLINIC | Facility: CLINIC | Age: 65
End: 2020-09-21
Payer: MEDICARE

## 2020-09-21 ENCOUNTER — OFFICE VISIT (OUTPATIENT)
Dept: SURGERY | Facility: CLINIC | Age: 65
End: 2020-09-21
Payer: MEDICARE

## 2020-09-21 VITALS
WEIGHT: 233 LBS | HEIGHT: 74 IN | HEART RATE: 88 BPM | SYSTOLIC BLOOD PRESSURE: 110 MMHG | BODY MASS INDEX: 29.9 KG/M2 | TEMPERATURE: 98 F | OXYGEN SATURATION: 98 % | DIASTOLIC BLOOD PRESSURE: 74 MMHG

## 2020-09-21 DIAGNOSIS — Z79.01 REQUIRES LIFELONG WARFARIN THERAPY: ICD-10-CM

## 2020-09-21 DIAGNOSIS — F34.0 CYCLOTHYMIA: ICD-10-CM

## 2020-09-21 DIAGNOSIS — J01.01 ACUTE RECURRENT MAXILLARY SINUSITIS: Primary | ICD-10-CM

## 2020-09-21 DIAGNOSIS — R10.30 LOWER ABDOMINAL PAIN: Primary | ICD-10-CM

## 2020-09-21 DIAGNOSIS — J30.1 SEASONAL ALLERGIC RHINITIS DUE TO POLLEN: ICD-10-CM

## 2020-09-21 PROCEDURE — G0008 ADMIN INFLUENZA VIRUS VAC: HCPCS | Performed by: FAMILY MEDICINE

## 2020-09-21 PROCEDURE — 99213 OFFICE O/P EST LOW 20 MIN: CPT | Performed by: SURGERY

## 2020-09-21 PROCEDURE — 90662 IIV NO PRSV INCREASED AG IM: CPT | Performed by: FAMILY MEDICINE

## 2020-09-21 PROCEDURE — 99214 OFFICE O/P EST MOD 30 MIN: CPT | Performed by: FAMILY MEDICINE

## 2020-09-21 PROCEDURE — G0463 HOSPITAL OUTPT CLINIC VISIT: HCPCS | Performed by: SURGERY

## 2020-09-21 RX ORDER — DIVALPROEX SODIUM 250 MG/1
TABLET, EXTENDED RELEASE ORAL
COMMUNITY
Start: 2020-09-11 | End: 2020-12-09

## 2020-09-21 RX ORDER — AMOXICILLIN 500 MG/1
CAPSULE ORAL
COMMUNITY
Start: 2020-08-11 | End: 2020-12-09

## 2020-09-21 RX ORDER — AZELASTINE 1 MG/ML
1 SPRAY, METERED NASAL 2 TIMES DAILY
Qty: 1 BOTTLE | Refills: 1 | Status: SHIPPED | OUTPATIENT
Start: 2020-09-21 | End: 2020-12-09

## 2020-09-21 RX ORDER — DIVALPROEX SODIUM 500 MG/1
TABLET, EXTENDED RELEASE ORAL
COMMUNITY
Start: 2020-08-30

## 2020-09-21 NOTE — TELEPHONE ENCOUNTER
Headache, post nasal drip, nasal congestion. Symptoms x 5-6 days. Pt had recently negative covid19 test. Pt scheduled with MP today for r/o sinusitis and flu vaccine. Pt requesting lab orders for valproic acid levels and ammonia levels. MP notified.  Pt golden

## 2020-09-21 NOTE — PROGRESS NOTES
HPI:    Patient ID: Didier Gallagher is a 72year old male who presents for sinusitis, flu shot, and blood work. HPI  Sinusitis:   Occurs every year but typically in January and b/l. Feels right-sided sinus pain and clogged. Postnasal drip.    Has swo 1/2 TABLET EVERY EVENING 135 tablet 0   • Multiple Vitamins-Minerals (MULTIVITAL) Oral Tab Take 1 tablet by mouth daily. • Lactobacillus (PROBIOTIC ACIDOPHILUS OR) Take by mouth daily. • Vitamin B-12 1000 MCG Oral Tab Take 1,000 mcg by mouth daily. Eyes: Pupils are equal, round, and reactive to light. Conjunctivae and EOM are normal. Right eye exhibits no discharge. Left eye exhibits no discharge. No scleral icterus. Neck: Normal range of motion. Neck supple. No JVD present.  No thyromegaly presen HCl 0.1 % Nasal Solution 1 Bottle 1     Si spray by Nasal route 2 (two) times daily.        Imaging & Referrals:  FLU VACC HIGH DOSE PRSV FREE       Paradise Cosby DO  OJ#[id number]

## 2020-09-21 NOTE — PROGRESS NOTES
[facility]GERY    Progress Note    Jorden De Paz Patient Status:  Outpatient    1/15/1955 MRN [id number]   Location [address] [facility] Attending No att. providers found   Hosp Day # 0 PCP Emily Dawkins,

## 2020-09-21 NOTE — TELEPHONE ENCOUNTER
Patient requesting an call back states experiencing an Sinus Infection will like an medication sent to pharmacy

## 2020-09-22 ENCOUNTER — TELEPHONE (OUTPATIENT)
Dept: OBGYN CLINIC | Facility: CLINIC | Age: 65
End: 2020-09-22

## 2020-09-23 ENCOUNTER — TELEPHONE (OUTPATIENT)
Dept: OBGYN CLINIC | Facility: CLINIC | Age: 65
End: 2020-09-23

## 2020-09-23 NOTE — TELEPHONE ENCOUNTER
Called pharmacy and unsure if it is 10mg daily of Warfarin. Called pt and currently on 7.5 mg warfarin daily. Called pharmacy to inform that pt taking 7.5 mg warfarin and if not due for refill will close encounter.   Also reviewed yesterday encounter an

## 2020-09-23 NOTE — TELEPHONE ENCOUNTER
Refill for  Warfarin 10mg  Send to  [facility] [address]  - [place], [place]  [phone], [phone]

## 2020-09-24 ENCOUNTER — ANTI-COAG (OUTPATIENT)
Dept: CARDIOLOGY | Age: 65
End: 2020-09-24

## 2020-09-24 DIAGNOSIS — Z95.2 HISTORY OF MECHANICAL AORTIC VALVE REPLACEMENT: ICD-10-CM

## 2020-09-24 LAB — INR PPP: 2.2

## 2020-10-12 LAB — INR PPP: 1.7

## 2020-10-13 ENCOUNTER — ANTI-COAG (OUTPATIENT)
Dept: CARDIOLOGY | Age: 65
End: 2020-10-13

## 2020-10-13 DIAGNOSIS — Z95.2 HISTORY OF MECHANICAL AORTIC VALVE REPLACEMENT: ICD-10-CM

## 2020-10-21 ENCOUNTER — ANTI-COAG (OUTPATIENT)
Dept: CARDIOLOGY | Age: 65
End: 2020-10-21

## 2020-10-21 DIAGNOSIS — Z95.2 HISTORY OF MECHANICAL AORTIC VALVE REPLACEMENT: ICD-10-CM

## 2020-10-21 LAB — INR PPP: 2.6

## 2020-11-02 ENCOUNTER — ANTI-COAG (OUTPATIENT)
Dept: CARDIOLOGY | Age: 65
End: 2020-11-02

## 2020-11-02 DIAGNOSIS — Z95.2 HISTORY OF MECHANICAL AORTIC VALVE REPLACEMENT: ICD-10-CM

## 2020-11-02 DIAGNOSIS — Z95.2 HISTORY OF MECHANICAL AORTIC VALVE REPLACEMENT: Primary | ICD-10-CM

## 2020-11-02 LAB — INR PPP: 2.6

## 2020-11-02 RX ORDER — WARFARIN SODIUM 7.5 MG/1
7.5 TABLET ORAL DAILY
Qty: 90 TABLET | Refills: 1 | Status: SHIPPED | OUTPATIENT
Start: 2020-11-02 | End: 2020-11-13 | Stop reason: SDUPTHER

## 2020-11-12 LAB — INR PPP: 2.3

## 2020-11-13 ENCOUNTER — ANTI-COAG (OUTPATIENT)
Dept: CARDIOLOGY | Age: 65
End: 2020-11-13

## 2020-11-13 DIAGNOSIS — Z95.2 HISTORY OF MECHANICAL AORTIC VALVE REPLACEMENT: ICD-10-CM

## 2020-11-13 RX ORDER — WARFARIN SODIUM 7.5 MG/1
7.5 TABLET ORAL DAILY
Qty: 90 TABLET | Refills: 1 | Status: SHIPPED | OUTPATIENT
Start: 2020-11-13

## 2020-11-19 ENCOUNTER — ANTI-COAG (OUTPATIENT)
Dept: CARDIOLOGY | Age: 65
End: 2020-11-19

## 2020-11-19 DIAGNOSIS — Z95.2 HISTORY OF MECHANICAL AORTIC VALVE REPLACEMENT: ICD-10-CM

## 2020-11-19 LAB — INR PPP: 2.3

## 2020-12-06 LAB — INR PPP: 2.3

## 2020-12-07 ENCOUNTER — ANTI-COAG (OUTPATIENT)
Dept: CARDIOLOGY | Age: 65
End: 2020-12-07

## 2020-12-07 DIAGNOSIS — Z95.2 HISTORY OF MECHANICAL AORTIC VALVE REPLACEMENT: ICD-10-CM

## 2020-12-09 ENCOUNTER — LAB ENCOUNTER (OUTPATIENT)
Dept: LAB | Age: 65
End: 2020-12-09
Attending: FAMILY MEDICINE
Payer: MEDICARE

## 2020-12-09 ENCOUNTER — OFFICE VISIT (OUTPATIENT)
Dept: FAMILY MEDICINE CLINIC | Facility: CLINIC | Age: 65
End: 2020-12-09
Payer: MEDICARE

## 2020-12-09 VITALS
DIASTOLIC BLOOD PRESSURE: 70 MMHG | HEIGHT: 74 IN | OXYGEN SATURATION: 98 % | BODY MASS INDEX: 32.08 KG/M2 | WEIGHT: 250 LBS | SYSTOLIC BLOOD PRESSURE: 110 MMHG | HEART RATE: 58 BPM

## 2020-12-09 DIAGNOSIS — Z95.2 S/P AORTIC VALVE REPLACEMENT WITH PROSTHETIC VALVE: ICD-10-CM

## 2020-12-09 DIAGNOSIS — R39.14 BENIGN PROSTATIC HYPERPLASIA WITH INCOMPLETE BLADDER EMPTYING: ICD-10-CM

## 2020-12-09 DIAGNOSIS — F34.0 CYCLOTHYMIA: ICD-10-CM

## 2020-12-09 DIAGNOSIS — N40.1 BENIGN PROSTATIC HYPERPLASIA WITH INCOMPLETE BLADDER EMPTYING: ICD-10-CM

## 2020-12-09 DIAGNOSIS — Z12.5 PROSTATE CANCER SCREENING: ICD-10-CM

## 2020-12-09 DIAGNOSIS — I47.1 PAROXYSMAL SUPRAVENTRICULAR TACHYCARDIA (HCC): ICD-10-CM

## 2020-12-09 DIAGNOSIS — Z00.00 ENCOUNTER FOR MEDICARE ANNUAL WELLNESS EXAM: ICD-10-CM

## 2020-12-09 DIAGNOSIS — R79.89 INCREASED AMMONIA LEVEL: ICD-10-CM

## 2020-12-09 DIAGNOSIS — I48.0 PAROXYSMAL ATRIAL FIBRILLATION (HCC): ICD-10-CM

## 2020-12-09 DIAGNOSIS — Z79.01 REQUIRES LIFELONG WARFARIN THERAPY: ICD-10-CM

## 2020-12-09 DIAGNOSIS — G25.81 RESTLESS LEGS: ICD-10-CM

## 2020-12-09 DIAGNOSIS — J30.9 ALLERGIC RHINITIS, UNSPECIFIED SEASONALITY, UNSPECIFIED TRIGGER: ICD-10-CM

## 2020-12-09 DIAGNOSIS — Z00.00 ENCOUNTER FOR MEDICARE ANNUAL WELLNESS EXAM: Primary | ICD-10-CM

## 2020-12-09 DIAGNOSIS — Z87.19 HISTORY OF DIVERTICULITIS: ICD-10-CM

## 2020-12-09 DIAGNOSIS — Z23 NEED FOR VACCINATION: ICD-10-CM

## 2020-12-09 PROBLEM — K57.92 DIVERTICULITIS: Status: RESOLVED | Noted: 2020-06-18 | Resolved: 2020-12-09

## 2020-12-09 PROBLEM — Z01.818 PREOP TESTING: Status: RESOLVED | Noted: 2020-06-18 | Resolved: 2020-12-09

## 2020-12-09 PROCEDURE — 80061 LIPID PANEL: CPT

## 2020-12-09 PROCEDURE — 80048 BASIC METABOLIC PNL TOTAL CA: CPT

## 2020-12-09 PROCEDURE — 90670 PCV13 VACCINE IM: CPT | Performed by: FAMILY MEDICINE

## 2020-12-09 PROCEDURE — 85025 COMPLETE CBC W/AUTO DIFF WBC: CPT | Performed by: FAMILY MEDICINE

## 2020-12-09 PROCEDURE — 36415 COLL VENOUS BLD VENIPUNCTURE: CPT | Performed by: FAMILY MEDICINE

## 2020-12-09 PROCEDURE — 80164 ASSAY DIPROPYLACETIC ACD TOT: CPT

## 2020-12-09 PROCEDURE — G0009 ADMIN PNEUMOCOCCAL VACCINE: HCPCS | Performed by: FAMILY MEDICINE

## 2020-12-09 PROCEDURE — 80076 HEPATIC FUNCTION PANEL: CPT | Performed by: FAMILY MEDICINE

## 2020-12-09 PROCEDURE — G0402 INITIAL PREVENTIVE EXAM: HCPCS | Performed by: FAMILY MEDICINE

## 2020-12-09 PROCEDURE — 82140 ASSAY OF AMMONIA: CPT

## 2020-12-09 NOTE — PROGRESS NOTES
HPI:   Geraldine Heath is a 72year old male who presents for a Medicare Initial Preventative Physical Exam (Welcome to Medicare- < 12 months on Medicare). Has not had any diverticulitis flare-ups since his surgery.    Bowel movements are also normal a weeks)?: Not at all  Feeling down, depressed, or hopeless (over the last two weeks)?: Several days  PHQ-2 SCORE: 1      Advanced Directive:  He does NOT have a Living Will on file in [address] [place] [address] Rd.    Advance care planning including the explanation and discussion of 02/11/2020    CA 8.4 (L) 06/21/2020    ALB 2.9 (L) 02/11/2020    CREATSERUM 0.66 (L) 06/21/2020    GLU 88 06/21/2020        CBC  (most recent labs)   Lab Results   Component Value Date    WBC 5.7 06/22/2020    HGB 11.8 (L) 06/22/2020    .0 06/22/202 HISTORY:   He  reports that he has never smoked. He has never used smokeless tobacco. He reports that he does not drink alcohol or use drugs.      REVIEW OF SYSTEMS:   GENERAL: feels well otherwise  SKIN: denies any unusual skin lesions  EYES: denies blurre not enlarged, symmetric, no tenderness/mass/nodules, no carotid bruit or JVD   Back:   Symmetric, no curvature, ROM normal, no CVA tenderness   Lungs:   Clear to auscultation bilaterally, respirations unlabored   Chest Wall:  No tenderness or deformity   H diverticulitis    Benign prostatic hyperplasia with incomplete bladder emptying    Allergic rhinitis, unspecified seasonality, unspecified trigger    Restless legs    Increased ammonia level    Requires lifelong warfarin therapy    Prostate cancer screenin or Procedure   Diabetes Screening      HbgA1C   Annually HEMOGLOBIN A1c (% of total Hgb)   Date Value   09/06/2019 5.2       No flowsheet data found.     Fasting Blood Sugar (FSB)Annually Glucose (mg/dL)   Date Value   06/21/2020 88     GLUCOSE (mg/dL)   Da benefits, but Medicare does not cover unless Medically needed    Zoster   Not covered by Medicare Part B No vaccine history found This may be covered with your pharmacy  prescription benefits      SPECIFIC DISEASE MONITORING Internal Lab or Procedure Exter

## 2020-12-09 NOTE — PATIENT INSTRUCTIONS
Phillip Jones's SCREENING SCHEDULE   Tests on this list are recommended by your physician but may not be covered, or covered at this frequency, by your insurer. Please check with your insurance carrier before scheduling to verify coverage.     RICKEY Cancer Screening Covered up to Age 76     Colonoscopy Screen   Covered every 10 years- more often if abnormal Colonoscopy due on 11/22/2026 Update Health Maintenance if applicable    Flex Sigmoidoscopy Screen  Covered every 5 years No results found for [facility] by Medicare Part B) No orders found for this or any previous visit.  This may be covered with your prescription benefits, but Medicare does not cover unless Medically needed    Zoster (Not covered by Medicare Part B) No orders found for this or any previous

## 2020-12-10 PROBLEM — E78.2 MIXED HYPERLIPIDEMIA: Status: ACTIVE | Noted: 2020-12-10

## 2020-12-21 ENCOUNTER — ANTI-COAG (OUTPATIENT)
Dept: CARDIOLOGY | Age: 65
End: 2020-12-21

## 2020-12-21 DIAGNOSIS — Z95.2 HISTORY OF MECHANICAL AORTIC VALVE REPLACEMENT: ICD-10-CM

## 2020-12-21 LAB — INR PPP: 2.5

## 2021-01-01 ENCOUNTER — EXTERNAL RECORD (OUTPATIENT)
Dept: OTHER | Age: 66
End: 2021-01-01

## 2021-01-05 LAB — INR PPP: 2

## 2021-01-06 ENCOUNTER — ANTI-COAG (OUTPATIENT)
Dept: CARDIOLOGY | Age: 66
End: 2021-01-06

## 2021-01-06 DIAGNOSIS — Z95.2 HISTORY OF MECHANICAL AORTIC VALVE REPLACEMENT: ICD-10-CM

## 2021-01-13 LAB — INR PPP: 2.4

## 2021-01-14 ENCOUNTER — ANTI-COAG (OUTPATIENT)
Dept: CARDIOLOGY | Age: 66
End: 2021-01-14

## 2021-01-14 DIAGNOSIS — Z95.2 HISTORY OF MECHANICAL AORTIC VALVE REPLACEMENT: ICD-10-CM

## 2021-01-28 ENCOUNTER — ANTI-COAG (OUTPATIENT)
Dept: CARDIOLOGY | Age: 66
End: 2021-01-28

## 2021-01-28 DIAGNOSIS — Z95.2 HISTORY OF MECHANICAL AORTIC VALVE REPLACEMENT: ICD-10-CM

## 2021-02-01 ENCOUNTER — ANTI-COAG (OUTPATIENT)
Dept: CARDIOLOGY | Age: 66
End: 2021-02-01

## 2021-02-01 DIAGNOSIS — Z95.2 HISTORY OF MECHANICAL AORTIC VALVE REPLACEMENT: ICD-10-CM

## 2021-02-01 LAB — INR PPP: 1.1

## 2021-02-11 ENCOUNTER — ANTI-COAG (OUTPATIENT)
Dept: CARDIOLOGY | Age: 66
End: 2021-02-11

## 2021-02-11 DIAGNOSIS — Z95.2 HISTORY OF MECHANICAL AORTIC VALVE REPLACEMENT: ICD-10-CM

## 2021-02-11 LAB — INR PPP: 2.8

## 2021-02-15 ENCOUNTER — PATIENT MESSAGE (OUTPATIENT)
Dept: GASTROENTEROLOGY | Facility: CLINIC | Age: 66
End: 2021-02-15

## 2021-02-15 NOTE — TELEPHONE ENCOUNTER
From: Topher Grijalva  To: Soren Paz. Jayda Li MD  Sent: 2/15/2021 4:28 PM CST  Subject: Other    Thank you so much for scheduling me for an appointment today.  I was assuming it wouldn't be that soon but in any case as you can see with the weather I would no

## 2021-02-16 ENCOUNTER — TELEPHONE (OUTPATIENT)
Dept: GASTROENTEROLOGY | Facility: CLINIC | Age: 66
End: 2021-02-16

## 2021-02-16 ENCOUNTER — ANTI-COAG (OUTPATIENT)
Dept: CARDIOLOGY | Age: 66
End: 2021-02-16

## 2021-02-16 DIAGNOSIS — Z95.2 HISTORY OF MECHANICAL AORTIC VALVE REPLACEMENT: ICD-10-CM

## 2021-02-16 LAB — INR PPP: 2.4

## 2021-02-16 RX ORDER — AMOXICILLIN AND CLAVULANATE POTASSIUM 875; 125 MG/1; MG/1
1 TABLET, FILM COATED ORAL 2 TIMES DAILY
Qty: 14 TABLET | Refills: 0 | Status: SHIPPED | OUTPATIENT
Start: 2021-02-16 | End: 2021-07-28

## 2021-02-16 NOTE — TELEPHONE ENCOUNTER
Duplicate encounter. See other encounter from 2/16/2021. Patient given sooner appointment.     Future Appointments   Date Time Provider Natanael Bland   2/17/2021 10:00 AM Faisal Araiza MD [facility] Jennifer JOY

## 2021-02-16 NOTE — TELEPHONE ENCOUNTER
Pt currently has appt scheduled for 4/15/21 and requesting to be seen sooner. Pt states he would like to discuss possible diverticulitis and resection surgery.   Please call [phone]

## 2021-02-16 NOTE — TELEPHONE ENCOUNTER
Patient contacted, he has been experiencing pain which he describes more of a discomfort which is not severe but reminiscent of his old episodes of diverticulitis.   Has been ongoing for the last for 5 days really has not changed or worsened but he was conc

## 2021-02-16 NOTE — TELEPHONE ENCOUNTER
Dr. Rebecca Hackett    Patient called because the past 5-6 days he has some intermittent soreness in abdomen that is a 4/10.  He is concerned because pain is similar to when he had diverticulitis in the past.  Recent colon resection this past summer with Dr. Pincus Olszewski

## 2021-02-17 ENCOUNTER — OFFICE VISIT (OUTPATIENT)
Dept: GASTROENTEROLOGY | Facility: CLINIC | Age: 66
End: 2021-02-17
Payer: MEDICARE

## 2021-02-17 VITALS
HEIGHT: 74 IN | SYSTOLIC BLOOD PRESSURE: 125 MMHG | DIASTOLIC BLOOD PRESSURE: 70 MMHG | WEIGHT: 258 LBS | HEART RATE: 65 BPM | BODY MASS INDEX: 33.11 KG/M2 | TEMPERATURE: 97 F

## 2021-02-17 DIAGNOSIS — Z87.19 HISTORY OF DIVERTICULITIS: ICD-10-CM

## 2021-02-17 DIAGNOSIS — R10.32 LEFT LOWER QUADRANT ABDOMINAL PAIN: Primary | ICD-10-CM

## 2021-02-17 PROCEDURE — 99213 OFFICE O/P EST LOW 20 MIN: CPT | Performed by: INTERNAL MEDICINE

## 2021-02-17 NOTE — PATIENT INSTRUCTIONS
Abdominal pain   Bloating   Weight gain  - CT scan abdomen/pelvis  - dietary changes/work on weight loss  - hold on antibiotics for now/await CT results

## 2021-02-17 NOTE — PROGRESS NOTES
Topher Grijalva is a 77year old male. HPI:   Patient presents with:   Follow - Up: bloating and pain like diverticulitis per pt    The patient is a 35-year-old male who has a history of diverticulitis, colon polyps status post left partial colon resecti (aortic valve replacement) 2001   • SVT (supraventricular tachycardia) (HCC)    • Visual impairment     GLASSES      Past Surgical History:   Procedure Laterality Date   • CATH TRANSCATHETER AORTIC VALVE REPLACEMENT  2001 [facility] • lamoTRIgine 100 MG Oral Tab Take 100 mg by mouth every morning.            Allergies:    Cat Hair Extract        ASTHMA  Dog Epithelium          WHEEZING      ROS:   The patient denies any chest pain or shortness of breath,  No neurologic or dermatologi Referrals:  None       Gertrudis Friedman MD  [address] [facility]

## 2021-02-22 ENCOUNTER — HOSPITAL ENCOUNTER (OUTPATIENT)
Dept: CT IMAGING | Facility: HOSPITAL | Age: 66
Discharge: HOME | End: 2021-02-22
Attending: INTERNAL MEDICINE
Payer: MEDICARE

## 2021-02-22 ENCOUNTER — ANTI-COAG (OUTPATIENT)
Dept: CARDIOLOGY | Age: 66
End: 2021-02-22

## 2021-02-22 DIAGNOSIS — Z87.19 HISTORY OF DIVERTICULITIS: ICD-10-CM

## 2021-02-22 DIAGNOSIS — Z95.2 HISTORY OF MECHANICAL AORTIC VALVE REPLACEMENT: ICD-10-CM

## 2021-02-22 DIAGNOSIS — R10.32 LEFT LOWER QUADRANT ABDOMINAL PAIN: ICD-10-CM

## 2021-02-22 LAB
CREAT BLD-MCNC: 0.8 MG/DL
INR PPP: 2.7

## 2021-02-22 PROCEDURE — 82565 ASSAY OF CREATININE: CPT

## 2021-02-22 PROCEDURE — 74177 CT ABD & PELVIS W/CONTRAST: CPT | Performed by: INTERNAL MEDICINE

## 2021-02-24 ENCOUNTER — PATIENT MESSAGE (OUTPATIENT)
Dept: GASTROENTEROLOGY | Facility: CLINIC | Age: 66
End: 2021-02-24

## 2021-02-24 ENCOUNTER — TELEPHONE (OUTPATIENT)
Dept: GASTROENTEROLOGY | Facility: CLINIC | Age: 66
End: 2021-02-24

## 2021-02-24 NOTE — TELEPHONE ENCOUNTER
----- Message from Alexis Villaseñor MD sent at 2/23/2021  5:24 PM CST -----  No evidence of diverticulitis. Gallstones noted    RN to contact and see how he is doing.

## 2021-02-24 NOTE — TELEPHONE ENCOUNTER
Dr. Lora Giang    Patient wants to know if it is ok to start taking his fiberwise smoothie again (from what I found online it has both soluble and insoluble fiber)    Thank you

## 2021-02-24 NOTE — TELEPHONE ENCOUNTER
From: Alpha Alpers  To: Augustina Rao. Lani Saini MD  Sent: 2/24/2021 1:35 PM CST  Subject: Non-Urgent Medical Question    I see the results of the CT scan. No sign of diverticulitis. That's fantastic! .     Before my surgery I always used to take a fiber drink

## 2021-03-02 ENCOUNTER — ANTI-COAG (OUTPATIENT)
Dept: CARDIOLOGY | Age: 66
End: 2021-03-02

## 2021-03-02 DIAGNOSIS — Z95.2 HISTORY OF MECHANICAL AORTIC VALVE REPLACEMENT: ICD-10-CM

## 2021-03-02 LAB — INR PPP: 2.1

## 2021-03-02 NOTE — TELEPHONE ENCOUNTER
Condition update-  Patient contacted, verified and message from Dr. Tayla Henderson given. Patient continues to have slight LLQ pain on and off. None today. Started taking fiber 4 days ago with some relief.

## 2021-03-09 DIAGNOSIS — Z23 NEED FOR VACCINATION: ICD-10-CM

## 2021-03-10 ENCOUNTER — ANTI-COAG (OUTPATIENT)
Dept: CARDIOLOGY | Age: 66
End: 2021-03-10

## 2021-03-10 DIAGNOSIS — Z95.2 HISTORY OF MECHANICAL AORTIC VALVE REPLACEMENT: ICD-10-CM

## 2021-03-10 DIAGNOSIS — Z79.01 LONG TERM (CURRENT) USE OF ANTICOAGULANTS: ICD-10-CM

## 2021-03-10 LAB — INR PPP: 2.3

## 2021-03-10 PROCEDURE — 93793 ANTICOAG MGMT PT WARFARIN: CPT

## 2021-03-10 RX ORDER — WARFARIN SODIUM 10 MG/1
TABLET ORAL
Qty: 90 TABLET | Refills: 0 | OUTPATIENT
Start: 2021-03-10

## 2021-03-10 RX ORDER — WARFARIN SODIUM 7.5 MG/1
TABLET ORAL
Qty: 90 TABLET | Refills: 0 | OUTPATIENT
Start: 2021-03-10

## 2021-03-10 NOTE — TELEPHONE ENCOUNTER
Patient has an appointment to see Dr. Ramon Padron I office on 3/25/2021.     Future Appointments   Date Time Provider Natanael Bland   3/25/2021  2:30 PM Meera Sanderson MD [facility] [place] Jennifer JOY

## 2021-03-10 NOTE — TELEPHONE ENCOUNTER
Patient not seen at [facility] cardi[facility]. Seen at [facility]. Refill denied and message sent to pharmacy.

## 2021-03-13 DIAGNOSIS — Z23 NEED FOR VACCINATION: ICD-10-CM

## 2021-03-15 ENCOUNTER — TELEPHONE (OUTPATIENT)
Dept: CARDIOLOGY | Age: 66
End: 2021-03-15

## 2021-03-15 DIAGNOSIS — I47.10 PAROXYSMAL SUPRAVENTRICULAR TACHYCARDIA: ICD-10-CM

## 2021-03-15 DIAGNOSIS — Z95.2 HISTORY OF MECHANICAL AORTIC VALVE REPLACEMENT: Primary | ICD-10-CM

## 2021-03-15 DIAGNOSIS — Z01.810 PRE-OPERATIVE CARDIOVASCULAR EXAMINATION: ICD-10-CM

## 2021-03-16 ENCOUNTER — ANTI-COAG (OUTPATIENT)
Dept: CARDIOLOGY | Age: 66
End: 2021-03-16

## 2021-03-16 DIAGNOSIS — Z95.2 HISTORY OF MECHANICAL AORTIC VALVE REPLACEMENT: ICD-10-CM

## 2021-03-16 DIAGNOSIS — Z79.01 LONG TERM (CURRENT) USE OF ANTICOAGULANTS: ICD-10-CM

## 2021-03-16 LAB — INR PPP: 2.5

## 2021-03-16 PROCEDURE — 93793 ANTICOAG MGMT PT WARFARIN: CPT

## 2021-03-20 ENCOUNTER — E-ADVICE (OUTPATIENT)
Dept: CARDIOLOGY | Age: 66
End: 2021-03-20

## 2021-03-22 LAB — INR PPP: 2.4

## 2021-03-23 ENCOUNTER — ANTI-COAG (OUTPATIENT)
Dept: CARDIOLOGY | Age: 66
End: 2021-03-23

## 2021-03-23 DIAGNOSIS — Z79.01 LONG TERM (CURRENT) USE OF ANTICOAGULANTS: ICD-10-CM

## 2021-03-23 DIAGNOSIS — Z95.2 HISTORY OF MECHANICAL AORTIC VALVE REPLACEMENT: ICD-10-CM

## 2021-03-23 PROCEDURE — 93793 ANTICOAG MGMT PT WARFARIN: CPT

## 2021-03-25 ENCOUNTER — HOSPITAL ENCOUNTER (OUTPATIENT)
Dept: GENERAL RADIOLOGY | Facility: HOSPITAL | Age: 66
Discharge: HOME | End: 2021-03-25
Attending: INTERNAL MEDICINE
Payer: MEDICARE

## 2021-03-25 ENCOUNTER — OFFICE VISIT (OUTPATIENT)
Dept: GASTROENTEROLOGY | Facility: CLINIC | Age: 66
End: 2021-03-25
Payer: MEDICARE

## 2021-03-25 VITALS
HEART RATE: 61 BPM | WEIGHT: 251 LBS | TEMPERATURE: 98 F | SYSTOLIC BLOOD PRESSURE: 114 MMHG | DIASTOLIC BLOOD PRESSURE: 70 MMHG | BODY MASS INDEX: 32.21 KG/M2 | HEIGHT: 74 IN

## 2021-03-25 DIAGNOSIS — R10.33 PERIUMBILICAL ABDOMINAL PAIN: ICD-10-CM

## 2021-03-25 DIAGNOSIS — R10.33 PERIUMBILICAL ABDOMINAL PAIN: Primary | ICD-10-CM

## 2021-03-25 PROCEDURE — 99213 OFFICE O/P EST LOW 20 MIN: CPT | Performed by: INTERNAL MEDICINE

## 2021-03-25 PROCEDURE — 74021 RADEX ABDOMEN 3+ VIEWS: CPT | Performed by: INTERNAL MEDICINE

## 2021-03-25 RX ORDER — THIAMINE HCL 100 MG
800 TABLET ORAL
COMMUNITY
End: 2021-07-28

## 2021-03-25 NOTE — PROGRESS NOTES
Maru Hua is a 77year old male. HPI:   Patient presents with:   Follow - Up: pressure and tenderness all over abdomen per pt    The patient is a 49-year-old male with a history of asthma, bipolar, diverticulitis status post partial colon resection Relation Age of Onset   • Cancer Father         lung cancer   • Stroke Mother       Social History: Social History    Tobacco Use      Smoking status: Never Smoker      Smokeless tobacco: Never Used    Vaping Use      Vaping Use: Never used    Alcohol use: temperature source Oral, height 6' 2\" (1.88 m), weight 251 lb (113.9 kg).     The patient appears their stated age and is in no acute distress  HEENT- anicteric sclera, neck no lymphadnopathy, OP- clear with no masses or lesions  Chest- Clear bilaterally,

## 2021-03-25 NOTE — PATIENT INSTRUCTIONS
Abdominal bloating/discomfort  - liquid diet/monitor symptoms  - XR abdomen - call to set up   - stop supplements  - trial of probiotic- Florastor 250 mg 1 pill twice a day ( available as pharmacy)  - if symptoms worsen call/ would plan CT scan abdomen /pe

## 2021-03-31 LAB — INR PPP: 3.5

## 2021-04-01 ENCOUNTER — ANTI-COAG (OUTPATIENT)
Dept: CARDIOLOGY | Age: 66
End: 2021-04-01

## 2021-04-01 DIAGNOSIS — Z95.2 HISTORY OF MECHANICAL AORTIC VALVE REPLACEMENT: ICD-10-CM

## 2021-04-01 DIAGNOSIS — Z79.01 LONG TERM (CURRENT) USE OF ANTICOAGULANTS: ICD-10-CM

## 2021-04-01 PROCEDURE — 93793 ANTICOAG MGMT PT WARFARIN: CPT

## 2021-04-05 ENCOUNTER — ANTI-COAG (OUTPATIENT)
Dept: CARDIOLOGY | Age: 66
End: 2021-04-05

## 2021-04-05 DIAGNOSIS — Z79.01 LONG TERM (CURRENT) USE OF ANTICOAGULANTS: ICD-10-CM

## 2021-04-05 DIAGNOSIS — Z95.2 HISTORY OF MECHANICAL AORTIC VALVE REPLACEMENT: ICD-10-CM

## 2021-04-05 LAB — INR PPP: 1.5

## 2021-04-05 PROCEDURE — 93793 ANTICOAG MGMT PT WARFARIN: CPT

## 2021-04-12 ENCOUNTER — ANTI-COAG (OUTPATIENT)
Dept: CARDIOLOGY | Age: 66
End: 2021-04-12

## 2021-04-12 DIAGNOSIS — Z79.01 LONG TERM (CURRENT) USE OF ANTICOAGULANTS: ICD-10-CM

## 2021-04-12 DIAGNOSIS — Z95.2 HISTORY OF MECHANICAL AORTIC VALVE REPLACEMENT: ICD-10-CM

## 2021-04-12 LAB — INR PPP: 2.6

## 2021-04-12 PROCEDURE — 93793 ANTICOAG MGMT PT WARFARIN: CPT

## 2021-04-14 ENCOUNTER — OFFICE VISIT (OUTPATIENT)
Dept: CARDIOLOGY | Age: 66
End: 2021-04-14

## 2021-04-14 ENCOUNTER — ANCILLARY PROCEDURE (OUTPATIENT)
Dept: CARDIOLOGY | Age: 66
End: 2021-04-14
Attending: INTERNAL MEDICINE

## 2021-04-14 ENCOUNTER — TELEPHONE (OUTPATIENT)
Dept: CARDIOLOGY | Age: 66
End: 2021-04-14

## 2021-04-14 VITALS
OXYGEN SATURATION: 96 % | SYSTOLIC BLOOD PRESSURE: 126 MMHG | BODY MASS INDEX: 31.57 KG/M2 | HEART RATE: 65 BPM | DIASTOLIC BLOOD PRESSURE: 76 MMHG | RESPIRATION RATE: 18 BRPM | WEIGHT: 246 LBS | HEIGHT: 74 IN

## 2021-04-14 DIAGNOSIS — E78.00 PURE HYPERCHOLESTEROLEMIA: ICD-10-CM

## 2021-04-14 DIAGNOSIS — Z95.2 HISTORY OF MECHANICAL AORTIC VALVE REPLACEMENT: Primary | ICD-10-CM

## 2021-04-14 DIAGNOSIS — Z01.810 PRE-OPERATIVE CARDIOVASCULAR EXAMINATION: ICD-10-CM

## 2021-04-14 DIAGNOSIS — I77.810 ASCENDING AORTA DILATION (CMD): ICD-10-CM

## 2021-04-14 DIAGNOSIS — Z95.2 HISTORY OF MECHANICAL AORTIC VALVE REPLACEMENT: ICD-10-CM

## 2021-04-14 DIAGNOSIS — I47.10 PAROXYSMAL SUPRAVENTRICULAR TACHYCARDIA: ICD-10-CM

## 2021-04-14 DIAGNOSIS — R00.2 PALPITATIONS: ICD-10-CM

## 2021-04-14 PROCEDURE — 93306 TTE W/DOPPLER COMPLETE: CPT | Performed by: INTERNAL MEDICINE

## 2021-04-14 PROCEDURE — 99214 OFFICE O/P EST MOD 30 MIN: CPT | Performed by: INTERNAL MEDICINE

## 2021-04-14 ASSESSMENT — PATIENT HEALTH QUESTIONNAIRE - PHQ9
SUM OF ALL RESPONSES TO PHQ9 QUESTIONS 1 AND 2: 0
2. FEELING DOWN, DEPRESSED OR HOPELESS: NOT AT ALL
1. LITTLE INTEREST OR PLEASURE IN DOING THINGS: NOT AT ALL
CLINICAL INTERPRETATION OF PHQ2 SCORE: NO FURTHER SCREENING NEEDED
CLINICAL INTERPRETATION OF PHQ9 SCORE: NO FURTHER SCREENING NEEDED
SUM OF ALL RESPONSES TO PHQ9 QUESTIONS 1 AND 2: 0

## 2021-04-19 ENCOUNTER — E-ADVICE (OUTPATIENT)
Dept: CARDIOLOGY | Age: 66
End: 2021-04-19

## 2021-04-20 ENCOUNTER — HOSPITAL ENCOUNTER (OUTPATIENT)
Dept: CT IMAGING | Facility: HOSPITAL | Age: 66
Discharge: HOME | End: 2021-04-20
Attending: INTERNAL MEDICINE
Payer: MEDICARE

## 2021-04-20 ENCOUNTER — ANTI-COAG (OUTPATIENT)
Dept: CARDIOLOGY | Age: 66
End: 2021-04-20

## 2021-04-20 DIAGNOSIS — I77.810 ASCENDING AORTA DILATION (HCC): ICD-10-CM

## 2021-04-20 DIAGNOSIS — Z95.2 HISTORY OF MECHANICAL AORTIC VALVE REPLACEMENT: ICD-10-CM

## 2021-04-20 DIAGNOSIS — Z79.01 LONG TERM (CURRENT) USE OF ANTICOAGULANTS: ICD-10-CM

## 2021-04-20 LAB — INR PPP: 2.8

## 2021-04-20 PROCEDURE — 82565 ASSAY OF CREATININE: CPT

## 2021-04-20 PROCEDURE — 93793 ANTICOAG MGMT PT WARFARIN: CPT

## 2021-04-20 PROCEDURE — 71275 CT ANGIOGRAPHY CHEST: CPT | Performed by: INTERNAL MEDICINE

## 2021-04-21 ENCOUNTER — TELEPHONE (OUTPATIENT)
Dept: FAMILY MEDICINE CLINIC | Facility: CLINIC | Age: 66
End: 2021-04-21

## 2021-04-21 DIAGNOSIS — R91.1 LUNG NODULE: Primary | ICD-10-CM

## 2021-04-21 NOTE — TELEPHONE ENCOUNTER
Karen from Dr. Tanesha Ovalle office called explaining that the pt had a CT scan completed and would like for Dr. Symone Chavez to review them and go over it with the patient.  There was an abnormality found and they recommend that the pt perform another CT scan in about si

## 2021-04-21 NOTE — TELEPHONE ENCOUNTER
CT SCAN     Impression   CONCLUSION:   1. 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 Continued surveillance is suggested.       2. Postthoracotomy chest with mechanical aortic valvular replacement.       3.  Preeti Hodge

## 2021-04-23 ENCOUNTER — ANCILLARY PROCEDURE (OUTPATIENT)
Dept: CARDIOLOGY | Age: 66
End: 2021-04-23
Attending: INTERNAL MEDICINE

## 2021-04-23 DIAGNOSIS — I77.810 ASCENDING AORTA DILATION (CMD): ICD-10-CM

## 2021-04-23 DIAGNOSIS — Z95.2 HISTORY OF MECHANICAL AORTIC VALVE REPLACEMENT: ICD-10-CM

## 2021-04-23 DIAGNOSIS — R00.2 PALPITATIONS: ICD-10-CM

## 2021-04-23 NOTE — TELEPHONE ENCOUNTER
Called patient to notify him of 1 cm pulmonary nodule found incidentally and need for 6 month repeat CT chest to follow it closely. He verbalized understanding and will repeat 10/2021.

## 2021-04-27 LAB — INR PPP: 3.2

## 2021-04-28 ENCOUNTER — ANTI-COAG (OUTPATIENT)
Dept: CARDIOLOGY | Age: 66
End: 2021-04-28

## 2021-04-28 DIAGNOSIS — Z95.2 HISTORY OF MECHANICAL AORTIC VALVE REPLACEMENT: ICD-10-CM

## 2021-04-28 DIAGNOSIS — Z79.01 LONG TERM (CURRENT) USE OF ANTICOAGULANTS: ICD-10-CM

## 2021-04-28 PROCEDURE — 93793 ANTICOAG MGMT PT WARFARIN: CPT

## 2021-05-03 PROCEDURE — 93227 XTRNL ECG REC<48 HR R&I: CPT | Performed by: INTERNAL MEDICINE

## 2021-05-04 ENCOUNTER — TELEPHONE (OUTPATIENT)
Dept: CARDIOLOGY | Age: 66
End: 2021-05-04

## 2021-05-05 ENCOUNTER — ANTI-COAG (OUTPATIENT)
Dept: CARDIOLOGY | Age: 66
End: 2021-05-05

## 2021-05-05 DIAGNOSIS — Z95.2 HISTORY OF MECHANICAL AORTIC VALVE REPLACEMENT: ICD-10-CM

## 2021-05-05 DIAGNOSIS — Z79.01 LONG TERM (CURRENT) USE OF ANTICOAGULANTS: ICD-10-CM

## 2021-05-05 LAB — INR PPP: 2.9

## 2021-05-05 PROCEDURE — 93793 ANTICOAG MGMT PT WARFARIN: CPT

## 2021-05-10 LAB — INR PPP: 5

## 2021-05-11 ENCOUNTER — ANTI-COAG (OUTPATIENT)
Dept: CARDIOLOGY | Age: 66
End: 2021-05-11

## 2021-05-11 DIAGNOSIS — Z79.01 LONG TERM (CURRENT) USE OF ANTICOAGULANTS: ICD-10-CM

## 2021-05-11 DIAGNOSIS — Z95.2 HISTORY OF MECHANICAL AORTIC VALVE REPLACEMENT: ICD-10-CM

## 2021-05-11 PROCEDURE — 93793 ANTICOAG MGMT PT WARFARIN: CPT

## 2021-05-12 LAB — INR PPP: 1.7

## 2021-05-14 ENCOUNTER — ANTI-COAG (OUTPATIENT)
Dept: CARDIOLOGY | Age: 66
End: 2021-05-14

## 2021-05-14 DIAGNOSIS — Z95.2 HISTORY OF MECHANICAL AORTIC VALVE REPLACEMENT: ICD-10-CM

## 2021-05-14 DIAGNOSIS — Z79.01 LONG TERM (CURRENT) USE OF ANTICOAGULANTS: ICD-10-CM

## 2021-05-14 PROCEDURE — 93793 ANTICOAG MGMT PT WARFARIN: CPT

## 2021-05-18 LAB — INR PPP: 2.2

## 2021-05-19 ENCOUNTER — ANTI-COAG (OUTPATIENT)
Dept: CARDIOLOGY | Age: 66
End: 2021-05-19

## 2021-05-19 DIAGNOSIS — Z95.2 HISTORY OF MECHANICAL AORTIC VALVE REPLACEMENT: ICD-10-CM

## 2021-05-19 DIAGNOSIS — Z79.01 LONG TERM (CURRENT) USE OF ANTICOAGULANTS: ICD-10-CM

## 2021-05-19 PROCEDURE — 93793 ANTICOAG MGMT PT WARFARIN: CPT

## 2021-05-24 LAB — INR PPP: 2.6

## 2021-05-25 ENCOUNTER — ANTI-COAG (OUTPATIENT)
Dept: CARDIOLOGY | Age: 66
End: 2021-05-25

## 2021-05-25 DIAGNOSIS — Z95.2 HISTORY OF MECHANICAL AORTIC VALVE REPLACEMENT: ICD-10-CM

## 2021-05-25 DIAGNOSIS — Z79.01 LONG TERM (CURRENT) USE OF ANTICOAGULANTS: ICD-10-CM

## 2021-05-25 PROCEDURE — 93793 ANTICOAG MGMT PT WARFARIN: CPT

## 2021-06-03 ENCOUNTER — ANTI-COAG (OUTPATIENT)
Dept: CARDIOLOGY | Age: 66
End: 2021-06-03

## 2021-06-03 DIAGNOSIS — Z79.01 LONG TERM (CURRENT) USE OF ANTICOAGULANTS: ICD-10-CM

## 2021-06-03 DIAGNOSIS — Z95.2 HISTORY OF MECHANICAL AORTIC VALVE REPLACEMENT: ICD-10-CM

## 2021-06-03 LAB — INR PPP: 2.6

## 2021-06-03 PROCEDURE — 93793 ANTICOAG MGMT PT WARFARIN: CPT

## 2021-06-07 ENCOUNTER — ANTI-COAG (OUTPATIENT)
Dept: CARDIOLOGY | Age: 66
End: 2021-06-07

## 2021-06-07 DIAGNOSIS — Z79.01 LONG TERM (CURRENT) USE OF ANTICOAGULANTS: ICD-10-CM

## 2021-06-07 DIAGNOSIS — Z95.2 HISTORY OF MECHANICAL AORTIC VALVE REPLACEMENT: ICD-10-CM

## 2021-06-07 LAB — INR PPP: 2.1

## 2021-06-07 PROCEDURE — 93793 ANTICOAG MGMT PT WARFARIN: CPT

## 2021-06-15 ENCOUNTER — ANTI-COAG (OUTPATIENT)
Dept: CARDIOLOGY | Age: 66
End: 2021-06-15

## 2021-06-15 DIAGNOSIS — Z79.01 LONG TERM (CURRENT) USE OF ANTICOAGULANTS: ICD-10-CM

## 2021-06-15 DIAGNOSIS — Z95.2 HISTORY OF MECHANICAL AORTIC VALVE REPLACEMENT: ICD-10-CM

## 2021-06-15 LAB — INR PPP: 2.7

## 2021-06-15 PROCEDURE — 93793 ANTICOAG MGMT PT WARFARIN: CPT

## 2021-06-22 LAB — INR PPP: 2.9

## 2021-06-23 ENCOUNTER — ANTI-COAG (OUTPATIENT)
Dept: CARDIOLOGY | Age: 66
End: 2021-06-23

## 2021-06-23 DIAGNOSIS — Z95.2 HISTORY OF MECHANICAL AORTIC VALVE REPLACEMENT: Primary | ICD-10-CM

## 2021-06-28 LAB — INR PPP: 2.5

## 2021-06-29 ENCOUNTER — ANTI-COAG (OUTPATIENT)
Dept: CARDIOLOGY | Age: 66
End: 2021-06-29

## 2021-06-29 DIAGNOSIS — Z95.2 HISTORY OF MECHANICAL AORTIC VALVE REPLACEMENT: Primary | ICD-10-CM

## 2021-07-07 ENCOUNTER — ANTI-COAG (OUTPATIENT)
Dept: CARDIOLOGY | Age: 66
End: 2021-07-07

## 2021-07-07 DIAGNOSIS — Z95.2 HISTORY OF MECHANICAL AORTIC VALVE REPLACEMENT: Primary | ICD-10-CM

## 2021-07-19 RX ORDER — SILDENAFIL 100 MG/1
TABLET, FILM COATED ORAL
Qty: 15 TABLET | Refills: 0 | OUTPATIENT
Start: 2021-07-19

## 2021-07-28 ENCOUNTER — OFFICE VISIT (OUTPATIENT)
Dept: FAMILY MEDICINE CLINIC | Facility: CLINIC | Age: 66
End: 2021-07-28
Payer: MEDICARE

## 2021-07-28 VITALS
OXYGEN SATURATION: 95 % | DIASTOLIC BLOOD PRESSURE: 76 MMHG | SYSTOLIC BLOOD PRESSURE: 120 MMHG | HEART RATE: 74 BPM | BODY MASS INDEX: 30.8 KG/M2 | HEIGHT: 74 IN | WEIGHT: 240 LBS

## 2021-07-28 DIAGNOSIS — N52.9 ERECTILE DYSFUNCTION, UNSPECIFIED ERECTILE DYSFUNCTION TYPE: ICD-10-CM

## 2021-07-28 DIAGNOSIS — N40.1 BENIGN PROSTATIC HYPERPLASIA WITH INCOMPLETE BLADDER EMPTYING: Primary | ICD-10-CM

## 2021-07-28 DIAGNOSIS — R39.14 BENIGN PROSTATIC HYPERPLASIA WITH INCOMPLETE BLADDER EMPTYING: Primary | ICD-10-CM

## 2021-07-28 PROBLEM — B07.0 VERRUCA PLANTARIS: Status: ACTIVE | Noted: 2021-05-07

## 2021-07-28 PROCEDURE — 99213 OFFICE O/P EST LOW 20 MIN: CPT | Performed by: FAMILY MEDICINE

## 2021-07-28 RX ORDER — SILDENAFIL 100 MG/1
100 TABLET, FILM COATED ORAL
Qty: 15 TABLET | Refills: 1 | Status: SHIPPED | OUTPATIENT
Start: 2021-07-28

## 2021-07-28 NOTE — PROGRESS NOTES
CC:  Patient presents with: Follow - Up  Medication Request: wants a script for viagra      HPI: 77year old male here to discuss refill of Viagra.   Reports he has not been sleeping until 5am and will not get up until noon as he does not have any reason t Substance and Sexual Activity      Alcohol use: No      Drug use: No      Sexual activity: Not on file    Other Topics      Concerns:        Caffeine Concern: Not Asked        Exercise: Not Asked        Seat Belt: Not Asked        Special Diet: Not Asked mcg by mouth daily. • Warfarin Sodium 7.5 MG Oral Tab Take 7.5 mg by mouth daily until antibiotics completed. Then return to 7.5 mg five days a week and 10 mg two days a week. 90 tablet 0   • melatonin 5 MG Oral Cap Take 5 mg by mouth nightly.      • fo

## 2021-08-03 ENCOUNTER — MED REC SCAN ONLY (OUTPATIENT)
Dept: FAMILY MEDICINE CLINIC | Facility: CLINIC | Age: 66
End: 2021-08-03

## 2021-10-12 ENCOUNTER — APPOINTMENT (OUTPATIENT)
Dept: CT IMAGING | Age: 66
End: 2021-10-12
Attending: INTERNAL MEDICINE

## 2021-10-29 ENCOUNTER — MED REC SCAN ONLY (OUTPATIENT)
Dept: FAMILY MEDICINE CLINIC | Facility: CLINIC | Age: 66
End: 2021-10-29

## 2021-11-07 ENCOUNTER — HOSPITAL ENCOUNTER (INPATIENT)
Age: 66
LOS: 2 days | Discharge: HOME | DRG: 389 | End: 2021-11-09
Attending: EMERGENCY MEDICINE | Admitting: HOSPITALIST

## 2021-11-07 ENCOUNTER — APPOINTMENT (OUTPATIENT)
Dept: GENERAL RADIOLOGY | Age: 66
DRG: 389 | End: 2021-11-07
Attending: EMERGENCY MEDICINE

## 2021-11-07 ENCOUNTER — APPOINTMENT (OUTPATIENT)
Dept: CT IMAGING | Age: 66
DRG: 389 | End: 2021-11-07
Attending: EMERGENCY MEDICINE

## 2021-11-07 DIAGNOSIS — K56.609 SBO (SMALL BOWEL OBSTRUCTION) (CMD): Primary | ICD-10-CM

## 2021-11-07 PROBLEM — K57.90 DIVERTICULAR DISEASE: Status: ACTIVE | Noted: 2021-11-07

## 2021-11-07 PROBLEM — F31.9 BIPOLAR DISORDER (CMD): Status: ACTIVE | Noted: 2021-11-07

## 2021-11-07 LAB
ALBUMIN SERPL-MCNC: 4 G/DL (ref 3.6–5.1)
ALBUMIN/GLOB SERPL: 1 {RATIO} (ref 1–2.4)
ALP SERPL-CCNC: 74 UNITS/L (ref 45–117)
ALT SERPL-CCNC: 35 UNITS/L
ANION GAP SERPL CALC-SCNC: 13 MMOL/L (ref 10–20)
APPEARANCE UR: CLEAR
APTT PPP: 37 SEC (ref 22–30)
APTT PPP: 64 SEC (ref 22–30)
AST SERPL-CCNC: 32 UNITS/L
BASOPHILS # BLD: 0 K/MCL (ref 0–0.3)
BASOPHILS NFR BLD: 0 %
BILIRUB SERPL-MCNC: 0.9 MG/DL (ref 0.2–1)
BILIRUB UR QL STRIP: NEGATIVE
BUN SERPL-MCNC: 18 MG/DL (ref 6–20)
BUN/CREAT SERPL: 27 (ref 7–25)
CALCIUM SERPL-MCNC: 9.8 MG/DL (ref 8.4–10.2)
CHLORIDE SERPL-SCNC: 102 MMOL/L (ref 98–107)
CO2 SERPL-SCNC: 26 MMOL/L (ref 21–32)
COLOR UR: YELLOW
CREAT SERPL-MCNC: 0.66 MG/DL (ref 0.67–1.17)
DEPRECATED RDW RBC: 45.9 FL (ref 39–50)
EOSINOPHIL # BLD: 0 K/MCL (ref 0–0.5)
EOSINOPHIL NFR BLD: 0 %
ERYTHROCYTE [DISTWIDTH] IN BLOOD: 13.7 % (ref 11–15)
FASTING DURATION TIME PATIENT: ABNORMAL H
GFR SERPLBLD BASED ON 1.73 SQ M-ARVRAT: >90 ML/MIN
GLOBULIN SER-MCNC: 4 G/DL (ref 2–4)
GLUCOSE SERPL-MCNC: 137 MG/DL (ref 70–99)
GLUCOSE UR STRIP-MCNC: NEGATIVE MG/DL
HCT VFR BLD CALC: 46.7 % (ref 39–51)
HGB BLD-MCNC: 16.1 G/DL (ref 13–17)
HGB UR QL STRIP: NEGATIVE
IMM GRANULOCYTES # BLD AUTO: 0.1 K/MCL (ref 0–0.2)
IMM GRANULOCYTES # BLD: 0 %
INR PPP: 2.7
KETONES UR STRIP-MCNC: 20 MG/DL
LEUKOCYTE ESTERASE UR QL STRIP: NEGATIVE
LIPASE SERPL-CCNC: 69 UNITS/L (ref 73–393)
LYMPHOCYTES # BLD: 1.4 K/MCL (ref 1–4)
LYMPHOCYTES NFR BLD: 9 %
MCH RBC QN AUTO: 31.8 PG (ref 26–34)
MCHC RBC AUTO-ENTMCNC: 34.5 G/DL (ref 32–36.5)
MCV RBC AUTO: 92.3 FL (ref 78–100)
MONOCYTES # BLD: 1.9 K/MCL (ref 0.3–0.9)
MONOCYTES NFR BLD: 12 %
NEUTROPHILS # BLD: 12.6 K/MCL (ref 1.8–7.7)
NEUTROPHILS NFR BLD: 79 %
NITRITE UR QL STRIP: NEGATIVE
NRBC BLD MANUAL-RTO: 0 /100 WBC
PH UR STRIP: 5 [PH] (ref 5–7)
PLATELET # BLD AUTO: 224 K/MCL (ref 140–450)
POTASSIUM SERPL-SCNC: 4 MMOL/L (ref 3.4–5.1)
PROT SERPL-MCNC: 8 G/DL (ref 6.4–8.2)
PROT UR STRIP-MCNC: NEGATIVE MG/DL
PROTHROMBIN TIME: 28 SEC (ref 9.7–11.8)
RAINBOW EXTRA TUBES HOLD SPECIMEN: NORMAL
RAINBOW EXTRA TUBES HOLD SPECIMEN: NORMAL
RBC # BLD: 5.06 MIL/MCL (ref 4.5–5.9)
SARS-COV-2 RNA RESP QL NAA+PROBE: NOT DETECTED
SERVICE CMNT-IMP: NORMAL
SERVICE CMNT-IMP: NORMAL
SODIUM SERPL-SCNC: 137 MMOL/L (ref 135–145)
SP GR UR STRIP: 1.01 (ref 1–1.03)
TROPONIN I SERPL DL<=0.01 NG/ML-MCNC: 10 NG/L
UROBILINOGEN UR STRIP-MCNC: 2 MG/DL
WBC # BLD: 16 K/MCL (ref 4.2–11)

## 2021-11-07 PROCEDURE — 99285 EMERGENCY DEPT VISIT HI MDM: CPT | Performed by: STUDENT IN AN ORGANIZED HEALTH CARE EDUCATION/TRAINING PROGRAM

## 2021-11-07 PROCEDURE — 10002800 HB RX 250 W HCPCS: Performed by: STUDENT IN AN ORGANIZED HEALTH CARE EDUCATION/TRAINING PROGRAM

## 2021-11-07 PROCEDURE — X1094 NO CHARGE VISIT: HCPCS | Performed by: STUDENT IN AN ORGANIZED HEALTH CARE EDUCATION/TRAINING PROGRAM

## 2021-11-07 PROCEDURE — 81003 URINALYSIS AUTO W/O SCOPE: CPT | Performed by: EMERGENCY MEDICINE

## 2021-11-07 PROCEDURE — 71045 X-RAY EXAM CHEST 1 VIEW: CPT

## 2021-11-07 PROCEDURE — 85025 COMPLETE CBC W/AUTO DIFF WBC: CPT | Performed by: EMERGENCY MEDICINE

## 2021-11-07 PROCEDURE — 10004651 HB RX, NO CHARGE ITEM: Performed by: STUDENT IN AN ORGANIZED HEALTH CARE EDUCATION/TRAINING PROGRAM

## 2021-11-07 PROCEDURE — 84484 ASSAY OF TROPONIN QUANT: CPT | Performed by: EMERGENCY MEDICINE

## 2021-11-07 PROCEDURE — 85730 THROMBOPLASTIN TIME PARTIAL: CPT | Performed by: HOSPITALIST

## 2021-11-07 PROCEDURE — 10002803 HB RX 637: Performed by: STUDENT IN AN ORGANIZED HEALTH CARE EDUCATION/TRAINING PROGRAM

## 2021-11-07 PROCEDURE — 99222 1ST HOSP IP/OBS MODERATE 55: CPT | Performed by: STUDENT IN AN ORGANIZED HEALTH CARE EDUCATION/TRAINING PROGRAM

## 2021-11-07 PROCEDURE — 85730 THROMBOPLASTIN TIME PARTIAL: CPT | Performed by: EMERGENCY MEDICINE

## 2021-11-07 PROCEDURE — 10002807 HB RX 258: Performed by: STUDENT IN AN ORGANIZED HEALTH CARE EDUCATION/TRAINING PROGRAM

## 2021-11-07 PROCEDURE — 96376 TX/PRO/DX INJ SAME DRUG ADON: CPT

## 2021-11-07 PROCEDURE — 99285 EMERGENCY DEPT VISIT HI MDM: CPT

## 2021-11-07 PROCEDURE — C9803 HOPD COVID-19 SPEC COLLECT: HCPCS

## 2021-11-07 PROCEDURE — 99223 1ST HOSP IP/OBS HIGH 75: CPT | Performed by: HOSPITALIST

## 2021-11-07 PROCEDURE — 10002805 HB CONTRAST AGENT: Performed by: EMERGENCY MEDICINE

## 2021-11-07 PROCEDURE — 10002801 HB RX 250 W/O HCPCS: Performed by: STUDENT IN AN ORGANIZED HEALTH CARE EDUCATION/TRAINING PROGRAM

## 2021-11-07 PROCEDURE — 10000002 HB ROOM CHARGE MED SURG

## 2021-11-07 PROCEDURE — 87635 SARS-COV-2 COVID-19 AMP PRB: CPT | Performed by: STUDENT IN AN ORGANIZED HEALTH CARE EDUCATION/TRAINING PROGRAM

## 2021-11-07 PROCEDURE — 85610 PROTHROMBIN TIME: CPT | Performed by: EMERGENCY MEDICINE

## 2021-11-07 PROCEDURE — 93005 ELECTROCARDIOGRAM TRACING: CPT | Performed by: EMERGENCY MEDICINE

## 2021-11-07 PROCEDURE — 96374 THER/PROPH/DIAG INJ IV PUSH: CPT

## 2021-11-07 PROCEDURE — 93010 ELECTROCARDIOGRAM REPORT: CPT | Performed by: INTERNAL MEDICINE

## 2021-11-07 PROCEDURE — 80053 COMPREHEN METABOLIC PANEL: CPT | Performed by: EMERGENCY MEDICINE

## 2021-11-07 PROCEDURE — 74177 CT ABD & PELVIS W/CONTRAST: CPT

## 2021-11-07 PROCEDURE — 36415 COLL VENOUS BLD VENIPUNCTURE: CPT | Performed by: HOSPITALIST

## 2021-11-07 PROCEDURE — 96375 TX/PRO/DX INJ NEW DRUG ADDON: CPT

## 2021-11-07 PROCEDURE — 83690 ASSAY OF LIPASE: CPT | Performed by: EMERGENCY MEDICINE

## 2021-11-07 RX ORDER — FAMOTIDINE 10 MG/ML
20 INJECTION, SOLUTION INTRAVENOUS EVERY 12 HOURS SCHEDULED
Status: CANCELLED | OUTPATIENT
Start: 2021-11-07

## 2021-11-07 RX ORDER — ONDANSETRON 2 MG/ML
4 INJECTION INTRAMUSCULAR; INTRAVENOUS ONCE
Status: COMPLETED | OUTPATIENT
Start: 2021-11-07 | End: 2021-11-07

## 2021-11-07 RX ORDER — SODIUM CHLORIDE, SODIUM LACTATE, POTASSIUM CHLORIDE, CALCIUM CHLORIDE 600; 310; 30; 20 MG/100ML; MG/100ML; MG/100ML; MG/100ML
INJECTION, SOLUTION INTRAVENOUS CONTINUOUS
Status: DISCONTINUED | OUTPATIENT
Start: 2021-11-07 | End: 2021-11-09

## 2021-11-07 RX ORDER — MAGNESIUM HYDROXIDE/ALUMINUM HYDROXICE/SIMETHICONE 120; 1200; 1200 MG/30ML; MG/30ML; MG/30ML
30 SUSPENSION ORAL ONCE
Status: COMPLETED | OUTPATIENT
Start: 2021-11-07 | End: 2021-11-07

## 2021-11-07 RX ORDER — FAMOTIDINE 10 MG/ML
20 INJECTION, SOLUTION INTRAVENOUS ONCE
Status: COMPLETED | OUTPATIENT
Start: 2021-11-07 | End: 2021-11-07

## 2021-11-07 RX ORDER — LANOLIN ALCOHOL/MO/W.PET/CERES
6 CREAM (GRAM) TOPICAL NIGHTLY PRN
Status: DISCONTINUED | OUTPATIENT
Start: 2021-11-07 | End: 2021-11-09 | Stop reason: HOSPADM

## 2021-11-07 RX ORDER — LAMOTRIGINE 25 MG/1
100 TABLET ORAL DAILY
Status: DISCONTINUED | OUTPATIENT
Start: 2021-11-07 | End: 2021-11-09 | Stop reason: HOSPADM

## 2021-11-07 RX ORDER — HEPARIN SODIUM 1000 [USP'U]/ML
4000 INJECTION, SOLUTION INTRAVENOUS; SUBCUTANEOUS PRN
Status: DISCONTINUED | OUTPATIENT
Start: 2021-11-07 | End: 2021-11-09

## 2021-11-07 RX ORDER — HEPARIN SODIUM 10000 [USP'U]/100ML
0-30 INJECTION, SOLUTION INTRAVENOUS CONTINUOUS
Status: DISCONTINUED | OUTPATIENT
Start: 2021-11-07 | End: 2021-11-09

## 2021-11-07 RX ORDER — 0.9 % SODIUM CHLORIDE 0.9 %
2 VIAL (ML) INJECTION EVERY 12 HOURS SCHEDULED
Status: DISCONTINUED | OUTPATIENT
Start: 2021-11-07 | End: 2021-11-09 | Stop reason: HOSPADM

## 2021-11-07 RX ORDER — HEPARIN SODIUM 1000 [USP'U]/ML
4000 INJECTION, SOLUTION INTRAVENOUS; SUBCUTANEOUS ONCE
Status: COMPLETED | OUTPATIENT
Start: 2021-11-07 | End: 2021-11-07

## 2021-11-07 RX ORDER — HEPARIN SODIUM 1000 [USP'U]/ML
2000 INJECTION, SOLUTION INTRAVENOUS; SUBCUTANEOUS PRN
Status: DISCONTINUED | OUTPATIENT
Start: 2021-11-07 | End: 2021-11-09

## 2021-11-07 RX ORDER — HYDRALAZINE HYDROCHLORIDE 20 MG/ML
10 INJECTION INTRAMUSCULAR; INTRAVENOUS EVERY 6 HOURS PRN
Status: DISCONTINUED | OUTPATIENT
Start: 2021-11-07 | End: 2021-11-09 | Stop reason: HOSPADM

## 2021-11-07 RX ADMIN — IOHEXOL 100 ML: 350 INJECTION, SOLUTION INTRAVENOUS at 13:01

## 2021-11-07 RX ADMIN — ONDANSETRON 4 MG: 2 INJECTION INTRAMUSCULAR; INTRAVENOUS at 12:25

## 2021-11-07 RX ADMIN — MORPHINE SULFATE 2 MG: 2 INJECTION, SOLUTION INTRAMUSCULAR; INTRAVENOUS at 20:50

## 2021-11-07 RX ADMIN — SODIUM CHLORIDE 1000 ML: 0.9 INJECTION, SOLUTION INTRAVENOUS at 10:33

## 2021-11-07 RX ADMIN — MORPHINE SULFATE 2 MG: 2 INJECTION, SOLUTION INTRAMUSCULAR; INTRAVENOUS at 18:00

## 2021-11-07 RX ADMIN — ONDANSETRON 4 MG: 2 INJECTION INTRAMUSCULAR; INTRAVENOUS at 10:33

## 2021-11-07 RX ADMIN — HEPARIN SODIUM 4000 UNITS: 1000 INJECTION, SOLUTION INTRAVENOUS; SUBCUTANEOUS at 17:15

## 2021-11-07 RX ADMIN — HEPARIN SODIUM 9 UNITS/KG/HR: 10000 INJECTION, SOLUTION INTRAVENOUS at 17:12

## 2021-11-07 RX ADMIN — MORPHINE SULFATE 4 MG: 4 INJECTION INTRAVENOUS at 12:26

## 2021-11-07 RX ADMIN — SODIUM CHLORIDE, SODIUM LACTATE, POTASSIUM CHLORIDE, AND CALCIUM CHLORIDE: .6; .31; .03; .02 INJECTION, SOLUTION INTRAVENOUS at 17:29

## 2021-11-07 RX ADMIN — MORPHINE SULFATE 4 MG: 4 INJECTION, SOLUTION INTRAMUSCULAR; INTRAVENOUS at 15:26

## 2021-11-07 RX ADMIN — ALUMINA, MAGNESIA, AND SIMETHICONE ORAL SUSPENSION REGULAR STRENGTH 30 ML: 1200; 1200; 120 SUSPENSION ORAL at 10:32

## 2021-11-07 RX ADMIN — LAMOTRIGINE 100 MG: 25 TABLET ORAL at 20:48

## 2021-11-07 RX ADMIN — SODIUM CHLORIDE, PRESERVATIVE FREE 2 ML: 5 INJECTION INTRAVENOUS at 21:05

## 2021-11-07 RX ADMIN — VALPROIC ACID 1250 MG: 250 SOLUTION ORAL at 20:49

## 2021-11-07 RX ADMIN — FAMOTIDINE 20 MG: 10 INJECTION INTRAVENOUS at 10:36

## 2021-11-07 ASSESSMENT — PATIENT HEALTH QUESTIONNAIRE - PHQ9
SUM OF ALL RESPONSES TO PHQ9 QUESTIONS 1 AND 2: 0
1. LITTLE INTEREST OR PLEASURE IN DOING THINGS: NOT AT ALL
2. FEELING DOWN, DEPRESSED OR HOPELESS: NOT AT ALL
CLINICAL INTERPRETATION OF PHQ2 SCORE: NO FURTHER SCREENING NEEDED
CLINICAL INTERPRETATION OF PHQ9 SCORE: NO FURTHER SCREENING NEEDED
SUM OF ALL RESPONSES TO PHQ9 QUESTIONS 1 AND 2: 0
IS PATIENT ABLE TO COMPLETE PHQ2 OR PHQ9: YES
SUM OF ALL RESPONSES TO PHQ9 QUESTIONS 1 AND 2: 0

## 2021-11-07 ASSESSMENT — COGNITIVE AND FUNCTIONAL STATUS - GENERAL
ARE YOU DEAF OR DO YOU HAVE SERIOUS DIFFICULTY  HEARING: NO
BECAUSE OF A PHYSICAL, MENTAL, OR EMOTIONAL CONDITION, DO YOU HAVE DIFFICULTY DOING ERRANDS ALONE: NO
ARE YOU DEAF OR DO YOU HAVE SERIOUS DIFFICULTY  HEARING: NO
ARE YOU BLIND OR DO YOU HAVE SERIOUS DIFFICULTY SEEING, EVEN WHEN WEARING GLASSES: NO
DO YOU HAVE DIFFICULTY DRESSING OR BATHING: NO
BECAUSE OF A PHYSICAL, MENTAL, OR EMOTIONAL CONDITION, DO YOU HAVE SERIOUS DIFFICULTY CONCENTRATING, REMEMBERING OR MAKING DECISIONS: NO
DO YOU HAVE SERIOUS DIFFICULTY WALKING OR CLIMBING STAIRS: NO
ARE YOU BLIND OR DO YOU HAVE SERIOUS DIFFICULTY SEEING, EVEN WHEN WEARING GLASSES: NO

## 2021-11-07 ASSESSMENT — ACTIVITIES OF DAILY LIVING (ADL)
RECENT_DECLINE_ADL: NO
CHRONIC_PAIN_PRESENT: NO
CHRONIC_PAIN_PRESENT: NO
ADL_BEFORE_ADMISSION: INDEPENDENT
ADL_SHORT_OF_BREATH: NO
ADL_SCORE: 12

## 2021-11-07 ASSESSMENT — ENCOUNTER SYMPTOMS
DIARRHEA: 0
APPETITE CHANGE: 1
SPEECH DIFFICULTY: 0
VOMITING: 1
DIZZINESS: 0
RHINORRHEA: 0
CHILLS: 0
EYE PAIN: 0
COUGH: 0
FACIAL SWELLING: 0
BACK PAIN: 0
FATIGUE: 0
NAUSEA: 1
NAUSEA: 0
SHORTNESS OF BREATH: 0
ABDOMINAL DISTENTION: 1
CONSTIPATION: 0
BLOOD IN STOOL: 0
VOMITING: 0
ACTIVITY CHANGE: 0
ABDOMINAL PAIN: 1
HEADACHES: 0
COLOR CHANGE: 0
SORE THROAT: 0
WHEEZING: 0
WEAKNESS: 0
EYE DISCHARGE: 0
NUMBNESS: 0
LIGHT-HEADEDNESS: 0
DIARRHEA: 1
PHOTOPHOBIA: 0
CHOKING: 0
FEVER: 0

## 2021-11-07 ASSESSMENT — COLUMBIA-SUICIDE SEVERITY RATING SCALE - C-SSRS
1. WITHIN THE PAST MONTH, HAVE YOU WISHED YOU WERE DEAD OR WISHED YOU COULD GO TO SLEEP AND NOT WAKE UP?: NO
6. HAVE YOU EVER DONE ANYTHING, STARTED TO DO ANYTHING, OR PREPARED TO DO ANYTHING TO END YOUR LIFE?: NO
IS THE PATIENT ABLE TO COMPLETE C-SSRS: YES
2. HAVE YOU ACTUALLY HAD ANY THOUGHTS OF KILLING YOURSELF?: NO

## 2021-11-07 ASSESSMENT — PAIN SCALES - GENERAL
PAINLEVEL_OUTOF10: 10
PAINLEVEL_OUTOF10: 7
PAINLEVEL_OUTOF10: 8
PAINLEVEL_OUTOF10: 8
PAINLEVEL_OUTOF10: 9
PAINLEVEL_OUTOF10: 8

## 2021-11-07 ASSESSMENT — LIFESTYLE VARIABLES
HOW OFTEN DO YOU HAVE A DRINK CONTAINING ALCOHOL: NEVER
HOW MANY STANDARD DRINKS CONTAINING ALCOHOL DO YOU HAVE ON A TYPICAL DAY: 0,1 OR 2
ALCOHOL_USE_STATUS: NO OR LOW RISK WITH VALIDATED TOOL
HOW OFTEN DO YOU HAVE 6 OR MORE DRINKS ON ONE OCCASION: NEVER
AUDIT-C TOTAL SCORE: 0

## 2021-11-07 ASSESSMENT — PAIN DESCRIPTION - PAIN TYPE: TYPE: ACUTE PAIN

## 2021-11-08 ENCOUNTER — TELEPHONE (OUTPATIENT)
Dept: GASTROENTEROLOGY | Facility: CLINIC | Age: 66
End: 2021-11-08

## 2021-11-08 ENCOUNTER — CASE MANAGEMENT (OUTPATIENT)
Dept: CARE COORDINATION | Age: 66
End: 2021-11-08

## 2021-11-08 ENCOUNTER — APPOINTMENT (OUTPATIENT)
Dept: GENERAL RADIOLOGY | Age: 66
DRG: 389 | End: 2021-11-08
Attending: STUDENT IN AN ORGANIZED HEALTH CARE EDUCATION/TRAINING PROGRAM

## 2021-11-08 LAB
ALBUMIN SERPL-MCNC: 3 G/DL (ref 3.6–5.1)
ALBUMIN/GLOB SERPL: 0.9 {RATIO} (ref 1–2.4)
ALP SERPL-CCNC: 55 UNITS/L (ref 45–117)
ALT SERPL-CCNC: 33 UNITS/L
ANION GAP SERPL CALC-SCNC: 9 MMOL/L (ref 10–20)
APTT PPP: 60 SEC (ref 22–30)
AST SERPL-CCNC: 26 UNITS/L
BASOPHILS # BLD: 0 K/MCL (ref 0–0.3)
BASOPHILS NFR BLD: 0 %
BILIRUB SERPL-MCNC: 0.6 MG/DL (ref 0.2–1)
BUN SERPL-MCNC: 16 MG/DL (ref 6–20)
BUN/CREAT SERPL: 21 (ref 7–25)
CALCIUM SERPL-MCNC: 8.3 MG/DL (ref 8.4–10.2)
CHLORIDE SERPL-SCNC: 106 MMOL/L (ref 98–107)
CO2 SERPL-SCNC: 29 MMOL/L (ref 21–32)
CREAT SERPL-MCNC: 0.78 MG/DL (ref 0.67–1.17)
DEPRECATED RDW RBC: 47.8 FL (ref 39–50)
EOSINOPHIL # BLD: 0 K/MCL (ref 0–0.5)
EOSINOPHIL NFR BLD: 1 %
ERYTHROCYTE [DISTWIDTH] IN BLOOD: 13.9 % (ref 11–15)
FASTING DURATION TIME PATIENT: ABNORMAL H
GFR SERPLBLD BASED ON 1.73 SQ M-ARVRAT: >90 ML/MIN
GLOBULIN SER-MCNC: 3.2 G/DL (ref 2–4)
GLUCOSE SERPL-MCNC: 103 MG/DL (ref 70–99)
HCT VFR BLD CALC: 40.6 % (ref 39–51)
HGB BLD-MCNC: 13.7 G/DL (ref 13–17)
IMM GRANULOCYTES # BLD AUTO: 0 K/MCL (ref 0–0.2)
IMM GRANULOCYTES # BLD: 0 %
LYMPHOCYTES # BLD: 1.4 K/MCL (ref 1–4)
LYMPHOCYTES NFR BLD: 16 %
MAGNESIUM SERPL-MCNC: 1.8 MG/DL (ref 1.7–2.4)
MCH RBC QN AUTO: 31.8 PG (ref 26–34)
MCHC RBC AUTO-ENTMCNC: 33.7 G/DL (ref 32–36.5)
MCV RBC AUTO: 94.2 FL (ref 78–100)
MONOCYTES # BLD: 1.4 K/MCL (ref 0.3–0.9)
MONOCYTES NFR BLD: 17 %
NEUTROPHILS # BLD: 5.7 K/MCL (ref 1.8–7.7)
NEUTROPHILS NFR BLD: 66 %
NRBC BLD MANUAL-RTO: 0 /100 WBC
PLATELET # BLD AUTO: 166 K/MCL (ref 140–450)
POTASSIUM SERPL-SCNC: 3.8 MMOL/L (ref 3.4–5.1)
PROT SERPL-MCNC: 6.2 G/DL (ref 6.4–8.2)
RBC # BLD: 4.31 MIL/MCL (ref 4.5–5.9)
SODIUM SERPL-SCNC: 140 MMOL/L (ref 135–145)
WBC # BLD: 8.5 K/MCL (ref 4.2–11)

## 2021-11-08 PROCEDURE — 85730 THROMBOPLASTIN TIME PARTIAL: CPT | Performed by: HOSPITALIST

## 2021-11-08 PROCEDURE — 10002800 HB RX 250 W HCPCS: Performed by: STUDENT IN AN ORGANIZED HEALTH CARE EDUCATION/TRAINING PROGRAM

## 2021-11-08 PROCEDURE — 10004651 HB RX, NO CHARGE ITEM: Performed by: STUDENT IN AN ORGANIZED HEALTH CARE EDUCATION/TRAINING PROGRAM

## 2021-11-08 PROCEDURE — 74250 X-RAY XM SM INT 1CNTRST STD: CPT

## 2021-11-08 PROCEDURE — 10002807 HB RX 258: Performed by: STUDENT IN AN ORGANIZED HEALTH CARE EDUCATION/TRAINING PROGRAM

## 2021-11-08 PROCEDURE — 99233 SBSQ HOSP IP/OBS HIGH 50: CPT | Performed by: INTERNAL MEDICINE

## 2021-11-08 PROCEDURE — C9113 INJ PANTOPRAZOLE SODIUM, VIA: HCPCS | Performed by: STUDENT IN AN ORGANIZED HEALTH CARE EDUCATION/TRAINING PROGRAM

## 2021-11-08 PROCEDURE — 85025 COMPLETE CBC W/AUTO DIFF WBC: CPT | Performed by: STUDENT IN AN ORGANIZED HEALTH CARE EDUCATION/TRAINING PROGRAM

## 2021-11-08 PROCEDURE — 10002805 HB CONTRAST AGENT: Performed by: STUDENT IN AN ORGANIZED HEALTH CARE EDUCATION/TRAINING PROGRAM

## 2021-11-08 PROCEDURE — 99233 SBSQ HOSP IP/OBS HIGH 50: CPT | Performed by: STUDENT IN AN ORGANIZED HEALTH CARE EDUCATION/TRAINING PROGRAM

## 2021-11-08 PROCEDURE — 83735 ASSAY OF MAGNESIUM: CPT | Performed by: STUDENT IN AN ORGANIZED HEALTH CARE EDUCATION/TRAINING PROGRAM

## 2021-11-08 PROCEDURE — 10002803 HB RX 637: Performed by: STUDENT IN AN ORGANIZED HEALTH CARE EDUCATION/TRAINING PROGRAM

## 2021-11-08 PROCEDURE — 10000002 HB ROOM CHARGE MED SURG

## 2021-11-08 PROCEDURE — 80053 COMPREHEN METABOLIC PANEL: CPT | Performed by: STUDENT IN AN ORGANIZED HEALTH CARE EDUCATION/TRAINING PROGRAM

## 2021-11-08 RX ORDER — PANTOPRAZOLE SODIUM 40 MG/10ML
40 INJECTION, POWDER, LYOPHILIZED, FOR SOLUTION INTRAVENOUS DAILY
Status: DISCONTINUED | OUTPATIENT
Start: 2021-11-08 | End: 2021-11-09 | Stop reason: HOSPADM

## 2021-11-08 RX ORDER — ACETAMINOPHEN 325 MG/1
650 TABLET ORAL 4 TIMES DAILY
Status: DISCONTINUED | OUTPATIENT
Start: 2021-11-08 | End: 2021-11-09 | Stop reason: HOSPADM

## 2021-11-08 RX ORDER — POTASSIUM CHLORIDE 14.9 MG/ML
20 INJECTION INTRAVENOUS ONCE
Status: COMPLETED | OUTPATIENT
Start: 2021-11-08 | End: 2021-11-08

## 2021-11-08 RX ADMIN — MORPHINE SULFATE 2 MG: 2 INJECTION, SOLUTION INTRAMUSCULAR; INTRAVENOUS at 03:37

## 2021-11-08 RX ADMIN — HEPARIN SODIUM 9 UNITS/KG/HR: 10000 INJECTION, SOLUTION INTRAVENOUS at 16:34

## 2021-11-08 RX ADMIN — METOPROLOL TARTRATE 25 MG: 25 TABLET, FILM COATED ORAL at 16:58

## 2021-11-08 RX ADMIN — METOPROLOL TARTRATE 12.5 MG: 25 TABLET, FILM COATED ORAL at 20:55

## 2021-11-08 RX ADMIN — SODIUM CHLORIDE, SODIUM LACTATE, POTASSIUM CHLORIDE, AND CALCIUM CHLORIDE: .6; .31; .03; .02 INJECTION, SOLUTION INTRAVENOUS at 03:41

## 2021-11-08 RX ADMIN — MAGNESIUM SULFATE HEPTAHYDRATE 2 G: 40 INJECTION, SOLUTION INTRAVENOUS at 10:27

## 2021-11-08 RX ADMIN — POTASSIUM CHLORIDE 20 MEQ: 14.9 INJECTION, SOLUTION INTRAVENOUS at 12:20

## 2021-11-08 RX ADMIN — SODIUM CHLORIDE, PRESERVATIVE FREE 2 ML: 5 INJECTION INTRAVENOUS at 08:34

## 2021-11-08 RX ADMIN — SODIUM CHLORIDE, SODIUM LACTATE, POTASSIUM CHLORIDE, AND CALCIUM CHLORIDE: .6; .31; .03; .02 INJECTION, SOLUTION INTRAVENOUS at 17:00

## 2021-11-08 RX ADMIN — ACETAMINOPHEN 650 MG: 325 TABLET ORAL at 16:57

## 2021-11-08 RX ADMIN — VALPROIC ACID 1250 MG: 250 SOLUTION ORAL at 20:54

## 2021-11-08 RX ADMIN — ACETAMINOPHEN 650 MG: 325 TABLET ORAL at 20:56

## 2021-11-08 RX ADMIN — PANTOPRAZOLE SODIUM 40 MG: 40 INJECTION, POWDER, FOR SOLUTION INTRAVENOUS at 08:34

## 2021-11-08 RX ADMIN — DIATRIZOATE MEGLUMINE AND DIATRIZOATE SODIUM 200 ML: 600; 100 SOLUTION ORAL; RECTAL at 14:30

## 2021-11-08 ASSESSMENT — COGNITIVE AND FUNCTIONAL STATUS - GENERAL
BECAUSE OF A PHYSICAL, MENTAL, OR EMOTIONAL CONDITION, DO YOU HAVE SERIOUS DIFFICULTY CONCENTRATING, REMEMBERING OR MAKING DECISIONS: NO
BECAUSE OF A PHYSICAL, MENTAL, OR EMOTIONAL CONDITION, DO YOU HAVE DIFFICULTY DOING ERRANDS ALONE: NO
DO YOU HAVE DIFFICULTY DRESSING OR BATHING: NO
DO YOU HAVE SERIOUS DIFFICULTY WALKING OR CLIMBING STAIRS: NO

## 2021-11-08 ASSESSMENT — PAIN SCALES - GENERAL
PAINLEVEL_OUTOF10: 3
PAINLEVEL_OUTOF10: 4
PAINLEVEL_OUTOF10: 5
PAINLEVEL_OUTOF10: 3
PAINLEVEL_OUTOF10: 3
PAINLEVEL_OUTOF10: 9

## 2021-11-08 NOTE — TELEPHONE ENCOUNTER
Pt hospitalized over the weekend with SBO - RN to contact the pt and inform that doctors at [facility] will be managing his care while an inpatient. I can contact him later today for an update.      Was just paged by his friend

## 2021-11-08 NOTE — TELEPHONE ENCOUNTER
Dr. Zeke Jon    I reviewed your message with Saba Saab. He has the NG tube in so his throat is bothering him/difficult to talk. He is aware that you will call to follow up with him later and is ok with that.   Aware you usually call after 4pm.    He told me that

## 2021-11-09 VITALS
DIASTOLIC BLOOD PRESSURE: 69 MMHG | RESPIRATION RATE: 18 BRPM | TEMPERATURE: 97.5 F | HEART RATE: 79 BPM | OXYGEN SATURATION: 96 % | SYSTOLIC BLOOD PRESSURE: 159 MMHG

## 2021-11-09 LAB
ALBUMIN SERPL-MCNC: 2.8 G/DL (ref 3.6–5.1)
ALBUMIN/GLOB SERPL: 0.9 {RATIO} (ref 1–2.4)
ALP SERPL-CCNC: 47 UNITS/L (ref 45–117)
ALT SERPL-CCNC: 24 UNITS/L
ANION GAP SERPL CALC-SCNC: 10 MMOL/L (ref 10–20)
APTT PPP: 59 SEC (ref 22–30)
AST SERPL-CCNC: 19 UNITS/L
BASOPHILS # BLD: 0.1 K/MCL (ref 0–0.3)
BASOPHILS NFR BLD: 1 %
BILIRUB SERPL-MCNC: 0.7 MG/DL (ref 0.2–1)
BUN SERPL-MCNC: 14 MG/DL (ref 6–20)
BUN/CREAT SERPL: 19 (ref 7–25)
CALCIUM SERPL-MCNC: 8.1 MG/DL (ref 8.4–10.2)
CHLORIDE SERPL-SCNC: 105 MMOL/L (ref 98–107)
CHOLEST SERPL-MCNC: 124 MG/DL
CHOLEST/HDLC SERPL: 2.9 {RATIO}
CO2 SERPL-SCNC: 29 MMOL/L (ref 21–32)
CREAT SERPL-MCNC: 0.74 MG/DL (ref 0.67–1.17)
DEPRECATED RDW RBC: 47.9 FL (ref 39–50)
EOSINOPHIL # BLD: 0.2 K/MCL (ref 0–0.5)
EOSINOPHIL NFR BLD: 3 %
ERYTHROCYTE [DISTWIDTH] IN BLOOD: 13.9 % (ref 11–15)
FASTING DURATION TIME PATIENT: ABNORMAL H
FASTING DURATION TIME PATIENT: NORMAL H
GFR SERPLBLD BASED ON 1.73 SQ M-ARVRAT: >90 ML/MIN
GLOBULIN SER-MCNC: 3 G/DL (ref 2–4)
GLUCOSE SERPL-MCNC: 91 MG/DL (ref 70–99)
HCT VFR BLD CALC: 37.7 % (ref 39–51)
HDLC SERPL-MCNC: 43 MG/DL
HGB BLD-MCNC: 12.6 G/DL (ref 13–17)
IMM GRANULOCYTES # BLD AUTO: 0 K/MCL (ref 0–0.2)
IMM GRANULOCYTES # BLD: 0 %
INR PPP: 2.4
LDLC SERPL CALC-MCNC: 60 MG/DL
LYMPHOCYTES # BLD: 1.8 K/MCL (ref 1–4)
LYMPHOCYTES NFR BLD: 28 %
MAGNESIUM SERPL-MCNC: 2.1 MG/DL (ref 1.7–2.4)
MCH RBC QN AUTO: 31.7 PG (ref 26–34)
MCHC RBC AUTO-ENTMCNC: 33.4 G/DL (ref 32–36.5)
MCV RBC AUTO: 94.7 FL (ref 78–100)
MONOCYTES # BLD: 1.1 K/MCL (ref 0.3–0.9)
MONOCYTES NFR BLD: 18 %
NEUTROPHILS # BLD: 3.2 K/MCL (ref 1.8–7.7)
NEUTROPHILS NFR BLD: 50 %
NONHDLC SERPL-MCNC: 81 MG/DL
NRBC BLD MANUAL-RTO: 0 /100 WBC
PHOSPHATE SERPL-MCNC: 3.2 MG/DL (ref 2.4–4.7)
PLATELET # BLD AUTO: 167 K/MCL (ref 140–450)
POTASSIUM SERPL-SCNC: 3.4 MMOL/L (ref 3.4–5.1)
PROT SERPL-MCNC: 5.8 G/DL (ref 6.4–8.2)
PROTHROMBIN TIME: 25.1 SEC (ref 9.7–11.8)
RBC # BLD: 3.98 MIL/MCL (ref 4.5–5.9)
SODIUM SERPL-SCNC: 141 MMOL/L (ref 135–145)
TRIGL SERPL-MCNC: 103 MG/DL
WBC # BLD: 6.4 K/MCL (ref 4.2–11)

## 2021-11-09 PROCEDURE — 10004651 HB RX, NO CHARGE ITEM: Performed by: STUDENT IN AN ORGANIZED HEALTH CARE EDUCATION/TRAINING PROGRAM

## 2021-11-09 PROCEDURE — 84100 ASSAY OF PHOSPHORUS: CPT | Performed by: STUDENT IN AN ORGANIZED HEALTH CARE EDUCATION/TRAINING PROGRAM

## 2021-11-09 PROCEDURE — C9113 INJ PANTOPRAZOLE SODIUM, VIA: HCPCS | Performed by: STUDENT IN AN ORGANIZED HEALTH CARE EDUCATION/TRAINING PROGRAM

## 2021-11-09 PROCEDURE — 80061 LIPID PANEL: CPT | Performed by: STUDENT IN AN ORGANIZED HEALTH CARE EDUCATION/TRAINING PROGRAM

## 2021-11-09 PROCEDURE — 36415 COLL VENOUS BLD VENIPUNCTURE: CPT | Performed by: INTERNAL MEDICINE

## 2021-11-09 PROCEDURE — 10002803 HB RX 637: Performed by: STUDENT IN AN ORGANIZED HEALTH CARE EDUCATION/TRAINING PROGRAM

## 2021-11-09 PROCEDURE — X1094 NO CHARGE VISIT: HCPCS | Performed by: INTERNAL MEDICINE

## 2021-11-09 PROCEDURE — 10002803 HB RX 637: Performed by: INTERNAL MEDICINE

## 2021-11-09 PROCEDURE — 10002800 HB RX 250 W HCPCS: Performed by: STUDENT IN AN ORGANIZED HEALTH CARE EDUCATION/TRAINING PROGRAM

## 2021-11-09 PROCEDURE — 10002807 HB RX 258: Performed by: STUDENT IN AN ORGANIZED HEALTH CARE EDUCATION/TRAINING PROGRAM

## 2021-11-09 PROCEDURE — 83735 ASSAY OF MAGNESIUM: CPT | Performed by: STUDENT IN AN ORGANIZED HEALTH CARE EDUCATION/TRAINING PROGRAM

## 2021-11-09 PROCEDURE — 10002803 HB RX 637: Performed by: SURGERY

## 2021-11-09 PROCEDURE — 90715 TDAP VACCINE 7 YRS/> IM: CPT | Performed by: STUDENT IN AN ORGANIZED HEALTH CARE EDUCATION/TRAINING PROGRAM

## 2021-11-09 PROCEDURE — 80053 COMPREHEN METABOLIC PANEL: CPT | Performed by: STUDENT IN AN ORGANIZED HEALTH CARE EDUCATION/TRAINING PROGRAM

## 2021-11-09 PROCEDURE — 85610 PROTHROMBIN TIME: CPT | Performed by: INTERNAL MEDICINE

## 2021-11-09 PROCEDURE — 99233 SBSQ HOSP IP/OBS HIGH 50: CPT | Performed by: STUDENT IN AN ORGANIZED HEALTH CARE EDUCATION/TRAINING PROGRAM

## 2021-11-09 PROCEDURE — 85025 COMPLETE CBC W/AUTO DIFF WBC: CPT | Performed by: STUDENT IN AN ORGANIZED HEALTH CARE EDUCATION/TRAINING PROGRAM

## 2021-11-09 PROCEDURE — 10002803 HB RX 637: Performed by: HOSPITALIST

## 2021-11-09 PROCEDURE — 99233 SBSQ HOSP IP/OBS HIGH 50: CPT | Performed by: INTERNAL MEDICINE

## 2021-11-09 PROCEDURE — 85730 THROMBOPLASTIN TIME PARTIAL: CPT | Performed by: INTERNAL MEDICINE

## 2021-11-09 RX ORDER — CEPHALEXIN 500 MG/1
500 CAPSULE ORAL EVERY 12 HOURS SCHEDULED
Qty: 14 CAPSULE | Refills: 0 | Status: SHIPPED | OUTPATIENT
Start: 2021-11-09 | End: 2021-11-09

## 2021-11-09 RX ORDER — CEPHALEXIN 500 MG/1
500 CAPSULE ORAL EVERY 6 HOURS SCHEDULED
Status: DISCONTINUED | OUTPATIENT
Start: 2021-11-10 | End: 2021-11-09 | Stop reason: HOSPADM

## 2021-11-09 RX ORDER — POTASSIUM CHLORIDE 20 MEQ/1
40 TABLET, EXTENDED RELEASE ORAL ONCE
Status: COMPLETED | OUTPATIENT
Start: 2021-11-09 | End: 2021-11-09

## 2021-11-09 RX ORDER — CEPHALEXIN 500 MG/1
500 CAPSULE ORAL EVERY 12 HOURS SCHEDULED
Status: DISCONTINUED | OUTPATIENT
Start: 2021-11-09 | End: 2021-11-09

## 2021-11-09 RX ORDER — WARFARIN SODIUM 7.5 MG/1
7.5 TABLET ORAL EVERY EVENING
Status: DISCONTINUED | OUTPATIENT
Start: 2021-11-09 | End: 2021-11-09 | Stop reason: HOSPADM

## 2021-11-09 RX ORDER — CEPHALEXIN 500 MG/1
500 CAPSULE ORAL 4 TIMES DAILY
Qty: 28 CAPSULE | Refills: 0 | Status: SHIPPED | OUTPATIENT
Start: 2021-11-09 | End: 2021-11-16

## 2021-11-09 RX ADMIN — LAMOTRIGINE 100 MG: 25 TABLET ORAL at 09:04

## 2021-11-09 RX ADMIN — SODIUM CHLORIDE, PRESERVATIVE FREE 2 ML: 5 INJECTION INTRAVENOUS at 09:05

## 2021-11-09 RX ADMIN — CEPHALEXIN 500 MG: 500 CAPSULE ORAL at 19:18

## 2021-11-09 RX ADMIN — Medication 6 MG: at 00:13

## 2021-11-09 RX ADMIN — SODIUM CHLORIDE, SODIUM LACTATE, POTASSIUM CHLORIDE, AND CALCIUM CHLORIDE: .6; .31; .03; .02 INJECTION, SOLUTION INTRAVENOUS at 03:40

## 2021-11-09 RX ADMIN — ACETAMINOPHEN 650 MG: 325 TABLET ORAL at 09:04

## 2021-11-09 RX ADMIN — POTASSIUM CHLORIDE 40 MEQ: 1500 TABLET, EXTENDED RELEASE ORAL at 09:04

## 2021-11-09 RX ADMIN — PANTOPRAZOLE SODIUM 40 MG: 40 INJECTION, POWDER, FOR SOLUTION INTRAVENOUS at 09:04

## 2021-11-09 RX ADMIN — TETANUS TOXOID, REDUCED DIPHTHERIA TOXOID AND ACELLULAR PERTUSSIS VACCINE, ADSORBED 0.5 ML: 5; 2.5; 8; 8; 2.5 SUSPENSION INTRAMUSCULAR at 12:00

## 2021-11-09 RX ADMIN — CEPHALEXIN 500 MG: 500 CAPSULE ORAL at 11:34

## 2021-11-09 RX ADMIN — METOPROLOL TARTRATE 25 MG: 25 TABLET, FILM COATED ORAL at 07:03

## 2021-11-09 RX ADMIN — ACETAMINOPHEN 650 MG: 325 TABLET ORAL at 17:20

## 2021-11-09 RX ADMIN — WARFARIN SODIUM 7.5 MG: 7.5 TABLET ORAL at 17:20

## 2021-11-09 RX ADMIN — ACETAMINOPHEN 650 MG: 325 TABLET ORAL at 12:00

## 2021-11-09 ASSESSMENT — PAIN SCALES - GENERAL
PAINLEVEL_OUTOF10: 0

## 2021-11-09 NOTE — TELEPHONE ENCOUNTER
Patient scheduled for a hospital f/u appointment on 11/11/2021 but would like to change it to 2 weeks later. No appointments available. Would you like patient to keep original appointment date or add patient to a later date.   Patient also asking what diet WALL: Unremarkable. BONES: Mild degenerative changes of the lumbar spine with vacuum disc  degeneration at L5-S1 with severe loss of intervertebral disc height. IMPRESSION:  1.  Mechanical small bowel obstruction with transition point in the  centra

## 2021-11-09 NOTE — TELEPHONE ENCOUNTER
Pt states appt scheduled for 11/11/21 should be moved back by two weeks. Pt also states he was to be on a specific diet and inquiring instructions. Pt states RN may call him back or send information via [employer].  . [phone] no fever and no chills.

## 2021-11-09 NOTE — TELEPHONE ENCOUNTER
Ok to push out his appiontment 2 - 3 weeks   He should stay on low residue diet, please send information on this to him

## 2021-11-09 NOTE — TELEPHONE ENCOUNTER
Patient contacted, bowel obstruction has resolved and they are starting po intake tomorrow. He has small bowel series which apparently showed no obvious blockage. This is all for my conversation with the patient this evening. He is feeling much better.

## 2021-11-10 ENCOUNTER — MED REC SCAN ONLY (OUTPATIENT)
Dept: FAMILY MEDICINE CLINIC | Facility: CLINIC | Age: 66
End: 2021-11-10

## 2021-11-10 ENCOUNTER — CASE MANAGEMENT (OUTPATIENT)
Dept: CARE COORDINATION | Age: 66
End: 2021-11-10

## 2021-11-10 ENCOUNTER — PATIENT MESSAGE (OUTPATIENT)
Dept: GASTROENTEROLOGY | Facility: CLINIC | Age: 66
End: 2021-11-10

## 2021-11-10 LAB
ATRIAL RATE (BPM): 81
P AXIS (DEGREES): 42
PR-INTERVAL (MSEC): 202
QRS-INTERVAL (MSEC): 98
QT-INTERVAL (MSEC): 376
QTC: 436
R AXIS (DEGREES): -6
REPORT TEXT: NORMAL
T AXIS (DEGREES): 80
VENTRICULAR RATE EKG/MIN (BPM): 81

## 2021-11-10 NOTE — TELEPHONE ENCOUNTER
Dr. Sergio Alonso    There are no available appointments in that timeframe available. Would it be ok to add to the MD approval on 12/8/2021 at 8:30am or is that too far out?     Thank you

## 2021-11-10 NOTE — TELEPHONE ENCOUNTER
Left message for patient to call back. CSS if patient calls back please confirm if patient can make appointment with Dr. Tayla Henderson on 12/8 at 8:30am as tentatively scheduled.     Please provide patient with office address as well and route encounter to RN to

## 2021-11-11 NOTE — TELEPHONE ENCOUNTER
Dr. Eddy Brown    Patient cannot come on 12/8/21 at 8am.  He can only do afternoons please advise when he may be added. He has an appointment with Dr. Gisel Nayak in [place] at 2:30pm on 12/9 wants to know if we could work our appointment around that.   It looks like

## 2021-11-11 NOTE — TELEPHONE ENCOUNTER
From: Rajat Thomas  To: Tutu Bianchi. Veda Gómez MD  Sent: 11/10/2021 6:07 PM CST  Subject: Follow up appointment    Hi Nehemias Avery,  I can only do afternoons. I am going to be in [facility] on Thursday, December 9 seeing my primary, Dr Joanna Lopez. She's at 2:30.  Could w

## 2021-11-12 NOTE — TELEPHONE ENCOUNTER
Patient accepted appointment with Dr. Sergio Alonso and placed on wait list if a sooner appointment becomes available.     Your Appointments    Thursday December 09, 2021  2:30 PM  Follow Up Visit with Ravinder Jeffrey, [facility],

## 2021-11-12 NOTE — TELEPHONE ENCOUNTER
Your Appointments       Thursday December 09, 2021  3:45 PM  Follow Up Visit with Frank Townsend MD  [facility], [facility], [address] [place] [address] ([facility]) [address] [place] [address]  [phone]           Patient want

## 2021-11-12 NOTE — TELEPHONE ENCOUNTER
Patient contacted. He said that he would be able to come to a 3:45pm appointment on 12/9-voiced the timing would work well and thinks he will be able to get here without difficulty. He accepted the appointment.   Advised to call if any worsening symptoms

## 2021-11-12 NOTE — TELEPHONE ENCOUNTER
Would the pt be able to make if over the [address] on Dec 9th at 3:45 from [facility]? Ok to add if you really think it would work.

## 2021-11-12 NOTE — TELEPHONE ENCOUNTER
Left message for patient to call back. Want to know if he could make appointment at [facility] on 12/9 at 3:45pm if he has a 2:30pm with Dr. Jose Cruz Romero in [facility].

## 2021-11-13 NOTE — TELEPHONE ENCOUNTER
Searched for sooner cancellations on MD schedule for both [facility] and [address] [place] [address] locations, none currently available at this time to offer patient. Soonest available is 11/16 which he declined. Pt to keep current appt:     Future Appointments   Date Time Provider Dep

## 2021-11-14 ENCOUNTER — TELEPHONE (OUTPATIENT)
Dept: GASTROENTEROLOGY | Facility: CLINIC | Age: 66
End: 2021-11-14

## 2021-11-14 NOTE — TELEPHONE ENCOUNTER
On Call Note:    Patient calling reviewing what appears to be what he already informed Dr. Britta Sanches about recently which was a hospitalization at an outside hospital for bowel obstruction and was informed to be on a low residue.  Says he has been doing this bu

## 2021-11-15 NOTE — TELEPHONE ENCOUNTER
Dr. Felix Fletcher    I spoke to Charu Reynoso. He states that he is feeling ok. He was discharged on Wednesday and has been eating the low residue diet as advised.   He is concerned because it took him 4 days to have a bowel movement and he typically eats a high fiber di

## 2021-11-15 NOTE — TELEPHONE ENCOUNTER
Left message for patient to call back to see how he is doing. I offered sooner appointment on 11/16 and 12/1 which patient declined. Currently scheduled for 12/9 overbook- no sooner appointments available at this time.

## 2021-11-16 NOTE — TELEPHONE ENCOUNTER
Patient contacted, verified and message from Dr. Jeffy Daugherty given. Patient voiced understanding.

## 2021-11-16 NOTE — TELEPHONE ENCOUNTER
Agree with recommendations;  - gradually increase fiber in diet and would start with a soluble fiber such as Benefiber or similar  - ok to use Miralax  - monitor symptoms and call if any worsening

## 2021-11-17 NOTE — TELEPHONE ENCOUNTER
No sooner appointment available before 12/9/2021 when patient scheduled. He declined the 2 earlier appointments that were offered previously.

## 2021-11-19 NOTE — TELEPHONE ENCOUNTER
I do not see any sooner appointment with Dr. Geno Dukes except the one on 12/1 that the patient already declined. He is scheduled to be seen on 12/9/2021.

## 2021-11-24 NOTE — TELEPHONE ENCOUNTER
Patient still has soonest available appointment except for the morning appointment on 12/1 which he already declined when offered.

## 2021-12-01 ENCOUNTER — LAB ENCOUNTER (OUTPATIENT)
Dept: LAB | Facility: HOSPITAL | Age: 66
End: 2021-12-01
Attending: INTERNAL MEDICINE
Payer: MEDICARE

## 2021-12-01 DIAGNOSIS — R06.00 DOE (DYSPNEA ON EXERTION): Primary | ICD-10-CM

## 2021-12-01 DIAGNOSIS — I48.0 PAROXYSMAL ATRIAL FIBRILLATION (HCC): ICD-10-CM

## 2021-12-01 PROCEDURE — 36415 COLL VENOUS BLD VENIPUNCTURE: CPT

## 2021-12-01 PROCEDURE — 83880 ASSAY OF NATRIURETIC PEPTIDE: CPT

## 2021-12-01 PROCEDURE — 85610 PROTHROMBIN TIME: CPT

## 2021-12-09 ENCOUNTER — OFFICE VISIT (OUTPATIENT)
Dept: FAMILY MEDICINE CLINIC | Facility: CLINIC | Age: 66
End: 2021-12-09
Payer: MEDICARE

## 2021-12-09 ENCOUNTER — OFFICE VISIT (OUTPATIENT)
Dept: GASTROENTEROLOGY | Facility: CLINIC | Age: 66
End: 2021-12-09
Payer: MEDICARE

## 2021-12-09 ENCOUNTER — LABORATORY ENCOUNTER (OUTPATIENT)
Dept: LAB | Facility: REFERENCE LAB | Age: 66
End: 2021-12-09
Attending: FAMILY MEDICINE
Payer: MEDICARE

## 2021-12-09 VITALS
HEART RATE: 92 BPM | SYSTOLIC BLOOD PRESSURE: 121 MMHG | BODY MASS INDEX: 31.18 KG/M2 | HEIGHT: 74 IN | DIASTOLIC BLOOD PRESSURE: 75 MMHG | WEIGHT: 243 LBS

## 2021-12-09 VITALS
HEART RATE: 76 BPM | SYSTOLIC BLOOD PRESSURE: 124 MMHG | OXYGEN SATURATION: 97 % | BODY MASS INDEX: 31.95 KG/M2 | DIASTOLIC BLOOD PRESSURE: 76 MMHG | WEIGHT: 249 LBS | HEIGHT: 74 IN

## 2021-12-09 DIAGNOSIS — E72.20 HYPERAMMONEMIA (HCC): ICD-10-CM

## 2021-12-09 DIAGNOSIS — Z87.19 HISTORY OF DIVERTICULITIS: ICD-10-CM

## 2021-12-09 DIAGNOSIS — Z00.00 ENCOUNTER FOR MEDICARE ANNUAL WELLNESS EXAM: Primary | ICD-10-CM

## 2021-12-09 DIAGNOSIS — F34.0 CYCLOTHYMIA: ICD-10-CM

## 2021-12-09 DIAGNOSIS — Z87.19 HX SBO: ICD-10-CM

## 2021-12-09 DIAGNOSIS — Z51.81 MEDICATION MONITORING ENCOUNTER: ICD-10-CM

## 2021-12-09 DIAGNOSIS — Z87.19 HISTORY OF SMALL BOWEL OBSTRUCTION: Primary | ICD-10-CM

## 2021-12-09 DIAGNOSIS — Z12.5 PROSTATE CANCER SCREENING: ICD-10-CM

## 2021-12-09 DIAGNOSIS — Z00.00 ENCOUNTER FOR MEDICARE ANNUAL WELLNESS EXAM: ICD-10-CM

## 2021-12-09 DIAGNOSIS — Z23 NEED FOR VACCINATION: ICD-10-CM

## 2021-12-09 PROBLEM — E78.2 MIXED HYPERLIPIDEMIA: Status: RESOLVED | Noted: 2020-12-10 | Resolved: 2021-12-09

## 2021-12-09 PROBLEM — R79.89 INCREASED AMMONIA LEVEL: Status: RESOLVED | Noted: 2018-06-12 | Resolved: 2021-12-09

## 2021-12-09 PROCEDURE — 36415 COLL VENOUS BLD VENIPUNCTURE: CPT

## 2021-12-09 PROCEDURE — 80053 COMPREHEN METABOLIC PANEL: CPT

## 2021-12-09 PROCEDURE — G0008 ADMIN INFLUENZA VIRUS VAC: HCPCS | Performed by: FAMILY MEDICINE

## 2021-12-09 PROCEDURE — 82140 ASSAY OF AMMONIA: CPT

## 2021-12-09 PROCEDURE — 80164 ASSAY DIPROPYLACETIC ACD TOT: CPT

## 2021-12-09 PROCEDURE — 85025 COMPLETE CBC W/AUTO DIFF WBC: CPT

## 2021-12-09 PROCEDURE — G0439 PPPS, SUBSEQ VISIT: HCPCS | Performed by: FAMILY MEDICINE

## 2021-12-09 PROCEDURE — 90662 IIV NO PRSV INCREASED AG IM: CPT | Performed by: FAMILY MEDICINE

## 2021-12-09 PROCEDURE — 80175 DRUG SCREEN QUAN LAMOTRIGINE: CPT

## 2021-12-09 PROCEDURE — 99213 OFFICE O/P EST LOW 20 MIN: CPT | Performed by: INTERNAL MEDICINE

## 2021-12-09 NOTE — PATIENT INSTRUCTIONS
Bowel obstruction  - call or ER if symptoms flare up     History of diverticulitis /surgery   - continue on fiber as we discussed

## 2021-12-09 NOTE — PROGRESS NOTES
HPI:   Maru Hua is a 77year old male who presents for a Medicare Subsequent Annual Wellness visit (Pt already had Initial Annual Wellness). Had a SBO in early November and had to have an NG tube only but no surgery was needed.  Did have a small Epic.   Advance care planning including the explanation and discussion of advance directives standard forms performed Face to Face with patient and Family/surrogate (if present), and forms available to patient in AVS       He has never smoked tobacco.    C EVERY EVENING  Sildenafil Citrate 100 MG Oral Tab, Take 1 tablet (100 mg total) by mouth daily as needed for Erectile Dysfunction. divalproex Sodium  MG Oral Tablet 24 Hr,   mupirocin 2 % External Ointment, as needed.     Multiple Vitamins-Minerals ( chest pain on exertion  GI: denies abdominal pain, denies heartburn  : 1 per night nocturia, no complaint of urinary incontinence, incomplete bladder emptying   MUSCULOSKELETAL: denies back pain  NEURO: denies headaches  PSYCHE: denies depression or anxi left lower anterior shin with well healing laceration with scab and no surrounding erythema or warmth    Lymph nodes: Cervical, supraclavicular, and axillary nodes normal   Neurologic: Normal            Vaccination History     Immunization History   Admini replacement. Also overdue for CT chest to follow-up on pulmonary nodule and order printed for him to schedule soon. Diet assessment: good     PLAN:  The patient indicates understanding of these issues and agrees to the plan. Lab work ordered.   Altaf Aortic Aneurysm (AAA) Covered once in a lifetime for one of the following risk factors   • Men who are 73-68 years old and have ever smoked   • Anyone with a family history -     Colorectal Cancer Screening  Covered for ages 52-80; only need ONE of the fol

## 2021-12-09 NOTE — PATIENT INSTRUCTIONS
Isa Jones's SCREENING SCHEDULE   Tests on this list are recommended by your physician but may not be covered, or covered at this frequency, by your insurer. Please check with your insurance carrier before scheduling to verify coverage.    Elmer No (Lrrlrso40 & [id number]) covered once after 65 Prevnar 13: 12/09/2020    Cassmywcv20: 10/03/2016     No recommendations at this time    Hepatitis B One screening covered for patients with certain risk factors   -  No recommendations at this time    Jessica

## 2021-12-15 ENCOUNTER — PATIENT MESSAGE (OUTPATIENT)
Dept: FAMILY MEDICINE CLINIC | Facility: CLINIC | Age: 66
End: 2021-12-15

## 2021-12-15 ENCOUNTER — PATIENT MESSAGE (OUTPATIENT)
Dept: GASTROENTEROLOGY | Facility: CLINIC | Age: 66
End: 2021-12-15

## 2021-12-15 DIAGNOSIS — E72.20 HYPERAMMONEMIA (HCC): Primary | ICD-10-CM

## 2021-12-15 NOTE — TELEPHONE ENCOUNTER
From: Marcus Vega  To: Ravinder Jeffrey DO  Sent: 12/15/2021 11:08 AM CST  Subject: Question regarding COMP METABOLIC PANEL (14)    How can I see the numbers for Depakote, lamotrigine, and ammonia?     How do I get these sent to my psychiatrist, Dr. Atkinson Artist

## 2021-12-15 NOTE — TELEPHONE ENCOUNTER
Dr. Bianka Brito    Patient was recently hospitalized for a bowel obstruction and was advised to be on a low residue diet at the time. He wants to know if he can eat salads again.       Please advise    Thank you

## 2021-12-15 NOTE — TELEPHONE ENCOUNTER
From: Mor Jones  To: Ananth Schaffer MD  Sent: 12/15/2021 11:13 AM CST  Subject: Vegetables    We talked about avoiding raw vegetables. What about salads? I was in the habit of eating large salads on a regular basis.      Edyta Armas

## 2021-12-15 NOTE — TELEPHONE ENCOUNTER
From: Rajat Thomas  To: Tutu Bianchi. Veda Gómez MD  Sent: 12/15/2021 11:14 AM CST  Subject: Ammonia    Do you have access to my ammonia number? We just did blood tests.  It was 74

## 2021-12-15 NOTE — TELEPHONE ENCOUNTER
Dr. Felix Fletcher    Patient had lab work done that was ordered by Dr. Carol Harrington. This included an ammonia level and he wanted to let you know. Results in Epic.     Thank you

## 2021-12-16 NOTE — PROGRESS NOTES
Didier Gallagher is a 77year old male.     HPI:   Patient presents with:  Consult: bowel obstruction; hospital follow    The patient is a 78-year-old male who has a history of asthma, bipolar affective disorder, hypertension, hypercholesterolemia, hyper amm Social History: Social History    Tobacco Use      Smoking status: Never Smoker      Smokeless tobacco: Never Used    Vaping Use      Vaping Use: Never used    Alcohol use: No    Drug use: No       Medications (Active prior to today's visit):  Current Ou or gallop  Abdomen- Soft and nontender, nondistended  Ext- no clubbing or cyanosis  Skin- no rashes or lesions  Neuro- appropriate response, alert, no confusion  .   ASSESSMENT/PLAN:   Assessment   History of bowel obstruction  History of diverticulitis

## 2021-12-22 NOTE — TELEPHONE ENCOUNTER
Dr. Bianka Brito    Just wanted to follow up. Were you able to review labs done through Dr. Margarito Ambrosio in Epic for this patient? He wanted to make sure you saw his ammonia level.     Thank you

## 2021-12-22 NOTE — TELEPHONE ENCOUNTER
RN to inform,  Ammonia level is elevated at 74, we had previously worked this up in 2019 there is no clear underlying liver disease. This was thought to be related to your antiseizure medication as this has been described in the literature.     I would nikia

## 2021-12-23 NOTE — TELEPHONE ENCOUNTER
Dr. Elizabeth Reid    I reviewed your below message with Clemencia Alcaraz. He does not want to add another medication like lactulose at this time - concerned about being on it long term or an extended period of time if he starts.     He denies any symptoms except when he ty

## 2021-12-24 ENCOUNTER — TELEPHONE (OUTPATIENT)
Dept: GASTROENTEROLOGY | Facility: CLINIC | Age: 66
End: 2021-12-24

## 2021-12-24 NOTE — TELEPHONE ENCOUNTER
3 month lab recall entered into patient outreach in [address] [place] [address] Rd. Ammonia level due 3/9/2022.     Jessica Reddy MD         6:32 PM  Note  Order placed for ammonia level in 3 months

## 2021-12-27 ENCOUNTER — OFFICE VISIT (OUTPATIENT)
Dept: SURGERY | Age: 66
End: 2021-12-27

## 2021-12-27 DIAGNOSIS — K56.609 SMALL BOWEL OBSTRUCTION (CMD): Primary | ICD-10-CM

## 2021-12-27 PROCEDURE — 99213 OFFICE O/P EST LOW 20 MIN: CPT | Performed by: SURGERY

## 2021-12-27 ASSESSMENT — ENCOUNTER SYMPTOMS
VOMITING: 0
ABDOMINAL PAIN: 0
SHORTNESS OF BREATH: 0
POLYDIPSIA: 0
FATIGUE: 0
POLYPHAGIA: 0
FEVER: 0
AGITATION: 0
HEADACHES: 0
CHILLS: 0
NAUSEA: 0
SORE THROAT: 0
BRUISES/BLEEDS EASILY: 0

## 2021-12-27 NOTE — TELEPHONE ENCOUNTER
A refill request was received for:  Requested Prescriptions     Pending Prescriptions Disp Refills   • metoprolol tartrate 25 MG Oral Tab 135 tablet 0     Sig: TAKE ONE TABLET BY MOUTH EVERY MORNING AND TAKE HALF TABLET BY MOUTH EVERY EVENING     Last refi

## 2021-12-28 ENCOUNTER — MED REC SCAN ONLY (OUTPATIENT)
Dept: FAMILY MEDICINE CLINIC | Facility: CLINIC | Age: 66
End: 2021-12-28

## 2022-01-20 ENCOUNTER — ORDER TRANSCRIPTION (OUTPATIENT)
Dept: ADMINISTRATIVE | Facility: HOSPITAL | Age: 67
End: 2022-01-20

## 2022-01-20 DIAGNOSIS — R94.39 ABNORMAL NUCLEAR STRESS TEST: Primary | ICD-10-CM

## 2022-01-20 DIAGNOSIS — I25.10 CALCIFICATION OF CORONARY ARTERY: ICD-10-CM

## 2022-01-20 DIAGNOSIS — E78.00 PURE HYPERCHOLESTEROLEMIA: ICD-10-CM

## 2022-01-20 DIAGNOSIS — I25.84 CALCIFICATION OF CORONARY ARTERY: ICD-10-CM

## 2022-01-24 ENCOUNTER — MED REC SCAN ONLY (OUTPATIENT)
Dept: FAMILY MEDICINE CLINIC | Facility: CLINIC | Age: 67
End: 2022-01-24

## 2022-01-26 ENCOUNTER — PATIENT MESSAGE (OUTPATIENT)
Dept: FAMILY MEDICINE CLINIC | Facility: CLINIC | Age: 67
End: 2022-01-26

## 2022-01-26 NOTE — TELEPHONE ENCOUNTER
From: Dacia Clemens  To: Dane Knox, DO  Sent: 1/26/2022 3:00 PM CST  Subject: CT Scan    I'm confused. Could someone explain why I'm getting a message in [employer] about a test I scheduled through Dr. Adonis Parekh office? I didn't think he was on [employer] system? I thought he had his own portal through [facility] cardiovascular institute.

## 2022-01-26 NOTE — TELEPHONE ENCOUNTER
From: Zeke Arceo  To:  Rosi Hernandez DO  Sent: 1/26/2022 2:31 PM CST  Subject: Test results    Can I set it up so all results of any kind get sent to [address] my psychiatrist and Za Simon my cardiologist?

## 2022-02-02 ENCOUNTER — HOSPITAL ENCOUNTER (OUTPATIENT)
Dept: CT IMAGING | Facility: HOSPITAL | Age: 67
Discharge: HOME | End: 2022-02-02
Attending: INTERNAL MEDICINE
Payer: MEDICARE

## 2022-02-14 ENCOUNTER — MED REC SCAN ONLY (OUTPATIENT)
Dept: FAMILY MEDICINE CLINIC | Facility: CLINIC | Age: 67
End: 2022-02-14

## 2022-02-25 ENCOUNTER — HOSPITAL ENCOUNTER (OUTPATIENT)
Dept: CT IMAGING | Facility: HOSPITAL | Age: 67
Discharge: HOME | End: 2022-02-25
Attending: INTERNAL MEDICINE
Payer: MEDICARE

## 2022-02-25 VITALS
SYSTOLIC BLOOD PRESSURE: 121 MMHG | WEIGHT: 265 LBS | DIASTOLIC BLOOD PRESSURE: 73 MMHG | RESPIRATION RATE: 17 BRPM | HEIGHT: 74 IN | BODY MASS INDEX: 34.01 KG/M2 | HEART RATE: 66 BPM

## 2022-02-25 DIAGNOSIS — E78.00 PURE HYPERCHOLESTEROLEMIA: ICD-10-CM

## 2022-02-25 DIAGNOSIS — I25.84 CALCIFICATION OF CORONARY ARTERY: ICD-10-CM

## 2022-02-25 DIAGNOSIS — I25.10 CALCIFICATION OF CORONARY ARTERY: ICD-10-CM

## 2022-02-25 DIAGNOSIS — R94.39 ABNORMAL NUCLEAR STRESS TEST: ICD-10-CM

## 2022-02-25 DIAGNOSIS — R06.00 DOE (DYSPNEA ON EXERTION): ICD-10-CM

## 2022-02-25 LAB — CREAT BLD-MCNC: 0.7 MG/DL

## 2022-02-25 PROCEDURE — 0502T CTA FRACTIONAL FLOW RESERVE ANALYSIS (CPT=0503T/0502T): CPT | Performed by: INTERNAL MEDICINE

## 2022-02-25 PROCEDURE — 75574 CT ANGIO HRT W/3D IMAGE: CPT | Performed by: INTERNAL MEDICINE

## 2022-02-25 PROCEDURE — 82565 ASSAY OF CREATININE: CPT

## 2022-02-25 PROCEDURE — 71275 CT ANGIOGRAPHY CHEST: CPT | Performed by: INTERNAL MEDICINE

## 2022-02-25 PROCEDURE — 0503T CTA FRACTIONAL FLOW RESERVE ANALYSIS (CPT=0503T/0502T): CPT | Performed by: INTERNAL MEDICINE

## 2022-02-25 RX ORDER — METOPROLOL TARTRATE 5 MG/5ML
5 INJECTION INTRAVENOUS SEE ADMIN INSTRUCTIONS
Status: DISCONTINUED | OUTPATIENT
Start: 2022-02-25 | End: 2022-02-27

## 2022-02-25 RX ORDER — DILTIAZEM HYDROCHLORIDE 5 MG/ML
5 INJECTION INTRAVENOUS SEE ADMIN INSTRUCTIONS
Status: DISCONTINUED | OUTPATIENT
Start: 2022-02-25 | End: 2022-02-27

## 2022-02-25 RX ORDER — METOPROLOL TARTRATE 5 MG/5ML
INJECTION INTRAVENOUS
Status: COMPLETED
Start: 2022-02-25 | End: 2022-02-25

## 2022-02-25 RX ORDER — METOPROLOL TARTRATE 5 MG/5ML
INJECTION INTRAVENOUS
Status: DISPENSED
Start: 2022-02-25 | End: 2022-02-25

## 2022-02-25 RX ORDER — NITROGLYCERIN 0.4 MG/1
0.4 TABLET SUBLINGUAL ONCE
Status: COMPLETED | OUTPATIENT
Start: 2022-02-25 | End: 2022-02-25

## 2022-02-25 RX ADMIN — METOPROLOL TARTRATE 5 MG: 5 INJECTION INTRAVENOUS at 11:19:00

## 2022-02-25 RX ADMIN — METOPROLOL TARTRATE 5 MG: 5 INJECTION INTRAVENOUS at 11:28:00

## 2022-02-25 RX ADMIN — NITROGLYCERIN 0.4 MG: 0.4 TABLET SUBLINGUAL at 11:14:00

## 2022-02-25 RX ADMIN — Medication 50 MG: at 10:10:00

## 2022-02-25 NOTE — IMAGING NOTE
TO RAD HOLDING , PROCEDURE EXPLAINED, PT AGREES W/POC-PT CONSENTED     HX TAKEN : NKDA HX AFIB ON WARFIN, HX AORTIC VALVE REPLACEMENT   CTA ORDERED BY MELVA  WAS PT GIVEN CTA  PREMEDS - YES METOPROLOL 50 MG HS/AM     BASELINE VITAL SIGNS   HR 66   /73  BMI 34   1010 METOPROLOL PO GIVEN 50 MILLIGRAMS     1010 18 GAUGE IV STARTED  POC TESTING COMPLETED GFR = 98  CREATINE = 0.7    1119 HR 65 /60 METOPROLOL 5 MILLIGRAMS GIVEN IV PUSH  SEE  PROTOCOL    1110 TO CT TABLE     O2 PLACED @ 2 LITERS NASAL CANNULA     CONNECT TO MONITOR  VS HR 67 /57    1114 NITROGLYCERIN 0.4 MILLIGRAMS SUBLINGUAL GIVEN     1119 HR 65 /60 METOPROLOL 5 MILLIGRAMS GIVEN IV PUSH  SEE  PROTOCOL    1128 HR 66 /51 METOPROLOL 5 MILLIGRAMS GIVEN IV PUSH     1135 up to BR  steady gait no pt c/o. Denies DIZZINESS. 1140 CALCIUM SCORE COMPLETED     1141 INJECTION STARTED HR  51   DURING SCAN PROCEDURE COMPLETE    1143  POST SCAN VS HR 56 /71 NO PT C/O DENIES DIZZINESS    1150 PT TO HOLDING AREA  VS Q 15 MIN X2 , HR 59 /57  16 OZ H20 GIVEN, INSTRUCTED PLENTY OF FLUIDS TO FLUSH CONTRAST.     1205 VS HR 65 /64     1206 AVS  PROVIDED  IV DC'D NO R/S  DISCHARGED HOME IN STABLE CONDITION -STEADY GAIT NO PT C/O.

## 2022-03-15 ENCOUNTER — TELEPHONE (OUTPATIENT)
Dept: GASTROENTEROLOGY | Facility: CLINIC | Age: 67
End: 2022-03-15

## 2022-03-15 NOTE — TELEPHONE ENCOUNTER
Patient outreach message received:    3 month lab recall entered into patient outreach in [address] [place] [address] Rd. Ammonia level due 3/9/2022.

## 2022-03-28 ENCOUNTER — MED REC SCAN ONLY (OUTPATIENT)
Dept: FAMILY MEDICINE CLINIC | Facility: CLINIC | Age: 67
End: 2022-03-28

## 2022-04-04 RX ORDER — SILDENAFIL 100 MG/1
100 TABLET, FILM COATED ORAL
Qty: 15 TABLET | Refills: 1 | Status: SHIPPED | OUTPATIENT
Start: 2022-04-04 | End: 2023-06-13

## 2022-04-05 NOTE — TELEPHONE ENCOUNTER
Refill passed per [place] [facility] [place], [facility] protocol. Requested Prescriptions   Pending Prescriptions Disp Refills    Sildenafil Citrate 100 MG Oral Tab 15 tablet 1     Sig: Take 1 tablet (100 mg total) by mouth daily as needed for Erectile Dysfunction.         Genitourinary Medications Passed - 4/3/2022  2:36 PM        Passed - Patient does not have pulmonary hypertension on problem list        Passed - Appointment in the past 12 or next 3 months               Recent Outpatient Visits              3 months ago History of small bowel obstruction    [facility], [facility], [facility], Shalonda Lind MD    Office Visit    3 months ago Encounter for [employer] annual wellness exam    [facility]rius Brice, [address], [place]    Office Visit    8 months ago Benign prostatic hyperplasia with incomplete bladder emptying    [address], [place]    Office Visit    1 year ago Periumbilical abdominal pain    Shalonda Prado MD    Office Visit    1 year ago Left lower quadrant abdominal pain    Rachel Prado Catheline Donna, MD    Office Visit

## 2022-04-07 ENCOUNTER — APPOINTMENT (OUTPATIENT)
Dept: GENERAL RADIOLOGY | Facility: HOSPITAL | Age: 67
End: 2022-04-07
Attending: EMERGENCY MEDICINE
Payer: MEDICARE

## 2022-04-07 ENCOUNTER — HOSPITAL ENCOUNTER (INPATIENT)
Facility: HOSPITAL | Age: 67
LOS: 3 days | Discharge: HOME | End: 2022-04-11
Attending: EMERGENCY MEDICINE | Admitting: HOSPITALIST
Payer: MEDICARE

## 2022-04-07 ENCOUNTER — APPOINTMENT (OUTPATIENT)
Dept: CT IMAGING | Facility: HOSPITAL | Age: 67
End: 2022-04-07
Attending: EMERGENCY MEDICINE
Payer: MEDICARE

## 2022-04-07 ENCOUNTER — TELEPHONE (OUTPATIENT)
Dept: GASTROENTEROLOGY | Facility: CLINIC | Age: 67
End: 2022-04-07

## 2022-04-07 DIAGNOSIS — K56.609 SBO (SMALL BOWEL OBSTRUCTION) (HCC): Primary | ICD-10-CM

## 2022-04-07 LAB
ALBUMIN SERPL-MCNC: 4.1 G/DL (ref 3.4–5)
ALP LIVER SERPL-CCNC: 78 U/L
ALT SERPL-CCNC: 40 U/L
ANION GAP SERPL CALC-SCNC: 7 MMOL/L (ref 0–18)
AST SERPL-CCNC: 28 U/L (ref 15–37)
BASOPHILS # BLD AUTO: 0.04 X10(3) UL (ref 0–0.2)
BASOPHILS NFR BLD AUTO: 0.3 %
BILIRUB DIRECT SERPL-MCNC: 0.1 MG/DL (ref 0–0.2)
BILIRUB SERPL-MCNC: 0.6 MG/DL (ref 0.1–2)
BUN BLD-MCNC: 10 MG/DL (ref 7–18)
BUN/CREAT SERPL: 11.8 (ref 10–20)
CALCIUM BLD-MCNC: 10.2 MG/DL (ref 8.5–10.1)
CHLORIDE SERPL-SCNC: 104 MMOL/L (ref 98–112)
CO2 SERPL-SCNC: 29 MMOL/L (ref 21–32)
CREAT BLD-MCNC: 0.85 MG/DL
DEPRECATED RDW RBC AUTO: 46.1 FL (ref 35.1–46.3)
EOSINOPHIL # BLD AUTO: 0 X10(3) UL (ref 0–0.7)
EOSINOPHIL NFR BLD AUTO: 0 %
ERYTHROCYTE [DISTWIDTH] IN BLOOD BY AUTOMATED COUNT: 13.3 % (ref 11–15)
GLUCOSE BLD-MCNC: 140 MG/DL (ref 70–99)
HCT VFR BLD AUTO: 50.9 %
HGB BLD-MCNC: 17.1 G/DL
IMM GRANULOCYTES # BLD AUTO: 0.03 X10(3) UL (ref 0–1)
IMM GRANULOCYTES NFR BLD: 0.2 %
LIPASE SERPL-CCNC: 54 U/L (ref 73–393)
LYMPHOCYTES # BLD AUTO: 1.37 X10(3) UL (ref 1–4)
LYMPHOCYTES NFR BLD AUTO: 11.3 %
MCH RBC QN AUTO: 31.5 PG (ref 26–34)
MCHC RBC AUTO-ENTMCNC: 33.6 G/DL (ref 31–37)
MCV RBC AUTO: 93.7 FL
MONOCYTES # BLD AUTO: 0.77 X10(3) UL (ref 0.1–1)
MONOCYTES NFR BLD AUTO: 6.3 %
NEUTROPHILS # BLD AUTO: 9.96 X10 (3) UL (ref 1.5–7.7)
NEUTROPHILS # BLD AUTO: 9.96 X10(3) UL (ref 1.5–7.7)
NEUTROPHILS NFR BLD AUTO: 81.9 %
OSMOLALITY SERPL CALC.SUM OF ELEC: 291 MOSM/KG (ref 275–295)
PLATELET # BLD AUTO: 239 10(3)UL (ref 150–450)
POTASSIUM SERPL-SCNC: 4.2 MMOL/L (ref 3.5–5.1)
PROT SERPL-MCNC: 8.3 G/DL (ref 6.4–8.2)
RBC # BLD AUTO: 5.43 X10(6)UL
SARS-COV-2 RNA RESP QL NAA+PROBE: NOT DETECTED
SODIUM SERPL-SCNC: 140 MMOL/L (ref 136–145)
WBC # BLD AUTO: 12.2 X10(3) UL (ref 4–11)

## 2022-04-07 PROCEDURE — 74176 CT ABD & PELVIS W/O CONTRAST: CPT | Performed by: EMERGENCY MEDICINE

## 2022-04-07 PROCEDURE — 71045 X-RAY EXAM CHEST 1 VIEW: CPT | Performed by: EMERGENCY MEDICINE

## 2022-04-07 RX ORDER — MORPHINE SULFATE 2 MG/ML
2 INJECTION, SOLUTION INTRAMUSCULAR; INTRAVENOUS ONCE
Status: COMPLETED | OUTPATIENT
Start: 2022-04-07 | End: 2022-04-07

## 2022-04-07 RX ORDER — LIDOCAINE HYDROCHLORIDE 40 MG/ML
2.5 INJECTION, SOLUTION RETROBULBAR; TOPICAL ONCE
Status: COMPLETED | OUTPATIENT
Start: 2022-04-07 | End: 2022-04-07

## 2022-04-07 RX ORDER — ONDANSETRON 2 MG/ML
4 INJECTION INTRAMUSCULAR; INTRAVENOUS ONCE
Status: COMPLETED | OUTPATIENT
Start: 2022-04-07 | End: 2022-04-07

## 2022-04-07 RX ORDER — MORPHINE SULFATE 4 MG/ML
4 INJECTION, SOLUTION INTRAMUSCULAR; INTRAVENOUS ONCE
Status: COMPLETED | OUTPATIENT
Start: 2022-04-07 | End: 2022-04-07

## 2022-04-07 NOTE — TELEPHONE ENCOUNTER
Patient is calling because he is afraid he might be having another bowel obstruction like how he had last October. He is experiencing lots of pain and bloating. If you could please call.

## 2022-04-07 NOTE — TELEPHONE ENCOUNTER
Dr. Val Palafox    I spoke to [place]. He is unsure if symptoms are from bowel obstruction or recent diet of drinking coffee and eating dairy products like grilled cheese/pizza. Reports he feels bloated/distended. He had a soft brown bowel movement yesterday. Passed gas around noon today. Can't remember if he had a bowel movement this morning or not. Reports abdominal pain all over but worse between navel and sternum. Intensity varies sometimes 2-3/10 sometimes 6/10. The pain started around 11:30am today. It was difficult to nap because of the pain. He is on a probiotic. He has not taken any stool softeners lately. Denies any fever, vomiting, borborygmus. He had pizza and grilled cheese recently - has noticed dairy/coffee triggers symptoms. I advised ED if severe pain, fever, vomiting, inability to pass gas.     Please advise    Thank you

## 2022-04-07 NOTE — TELEPHONE ENCOUNTER
Patient was contacted, he has developed abdominal pain, bloating, burning and he cannot recall whether he had a bowel movement or not. Nausea starting to develop. Feels like when he had his prior bowel obstruction last year, hospitalized at [facility] in [place]. Discussed he needs imaging and work-up tonight. Need to rule out bowel blockage as he may have underlying adhesive disease from his prior abdominal surgery. CT scan will be necessary as well as labs and abdominal examination. Patient is agreeable to proceeding to emergency room. I have called and contacted the charge nurse and clinical information given.

## 2022-04-08 PROBLEM — Z95.2 HISTORY OF PROSTHETIC AORTIC VALVE: Status: ACTIVE | Noted: 2020-02-19

## 2022-04-08 LAB
ALBUMIN SERPL-MCNC: 3.4 G/DL (ref 3.4–5)
ALBUMIN/GLOB SERPL: 0.9 {RATIO} (ref 1–2)
ALP LIVER SERPL-CCNC: 57 U/L
ALT SERPL-CCNC: 32 U/L
ANION GAP SERPL CALC-SCNC: 4 MMOL/L (ref 0–18)
AST SERPL-CCNC: 21 U/L (ref 15–37)
BASOPHILS # BLD AUTO: 0.04 X10(3) UL (ref 0–0.2)
BASOPHILS NFR BLD AUTO: 0.3 %
BILIRUB SERPL-MCNC: 0.6 MG/DL (ref 0.1–2)
BUN BLD-MCNC: 15 MG/DL (ref 7–18)
BUN/CREAT SERPL: 20.5 (ref 10–20)
CALCIUM BLD-MCNC: 9.3 MG/DL (ref 8.5–10.1)
CHLORIDE SERPL-SCNC: 110 MMOL/L (ref 98–112)
CO2 SERPL-SCNC: 28 MMOL/L (ref 21–32)
CREAT BLD-MCNC: 0.73 MG/DL
DEPRECATED RDW RBC AUTO: 46.6 FL (ref 35.1–46.3)
EOSINOPHIL # BLD AUTO: 0 X10(3) UL (ref 0–0.7)
EOSINOPHIL NFR BLD AUTO: 0 %
ERYTHROCYTE [DISTWIDTH] IN BLOOD BY AUTOMATED COUNT: 13.6 % (ref 11–15)
GLOBULIN PLAS-MCNC: 3.6 G/DL (ref 2.8–4.4)
GLUCOSE BLD-MCNC: 124 MG/DL (ref 70–99)
HCT VFR BLD AUTO: 46.4 %
HGB BLD-MCNC: 15.6 G/DL
IMM GRANULOCYTES # BLD AUTO: 0.05 X10(3) UL (ref 0–1)
IMM GRANULOCYTES NFR BLD: 0.4 %
INR BLD: 2.07 (ref 0.8–1.2)
LYMPHOCYTES # BLD AUTO: 0.77 X10(3) UL (ref 1–4)
LYMPHOCYTES NFR BLD AUTO: 5.9 %
MAGNESIUM SERPL-MCNC: 2.1 MG/DL (ref 1.6–2.6)
MCH RBC QN AUTO: 31.5 PG (ref 26–34)
MCHC RBC AUTO-ENTMCNC: 33.6 G/DL (ref 31–37)
MCV RBC AUTO: 93.5 FL
MONOCYTES # BLD AUTO: 1.18 X10(3) UL (ref 0.1–1)
MONOCYTES NFR BLD AUTO: 9.1 %
NEUTROPHILS # BLD AUTO: 10.96 X10 (3) UL (ref 1.5–7.7)
NEUTROPHILS # BLD AUTO: 10.96 X10(3) UL (ref 1.5–7.7)
NEUTROPHILS NFR BLD AUTO: 84.3 %
OSMOLALITY SERPL CALC.SUM OF ELEC: 296 MOSM/KG (ref 275–295)
PLATELET # BLD AUTO: 220 10(3)UL (ref 150–450)
POTASSIUM SERPL-SCNC: 4.3 MMOL/L (ref 3.5–5.1)
PROT SERPL-MCNC: 7 G/DL (ref 6.4–8.2)
PROTHROMBIN TIME: 23.6 SECONDS (ref 11.6–14.8)
RBC # BLD AUTO: 4.96 X10(6)UL
SODIUM SERPL-SCNC: 142 MMOL/L (ref 136–145)
WBC # BLD AUTO: 13 X10(3) UL (ref 4–11)

## 2022-04-08 PROCEDURE — 99233 SBSQ HOSP IP/OBS HIGH 50: CPT | Performed by: HOSPITALIST

## 2022-04-08 PROCEDURE — 99223 1ST HOSP IP/OBS HIGH 75: CPT | Performed by: INTERNAL MEDICINE

## 2022-04-08 RX ORDER — MORPHINE SULFATE 2 MG/ML
2 INJECTION, SOLUTION INTRAMUSCULAR; INTRAVENOUS EVERY 2 HOUR PRN
Status: DISCONTINUED | OUTPATIENT
Start: 2022-04-08 | End: 2022-04-11

## 2022-04-08 RX ORDER — ONDANSETRON 2 MG/ML
4 INJECTION INTRAMUSCULAR; INTRAVENOUS EVERY 6 HOURS PRN
Status: DISCONTINUED | OUTPATIENT
Start: 2022-04-08 | End: 2022-04-11

## 2022-04-08 RX ORDER — SODIUM CHLORIDE 9 MG/ML
INJECTION, SOLUTION INTRAVENOUS CONTINUOUS
Status: DISCONTINUED | OUTPATIENT
Start: 2022-04-08 | End: 2022-04-11

## 2022-04-08 RX ORDER — METOCLOPRAMIDE HYDROCHLORIDE 5 MG/ML
10 INJECTION INTRAMUSCULAR; INTRAVENOUS EVERY 8 HOURS PRN
Status: DISCONTINUED | OUTPATIENT
Start: 2022-04-08 | End: 2022-04-11

## 2022-04-08 RX ORDER — MORPHINE SULFATE 4 MG/ML
4 INJECTION, SOLUTION INTRAMUSCULAR; INTRAVENOUS EVERY 2 HOUR PRN
Status: DISCONTINUED | OUTPATIENT
Start: 2022-04-08 | End: 2022-04-11

## 2022-04-08 RX ORDER — MORPHINE SULFATE 2 MG/ML
1 INJECTION, SOLUTION INTRAMUSCULAR; INTRAVENOUS EVERY 2 HOUR PRN
Status: DISCONTINUED | OUTPATIENT
Start: 2022-04-08 | End: 2022-04-11

## 2022-04-08 NOTE — ED INITIAL ASSESSMENT (HPI)
Pt c/o sharp burning cramping pain and bloated sensation to middle of his abd. Pt reports this feels like previous episode of SBO. Also c/o fever and sweating. Reports last BM yesterday. Belching in triage.

## 2022-04-08 NOTE — PLAN OF CARE
Patient arrived on the unit from the ER, c/o of abdominal pain, SBO, NG to right nare on LIS, received PRN Morphine for pain management, PRN Reglan for nausea, up self, IVF infusing. Patient orientated to unit and updated on plan of care.

## 2022-04-08 NOTE — PLAN OF CARE
Patient is alert and oriented, on room air. NG tube in place, on low-intermittent suction. NG tube can be clamped when ambulating. Patient is up independently after set-up. Patient has walked 2x this shift. Patient NPO, but can have ice chips and lozenges. Pain is mild-moderate, morphine as needed. Declines nausea or vomiting. Problem: Patient Centered Care  Goal: Patient preferences are identified and integrated in the patient's plan of care  Description: Interventions:  - What would you like us to know as we care for you?  I live at home alone  - Provide timely, complete, and accurate information to patient/family  - Incorporate patient and family knowledge, values, beliefs, and cultural backgrounds into the planning and delivery of care  - Encourage patient/family to participate in care and decision-making at the level they choose  - Honor patient and family perspectives and choices  4/8/2022 1619 by Mary Gomes, RN  Outcome: Progressing  4/8/2022 1618 by Mary Gomes, RN  Outcome: Progressing

## 2022-04-08 NOTE — ED QUICK NOTES
Orders for admission, patient is aware of plan and ready to go upstairs. Any questions, please call ED RN [address] at extension [phone].      Patient Covid vaccination status: Fully vaccinated     COVID Test Ordered in ED: Rapid SARS-CoV-2 by PCR    COVID Suspicion at Admission: Low clinical suspicion for COVID    Running Infusions:  None    Mental Status/LOC at time of transport: A&OX 4    Other pertinent information: NG TUBE IN PLACE  CIWA score: N/A   NIH score:  N/A

## 2022-04-09 LAB
INR BLD: 1.91 (ref 0.8–1.2)
PROTHROMBIN TIME: 22.2 SECONDS (ref 11.6–14.8)

## 2022-04-09 PROCEDURE — 99222 1ST HOSP IP/OBS MODERATE 55: CPT | Performed by: SURGERY

## 2022-04-09 PROCEDURE — 99233 SBSQ HOSP IP/OBS HIGH 50: CPT | Performed by: HOSPITALIST

## 2022-04-09 PROCEDURE — 99232 SBSQ HOSP IP/OBS MODERATE 35: CPT | Performed by: INTERNAL MEDICINE

## 2022-04-09 RX ORDER — HYDROCODONE BITARTRATE AND ACETAMINOPHEN 5; 325 MG/1; MG/1
1 TABLET ORAL EVERY 4 HOURS PRN
Status: DISCONTINUED | OUTPATIENT
Start: 2022-04-09 | End: 2022-04-11

## 2022-04-09 RX ORDER — DIVALPROEX SODIUM 500 MG/1
500 TABLET, EXTENDED RELEASE ORAL DAILY
Status: DISCONTINUED | OUTPATIENT
Start: 2022-04-10 | End: 2022-04-09

## 2022-04-09 RX ORDER — PANTOPRAZOLE SODIUM 40 MG/1
40 TABLET, DELAYED RELEASE ORAL
Status: DISCONTINUED | OUTPATIENT
Start: 2022-04-09 | End: 2022-04-11

## 2022-04-09 RX ORDER — ACETAMINOPHEN 325 MG/1
650 TABLET ORAL EVERY 4 HOURS PRN
Status: DISCONTINUED | OUTPATIENT
Start: 2022-04-09 | End: 2022-04-11

## 2022-04-09 RX ORDER — WARFARIN SODIUM 5 MG/1
7.5 TABLET ORAL NIGHTLY
Status: DISCONTINUED | OUTPATIENT
Start: 2022-04-09 | End: 2022-04-10

## 2022-04-09 NOTE — CONSULTS
[facility]    PATIENT'S NAME: Willie Prescott   ATTENDING PHYSICIAN: Gilda Purdy. Beti Johnson MD   CONSULTING PHYSICIAN: Rohit Holland. Talia Isidro MD   PATIENT ACCOUNT#:   [id number]    LOCATION:  [address] [place] [address] Yulissa Schaeffer #:   [id number]       YOB: 1955  ADMISSION DATE:       04/07/2022      CONSULT DATE:  04/09/2022    REPORT OF CONSULTATION      HISTORY OF PRESENT ILLNESS:  The patient is a 78-year-old man who presented with abdominal pain. Workup has revealed a probable partial small-bowel obstruction, which appears to be resolving. NG tube has been removed. PAST MEDICAL HISTORY:  He has a history of a mechanical aortic valve replacement and has been on Coumadin long-term. Other history is that of paroxysmal supraventricular tachycardia and nonobstructive CAD. CT angiogram done within the last several months showed a 50% to 70% LAD stenosis with normal flow reserve. Nuclear imaging was also normal in that area. He has had no chest pain or other anginal symptoms. MEDICATIONS:  Home medications include Depakote, folic acid, Lamictal, metoprolol, vitamins, and Coumadin. ALLERGIES:  He has no known drug allergies. PHYSICAL EXAMINATION:    VITAL SIGNS:  Blood pressure initially elevated, now back to normal.   HEART:  He has normal prosthetic valve tone, regular rhythm. LUNGS:  Clear lung fields. ABDOMEN:  Soft. LABORATORY DATA:  His lab work is unremarkable except for a white count of 13. INR on admission was 2.07 and now 1.91.    ASSESSMENT:    1. Partial small-bowel obstruction, resolving. The patient appears not to need surgery. 2.   Mechanical aortic valve, has been on Coumadin. INR held for 1 day and has dropped to just below therapeutic range. Warfarin will be restarted this evening. PLAN:    1. Resume warfarin. 2.   Unless INR drops substantially tomorrow morning, will need to be bridged with Lovenox. Thank you, will follow.     Dictated By Rohit Holland. Trisha Arenas MD  d: 04/09/2022 12:26:05  t: 04/09/2022 14:43:01  [facility] [id number]  GFZ/

## 2022-04-09 NOTE — CONSULTS
[facility]    PATIENT'S NAME: Hodan Barnett   ATTENDING PHYSICIAN: Judy Gomez. Sarkis Manrique MD   CONSULTING PHYSICIAN: Taran Solorzano MD   PATIENT ACCOUNT#:   [de-identified]    LOCATION:  [address] [place] [address] RECORD #:   [id number]       YOB: 1955  ADMISSION DATE:       04/07/2022      CONSULT DATE:  04/09/2022    REPORT OF CONSULTATION      REASON FOR CONSULTATION:  Small-bowel obstruction. HISTORY OF PRESENT ILLNESS:  Patient is a 59-year-old male with a history of bipolar disease, aortic valve replacement, on Coumadin, sigmoid resection in June 2020 for recurrent diverticulitis. He is admitted with abdominal pain, distention. CT scan demonstrates a partial small-bowel obstruction versus ileus. NG tube was placed. There is dark bloody output, but his bowel function has returned. He is passing large amount of flatus. PAST MEDICAL HISTORY:  Above, hypertension. PAST SURGICAL HISTORY:  Above, aortic valve replacement. MEDICATIONS:  Please see reconciliation. ALLERGIES:  No medications. REVIEW OF SYSTEMS:  Significant for abdominal pain, mostly midepigastric, some nausea which has resolved. Otherwise, 12-point system unremarkable. PHYSICAL EXAMINATION:    GENERAL:  He is alert and oriented x3, appears comfortable, in no distress. NG tube is in place and clamped, bloody output in the canister. HEENT:  His sclerae are anicteric. NECK:  Supple without lymphadenopathy. LUNGS:  Clear. HEART:  Regular rate and rhythm. ABDOMEN:  Soft, mildly distended, nontender. No rebound, guarding, or rigidity. EXTREMITIES:  Without clubbing, cyanosis, or edema. LABORATORY DATA:  White count 13. Other labs unremarkable. IMPRESSION:  Resolving partial small-bowel obstruction. Patient did have a small-bowel followthrough at [facility] for similar symptoms, which was unremarkable. PLAN:  Discontinue NG tube.   Start clear liquid diet and pantoprazole for presumed peptic ulcer disease. I thank you for this consultation.     Dictated By Roberto Juarez MD  d: 04/09/2022 08:21:50  t: 04/09/2022 11:27:38  [facility] [id number]  TV/

## 2022-04-09 NOTE — PROGRESS NOTES
General surgery consult    Consult dictated    #Resolving small bowel obstruction versus ileus  #Probable gastritis    -DC NG tube and start clear liquid diet  -Pantoprazole for gastritis  -No planned surgery at this time

## 2022-04-10 LAB
INR BLD: 1.58 (ref 0.8–1.2)
PROTHROMBIN TIME: 19.1 SECONDS (ref 11.6–14.8)

## 2022-04-10 PROCEDURE — 99232 SBSQ HOSP IP/OBS MODERATE 35: CPT | Performed by: HOSPITALIST

## 2022-04-10 PROCEDURE — 99231 SBSQ HOSP IP/OBS SF/LOW 25: CPT | Performed by: SURGERY

## 2022-04-10 PROCEDURE — 99232 SBSQ HOSP IP/OBS MODERATE 35: CPT | Performed by: INTERNAL MEDICINE

## 2022-04-10 RX ORDER — MELATONIN
400 DAILY
Status: DISCONTINUED | OUTPATIENT
Start: 2022-04-10 | End: 2022-04-11

## 2022-04-10 RX ORDER — LAMOTRIGINE 25 MG/1
100 TABLET ORAL EVERY MORNING
Status: DISCONTINUED | OUTPATIENT
Start: 2022-04-10 | End: 2022-04-11

## 2022-04-10 RX ORDER — WARFARIN SODIUM 5 MG/1
10 TABLET ORAL
Status: COMPLETED | OUTPATIENT
Start: 2022-04-10 | End: 2022-04-10

## 2022-04-10 RX ORDER — MULTIPLE VITAMINS W/ MINERALS TAB 9MG-400MCG
1 TAB ORAL DAILY
Status: DISCONTINUED | OUTPATIENT
Start: 2022-04-10 | End: 2022-04-11

## 2022-04-10 RX ORDER — ENOXAPARIN SODIUM 150 MG/ML
1 INJECTION SUBCUTANEOUS EVERY 12 HOURS SCHEDULED
Status: DISCONTINUED | OUTPATIENT
Start: 2022-04-10 | End: 2022-04-11

## 2022-04-11 VITALS
DIASTOLIC BLOOD PRESSURE: 79 MMHG | SYSTOLIC BLOOD PRESSURE: 128 MMHG | OXYGEN SATURATION: 100 % | RESPIRATION RATE: 22 BRPM | HEIGHT: 74 IN | TEMPERATURE: 98 F | HEART RATE: 67 BPM | WEIGHT: 235 LBS | BODY MASS INDEX: 30.16 KG/M2

## 2022-04-11 LAB
INR BLD: 1.77 (ref 0.8–1.2)
PROTHROMBIN TIME: 20.9 SECONDS (ref 11.6–14.8)

## 2022-04-11 PROCEDURE — 99239 HOSP IP/OBS DSCHRG MGMT >30: CPT | Performed by: HOSPITALIST

## 2022-04-11 RX ORDER — WARFARIN SODIUM 5 MG/1
10 TABLET ORAL
Status: DISCONTINUED | OUTPATIENT
Start: 2022-04-11 | End: 2022-04-11

## 2022-04-11 RX ORDER — WARFARIN SODIUM 10 MG/1
10 TABLET ORAL ONCE
Qty: 30 TABLET | Refills: 0 | Status: SHIPPED | OUTPATIENT
Start: 2022-04-11 | End: 2022-04-11

## 2022-04-11 NOTE — CM/SW NOTE
BPCI-Advanced Medicare Program Note:  Plan of care reviewed for care coordination and discharge planning. Noted patient falls under  BPCI/Medicare program, with  for gastrointestinal obstruction. ANDRÉS tool was used to help determine next care setting. Thus, [address] recommendations is for home pending patient progress. Case Management team is following for care coordination and discharge planning needs.

## 2022-04-12 ENCOUNTER — PATIENT MESSAGE (OUTPATIENT)
Dept: GASTROENTEROLOGY | Facility: CLINIC | Age: 67
End: 2022-04-12

## 2022-04-13 ENCOUNTER — PATIENT OUTREACH (OUTPATIENT)
Dept: CASE MANAGEMENT | Age: 67
End: 2022-04-13

## 2022-04-13 NOTE — TELEPHONE ENCOUNTER
Low residue diet is correct then over the next 4 weeks ok to increase soluble fiber ( ie Benefiber or Citracel - the kind that disolves in water )

## 2022-04-13 NOTE — TELEPHONE ENCOUNTER
I sent below response from Dr. Katja Lau and low residue diet instructions to the patient via [employer].

## 2022-04-13 NOTE — TELEPHONE ENCOUNTER
From: Miranda Giang  To: Stephon March. Linda Florian MD  Sent: 4/12/2022 8:02 PM CDT  Subject: Diet    Could you please send specifics about what I am supposed to / not supposed to eat for the next two weeks and thereafter?     Chidi Ferrell

## 2022-04-13 NOTE — TELEPHONE ENCOUNTER
Dr. Faiza Bolivar    Patient recently discharged after partial small bowel obstruction vs. Ileus. I do not see a diet indicated on his discharge instructions. Please advise on diet you would recommend so I could send instructions. Low residue?     Thank you

## 2022-04-17 ENCOUNTER — PATIENT MESSAGE (OUTPATIENT)
Dept: GASTROENTEROLOGY | Facility: CLINIC | Age: 67
End: 2022-04-17

## 2022-04-18 NOTE — TELEPHONE ENCOUNTER
From: César Oden  To: Trinidad Augustin. Dionna Bill MD  Sent: 4/17/2022 10:19 PM CDT  Subject: Digestive enzymes     OK for me to take digestive enzymes? Advisable?

## 2022-04-20 ENCOUNTER — APPOINTMENT (OUTPATIENT)
Dept: CARDIOLOGY | Age: 67
End: 2022-04-20

## 2022-05-23 ENCOUNTER — TELEPHONE (OUTPATIENT)
Dept: GASTROENTEROLOGY | Facility: CLINIC | Age: 67
End: 2022-05-23

## 2022-06-06 ENCOUNTER — MED REC SCAN ONLY (OUTPATIENT)
Dept: FAMILY MEDICINE CLINIC | Facility: CLINIC | Age: 67
End: 2022-06-06

## 2022-08-29 NOTE — CONSULTS
Cardiology (consult dictated)    Assessment:  1. Asked to provide perioperative anticoagulation management and cardiac risk assessment for patient who may have needed abdominal surgery. Surgery now appears unlikely. 2.  INR minimally subtherapeutic. Will resume warfarin tonight. No need for bridging therapy at this time. Thank you. Final Anesthesia Post-op Assessment    Patient: Rikki Mack  Procedure(s) Performed: REPAIR, HERNIA, INGUINAL/RIGHT WITH MESH - RIGHT  Anesthesia type: General    Vitals Value Taken Time   Temp 36.7 °C (98.1 °F) 08/29/22 1100   Pulse 66 08/29/22 1100   Resp 16 08/29/22 1100   SpO2 95 % 08/29/22 1100   /73 08/29/22 1100         Patient Location: Phase II  Post-op Vital Signs:stable  Level of Consciousness: awake, participates in exam, answers questions appropriately and alert  Respiratory Status: spontaneous ventilation  Cardiovascular blood pressure returned to baseline  Hydration: euvolemic  Pain Management: well controlled  Handoff: Handoff to receiving nurse was performed and questions were answered  Nausea: None  Airway Patency:patent  Post-op Assessment: awake, alert, appropriately conversant, or baseline and patient tolerated procedure well with no complications  Comments: Pt meets Discharge criteria. Patient is responsive and appropriate.      No complications documented.

## 2022-09-07 ENCOUNTER — MED REC SCAN ONLY (OUTPATIENT)
Dept: FAMILY MEDICINE CLINIC | Facility: CLINIC | Age: 67
End: 2022-09-07

## 2022-10-03 ENCOUNTER — PATIENT MESSAGE (OUTPATIENT)
Dept: FAMILY MEDICINE CLINIC | Facility: CLINIC | Age: 67
End: 2022-10-03

## 2022-10-03 ENCOUNTER — TELEPHONE (OUTPATIENT)
Dept: FAMILY MEDICINE CLINIC | Facility: CLINIC | Age: 67
End: 2022-10-03

## 2022-10-03 NOTE — TELEPHONE ENCOUNTER
Patient has ongoing shaking of the hands. Patient had an appt on Friday 10/7 which he requested to be canceled since he would not be able to make it. He wants a new appt but needs a sooner appt than the Nov date that Dr Brii Boles has available.

## 2022-10-04 ENCOUNTER — NURSE TRIAGE (OUTPATIENT)
Dept: FAMILY MEDICINE CLINIC | Facility: CLINIC | Age: 67
End: 2022-10-04

## 2022-10-04 ENCOUNTER — OFFICE VISIT (OUTPATIENT)
Dept: GASTROENTEROLOGY | Facility: CLINIC | Age: 67
End: 2022-10-04
Payer: MEDICARE

## 2022-10-04 VITALS
SYSTOLIC BLOOD PRESSURE: 120 MMHG | WEIGHT: 236 LBS | HEIGHT: 74 IN | BODY MASS INDEX: 30.29 KG/M2 | DIASTOLIC BLOOD PRESSURE: 70 MMHG

## 2022-10-04 DIAGNOSIS — Z87.19 HISTORY OF SMALL BOWEL OBSTRUCTION: Primary | ICD-10-CM

## 2022-10-04 PROCEDURE — 99213 OFFICE O/P EST LOW 20 MIN: CPT | Performed by: INTERNAL MEDICINE

## 2022-10-04 RX ORDER — WARFARIN SODIUM 7.5 MG/1
TABLET ORAL
COMMUNITY
Start: 2022-09-10

## 2022-10-04 NOTE — TELEPHONE ENCOUNTER
Patient will be added on Friday at 5 Am per Dr. Betina Leon . Patient notified and agreed to day and time.

## 2022-10-04 NOTE — TELEPHONE ENCOUNTER
Patient can not do Thursday at [facility]. Dr. Marry Becerra is going to take another look on where we can fit him in.

## 2022-11-18 ENCOUNTER — TELEPHONE (OUTPATIENT)
Facility: CLINIC | Age: 67
End: 2022-11-18

## 2022-11-18 ENCOUNTER — HOSPITAL ENCOUNTER (OUTPATIENT)
Dept: CT IMAGING | Facility: HOSPITAL | Age: 67
Discharge: HOME | End: 2022-11-18
Attending: INTERNAL MEDICINE
Payer: MEDICARE

## 2022-11-18 ENCOUNTER — PATIENT MESSAGE (OUTPATIENT)
Dept: GASTROENTEROLOGY | Facility: CLINIC | Age: 67
End: 2022-11-18

## 2022-11-18 DIAGNOSIS — Z87.19 HISTORY OF SMALL BOWEL OBSTRUCTION: ICD-10-CM

## 2022-11-18 LAB
CREAT BLD-MCNC: 0.8 MG/DL
GFR SERPLBLD BASED ON 1.73 SQ M-ARVRAT: 97 ML/MIN/1.73M2 (ref 60–?)

## 2022-11-18 PROCEDURE — 82565 ASSAY OF CREATININE: CPT

## 2022-11-18 PROCEDURE — 74177 CT ABD & PELVIS W/CONTRAST: CPT | Performed by: INTERNAL MEDICINE

## 2022-11-18 NOTE — TELEPHONE ENCOUNTER
Message left on patient's personal voicemail, like him to contact the office so we can review results and see what issues he had with the contrast for the enterography portion examination.

## 2022-11-21 NOTE — TELEPHONE ENCOUNTER
Dr. Marlo Kingsley (office on call)    Would you be able to give input on CT enterography results? Dr. Kelley Kingsley is out of office this week. Routed to Dr. Kelley Kingsley as well for input when he returns next week.     Thank you

## 2022-11-21 NOTE — TELEPHONE ENCOUNTER
From: Dari Kowalski  To: Dorothy Isidro. Vick Treviño MD  Sent: 11/18/2022 10:09 PM CST  Subject: Question regarding CT ENTEROGRAPHY ABD/PEL W CONTRAST (CPT=74177)    What does all this mean ?

## 2022-11-21 NOTE — TELEPHONE ENCOUNTER
Left detailed VM for patient explaining CT scan results    -there is no blockage/stricture  -however, small possible growth in small bowel, near colon. May need repeat CLN for evaluation vs blood work - however not urgent  -also some benign appearing hepatic cysts and gallstones      Will have Dr. Roe Lea decide on next steps.        Copy Dr. Roe Lea

## 2022-11-30 ENCOUNTER — OFFICE VISIT (OUTPATIENT)
Dept: FAMILY MEDICINE CLINIC | Facility: CLINIC | Age: 67
End: 2022-11-30
Payer: MEDICARE

## 2022-11-30 VITALS
HEIGHT: 74 IN | HEART RATE: 74 BPM | OXYGEN SATURATION: 97 % | WEIGHT: 241 LBS | BODY MASS INDEX: 30.93 KG/M2 | DIASTOLIC BLOOD PRESSURE: 74 MMHG | SYSTOLIC BLOOD PRESSURE: 122 MMHG

## 2022-11-30 DIAGNOSIS — N40.1 BENIGN PROSTATIC HYPERPLASIA WITH INCOMPLETE BLADDER EMPTYING: ICD-10-CM

## 2022-11-30 DIAGNOSIS — Z95.2 HISTORY OF PROSTHETIC AORTIC VALVE: ICD-10-CM

## 2022-11-30 DIAGNOSIS — Z23 NEED FOR VACCINATION: ICD-10-CM

## 2022-11-30 DIAGNOSIS — I47.1 PAROXYSMAL SUPRAVENTRICULAR TACHYCARDIA (HCC): ICD-10-CM

## 2022-11-30 DIAGNOSIS — I77.810 ASCENDING AORTA DILATION (HCC): ICD-10-CM

## 2022-11-30 DIAGNOSIS — R39.14 BENIGN PROSTATIC HYPERPLASIA WITH INCOMPLETE BLADDER EMPTYING: ICD-10-CM

## 2022-11-30 DIAGNOSIS — Z12.5 PROSTATE CANCER SCREENING: ICD-10-CM

## 2022-11-30 DIAGNOSIS — F34.0 CYCLOTHYMIA: ICD-10-CM

## 2022-11-30 DIAGNOSIS — Z13.6 SCREENING FOR CARDIOVASCULAR CONDITION: ICD-10-CM

## 2022-11-30 DIAGNOSIS — E72.20 HYPERAMMONEMIA (HCC): ICD-10-CM

## 2022-11-30 DIAGNOSIS — I48.0 PAROXYSMAL ATRIAL FIBRILLATION (HCC): ICD-10-CM

## 2022-11-30 DIAGNOSIS — H53.9 VISION CHANGES: ICD-10-CM

## 2022-11-30 DIAGNOSIS — Z00.00 ENCOUNTER FOR MEDICARE ANNUAL WELLNESS EXAM: Primary | ICD-10-CM

## 2022-11-30 PROBLEM — K56.609 SBO (SMALL BOWEL OBSTRUCTION) (HCC): Status: RESOLVED | Noted: 2022-04-07 | Resolved: 2022-11-30

## 2022-12-01 PROBLEM — I25.10 ARTERIOSCLEROSIS OF CORONARY ARTERY: Status: ACTIVE | Noted: 2022-03-02

## 2023-02-01 ENCOUNTER — TELEPHONE (OUTPATIENT)
Facility: CLINIC | Age: 68
End: 2023-02-01

## 2023-02-01 NOTE — TELEPHONE ENCOUNTER
Called patient to inform him the billing issues on his most recent Medicare physical has been resolved     He should not see a bill on this end

## 2023-03-01 ENCOUNTER — TELEPHONE (OUTPATIENT)
Facility: CLINIC | Age: 68
End: 2023-03-01

## 2023-03-06 NOTE — TELEPHONE ENCOUNTER
I was able to reach the patient and we discussed setting up the MRI scan, the rationale for MR enterography was reviewed. If still positive then I would go ahead and get specialized testing to check for carcinoid and then can consider surgery, if the MRI does not show a lesion then potentially no further work-up would be necessary. The above rationale was discussed with the patient he agrees to proceed. He will call to schedule the MR enterography.

## 2023-03-14 ENCOUNTER — HOSPITAL ENCOUNTER (OUTPATIENT)
Dept: MRI IMAGING | Facility: HOSPITAL | Age: 68
Discharge: HOME | End: 2023-03-14
Attending: INTERNAL MEDICINE
Payer: MEDICARE

## 2023-03-14 ENCOUNTER — HOSPITAL ENCOUNTER (OUTPATIENT)
Dept: GENERAL RADIOLOGY | Facility: HOSPITAL | Age: 68
Discharge: HOME | End: 2023-03-14
Attending: INTERNAL MEDICINE
Payer: MEDICARE

## 2023-03-14 ENCOUNTER — TELEPHONE (OUTPATIENT)
Dept: GASTROENTEROLOGY | Facility: CLINIC | Age: 68
End: 2023-03-14

## 2023-03-14 ENCOUNTER — TELEPHONE (OUTPATIENT)
Facility: CLINIC | Age: 68
End: 2023-03-14

## 2023-03-14 DIAGNOSIS — R93.3 ABNORMAL CT SCAN, SMALL BOWEL: Primary | ICD-10-CM

## 2023-03-14 DIAGNOSIS — Z87.821 HISTORY OF FOREIGN BODY IN EYE: ICD-10-CM

## 2023-03-14 DIAGNOSIS — K63.9 SMALL BOWEL LESION: ICD-10-CM

## 2023-03-14 PROCEDURE — 70030 X-RAY EYE FOR FOREIGN BODY: CPT | Performed by: INTERNAL MEDICINE

## 2023-03-14 PROCEDURE — 72197 MRI PELVIS W/O & W/DYE: CPT | Performed by: INTERNAL MEDICINE

## 2023-03-14 PROCEDURE — A9575 INJ GADOTERATE MEGLUMI 0.1ML: HCPCS | Performed by: INTERNAL MEDICINE

## 2023-03-14 PROCEDURE — 74183 MRI ABD W/O CNTR FLWD CNTR: CPT | Performed by: INTERNAL MEDICINE

## 2023-03-14 RX ORDER — GADOTERATE MEGLUMINE 376.9 MG/ML
20 INJECTION INTRAVENOUS
Status: COMPLETED | OUTPATIENT
Start: 2023-03-14 | End: 2023-03-14

## 2023-03-14 RX ADMIN — GADOTERATE MEGLUMINE 20 ML: 376.9 INJECTION INTRAVENOUS at 11:06:00

## 2023-03-14 NOTE — TELEPHONE ENCOUNTER
Patient contacted and results of the MRI scan reviewed. Lesion noted in the small bowel/TI 6 to 8 cm proximal to the IC valve. This lesion measures 1.8 cm in diameter and is suspicious for small bowel neoplasia. Possible neuroendocrine tumor. The tumor may be small too small to visualize with further imaging modalities such as a neuroendocrine tumor specific PET scan    We discussed the options, colonoscopy may not be able to get adequate tissue samples as these lesions are typically submucosal.  I would like to get surgery input regarding potential next steps.

## 2023-03-15 NOTE — TELEPHONE ENCOUNTER
Nursing staff to set up colonoscopy for abnormal imaging results/abnormal MRI. Suspected small bowel lesion. GoLytely bowel preparation  Would need to hold his Coumadin for 2 days preprocedure and check a stat PT/INR, I have placed these orders. I would like to get input from cardiology to make sure that that is okay to be off the Coumadin.   He reports he has done Lovenox in the past.    Please see if we can set up for next Friday add on at the hospital

## 2023-03-16 NOTE — TELEPHONE ENCOUNTER
The patient wanted to get input from a surgical oncologist regarding the lesion, yes please give him the number as I discussed the case with surgeon as per patient request.

## 2023-03-16 NOTE — TELEPHONE ENCOUNTER
Dr. Autumn Swartz    Do you want patient to follow up with you or Dr. Salazar flores? I just want to make sure I give him the correct information.     Thank you

## 2023-03-16 NOTE — TELEPHONE ENCOUNTER
Mechelle Croft 03/24/2023 will not work per [facility] [facility] because your cases at [facility] run until 3:15pm on that day per [facility] a shorter day would work better please advise thank you

## 2023-03-16 NOTE — TELEPHONE ENCOUNTER
Schedulers:  Please contact patient to schedule colonoscopy with Dr. Isatu Wilkerson. He wants to add next Friday so routed as high priority. Thank you    I faxed an urgent request to Dr. Laura Gardner office to see if ok to hold coumadin for 2 days prior to procedure.

## 2023-03-17 NOTE — TELEPHONE ENCOUNTER
Dr. Isatu Wilkerson    Patient is scheduled for Wednesday with Dr. Arlene Sparrow    Just to confirm-we are not scheduling colonoscopy, correct?     Thank you  Your Appointments    Wednesday March 22, 2023 10:45 AM  Consult Visit with Nora Nuñez MD  [address], [facility] ([facility]) [address] [place] [address]  [phone]

## 2023-03-17 NOTE — TELEPHONE ENCOUNTER
Left message to call back. Want to provide him with Dr. Susana Darden phone number so he can call to set up an appointment.

## 2023-03-17 NOTE — TELEPHONE ENCOUNTER
Yes hold on scheduling colonoscopy as per my discussion with surg onc /Dr. Jessica Alvarado we may not need the colonoscopy - will await their input.    Please inform the pt

## 2023-03-20 ENCOUNTER — PATIENT MESSAGE (OUTPATIENT)
Dept: GASTROENTEROLOGY | Facility: CLINIC | Age: 68
End: 2023-03-20

## 2023-03-20 NOTE — TELEPHONE ENCOUNTER
Patient contacted. Reviewed below message from Dr. Nicholas Santamaria  He told me he just sent a MyChart about the same thing.

## 2023-03-20 NOTE — TELEPHONE ENCOUNTER
From: Tamir Hazel  To: Alan Martinez. Nicholas Santamaria MD  Sent: 3/20/2023 8:58 AM CDT  Subject: Colonoscopy    Hi Dr. Nicholas Santamaria,    Thank you SO much for fitting me in on such short notice for the colonoscopy but I've decided it would be better to wait until later for that. I want to take this time to gather information. I have a consultation with the surgeon at [facility] this Wedndesday, Dr. Jose Sequeira and I'll take follow on steps from there.     Thanks,    Donnie Silva

## 2023-03-21 ENCOUNTER — LAB ENCOUNTER (OUTPATIENT)
Dept: LAB | Facility: REFERENCE LAB | Age: 68
End: 2023-03-21
Attending: FAMILY MEDICINE
Payer: MEDICARE

## 2023-03-21 DIAGNOSIS — R73.09 ELEVATED GLUCOSE LEVEL: ICD-10-CM

## 2023-03-21 DIAGNOSIS — E72.20 HYPERAMMONEMIA (HCC): ICD-10-CM

## 2023-03-21 DIAGNOSIS — Z13.6 SCREENING FOR CARDIOVASCULAR CONDITION: ICD-10-CM

## 2023-03-21 DIAGNOSIS — F34.0 CYCLOTHYMIA: ICD-10-CM

## 2023-03-21 DIAGNOSIS — Z12.5 PROSTATE CANCER SCREENING: ICD-10-CM

## 2023-03-21 DIAGNOSIS — Z00.00 ENCOUNTER FOR MEDICARE ANNUAL WELLNESS EXAM: ICD-10-CM

## 2023-03-21 LAB
ALBUMIN SERPL-MCNC: 3.4 G/DL (ref 3.4–5)
ALBUMIN/GLOB SERPL: 0.9 {RATIO} (ref 1–2)
ALP LIVER SERPL-CCNC: 60 U/L
ALT SERPL-CCNC: 32 U/L
AMMONIA PLAS-MCNC: 54 UMOL/L (ref 11–32)
ANION GAP SERPL CALC-SCNC: 8 MMOL/L (ref 0–18)
AST SERPL-CCNC: 28 U/L (ref 15–37)
BASOPHILS # BLD AUTO: 0.06 X10(3) UL (ref 0–0.2)
BASOPHILS NFR BLD AUTO: 1.2 %
BILIRUB SERPL-MCNC: 0.4 MG/DL (ref 0.1–2)
BUN BLD-MCNC: 14 MG/DL (ref 7–18)
BUN/CREAT SERPL: 17.5 (ref 10–20)
CALCIUM BLD-MCNC: 9.3 MG/DL (ref 8.5–10.1)
CHLORIDE SERPL-SCNC: 109 MMOL/L (ref 98–112)
CHOLEST SERPL-MCNC: 197 MG/DL (ref ?–200)
CO2 SERPL-SCNC: 25 MMOL/L (ref 21–32)
COMPLEXED PSA SERPL-MCNC: 0.85 NG/ML (ref ?–4)
CREAT BLD-MCNC: 0.8 MG/DL
DEPRECATED RDW RBC AUTO: 47.4 FL (ref 35.1–46.3)
EOSINOPHIL # BLD AUTO: 0.22 X10(3) UL (ref 0–0.7)
EOSINOPHIL NFR BLD AUTO: 4.5 %
ERYTHROCYTE [DISTWIDTH] IN BLOOD BY AUTOMATED COUNT: 13.8 % (ref 11–15)
FASTING PATIENT LIPID ANSWER: NO
FASTING STATUS PATIENT QL REPORTED: NO
GFR SERPLBLD BASED ON 1.73 SQ M-ARVRAT: 96 ML/MIN/1.73M2 (ref 60–?)
GLOBULIN PLAS-MCNC: 3.7 G/DL (ref 2.8–4.4)
GLUCOSE BLD-MCNC: 143 MG/DL (ref 70–99)
HCT VFR BLD AUTO: 42.6 %
HDLC SERPL-MCNC: 42 MG/DL (ref 40–59)
HGB BLD-MCNC: 14.6 G/DL
IMM GRANULOCYTES # BLD AUTO: 0.01 X10(3) UL (ref 0–1)
IMM GRANULOCYTES NFR BLD: 0.2 %
LDLC SERPL CALC-MCNC: 120 MG/DL (ref ?–100)
LYMPHOCYTES # BLD AUTO: 1.41 X10(3) UL (ref 1–4)
LYMPHOCYTES NFR BLD AUTO: 28.5 %
MCH RBC QN AUTO: 31.8 PG (ref 26–34)
MCHC RBC AUTO-ENTMCNC: 34.3 G/DL (ref 31–37)
MCV RBC AUTO: 92.8 FL
MONOCYTES # BLD AUTO: 0.75 X10(3) UL (ref 0.1–1)
MONOCYTES NFR BLD AUTO: 15.2 %
NEUTROPHILS # BLD AUTO: 2.49 X10 (3) UL (ref 1.5–7.7)
NEUTROPHILS # BLD AUTO: 2.49 X10(3) UL (ref 1.5–7.7)
NEUTROPHILS NFR BLD AUTO: 50.4 %
NONHDLC SERPL-MCNC: 155 MG/DL (ref ?–130)
OSMOLALITY SERPL CALC.SUM OF ELEC: 297 MOSM/KG (ref 275–295)
PLATELET # BLD AUTO: 206 10(3)UL (ref 150–450)
POTASSIUM SERPL-SCNC: 3.9 MMOL/L (ref 3.5–5.1)
PROT SERPL-MCNC: 7.1 G/DL (ref 6.4–8.2)
RBC # BLD AUTO: 4.59 X10(6)UL
SODIUM SERPL-SCNC: 142 MMOL/L (ref 136–145)
TRIGL SERPL-MCNC: 199 MG/DL (ref 30–149)
VALPROATE SERPL-MCNC: 40.9 UG/ML (ref 50–100)
VLDLC SERPL CALC-MCNC: 35 MG/DL (ref 0–30)
WBC # BLD AUTO: 4.9 X10(3) UL (ref 4–11)

## 2023-03-21 PROCEDURE — 80164 ASSAY DIPROPYLACETIC ACD TOT: CPT

## 2023-03-21 PROCEDURE — 80053 COMPREHEN METABOLIC PANEL: CPT

## 2023-03-21 PROCEDURE — 82140 ASSAY OF AMMONIA: CPT

## 2023-03-21 PROCEDURE — 85025 COMPLETE CBC W/AUTO DIFF WBC: CPT

## 2023-03-21 PROCEDURE — 80175 DRUG SCREEN QUAN LAMOTRIGINE: CPT

## 2023-03-21 PROCEDURE — 80061 LIPID PANEL: CPT

## 2023-03-21 PROCEDURE — 83036 HEMOGLOBIN GLYCOSYLATED A1C: CPT

## 2023-03-21 PROCEDURE — 36415 COLL VENOUS BLD VENIPUNCTURE: CPT

## 2023-03-22 ENCOUNTER — OFFICE VISIT (OUTPATIENT)
Dept: SURGERY | Facility: CLINIC | Age: 68
End: 2023-03-22
Payer: MEDICARE

## 2023-03-22 VITALS
HEIGHT: 74 IN | TEMPERATURE: 97 F | DIASTOLIC BLOOD PRESSURE: 85 MMHG | WEIGHT: 241 LBS | BODY MASS INDEX: 30.93 KG/M2 | SYSTOLIC BLOOD PRESSURE: 130 MMHG | HEART RATE: 86 BPM | OXYGEN SATURATION: 96 % | RESPIRATION RATE: 16 BRPM

## 2023-03-22 DIAGNOSIS — K63.89 SMALL BOWEL MASS: Primary | ICD-10-CM

## 2023-03-22 DIAGNOSIS — R73.09 ELEVATED GLUCOSE LEVEL: Primary | ICD-10-CM

## 2023-03-22 DIAGNOSIS — D37.2 NEOPLASM OF UNCERTAIN BEHAVIOR OF SMALL INTESTINE: ICD-10-CM

## 2023-03-22 LAB
CEA SERPL-MCNC: 1.2 NG/ML (ref ?–5)
EST. AVERAGE GLUCOSE BLD GHB EST-MCNC: 105 MG/DL (ref 68–126)
HBA1C MFR BLD: 5.3 % (ref ?–5.7)
LDH SERPL L TO P-CCNC: 319 U/L

## 2023-03-22 PROCEDURE — 36415 COLL VENOUS BLD VENIPUNCTURE: CPT | Performed by: SURGERY

## 2023-03-22 PROCEDURE — 84260 ASSAY OF SEROTONIN: CPT | Performed by: PHYSICIAN ASSISTANT

## 2023-03-22 PROCEDURE — 82378 CARCINOEMBRYONIC ANTIGEN: CPT | Performed by: PHYSICIAN ASSISTANT

## 2023-03-22 PROCEDURE — 86316 IMMUNOASSAY TUMOR OTHER: CPT | Performed by: PHYSICIAN ASSISTANT

## 2023-03-22 PROCEDURE — 83615 LACTATE (LD) (LDH) ENZYME: CPT | Performed by: SURGERY

## 2023-03-22 NOTE — PATIENT INSTRUCTIONS
Surgery: XI robot-assisted, laparoscopic, possible open, right hemicolectomy    Date of Surgery: [facility] [place]    Hospital:    [address] [place], [place] [address]   Phone: [phone]    This is an inpatient procedure. Use the provided Chlorhexadine surgical soap(instructions attached) to shower the night before and morning of your procedure. Do not apply powders, creams, lotions or deodorant after showering. Do not apply any kind of makeup and make sure to remove nail polish prior to your surgery. For faster recovery from anesthesia and surgery please follow the instructions below regarding your pre-op diet:    Bowel Prep diet and instructions for colon surgery: The day before surgery you may have a small breakfast and then start a clear liquid diet. This diet includes: clear beverages, clear soup or broth, and Jell-O. A liquid bowel preparation will be called into your pharmacy. You must drink this preparation the day before surgery to clean out your colon. Follow the directions for the prep on the prescription and begin to take the prep between 2pm-3pm the day prior to surgery. You will also need to take 2 oral antibiotics the day before your surgery to reduce the risk of infection. You will get a prescription for these or they will be sent electronically to your pharmacy. The antibiotics include: Neomycin 1gram and Flagyl 500mg. You will take these as directed at 2pm, 3pm, and 10pm the day before surgery. 12 hours prior to your surgery time you are to drink one 10oz bottle of Ensure Pre-Surgery Drink. You are to have NO solid food or water after 11pm the night before your surgery EXCEPT one additional 10oz bottle of Ensure Pre-Surgery Drink. You need to finish drinking this 4 hours prior to surgery time. Take Tylenol 1000mg by mouth at the time of your second Ensure Pre-Surgery drink(4hrs prior to surgery time), unless instructed otherwise by your surgeon.      Bring your picture ID and insurance card with you. Wear comfortable clothing that can easily be removed. Preferably, something that zips, snaps, or buttons up the front. You will be contacted by the hospital the day prior to your surgery to confirm details and give you specific instructions about when and where to arrive the day of your procedure. If you are taking blood thinners including: Plavix, Eliquis, Coumadin you will need to contact the prescribing provider for specific instructions on holding these medications for your procedure  Motrin, Advil, Ibuprofen, Aspirin, Baby Aspirin and Fish Oil are also blood thinners and need to be held at least one week prior to your procedure. It is okay to take Tylenol. Inform your primary care physician of your surgery and ask if him/her will need to see you prior to surgery. Pre-Operative Testing  x CBC x CMP  BMP    PT, PTT, INR  UA  EKG    Chest X-Ray x Cardiac Clearance x [facility] Clearance      HIP[facility]:     [employer]ent  CEA, , Lien Drake M.D.   Carrie Beltran nurse line:  [phone]  Monday through Friday 8:00am to 4:30pm.     For Dr. Jack Beltran office: [phone]/ Fax: [phone]  After hours you will reach the answering service     Central [phone]  Medical Records:   [phone]

## 2023-03-23 ENCOUNTER — TELEPHONE (OUTPATIENT)
Dept: SURGERY | Facility: CLINIC | Age: 68
End: 2023-03-23

## 2023-03-23 DIAGNOSIS — K63.89 SMALL BOWEL MASS: Primary | ICD-10-CM

## 2023-03-23 LAB — LAMOTRIGINE: 5.6 UG/ML

## 2023-03-23 RX ORDER — NEOMYCIN SULFATE 500 MG/1
TABLET ORAL
Qty: 6 TABLET | Refills: 0 | Status: SHIPPED | OUTPATIENT
Start: 2023-03-23

## 2023-03-23 RX ORDER — METRONIDAZOLE 500 MG/1
TABLET ORAL
Qty: 3 TABLET | Refills: 0 | Status: SHIPPED | OUTPATIENT
Start: 2023-03-23

## 2023-03-23 NOTE — TELEPHONE ENCOUNTER
Pelon Mari 81. called, states he received a Rx for Metronidazole. He is asking if its okay to dispense it since it interacts with Warfarin.   [phone]

## 2023-03-23 NOTE — TELEPHONE ENCOUNTER
I attempt to reach patient. I left a message on his identified voicemail with surgery date, pet scan date and I faxed clearance request letters to his pcp and cardiologist.    I provided him with our number if any questions.

## 2023-03-24 ENCOUNTER — TELEPHONE (OUTPATIENT)
Dept: SURGERY | Facility: CLINIC | Age: 68
End: 2023-03-24

## 2023-03-24 LAB — CHROMOGRANIN A: 62 NG/ML

## 2023-03-24 NOTE — TELEPHONE ENCOUNTER
Pt called back to confirm scheduled appts and to discuss plan of care. Pt given arrival/testing instructions for Gallium PET Scan. Also discussed tentative surgery date of 4/27. Pt would like to possibly hold off on surgery until after 5/11. I told him we would discuss his case in our GI conference next week and follow up with him afterwards with any additional recommendations and possibly a later surgery date.

## 2023-03-25 LAB — SEROTONIN, SERUM: 95 NG/ML

## 2023-03-29 ENCOUNTER — ORDER TRANSCRIPTION (OUTPATIENT)
Dept: ADMINISTRATIVE | Facility: HOSPITAL | Age: 68
End: 2023-03-29

## 2023-03-29 DIAGNOSIS — I77.810 ASCENDING AORTA DILATION (HCC): Primary | ICD-10-CM

## 2023-03-29 DIAGNOSIS — I25.10 CAD IN NATIVE ARTERY: ICD-10-CM

## 2023-03-29 DIAGNOSIS — I25.84 CALCIFICATION OF CORONARY ARTERY: ICD-10-CM

## 2023-03-29 DIAGNOSIS — I25.10 CALCIFICATION OF CORONARY ARTERY: ICD-10-CM

## 2023-03-31 ENCOUNTER — HOSPITAL ENCOUNTER (OUTPATIENT)
Dept: NUCLEAR MEDICINE | Facility: HOSPITAL | Age: 68
Discharge: HOME | End: 2023-03-31
Attending: PHYSICIAN ASSISTANT
Payer: MEDICARE

## 2023-03-31 DIAGNOSIS — K63.89 SMALL BOWEL MASS: ICD-10-CM

## 2023-03-31 PROCEDURE — 78815 PET IMAGE W/CT SKULL-THIGH: CPT | Performed by: PHYSICIAN ASSISTANT

## 2023-04-03 ENCOUNTER — TELEPHONE (OUTPATIENT)
Dept: SURGERY | Facility: CLINIC | Age: 68
End: 2023-04-03

## 2023-04-03 NOTE — TELEPHONE ENCOUNTER
Discussed PET results and reiterated TB recommendation for surgery. Patient is amendable, would like to move to week of the 5/16-19 week if possible. We will find a date and communicate with patient. Dannielle Mares PA-C  Department of [address]  [address] [facility] [place] [facility] [place] [address] [facility], [address] [facility]  T: [phone]  F: [phone]

## 2023-04-11 ENCOUNTER — TELEPHONE (OUTPATIENT)
Dept: SURGERY | Facility: CLINIC | Age: 68
End: 2023-04-11

## 2023-04-11 DIAGNOSIS — K63.89 SMALL BOWEL MASS: Primary | ICD-10-CM

## 2023-04-11 NOTE — TELEPHONE ENCOUNTER
Contacted patient to discuss upcoming surgery with Dr. Cari Boeck. Pt had requested to have surgery scheduled after 5/16. Offered new surgery date of 5/22 at [facility] with Dr. Cari Boeck. Agreeable to new date, will enter case change reflecting new date.

## 2023-04-14 ENCOUNTER — HOSPITAL ENCOUNTER (OUTPATIENT)
Dept: CT IMAGING | Facility: HOSPITAL | Age: 68
Discharge: HOME | End: 2023-04-14
Attending: INTERNAL MEDICINE
Payer: MEDICARE

## 2023-04-14 VITALS — BODY MASS INDEX: 30.8 KG/M2 | HEIGHT: 74 IN | WEIGHT: 240 LBS

## 2023-04-14 DIAGNOSIS — I77.810 ASCENDING AORTA DILATION (HCC): ICD-10-CM

## 2023-04-14 DIAGNOSIS — I25.10 CAD IN NATIVE ARTERY: ICD-10-CM

## 2023-04-14 DIAGNOSIS — I25.84 CALCIFICATION OF CORONARY ARTERY: ICD-10-CM

## 2023-04-14 DIAGNOSIS — I25.10 CALCIFICATION OF CORONARY ARTERY: ICD-10-CM

## 2023-04-14 PROCEDURE — 0502T CTA FRACTIONAL FLOW RESERVE ANALYSIS (CPT=0503T/0502T): CPT | Performed by: INTERNAL MEDICINE

## 2023-04-14 PROCEDURE — 0503T CTA FRACTIONAL FLOW RESERVE ANALYSIS (CPT=0503T/0502T): CPT | Performed by: INTERNAL MEDICINE

## 2023-04-14 PROCEDURE — 75574 CT ANGIO HRT W/3D IMAGE: CPT | Performed by: INTERNAL MEDICINE

## 2023-04-14 RX ORDER — DILTIAZEM HYDROCHLORIDE 5 MG/ML
5 INJECTION INTRAVENOUS SEE ADMIN INSTRUCTIONS
Status: DISCONTINUED | OUTPATIENT
Start: 2023-04-14 | End: 2023-04-16

## 2023-04-14 RX ORDER — METOPROLOL TARTRATE 5 MG/5ML
5 INJECTION INTRAVENOUS SEE ADMIN INSTRUCTIONS
Status: DISCONTINUED | OUTPATIENT
Start: 2023-04-14 | End: 2023-04-16

## 2023-04-14 RX ORDER — NITROGLYCERIN 0.4 MG/1
0.4 TABLET SUBLINGUAL ONCE
Status: COMPLETED | OUTPATIENT
Start: 2023-04-14 | End: 2023-04-14

## 2023-04-14 RX ORDER — METOPROLOL TARTRATE 5 MG/5ML
INJECTION INTRAVENOUS
Status: DISCONTINUED
Start: 2023-04-14 | End: 2023-04-14 | Stop reason: WASHOUT

## 2023-04-14 RX ADMIN — Medication 50 MG: at 10:13:00

## 2023-04-14 RX ADMIN — NITROGLYCERIN 0.4 MG: 0.4 TABLET SUBLINGUAL at 11:20:00

## 2023-04-14 NOTE — IMAGING NOTE
TO RAD HOLDING AT 1000    HX TAKEN: NEED CTA FOR CARDIAC CLEARANCE FOR SURGERY SCHEDULED IN MAY    PT CONSENTED AT 1010     BASELINE VITAL SIGNS   HR 65  /78 BMI 30.8 / LB    CTA ORDERED BY DR Tk Zarco.   WAS PT GIVEN CTA  PREMEDS: YES - PT TAKES 25 MG METOPROLOL TARTRATE DAILY- TOOK DOSE LAST NIGHT AND THIS AM.     METOPROLOL PO GIVEN 50 MILLIGRAMS AT 1013    1100: VS: HR 59 /72    18 GAUGE IV STARTED AT 1105    LABS FROM 3/21/23:   GFR = 96  CREATINE = 0.8    TO CT TABLE @ 1115    1118: CONNECT TO MONITOR  VS HR 60 /72    NITROGLYCERIN 0.4 MILLIGRAMS SUBLINGUAL GIVEN AT 1120    CALCIUM SCORE COMPLETED: N/A    INJECTION STARTED AT 1123 HR 59 DURING SCAN PROCEDURE COMPLETE    POST SCAN VS HR 64 /68 AT 1126    1130: PT TO HOLDING AREA  VS: Q 15 MIN X2     1133: HR 64 /75  1150: HR 59 /76    AVS  PROVIDED-     1201: DISCHARGED HOME

## 2023-04-21 ENCOUNTER — OFFICE VISIT (OUTPATIENT)
Facility: CLINIC | Age: 68
End: 2023-04-21
Payer: MEDICARE

## 2023-04-21 VITALS
OXYGEN SATURATION: 98 % | DIASTOLIC BLOOD PRESSURE: 78 MMHG | HEART RATE: 65 BPM | WEIGHT: 244 LBS | SYSTOLIC BLOOD PRESSURE: 122 MMHG | HEIGHT: 74 IN | BODY MASS INDEX: 31.32 KG/M2

## 2023-04-21 DIAGNOSIS — Z01.818 PREOP EXAMINATION: Primary | ICD-10-CM

## 2023-04-21 DIAGNOSIS — K63.89 SMALL BOWEL MASS: ICD-10-CM

## 2023-04-21 DIAGNOSIS — I47.1 PAROXYSMAL SUPRAVENTRICULAR TACHYCARDIA (HCC): ICD-10-CM

## 2023-04-21 DIAGNOSIS — F34.0 CYCLOTHYMIA: ICD-10-CM

## 2023-04-21 DIAGNOSIS — I48.0 PAROXYSMAL ATRIAL FIBRILLATION (HCC): ICD-10-CM

## 2023-04-21 PROCEDURE — 99214 OFFICE O/P EST MOD 30 MIN: CPT | Performed by: FAMILY MEDICINE

## 2023-05-01 ENCOUNTER — TELEPHONE (OUTPATIENT)
Dept: GASTROENTEROLOGY | Facility: CLINIC | Age: 68
End: 2023-05-01

## 2023-05-01 RX ORDER — OMEPRAZOLE 20 MG/1
20 CAPSULE, DELAYED RELEASE ORAL EVERY MORNING
Qty: 30 CAPSULE | Refills: 1 | Status: SHIPPED | OUTPATIENT
Start: 2023-05-01

## 2023-05-01 NOTE — TELEPHONE ENCOUNTER
Patient contacted me, he says he gets intermittent episodes of burning sensation in the abdomen typically if he drinks coffee for prolonged periods of time. He noticed this last week, stopped the coffee seem to get better but then still had some symptoms over the weekend. Discussed a trial of omeprazole 20 mg daily. [place] diet overnight tonight and then we will see how he does. He denies any obstructive symptoms, no vomiting, he is moving his bowels, no abdominal distention. To call if ongoing or worsening symptoms, he does have a history of small bowel obstruction. 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

## 2023-05-01 NOTE — TELEPHONE ENCOUNTER
Dr. Radha Victoria    Please see below message-patient requesting to speak directly to you about current health status.     Thank you

## 2023-05-02 ENCOUNTER — PATIENT MESSAGE (OUTPATIENT)
Dept: GASTROENTEROLOGY | Facility: CLINIC | Age: 68
End: 2023-05-02

## 2023-05-02 NOTE — TELEPHONE ENCOUNTER
Patient states started taking Omeprazole this morning. Patient confirming that it is okay to take Miralax with the Omeprazole and how often he should be taking it. Has occasional problems with constipation. Last bowel movement was this morning. Please advise. Thank you.

## 2023-05-02 NOTE — TELEPHONE ENCOUNTER
Continue on high fiber and fluids daily   Miralax daily /titrate to stool output and consistency   Continue on Omeprazole     RN to review the above, ER if develops bowel obstruction symptoms

## 2023-05-09 ENCOUNTER — TELEPHONE (OUTPATIENT)
Dept: SURGERY | Facility: CLINIC | Age: 68
End: 2023-05-09

## 2023-05-09 NOTE — TELEPHONE ENCOUNTER
Received phone call from [facility] Warfarin clinic nurse clarifying Dr. Mundo Hidalgo preference on managing Coumadin pre-operatively. APN in Dr. Kay Cavazos office has instructed patient to stop Warfarin on 5/16 and check INR on 5/19 then Lovenox bridging will start. Plan to give last dose of Lovenox 12 hours prior to surgery but asking for Dr. Mundo Hidalgo preference. Will clarify with Dr. Janine Bishop if Lovenox needs to be stopped 12 hours or 24 hours prior to surgery on 5/22. Will call nurse line back with his recommendations.

## 2023-05-11 ENCOUNTER — TELEPHONE (OUTPATIENT)
Dept: SURGERY | Facility: CLINIC | Age: 68
End: 2023-05-11

## 2023-05-11 ENCOUNTER — TELEPHONE (OUTPATIENT)
Facility: CLINIC | Age: 68
End: 2023-05-11

## 2023-05-11 NOTE — TELEPHONE ENCOUNTER
Dr. Kane Varghese    I spoke to Jeffery Camarillo. He has been taking the omeprazole every day. After about 4 days on it he had 2 days with no symptoms. Now symptoms come and go-seems to only have them when his mind is not busy. When he is busy at work he is fine, but when he is not working he notices the pain. It feels better if her rubs his abdomen. Describes it as a gnawing pain \"like when you go a long time without eating and it tightens up\". He also describes a sharp pain \"6 in to left and 2 inches up from belly button by ribs\". Sometimes it is across the abdomen, nothing low on abdomen. Rates this pain a 3/10. He has been taking in fiber. He took Miralax 2 times in 3 days - has soft stools, no diarrhea. Already avoiding dairy. He noticed he subconsciously tightens muscles-finds himself tensing his stomach muscles a lot during the course of the day. He does not know why he is doing this, tole me maybe because he is worried about the surgery. Lost 10 lbs recently. He is eating sugar so surprised he lost weight instead of gaining weight since he is bloated. He is worried because he has surgery coming up. Denies any fever, bleeding, nausea, or vomiting. He wants input/to know what to do next.     Please advise    Thank you

## 2023-05-11 NOTE — TELEPHONE ENCOUNTER
Discussed pre-op anticoagulation with Dr. Merrill Arcos. Contacted [facility] Coumadin clinic to clarify the pre-op Lovenox bridge dosing. OK to give one Lovenox dose on the day before surgery. Pt will be off Lovenox 12 hours prior to his surgery.

## 2023-05-12 NOTE — TELEPHONE ENCOUNTER
I contacted Jackie Jovany today, he has been experiencing symptoms in the mid abdomen which can fluctuate, sometimes bloating and sometimes a movement type. Typically 3 out of 10 or less. He notices it if he sitting but if he is moving or doing something he typically does not notice it. He does not notice any hernias or bulges. He has tried omeprazole, MiraLAX without significant improvement, he is moving his bowels. I discussed dietary adjustments, bloating could be related to his sugar intake and he will try to cut this out. He is nervous about the upcoming surgery. We discussed this. Nursing staff to see if I can see him in the office next week, okay to overbook one of my slots so I can examine his abdomen. His surgery is scheduled for about 10 days from now.

## 2023-05-12 NOTE — TELEPHONE ENCOUNTER
Patient contacted. Accepted below appointment.   Given office directions    Your Appointments    Thursday May 18, 2023  2:00 PM  Follow Up Visit with Anita Lomax MD  [address], [address] [facility],[address] Floor, [facility]) [address] [place] [address],[address]  [phone]

## 2023-05-19 RX ORDER — SODIUM CHLORIDE 9 MG/ML
INJECTION, SOLUTION INTRAVENOUS CONTINUOUS
Status: DISCONTINUED | OUTPATIENT
Start: 2023-05-19 | End: 2023-05-19

## 2023-05-19 RX ORDER — SODIUM CHLORIDE, SODIUM LACTATE, POTASSIUM CHLORIDE, CALCIUM CHLORIDE 600; 310; 30; 20 MG/100ML; MG/100ML; MG/100ML; MG/100ML
INJECTION, SOLUTION INTRAVENOUS CONTINUOUS
Status: CANCELLED | OUTPATIENT
Start: 2023-05-19

## 2023-05-20 ENCOUNTER — LAB ENCOUNTER (OUTPATIENT)
Dept: LAB | Facility: HOSPITAL | Age: 68
End: 2023-05-20
Attending: FAMILY MEDICINE
Payer: MEDICARE

## 2023-05-20 DIAGNOSIS — Z01.818 PREOPERATIVE TESTING: ICD-10-CM

## 2023-05-20 DIAGNOSIS — Z01.818 PREOP EXAMINATION: ICD-10-CM

## 2023-05-20 LAB
ALBUMIN SERPL-MCNC: 3.3 G/DL (ref 3.4–5)
ALBUMIN/GLOB SERPL: 0.9 {RATIO} (ref 1–2)
ALP LIVER SERPL-CCNC: 69 U/L
ALT SERPL-CCNC: 45 U/L
ANION GAP SERPL CALC-SCNC: 5 MMOL/L (ref 0–18)
ANTIBODY SCREEN: NEGATIVE
AST SERPL-CCNC: 42 U/L (ref 15–37)
ATRIAL RATE: 61 BPM
BASOPHILS # BLD AUTO: 0.05 X10(3) UL (ref 0–0.2)
BASOPHILS NFR BLD AUTO: 1.4 %
BILIRUB SERPL-MCNC: 0.5 MG/DL (ref 0.1–2)
BUN BLD-MCNC: 9 MG/DL (ref 7–18)
BUN/CREAT SERPL: 12.9 (ref 10–20)
CALCIUM BLD-MCNC: 8.7 MG/DL (ref 8.5–10.1)
CHLORIDE SERPL-SCNC: 110 MMOL/L (ref 98–112)
CO2 SERPL-SCNC: 26 MMOL/L (ref 21–32)
CREAT BLD-MCNC: 0.7 MG/DL
DEPRECATED RDW RBC AUTO: 45.1 FL (ref 35.1–46.3)
EOSINOPHIL # BLD AUTO: 0.1 X10(3) UL (ref 0–0.7)
EOSINOPHIL NFR BLD AUTO: 2.8 %
ERYTHROCYTE [DISTWIDTH] IN BLOOD BY AUTOMATED COUNT: 13.2 % (ref 11–15)
FASTING STATUS PATIENT QL REPORTED: NO
GFR SERPLBLD BASED ON 1.73 SQ M-ARVRAT: 100 ML/MIN/1.73M2 (ref 60–?)
GLOBULIN PLAS-MCNC: 3.5 G/DL (ref 2.8–4.4)
GLUCOSE BLD-MCNC: 127 MG/DL (ref 70–99)
HCT VFR BLD AUTO: 41.7 %
HGB BLD-MCNC: 14.1 G/DL
IMM GRANULOCYTES # BLD AUTO: 0.01 X10(3) UL (ref 0–1)
IMM GRANULOCYTES NFR BLD: 0.3 %
LYMPHOCYTES # BLD AUTO: 1.43 X10(3) UL (ref 1–4)
LYMPHOCYTES NFR BLD AUTO: 39.8 %
MCH RBC QN AUTO: 31.5 PG (ref 26–34)
MCHC RBC AUTO-ENTMCNC: 33.8 G/DL (ref 31–37)
MCV RBC AUTO: 93.1 FL
MONOCYTES # BLD AUTO: 0.49 X10(3) UL (ref 0.1–1)
MONOCYTES NFR BLD AUTO: 13.6 %
NEUTROPHILS # BLD AUTO: 1.51 X10 (3) UL (ref 1.5–7.7)
NEUTROPHILS # BLD AUTO: 1.51 X10(3) UL (ref 1.5–7.7)
NEUTROPHILS NFR BLD AUTO: 42.1 %
OSMOLALITY SERPL CALC.SUM OF ELEC: 292 MOSM/KG (ref 275–295)
P AXIS: 47 DEGREES
P-R INTERVAL: 234 MS
PLATELET # BLD AUTO: 188 10(3)UL (ref 150–450)
POTASSIUM SERPL-SCNC: 4.2 MMOL/L (ref 3.5–5.1)
PROT SERPL-MCNC: 6.8 G/DL (ref 6.4–8.2)
Q-T INTERVAL: 394 MS
QRS DURATION: 88 MS
QTC CALCULATION (BEZET): 396 MS
R AXIS: 8 DEGREES
RBC # BLD AUTO: 4.48 X10(6)UL
RH BLOOD TYPE: NEGATIVE
SODIUM SERPL-SCNC: 141 MMOL/L (ref 136–145)
T AXIS: 69 DEGREES
VENTRICULAR RATE: 61 BPM
WBC # BLD AUTO: 3.6 X10(3) UL (ref 4–11)

## 2023-05-20 PROCEDURE — 86920 COMPATIBILITY TEST SPIN: CPT

## 2023-05-20 PROCEDURE — 80053 COMPREHEN METABOLIC PANEL: CPT

## 2023-05-20 PROCEDURE — 93005 ELECTROCARDIOGRAM TRACING: CPT

## 2023-05-20 PROCEDURE — 85025 COMPLETE CBC W/AUTO DIFF WBC: CPT

## 2023-05-20 PROCEDURE — 86900 BLOOD TYPING SEROLOGIC ABO: CPT

## 2023-05-20 PROCEDURE — 86901 BLOOD TYPING SEROLOGIC RH(D): CPT

## 2023-05-20 PROCEDURE — 86850 RBC ANTIBODY SCREEN: CPT

## 2023-05-20 PROCEDURE — 93010 ELECTROCARDIOGRAM REPORT: CPT | Performed by: INTERNAL MEDICINE

## 2023-05-20 PROCEDURE — 36415 COLL VENOUS BLD VENIPUNCTURE: CPT

## 2023-05-22 ENCOUNTER — ANESTHESIA (OUTPATIENT)
Dept: SURGERY | Facility: HOSPITAL | Age: 68
End: 2023-05-22
Payer: MEDICARE

## 2023-05-22 ENCOUNTER — ANESTHESIA EVENT (OUTPATIENT)
Dept: SURGERY | Facility: HOSPITAL | Age: 68
End: 2023-05-22
Payer: MEDICARE

## 2023-05-22 ENCOUNTER — HOSPITAL ENCOUNTER (INPATIENT)
Facility: HOSPITAL | Age: 68
LOS: 22 days | Discharge: HOME | End: 2023-06-13
Attending: SURGERY | Admitting: SURGERY
Payer: MEDICARE

## 2023-05-22 DIAGNOSIS — Z01.818 PREOPERATIVE TESTING: Primary | ICD-10-CM

## 2023-05-22 DIAGNOSIS — D3A.8 NEUROENDOCRINE NEOPLASM OF SMALL INTESTINE: ICD-10-CM

## 2023-05-22 DIAGNOSIS — K63.89 SMALL BOWEL MASS: ICD-10-CM

## 2023-05-22 DIAGNOSIS — Z90.49 S/P RIGHT HEMICOLECTOMY: ICD-10-CM

## 2023-05-22 DIAGNOSIS — Z95.2 H/O MECHANICAL AORTIC VALVE REPLACEMENT: ICD-10-CM

## 2023-05-22 DIAGNOSIS — N17.9 AKI (ACUTE KIDNEY INJURY) (HCC): ICD-10-CM

## 2023-05-22 DIAGNOSIS — Z90.49 HISTORY OF PARTIAL COLECTOMY: ICD-10-CM

## 2023-05-22 DIAGNOSIS — K92.2 GI BLEED: ICD-10-CM

## 2023-05-22 PROBLEM — D49.0 SMALL BOWEL TUMOR: Status: ACTIVE | Noted: 2023-05-22

## 2023-05-22 PROBLEM — F31.9 BIPOLAR AFFECTIVE (HCC): Chronic | Status: ACTIVE | Noted: 2023-05-22

## 2023-05-22 LAB
APTT PPP: 26.8 SECONDS (ref 23.3–35.6)
APTT PPP: 27.9 SECONDS (ref 23.3–35.6)
INR BLD: 1.14 (ref 0.85–1.16)
PROTHROMBIN TIME: 14.6 SECONDS (ref 11.6–14.8)

## 2023-05-22 PROCEDURE — 8E0W4CZ ROBOTIC ASSISTED PROCEDURE OF TRUNK REGION, PERCUTANEOUS ENDOSCOPIC APPROACH: ICD-10-PCS | Performed by: SURGERY

## 2023-05-22 PROCEDURE — 0DNF8ZZ RELEASE RIGHT LARGE INTESTINE, VIA NATURAL OR ARTIFICIAL OPENING ENDOSCOPIC: ICD-10-PCS | Performed by: SURGERY

## 2023-05-22 PROCEDURE — 0DTF4ZZ RESECTION OF RIGHT LARGE INTESTINE, PERCUTANEOUS ENDOSCOPIC APPROACH: ICD-10-PCS | Performed by: SURGERY

## 2023-05-22 PROCEDURE — 99233 SBSQ HOSP IP/OBS HIGH 50: CPT | Performed by: HOSPITALIST

## 2023-05-22 RX ORDER — SODIUM CHLORIDE, SODIUM LACTATE, POTASSIUM CHLORIDE, CALCIUM CHLORIDE 600; 310; 30; 20 MG/100ML; MG/100ML; MG/100ML; MG/100ML
INJECTION, SOLUTION INTRAVENOUS CONTINUOUS
Status: DISCONTINUED | OUTPATIENT
Start: 2023-05-22 | End: 2023-05-23

## 2023-05-22 RX ORDER — HEPARIN SODIUM AND DEXTROSE 10000; 5 [USP'U]/100ML; G/100ML
18 INJECTION INTRAVENOUS ONCE
Status: COMPLETED | OUTPATIENT
Start: 2023-05-22 | End: 2023-05-22

## 2023-05-22 RX ORDER — HYDROMORPHONE HYDROCHLORIDE 1 MG/ML
0.2 INJECTION, SOLUTION INTRAMUSCULAR; INTRAVENOUS; SUBCUTANEOUS EVERY 2 HOUR PRN
Status: DISCONTINUED | OUTPATIENT
Start: 2023-05-22 | End: 2023-06-13

## 2023-05-22 RX ORDER — HYDROMORPHONE HYDROCHLORIDE 1 MG/ML
0.6 INJECTION, SOLUTION INTRAMUSCULAR; INTRAVENOUS; SUBCUTANEOUS EVERY 5 MIN PRN
Status: DISCONTINUED | OUTPATIENT
Start: 2023-05-22 | End: 2023-05-22 | Stop reason: HOSPADM

## 2023-05-22 RX ORDER — HYDROMORPHONE HYDROCHLORIDE 1 MG/ML
INJECTION, SOLUTION INTRAMUSCULAR; INTRAVENOUS; SUBCUTANEOUS AS NEEDED
Status: DISCONTINUED | OUTPATIENT
Start: 2023-05-22 | End: 2023-05-22 | Stop reason: SURG

## 2023-05-22 RX ORDER — SODIUM CHLORIDE 9 MG/ML
INJECTION, SOLUTION INTRAVENOUS CONTINUOUS
Status: DISCONTINUED | OUTPATIENT
Start: 2023-05-22 | End: 2023-05-26

## 2023-05-22 RX ORDER — MIDAZOLAM HYDROCHLORIDE 1 MG/ML
INJECTION INTRAMUSCULAR; INTRAVENOUS AS NEEDED
Status: DISCONTINUED | OUTPATIENT
Start: 2023-05-22 | End: 2023-05-22 | Stop reason: SURG

## 2023-05-22 RX ORDER — DEXAMETHASONE SODIUM PHOSPHATE 4 MG/ML
VIAL (ML) INJECTION AS NEEDED
Status: DISCONTINUED | OUTPATIENT
Start: 2023-05-22 | End: 2023-05-22 | Stop reason: SURG

## 2023-05-22 RX ORDER — HEPARIN SODIUM AND DEXTROSE 10000; 5 [USP'U]/100ML; G/100ML
INJECTION INTRAVENOUS CONTINUOUS
Status: DISCONTINUED | OUTPATIENT
Start: 2023-05-23 | End: 2023-05-23

## 2023-05-22 RX ORDER — HYDROMORPHONE HYDROCHLORIDE 1 MG/ML
0.2 INJECTION, SOLUTION INTRAMUSCULAR; INTRAVENOUS; SUBCUTANEOUS EVERY 5 MIN PRN
Status: DISCONTINUED | OUTPATIENT
Start: 2023-05-22 | End: 2023-05-22 | Stop reason: HOSPADM

## 2023-05-22 RX ORDER — MORPHINE SULFATE 4 MG/ML
4 INJECTION, SOLUTION INTRAMUSCULAR; INTRAVENOUS EVERY 10 MIN PRN
Status: DISCONTINUED | OUTPATIENT
Start: 2023-05-22 | End: 2023-05-22 | Stop reason: HOSPADM

## 2023-05-22 RX ORDER — ROCURONIUM BROMIDE 10 MG/ML
INJECTION, SOLUTION INTRAVENOUS AS NEEDED
Status: DISCONTINUED | OUTPATIENT
Start: 2023-05-22 | End: 2023-05-22 | Stop reason: SURG

## 2023-05-22 RX ORDER — ONDANSETRON 2 MG/ML
4 INJECTION INTRAMUSCULAR; INTRAVENOUS ONCE
Status: COMPLETED | OUTPATIENT
Start: 2023-05-22 | End: 2023-05-22

## 2023-05-22 RX ORDER — LAMOTRIGINE 25 MG/1
100 TABLET ORAL EVERY MORNING
Status: DISCONTINUED | OUTPATIENT
Start: 2023-05-23 | End: 2023-06-13

## 2023-05-22 RX ORDER — HYDROMORPHONE HYDROCHLORIDE 1 MG/ML
0.4 INJECTION, SOLUTION INTRAMUSCULAR; INTRAVENOUS; SUBCUTANEOUS EVERY 2 HOUR PRN
Status: DISCONTINUED | OUTPATIENT
Start: 2023-05-22 | End: 2023-06-13

## 2023-05-22 RX ORDER — METOCLOPRAMIDE 10 MG/1
10 TABLET ORAL ONCE
Status: COMPLETED | OUTPATIENT
Start: 2023-05-22 | End: 2023-05-22

## 2023-05-22 RX ORDER — HYDROMORPHONE HYDROCHLORIDE 1 MG/ML
0.4 INJECTION, SOLUTION INTRAMUSCULAR; INTRAVENOUS; SUBCUTANEOUS EVERY 5 MIN PRN
Status: DISCONTINUED | OUTPATIENT
Start: 2023-05-22 | End: 2023-05-22 | Stop reason: HOSPADM

## 2023-05-22 RX ORDER — DIVALPROEX SODIUM 250 MG/1
500 TABLET, EXTENDED RELEASE ORAL DAILY
Status: DISCONTINUED | OUTPATIENT
Start: 2023-05-23 | End: 2023-05-29

## 2023-05-22 RX ORDER — PANTOPRAZOLE SODIUM 20 MG/1
20 TABLET, DELAYED RELEASE ORAL
Status: DISCONTINUED | OUTPATIENT
Start: 2023-05-23 | End: 2023-05-24

## 2023-05-22 RX ORDER — LIDOCAINE HYDROCHLORIDE 10 MG/ML
INJECTION, SOLUTION EPIDURAL; INFILTRATION; INTRACAUDAL; PERINEURAL AS NEEDED
Status: DISCONTINUED | OUTPATIENT
Start: 2023-05-22 | End: 2023-05-22 | Stop reason: SURG

## 2023-05-22 RX ORDER — OXYCODONE HYDROCHLORIDE 5 MG/1
5 TABLET ORAL EVERY 4 HOURS PRN
Status: DISCONTINUED | OUTPATIENT
Start: 2023-05-22 | End: 2023-05-23

## 2023-05-22 RX ORDER — ONDANSETRON 2 MG/ML
4 INJECTION INTRAMUSCULAR; INTRAVENOUS EVERY 6 HOURS PRN
Status: DISCONTINUED | OUTPATIENT
Start: 2023-05-22 | End: 2023-06-13

## 2023-05-22 RX ORDER — FAMOTIDINE 20 MG/1
20 TABLET, FILM COATED ORAL ONCE
Status: COMPLETED | OUTPATIENT
Start: 2023-05-22 | End: 2023-05-22

## 2023-05-22 RX ORDER — METOPROLOL TARTRATE 5 MG/5ML
5 INJECTION INTRAVENOUS EVERY 6 HOURS
Status: DISCONTINUED | OUTPATIENT
Start: 2023-05-22 | End: 2023-05-22

## 2023-05-22 RX ORDER — ONDANSETRON 2 MG/ML
4 INJECTION INTRAMUSCULAR; INTRAVENOUS EVERY 6 HOURS PRN
Status: DISCONTINUED | OUTPATIENT
Start: 2023-05-22 | End: 2023-05-22

## 2023-05-22 RX ORDER — ONDANSETRON 2 MG/ML
INJECTION INTRAMUSCULAR; INTRAVENOUS AS NEEDED
Status: DISCONTINUED | OUTPATIENT
Start: 2023-05-22 | End: 2023-05-22 | Stop reason: SURG

## 2023-05-22 RX ORDER — ONDANSETRON 2 MG/ML
INJECTION INTRAMUSCULAR; INTRAVENOUS AS NEEDED
Status: DISCONTINUED | OUTPATIENT
Start: 2023-05-22 | End: 2023-05-22

## 2023-05-22 RX ORDER — HYDROMORPHONE HYDROCHLORIDE 1 MG/ML
0.8 INJECTION, SOLUTION INTRAMUSCULAR; INTRAVENOUS; SUBCUTANEOUS EVERY 2 HOUR PRN
Status: DISCONTINUED | OUTPATIENT
Start: 2023-05-22 | End: 2023-06-13

## 2023-05-22 RX ORDER — MORPHINE SULFATE 10 MG/ML
6 INJECTION, SOLUTION INTRAMUSCULAR; INTRAVENOUS EVERY 10 MIN PRN
Status: DISCONTINUED | OUTPATIENT
Start: 2023-05-22 | End: 2023-05-22 | Stop reason: HOSPADM

## 2023-05-22 RX ORDER — ACETAMINOPHEN 500 MG
1000 TABLET ORAL EVERY 6 HOURS
Status: DISCONTINUED | OUTPATIENT
Start: 2023-05-22 | End: 2023-05-23

## 2023-05-22 RX ORDER — NALOXONE HYDROCHLORIDE 0.4 MG/ML
80 INJECTION, SOLUTION INTRAMUSCULAR; INTRAVENOUS; SUBCUTANEOUS AS NEEDED
Status: DISCONTINUED | OUTPATIENT
Start: 2023-05-22 | End: 2023-05-22 | Stop reason: HOSPADM

## 2023-05-22 RX ORDER — CEFAZOLIN SODIUM/WATER 2 G/20 ML
2 SYRINGE (ML) INTRAVENOUS ONCE
Status: CANCELLED | OUTPATIENT
Start: 2023-05-22 | End: 2023-05-22

## 2023-05-22 RX ORDER — INDOCYANINE GREEN AND WATER 25 MG
KIT INJECTION AS NEEDED
Status: DISCONTINUED | OUTPATIENT
Start: 2023-05-22 | End: 2023-05-22 | Stop reason: SURG

## 2023-05-22 RX ORDER — MORPHINE SULFATE 4 MG/ML
2 INJECTION, SOLUTION INTRAMUSCULAR; INTRAVENOUS EVERY 10 MIN PRN
Status: DISCONTINUED | OUTPATIENT
Start: 2023-05-22 | End: 2023-05-22 | Stop reason: HOSPADM

## 2023-05-22 RX ORDER — NEOSTIGMINE METHYLSULFATE 1 MG/ML
INJECTION, SOLUTION INTRAVENOUS AS NEEDED
Status: DISCONTINUED | OUTPATIENT
Start: 2023-05-22 | End: 2023-05-22 | Stop reason: SURG

## 2023-05-22 RX ORDER — HEPARIN SODIUM 5000 [USP'U]/ML
5000 INJECTION, SOLUTION INTRAVENOUS; SUBCUTANEOUS
Status: COMPLETED | OUTPATIENT
Start: 2023-05-22 | End: 2023-05-22

## 2023-05-22 RX ORDER — GLYCOPYRROLATE 0.2 MG/ML
INJECTION, SOLUTION INTRAMUSCULAR; INTRAVENOUS AS NEEDED
Status: DISCONTINUED | OUTPATIENT
Start: 2023-05-22 | End: 2023-05-22 | Stop reason: SURG

## 2023-05-22 RX ORDER — SODIUM CHLORIDE, SODIUM LACTATE, POTASSIUM CHLORIDE, CALCIUM CHLORIDE 600; 310; 30; 20 MG/100ML; MG/100ML; MG/100ML; MG/100ML
INJECTION, SOLUTION INTRAVENOUS CONTINUOUS
Status: DISCONTINUED | OUTPATIENT
Start: 2023-05-22 | End: 2023-05-22 | Stop reason: HOSPADM

## 2023-05-22 RX ORDER — METRONIDAZOLE 500 MG/100ML
500 INJECTION, SOLUTION INTRAVENOUS ONCE
Status: DISCONTINUED | OUTPATIENT
Start: 2023-05-22 | End: 2023-05-22 | Stop reason: HOSPADM

## 2023-05-22 RX ORDER — ACETAMINOPHEN 500 MG
1000 TABLET ORAL ONCE
Status: COMPLETED | OUTPATIENT
Start: 2023-05-22 | End: 2023-05-22

## 2023-05-22 RX ADMIN — ONDANSETRON 4 MG: 2 INJECTION INTRAMUSCULAR; INTRAVENOUS at 14:55:00

## 2023-05-22 RX ADMIN — DEXAMETHASONE SODIUM PHOSPHATE 8 MG: 4 MG/ML VIAL (ML) INJECTION at 12:33:00

## 2023-05-22 RX ADMIN — SODIUM CHLORIDE: 9 INJECTION, SOLUTION INTRAVENOUS at 14:49:00

## 2023-05-22 RX ADMIN — HYDROMORPHONE HYDROCHLORIDE 0.5 MG: 1 INJECTION, SOLUTION INTRAMUSCULAR; INTRAVENOUS; SUBCUTANEOUS at 15:05:00

## 2023-05-22 RX ADMIN — ROCURONIUM BROMIDE 10 MG: 10 INJECTION, SOLUTION INTRAVENOUS at 12:19:00

## 2023-05-22 RX ADMIN — ROCURONIUM BROMIDE 20 MG: 10 INJECTION, SOLUTION INTRAVENOUS at 13:57:00

## 2023-05-22 RX ADMIN — ROCURONIUM BROMIDE 20 MG: 10 INJECTION, SOLUTION INTRAVENOUS at 14:24:00

## 2023-05-22 RX ADMIN — HYDROMORPHONE HYDROCHLORIDE 0.5 MG: 1 INJECTION, SOLUTION INTRAMUSCULAR; INTRAVENOUS; SUBCUTANEOUS at 12:49:00

## 2023-05-22 RX ADMIN — SODIUM CHLORIDE: 9 INJECTION, SOLUTION INTRAVENOUS at 13:33:00

## 2023-05-22 RX ADMIN — SODIUM CHLORIDE: 9 INJECTION, SOLUTION INTRAVENOUS at 14:14:00

## 2023-05-22 RX ADMIN — INDOCYANINE GREEN AND WATER 7.5 MG: 25 MG KIT INJECTION at 14:19:00

## 2023-05-22 RX ADMIN — ROCURONIUM BROMIDE 20 MG: 10 INJECTION, SOLUTION INTRAVENOUS at 13:24:00

## 2023-05-22 RX ADMIN — SODIUM CHLORIDE: 9 INJECTION, SOLUTION INTRAVENOUS at 15:22:00

## 2023-05-22 RX ADMIN — LIDOCAINE HYDROCHLORIDE 50 MG: 10 INJECTION, SOLUTION EPIDURAL; INFILTRATION; INTRACAUDAL; PERINEURAL at 12:19:00

## 2023-05-22 RX ADMIN — SODIUM CHLORIDE: 9 INJECTION, SOLUTION INTRAVENOUS at 12:14:00

## 2023-05-22 RX ADMIN — SODIUM CHLORIDE: 9 INJECTION, SOLUTION INTRAVENOUS at 13:51:00

## 2023-05-22 RX ADMIN — MIDAZOLAM HYDROCHLORIDE 2 MG: 1 INJECTION INTRAMUSCULAR; INTRAVENOUS at 12:13:00

## 2023-05-22 RX ADMIN — ROCURONIUM BROMIDE 30 MG: 10 INJECTION, SOLUTION INTRAVENOUS at 12:33:00

## 2023-05-22 RX ADMIN — NEOSTIGMINE METHYLSULFATE 5 MG: 1 INJECTION, SOLUTION INTRAVENOUS at 14:55:00

## 2023-05-22 RX ADMIN — GLYCOPYRROLATE 1 MG: 0.2 INJECTION, SOLUTION INTRAMUSCULAR; INTRAVENOUS at 14:55:00

## 2023-05-22 NOTE — ANESTHESIA PROCEDURE NOTES
Airway  Date/Time: 5/22/2023 12:20 PM  Urgency: elective    Airway not difficult    General Information and Staff    Patient location during procedure: OR  Anesthesiologist: Mohamud Amos MD  Performed: anesthesiologist   Performed by: Mohamud Amos MD  Authorized by: Mohamud Amos MD      Indications and Patient Condition  Indications for airway management: anesthesia  Sedation level: deep  Preoxygenated: yes  Patient position: sniffing  Mask difficulty assessment: 1 - vent by mask    Final Airway Details  Final airway type: endotracheal airway      Successful airway: ETT  Cuffed: yes   Successful intubation technique: direct laryngoscopy  Facilitating devices/methods: intubating stylet and anterior pressure/BURP  Endotracheal tube insertion site: oral  Blade: Mikaela  Blade size: #4  ETT size (mm): 7.5    Cormack-Lehane Classification: grade IIA - partial view of glottis  Placement verified by: chest auscultation and capnometry   Cuff volume (mL): 8  Measured from: lips  ETT to lips (cm): 20  Number of attempts at approach: 1  Number of other approaches attempted: 0

## 2023-05-22 NOTE — BRIEF OP NOTE
Pre-Operative Diagnosis: Small bowel mass [K63.89]     Post-Operative Diagnosis: Small bowel mass [K63.89]      Procedure Performed:   XI ROBOT-ASSISTED LAPAROSCOPIC , RIGHT HEMICOLECTOMY,  LYSIS OF ADEHESIONS    Surgeon(s) and Role:     * Maureen Tran MD - Primary    Assistant(s):  PA: Cathi Myers PA-C     Surgical Findings: pending pathology     Specimen: right hemicolectomy     Estimated Blood Loss: 50cc    Dictation Number:  n/a    Serg Espinosa PA-C  5/22/2023  3:15 PM

## 2023-05-22 NOTE — CONSULTS
[facility]    PATIENT'S NAME: Wendy Steinberg   ATTENDING PHYSICIAN: Eugene Conrad MD   CONSULTING PHYSICIAN: Pepper Tran. Ivar Hatchet, MD   PATIENT ACCOUNT#:   [id number]    LOCATION:  [address] #:   [id number]       YOB: 1955  ADMISSION DATE:       05/22/2023      CONSULT DATE:  05/22/2023    REPORT OF CONSULTATION      HISTORY OF PRESENT ILLNESS:  The patient is a 42-year-old man who underwent robotic-assisted right hemicolectomy and lysis of adhesions this afternoon. We were asked to provide cardiology followup postop. At the present time, he is sedated but awake and denies any chest pain. He has some periumbilical pain. PAST MEDICAL HISTORY:  Mechanical aortic valve replacement in the remote past, paroxysmal supraventricular tachycardia, nonobstructive coronary artery disease with a 50% to 70% mid LAD stenosis on recent coronary CTA with normal flow reserve, seizure disorder? Lenny Lunsford MEDICATIONS:  Home medications:  Omeprazole 20 mg daily; metoprolol tartrate 25 mg in the morning and 12.5 in the afternoon; Depakote  mg q.a.m.; folic acid; Lamictal 443 mg q.a.m.; multivitamin. ALLERGIES:  No known drug allergies. SOCIAL HISTORY:  He is a nonsmoker. Does not ingest alcohol or illicit drugs. FAMILY HISTORY:  Negative for premature coronary disease. REVIEW OF SYSTEMS:  Otherwise unobtainable at this time. PHYSICAL EXAMINATION:    GENERAL:  Well-developed, well-nourished male. No acute distress. Comfortable at this time. VITAL SIGNS:  Blood pressure 130/80; respirations 16; pulse 88, regular rhythm; afebrile; saturation 96% on room air. HEENT:  Unremarkable. NECK:  Supple. Jugular venous pressure normal.  Carotids brisk without bruits. No thyromegaly. LUNGS:  Clear anterolaterally. HEART:  S1 normal.  Crisp prosthetic S2. No pathologic murmur. No S3.  ABDOMEN:  Soft. Diminished bowel sounds. Not palpated. EXTREMITIES:  Well-perfused. No edema. LABORATORY DATA:  Lab work from 2 days ago is unremarkable. . Hemoglobin 14.1, platelets 196, WBC 3.6. EKG from 2 days ago shows sinus rhythm with a rate of 61. No acute changes. ASSESSMENT:    1.   Small-bowel tumor consistent with neuroendocrine tumor. Status post robotic-assisted right hemicolectomy today. Stable in the immediate postop time frame in PACU.  2.   History of mechanical aortic valve, on long-term Coumadin therapy. 3.   Nonobstructive coronary disease, specifically a 50% to 70% stenosis in the mid LAD with normal flow reserve. Stable with no active ischemia. 4.   Moderate to severe dilatation of the ascending aorta and aortic root. PLAN:    1. We will give IV Lopressor while n.p.o.    2.   Resume warfarin when okay with General Surgery. Thank you for this consultation. Dictated By Claudene Distel.  Tyree Sarah MD  d: 05/22/2023 17:12:34  t: 05/22/2023 17:51:55  Job [id number]  [facility]/

## 2023-05-22 NOTE — OPERATIVE REPORT
Date of operation 5/22/2023    Preoperative diagnosis:  1. Small bowel tumor, consistent clinically with neuroendocrine tumor midgut    Operations performed:  1.  Robotic assisted right hemicolectomy  2. Lysis of adhesions    Postoperative diagnosis:  1. Same    Operating surgeon:  1. Emili Peter MD    Assistant:  1. RODRIGO Philip    Indications:  Very pleasant 70-year-old gentleman who was incidentally noted to have a mass in the terminal ileum. Work-up was consistent with a neuroendocrine tumor. Staging studies were negative for metastatic disease. His case was reviewed in our multidisciplinary tumor board. Decision was made to proceed with resection. Details of the operation:  The patient was brought to the operating room and laid supine on the operating table. General endotracheal anesthesia was induced without complications. All pressure points were padded appropriately. The arms were tucked. Singleton's catheter was inserted under sterile conditions. The abdomen was clipped prepped and draped in the standard sterile manner. A time was completed verifying the patient's name, procedure to be performed administration of antibiotics. Everybody in the room was in agreement. At Peters's point, a nick in the skin was made and a Veress needle was introduced. Pneumoperitoneum established. Four 8 mm working ports were placed in a straight line from the left upper quadrant towards the left suprapubic region. The second highest port was exchanged to a 12 to accommodate a stapler. A left hemiabdominal 5 mm port was placed for an assistant. The robot was docked per usual.  Attention was directed towards the right side of the colon. It should be noted that there were significant adhesions to the pelvis given the patient's previous history of diverticulitis and resection. Lyse of adhesions commenced and lasted for a total of 60 minutes. It was performed entirely sharply.   Lateral to medial dissection was then undertaken. The right colon was mobilized. The terminal ileum was mobilized off of its retroperitoneal attachments. Those were extensive. The ileocolonic pedicle was isolated. The duodenum was protected. The hepatic flexure was taken down with the vessel sealer. Antimesenteric windows were created proximally and distally along the small intestine and proximal transverse colon. The colon was divided. The ileocolonic pedicle was divided with a white load stapler. The robot was temporarily undocked. The specimen was exteriorized. As the tumor was submucosal, I was able to run the bowel manually and appreciate the mass. The area was marked. The small intestine was divided with a blue load stapler. The specimen was oriented and passed off for final pathological analysis. The abdomen was reinsufflated and the robot redocked. An isoperistaltic side-to-side functional end-to-end ileocolonic anastomosis was fashioned by firing a 60 mm blue load across. The common enterotomy was closed in a running manner using 2 oh V-Loc suture. Hemostasis was excellent. The 12 mm port fascia was closed in a figure-of-eight manner using 0 Vicryl suture under direct vision with a Martinez-Maida needle. Remainder of ports were withdrawn under direct vision. The midline fascia was closed in a running manner using 0 Vicryl suture. Skin and subcutaneous tissue were irrigated. The skin was approximated with a stapler. The wounds were dressed. The patient was awakened and taken to the postoperative anesthesia care into the stable state. I was present for the entire case. Gretel Madison MD  [facility] General Surgical Oncology  [address] [place] [address]  Pager [phone]  [email or web address]

## 2023-05-22 NOTE — CONSULTS
Cardiology (consult dictated)    Assessment:  1. Small bowel tumor, consistent with neuroendocrine tumor. Status post robotic assisted right hemicolectomy today. 2.  History of mechanical aortic valve, on long-term Coumadin therapy. 3.  History of nonobstructive coronary artery disease, specifically an asymptomatic 50 to 70% stenosis in the mid LAD with normal flow reserve. 4.  Moderate-severe aneurysmal dilatation of the ascending aorta and aortic root. 5.  History of PSVT. Currently in sinus rhythm. Plan:  1. We will give IV Lopressor while n.p.o.    2.  Resume warfarin when okay with general surgery. Thank you. We will follow.

## 2023-05-23 ENCOUNTER — TELEPHONE (OUTPATIENT)
Facility: CLINIC | Age: 68
End: 2023-05-23

## 2023-05-23 ENCOUNTER — APPOINTMENT (OUTPATIENT)
Dept: CT IMAGING | Facility: HOSPITAL | Age: 68
End: 2023-05-23
Attending: HOSPITALIST
Payer: MEDICARE

## 2023-05-23 LAB
ANION GAP SERPL CALC-SCNC: 6 MMOL/L (ref 0–18)
APTT PPP: 111.9 SECONDS (ref 23.3–35.6)
APTT PPP: 128.8 SECONDS (ref 23.3–35.6)
BLOOD TYPE BARCODE: 5100
BUN BLD-MCNC: 13 MG/DL (ref 7–18)
BUN/CREAT SERPL: 13 (ref 10–20)
CALCIUM BLD-MCNC: 8.8 MG/DL (ref 8.5–10.1)
CHLORIDE SERPL-SCNC: 107 MMOL/L (ref 98–112)
CO2 SERPL-SCNC: 25 MMOL/L (ref 21–32)
CREAT BLD-MCNC: 1 MG/DL
DEPRECATED RDW RBC AUTO: 47.1 FL (ref 35.1–46.3)
ERYTHROCYTE [DISTWIDTH] IN BLOOD BY AUTOMATED COUNT: 13.6 % (ref 11–15)
GFR SERPLBLD BASED ON 1.73 SQ M-ARVRAT: 82 ML/MIN/1.73M2 (ref 60–?)
GLUCOSE BLD-MCNC: 130 MG/DL (ref 70–99)
HCT VFR BLD AUTO: 28.7 %
HCT VFR BLD AUTO: 31.2 %
HCT VFR BLD AUTO: 35.3 %
HCT VFR BLD AUTO: 36.6 %
HCT VFR BLD AUTO: 41.8 %
HGB BLD-MCNC: 10.6 G/DL
HGB BLD-MCNC: 11.8 G/DL
HGB BLD-MCNC: 12.3 G/DL
HGB BLD-MCNC: 13.7 G/DL
HGB BLD-MCNC: 9.6 G/DL
IRON SATN MFR SERPL: 28 %
IRON SERPL-MCNC: 83 UG/DL
LACTATE SERPL-SCNC: 3 MMOL/L (ref 0.4–2)
LACTATE SERPL-SCNC: 3.1 MMOL/L (ref 0.4–2)
MAGNESIUM SERPL-MCNC: 2.1 MG/DL (ref 1.6–2.6)
MCH RBC QN AUTO: 31.7 PG (ref 26–34)
MCHC RBC AUTO-ENTMCNC: 33.6 G/DL (ref 31–37)
MCV RBC AUTO: 94.3 FL
OSMOLALITY SERPL CALC.SUM OF ELEC: 288 MOSM/KG (ref 275–295)
PHOSPHATE SERPL-MCNC: 4.2 MG/DL (ref 2.5–4.9)
PLATELET # BLD AUTO: 203 10(3)UL (ref 150–450)
PLATELET # BLD AUTO: 203 10(3)UL (ref 150–450)
POTASSIUM SERPL-SCNC: 4.4 MMOL/L (ref 3.5–5.1)
RBC # BLD AUTO: 3.88 X10(6)UL
SODIUM SERPL-SCNC: 138 MMOL/L (ref 136–145)
TIBC SERPL-MCNC: 292 UG/DL (ref 240–450)
TRANSFERRIN SERPL-MCNC: 196 MG/DL (ref 200–360)
UNIT VOLUME: 350 ML
WBC # BLD AUTO: 11.3 X10(3) UL (ref 4–11)

## 2023-05-23 PROCEDURE — 71260 CT THORAX DX C+: CPT | Performed by: HOSPITALIST

## 2023-05-23 PROCEDURE — 99233 SBSQ HOSP IP/OBS HIGH 50: CPT | Performed by: HOSPITALIST

## 2023-05-23 PROCEDURE — 30233N1 TRANSFUSION OF NONAUTOLOGOUS RED BLOOD CELLS INTO PERIPHERAL VEIN, PERCUTANEOUS APPROACH: ICD-10-PCS | Performed by: PHYSICIAN ASSISTANT

## 2023-05-23 PROCEDURE — 74177 CT ABD & PELVIS W/CONTRAST: CPT | Performed by: HOSPITALIST

## 2023-05-23 RX ORDER — ACETAMINOPHEN 10 MG/ML
1000 INJECTION, SOLUTION INTRAVENOUS EVERY 6 HOURS
Status: DISCONTINUED | OUTPATIENT
Start: 2023-05-23 | End: 2023-05-25

## 2023-05-23 RX ORDER — SODIUM CHLORIDE 9 MG/ML
INJECTION, SOLUTION INTRAVENOUS ONCE
Status: DISCONTINUED | OUTPATIENT
Start: 2023-05-23 | End: 2023-05-26

## 2023-05-23 RX ORDER — WARFARIN SODIUM 7.5 MG/1
7.5 TABLET ORAL NIGHTLY
Status: DISCONTINUED | OUTPATIENT
Start: 2023-05-23 | End: 2023-05-24

## 2023-05-23 RX ORDER — ENOXAPARIN SODIUM 100 MG/ML
110 INJECTION SUBCUTANEOUS EVERY 12 HOURS SCHEDULED
Status: DISCONTINUED | OUTPATIENT
Start: 2023-05-23 | End: 2023-05-26

## 2023-05-23 RX ORDER — OXYCODONE HYDROCHLORIDE 5 MG/1
10 TABLET ORAL EVERY 4 HOURS PRN
Status: DISCONTINUED | OUTPATIENT
Start: 2023-05-23 | End: 2023-06-13

## 2023-05-23 RX ORDER — SODIUM CHLORIDE, SODIUM LACTATE, POTASSIUM CHLORIDE, CALCIUM CHLORIDE 600; 310; 30; 20 MG/100ML; MG/100ML; MG/100ML; MG/100ML
INJECTION, SOLUTION INTRAVENOUS CONTINUOUS
Status: DISCONTINUED | OUTPATIENT
Start: 2023-05-23 | End: 2023-05-26

## 2023-05-23 RX ORDER — SIMETHICONE 80 MG
80 TABLET,CHEWABLE ORAL 4 TIMES DAILY PRN
Status: DISCONTINUED | OUTPATIENT
Start: 2023-05-23 | End: 2023-05-27

## 2023-05-23 RX ORDER — OXYCODONE HYDROCHLORIDE 5 MG/1
5 TABLET ORAL EVERY 4 HOURS PRN
Status: DISCONTINUED | OUTPATIENT
Start: 2023-05-23 | End: 2023-06-13

## 2023-05-23 RX ORDER — SODIUM CHLORIDE 9 MG/ML
INJECTION, SOLUTION INTRAVENOUS CONTINUOUS
Status: DISCONTINUED | OUTPATIENT
Start: 2023-05-23 | End: 2023-05-26

## 2023-05-23 NOTE — PLAN OF CARE
Brigette Villavicencio is alert/oriented. Received from PACU. Lap sites with surgical dressings intact. Scant drainage present - stable since arrival to unit. Vitals stable. Tolerating clear liquids. IVF infusing. Pain control with scheduled tylenol and dilaudid/oxy prn. Heparin to start tonight for DVT prophylaxis. Voiding via wilkes. Plan of care reviewed with pt and family. Bed low, locked, all safety measures in place.     Problem: Patient Centered Care  Goal: Patient preferences are identified and integrated in the patient's plan of care  Description: Interventions:  - What would you like us to know as we care for you?   - Provide timely, complete, and accurate information to patient/family  - Incorporate patient and family knowledge, values, beliefs, and cultural backgrounds into the planning and delivery of care  - Encourage patient/family to participate in care and decision-making at the level they choose  - Honor patient and family perspectives and choices  Outcome: Progressing     Problem: PAIN - ADULT  Goal: Verbalizes/displays adequate comfort level or patient's stated pain goal  Description: INTERVENTIONS:  - Encourage pt to monitor pain and request assistance  - Assess pain using appropriate pain scale  - Administer analgesics based on type and severity of pain and evaluate response  - Implement non-pharmacological measures as appropriate and evaluate response  - Consider cultural and social influences on pain and pain management  - Manage/alleviate anxiety  - Utilize distraction and/or relaxation techniques  - Monitor for opioid side effects  - Notify MD/LIP if interventions unsuccessful or patient reports new pain  - Anticipate increased pain with activity and pre-medicate as appropriate  Outcome: Progressing     Problem: RISK FOR INFECTION - ADULT  Goal: Absence of fever/infection during anticipated neutropenic period  Description: INTERVENTIONS  - Monitor WBC  - Administer growth factors as ordered  - Implement neutropenic guidelines  Outcome: Progressing     Problem: SAFETY ADULT - FALL  Goal: Free from fall injury  Description: INTERVENTIONS:  - Assess pt frequently for physical needs  - Identify cognitive and physical deficits and behaviors that affect risk of falls.   - [place] fall precautions as indicated by assessment.  - Educate pt/family on patient safety including physical limitations  - Instruct pt to call for assistance with activity based on assessment  - Modify environment to reduce risk of injury  - Provide assistive devices as appropriate  - Consider OT/PT consult to assist with strengthening/mobility  - Encourage toileting schedule  Outcome: Progressing     Problem: DISCHARGE PLANNING  Goal: Discharge to home or other facility with appropriate resources  Description: INTERVENTIONS:  - Identify barriers to discharge w/pt and caregiver  - Include patient/family/discharge partner in discharge planning  - Arrange for needed discharge resources and transportation as appropriate  - Identify discharge learning needs (meds, wound care, etc)  - Arrange for interpreters to assist at discharge as needed  - Consider post-discharge preferences of patient/family/discharge partner  - Complete POLST form as appropriate  - Assess patient's ability to be responsible for managing their own health  - Refer to Case Management Department for coordinating discharge planning if the patient needs post-hospital services based on physician/LIP order or complex needs related to functional status, cognitive ability or social support system  Outcome: Progressing

## 2023-05-23 NOTE — CM/SW NOTE
Per chart review, PT rec is home health. Tentative home health referrals pending in A[place], need to follow up with choice list when avail. Orders/f2f pl.     1211pm     05/23/23 1200   CM/SW Referral Data   Referral Source Social Work (self-referral)   Reason for Referral Discharge planning   Informant Patient   Medical Hx   Does patient have an established PCP? Yes   Patient Info   Patient's Current Mental Status at Time of Assessment Alert;Oriented   Patient's Home Environment Condo/Apt with elevator   Patient lives with Alone   Patient Status Prior to Admission   Independent with ADLs and Mobility Yes   Discharge Needs   Anticipated D/C needs Home health care     SW met with patient at bedside, introduced self and role. Patient alert and oriented at time of visit. Patient lives alone at (address correct on facesheet). Patient independent at baseline. Drives as able, no DME. SW notified of rec for home health care. Patient in agreement. SW discussed home health process, patient expressed understanding. [place] generated choice list provided to patient, patient has chosen [facility]. Patient does not have any further needs or concerns at this time. PLAN: Return home with [facility]. Orders/f2f placed.        TAMELA [place], [place]    [id number]

## 2023-05-23 NOTE — PLAN OF CARE
Justyna Martinez is A&Ox4. Surgical Incisions Clean, dry and intact. Pain managed with Dilaudid and Oxy IR PRN. Vitals stable. Singleton intact and hanging to gravity, Output monitored. Heparin gtt currently running at 16ml/hr, Titrated per protocol. Ambulating hallways. Tolerating clears, advanced to fulls per protocol. Plan of care continued as ordered.        Problem: Patient Centered Care  Goal: Patient preferences are identified and integrated in the patient's plan of care  Description: Interventions:  - What would you like us to know as we care for you?   - Provide timely, complete, and accurate information to patient/family  - Incorporate patient and family knowledge, values, beliefs, and cultural backgrounds into the planning and delivery of care  - Encourage patient/family to participate in care and decision-making at the level they choose  - Honor patient and family perspectives and choices  Outcome: Progressing     Problem: PAIN - ADULT  Goal: Verbalizes/displays adequate comfort level or patient's stated pain goal  Description: INTERVENTIONS:  - Encourage pt to monitor pain and request assistance  - Assess pain using appropriate pain scale  - Administer analgesics based on type and severity of pain and evaluate response  - Implement non-pharmacological measures as appropriate and evaluate response  - Consider cultural and social influences on pain and pain management  - Manage/alleviate anxiety  - Utilize distraction and/or relaxation techniques  - Monitor for opioid side effects  - Notify MD/LIP if interventions unsuccessful or patient reports new pain  - Anticipate increased pain with activity and pre-medicate as appropriate  Outcome: Progressing     Problem: RISK FOR INFECTION - ADULT  Goal: Absence of fever/infection during anticipated neutropenic period  Description: INTERVENTIONS  - Monitor WBC  - Administer growth factors as ordered  - Implement neutropenic guidelines  Outcome: Progressing     Problem: SAFETY ADULT - FALL  Goal: Free from fall injury  Description: INTERVENTIONS:  - Assess pt frequently for physical needs  - Identify cognitive and physical deficits and behaviors that affect risk of falls.   - [place] fall precautions as indicated by assessment.  - Educate pt/family on patient safety including physical limitations  - Instruct pt to call for assistance with activity based on assessment  - Modify environment to reduce risk of injury  - Provide assistive devices as appropriate  - Consider OT/PT consult to assist with strengthening/mobility  - Encourage toileting schedule  Outcome: Progressing     Problem: DISCHARGE PLANNING  Goal: Discharge to home or other facility with appropriate resources  Description: INTERVENTIONS:  - Identify barriers to discharge w/pt and caregiver  - Include patient/family/discharge partner in discharge planning  - Arrange for needed discharge resources and transportation as appropriate  - Identify discharge learning needs (meds, wound care, etc)  - Arrange for interpreters to assist at discharge as needed  - Consider post-discharge preferences of patient/family/discharge partner  - Complete POLST form as appropriate  - Assess patient's ability to be responsible for managing their own health  - Refer to Case Management Department for coordinating discharge planning if the patient needs post-hospital services based on physician/LIP order or complex needs related to functional status, cognitive ability or social support system  Outcome: Progressing     Problem: Patient/Family Goals  Goal: Patient/Family Long Term Goal  Description: Patient's Long Term Goal:     Interventions:  -   - See additional Care Plan goals for specific interventions  Outcome: Progressing  Goal: Patient/Family Short Term Goal  Description: Patient's Short Term Goal:     Interventions:   -   - See additional Care Plan goals for specific interventions  Outcome: Progressing

## 2023-05-23 NOTE — PLAN OF CARE
Roland Stevens Patient Status:  Inpatient    1/15/1955 MRN [id number]   Location [facility] 4W/SW/SE Attending Paulina Anthony MD   Hosp Day # 0 PCP Mark Anthony Cintron DO     Cardiology Nocturnal APN Plan of Care     Page Received: Bedside RN    Patient is post-op. Was NPO and now advanced to clears and able to take PO meds. Patient was ordered IV metoprolol for NPO status and PO for when advanced. Patient received IV at . HR and BP stable.        Assessment/Plan:   - IV metoprolol discontinued  - OK to give 2100 dose of PO metoprolol         Nicky Huntley, [address]  2023  7:20 PM

## 2023-05-23 NOTE — TELEPHONE ENCOUNTER
Martine with [facility] care calling in to confirm that Dr Camden Corona will be following/signing for the pt's home care.       They will also need the following information when calling back  - Pt's last visit date with provider  - Follow up date if scheduled

## 2023-05-23 NOTE — TELEPHONE ENCOUNTER
Dr Brii Boles: will you be signing off [facility] orders?    once we get your ok, we can provide additional info to [facility].

## 2023-05-24 ENCOUNTER — APPOINTMENT (OUTPATIENT)
Dept: GENERAL RADIOLOGY | Facility: HOSPITAL | Age: 68
End: 2023-05-24
Attending: INTERNAL MEDICINE
Payer: MEDICARE

## 2023-05-24 ENCOUNTER — ANESTHESIA (OUTPATIENT)
Dept: SURGERY | Facility: HOSPITAL | Age: 68
End: 2023-05-24
Payer: MEDICARE

## 2023-05-24 ENCOUNTER — APPOINTMENT (OUTPATIENT)
Dept: INTERVENTIONAL RADIOLOGY/VASCULAR | Facility: HOSPITAL | Age: 68
End: 2023-05-24
Attending: INTERNAL MEDICINE
Payer: MEDICARE

## 2023-05-24 ENCOUNTER — TELEPHONE (OUTPATIENT)
Facility: CLINIC | Age: 68
End: 2023-05-24

## 2023-05-24 ENCOUNTER — ANESTHESIA EVENT (OUTPATIENT)
Dept: SURGERY | Facility: HOSPITAL | Age: 68
End: 2023-05-24
Payer: MEDICARE

## 2023-05-24 LAB
ANION GAP SERPL CALC-SCNC: 19 MMOL/L (ref 0–18)
ANION GAP SERPL CALC-SCNC: 21 MMOL/L (ref 0–18)
APTT PPP: 47.3 SECONDS (ref 23.3–35.6)
ATRIAL RATE: 104 BPM
BASE EXCESS BLD CALC-SCNC: -2.2 MMOL/L (ref ?–2)
BASE EXCESS BLD CALC-SCNC: -25.1 MMOL/L (ref ?–2)
BASE EXCESS BLD CALC-SCNC: -25.8 MMOL/L (ref ?–2)
BASOPHILS # BLD AUTO: 0.01 X10(3) UL (ref 0–0.2)
BASOPHILS NFR BLD AUTO: 0.1 %
BUN BLD-MCNC: 27 MG/DL (ref 7–18)
BUN BLD-MCNC: 32 MG/DL (ref 7–18)
BUN/CREAT SERPL: 7.9 (ref 10–20)
BUN/CREAT SERPL: 8 (ref 10–20)
CA-I BLD-SCNC: 1.07 MMOL/L (ref 0.95–1.32)
CA-I BLD-SCNC: 1.12 MMOL/L (ref 0.95–1.32)
CA-I BLD-SCNC: 1.12 MMOL/L (ref 0.95–1.32)
CALCIUM BLD-MCNC: 7.5 MG/DL (ref 8.5–10.1)
CALCIUM BLD-MCNC: 7.7 MG/DL (ref 8.5–10.1)
CHLORIDE SERPL-SCNC: 104 MMOL/L (ref 98–112)
CHLORIDE SERPL-SCNC: 109 MMOL/L (ref 98–112)
CO2 SERPL-SCNC: 10 MMOL/L (ref 21–32)
CO2 SERPL-SCNC: 13 MMOL/L (ref 21–32)
COHGB MFR BLD: 1.6 % (ref 0–3)
COHGB MFR BLD: 1.8 % (ref 0–3)
COHGB MFR BLD: 3.3 % (ref 0–3)
CREAT BLD-MCNC: 3.37 MG/DL
CREAT BLD-MCNC: 4.06 MG/DL
DEPRECATED RDW RBC AUTO: 47.2 FL (ref 35.1–46.3)
DEPRECATED RDW RBC AUTO: 49.8 FL (ref 35.1–46.3)
DEPRECATED RDW RBC AUTO: 55.6 FL (ref 35.1–46.3)
EOSINOPHIL # BLD AUTO: 0 X10(3) UL (ref 0–0.7)
EOSINOPHIL NFR BLD AUTO: 0 %
ERYTHROCYTE [DISTWIDTH] IN BLOOD BY AUTOMATED COUNT: 14.7 % (ref 11–15)
ERYTHROCYTE [DISTWIDTH] IN BLOOD BY AUTOMATED COUNT: 14.9 % (ref 11–15)
ERYTHROCYTE [DISTWIDTH] IN BLOOD BY AUTOMATED COUNT: 15.5 % (ref 11–15)
GFR SERPLBLD BASED ON 1.73 SQ M-ARVRAT: 15 ML/MIN/1.73M2 (ref 60–?)
GFR SERPLBLD BASED ON 1.73 SQ M-ARVRAT: 19 ML/MIN/1.73M2 (ref 60–?)
GLUCOSE BLD-MCNC: 114 MG/DL (ref 70–99)
GLUCOSE BLD-MCNC: 199 MG/DL (ref 70–99)
HCO3 BLDA-SCNC: 23.2 MEQ/L (ref 21–27)
HCO3 BLDA-SCNC: 4.8 MEQ/L (ref 21–27)
HCO3 BLDA-SCNC: 5.4 MEQ/L (ref 21–27)
HCT VFR BLD AUTO: 25.1 %
HCT VFR BLD AUTO: 25.1 %
HCT VFR BLD AUTO: 25.7 %
HCT VFR BLD AUTO: 27.4 %
HCT VFR BLD AUTO: 27.9 %
HGB BLD-MCNC: 7.8 G/DL
HGB BLD-MCNC: 8.2 G/DL
HGB BLD-MCNC: 8.5 G/DL
HGB BLD-MCNC: 8.7 G/DL
HGB BLD-MCNC: 9 G/DL
HGB BLD-MCNC: 9 G/DL
IMM GRANULOCYTES # BLD AUTO: 0.12 X10(3) UL (ref 0–1)
IMM GRANULOCYTES NFR BLD: 1.1 %
INR BLD: 2.05 (ref 0.85–1.16)
INR BLD: 2.08 (ref 0.85–1.16)
INR BLD: 2.9 (ref 0.85–1.16)
INR BLD: 3.37 (ref 0.85–1.16)
LACTATE BLD-SCNC: 15.9 MMOL/L (ref 0.5–2)
LACTATE BLD-SCNC: 16.1 MMOL/L (ref 0.5–2)
LACTATE BLD-SCNC: 16.8 MMOL/L (ref 0.5–2)
LACTATE BLD-SCNC: 5 MMOL/L (ref 0.5–2)
LACTATE SERPL-SCNC: 16.3 MMOL/L (ref 0.4–2)
LACTATE SERPL-SCNC: 5.6 MMOL/L (ref 0.4–2)
LYMPHOCYTES # BLD AUTO: 1.01 X10(3) UL (ref 1–4)
LYMPHOCYTES NFR BLD AUTO: 8.9 %
MAGNESIUM SERPL-MCNC: 1.8 MG/DL (ref 1.6–2.6)
MAGNESIUM SERPL-MCNC: 2.2 MG/DL (ref 1.6–2.6)
MCH RBC QN AUTO: 30.9 PG (ref 26–34)
MCH RBC QN AUTO: 31 PG (ref 26–34)
MCH RBC QN AUTO: 31.3 PG (ref 26–34)
MCHC RBC AUTO-ENTMCNC: 31.2 G/DL (ref 31–37)
MCHC RBC AUTO-ENTMCNC: 33.9 G/DL (ref 31–37)
MCHC RBC AUTO-ENTMCNC: 35 G/DL (ref 31–37)
MCV RBC AUTO: 88.6 FL
MCV RBC AUTO: 92.3 FL
MCV RBC AUTO: 98.9 FL
METHGB MFR BLD: 0.5 % SAT (ref 0.4–1.5)
METHGB MFR BLD: 0.5 % SAT (ref 0.4–1.5)
METHGB MFR BLD: 1.3 % SAT (ref 0.4–1.5)
MODIFIED ALLEN TEST: POSITIVE
MONOCYTES # BLD AUTO: 0.59 X10(3) UL (ref 0.1–1)
MONOCYTES NFR BLD AUTO: 5.2 %
NEUTROPHILS # BLD AUTO: 9.63 X10 (3) UL (ref 1.5–7.7)
NEUTROPHILS # BLD AUTO: 9.63 X10(3) UL (ref 1.5–7.7)
NEUTROPHILS NFR BLD AUTO: 84.7 %
O2 CT BLD-SCNC: 11.6 VOL% (ref 15–23)
O2 CT BLD-SCNC: 11.8 VOL% (ref 15–23)
O2 CT BLD-SCNC: 11.8 VOL% (ref 15–23)
O2 CT BLD-SCNC: 12.3 VOL% (ref 15–23)
O2/TOTAL GAS SETTING VFR VENT: 100 %
O2/TOTAL GAS SETTING VFR VENT: 40 %
OSMOLALITY SERPL CALC.SUM OF ELEC: 292 MOSM/KG (ref 275–295)
OSMOLALITY SERPL CALC.SUM OF ELEC: 298 MOSM/KG (ref 275–295)
OXYGEN LITERS/MINUTE: 2 L/MIN
P AXIS: 22 DEGREES
P-R INTERVAL: 212 MS
PCO2 BLDA: 23 MM HG (ref 35–45)
PCO2 BLDA: 28 MM HG (ref 35–45)
PCO2 BLDA: 36 MM HG (ref 35–45)
PCO2 BLDA: 46 MM HG (ref 35–45)
PEEP SETTING VENT: 5 CM H2O
PEEP SETTING VENT: 5 CM H2O
PH BLDA: 6.9 [PH] (ref 7.35–7.45)
PH BLDA: 6.92 [PH] (ref 7.35–7.45)
PH BLDA: 6.93 [PH] (ref 7.35–7.45)
PH BLDA: 7.4 [PH] (ref 7.35–7.45)
PLATELET # BLD AUTO: 172 10(3)UL (ref 150–450)
PLATELET # BLD AUTO: 173 10(3)UL (ref 150–450)
PLATELET # BLD AUTO: 45 10(3)UL (ref 150–450)
PLATELET # BLD AUTO: 59 10(3)UL (ref 150–450)
PO2 BLDA: 110 MM HG (ref 80–100)
PO2 BLDA: 205 MM HG (ref 80–100)
PO2 BLDA: 319 MM HG (ref 80–100)
PO2 BLDA: 78 MM HG (ref 80–100)
POTASSIUM BLD-SCNC: 4.7 MMOL/L (ref 3.6–5.1)
POTASSIUM BLD-SCNC: 5 MMOL/L (ref 3.6–5.1)
POTASSIUM BLD-SCNC: 5.2 MMOL/L (ref 3.6–5.1)
POTASSIUM SERPL-SCNC: 4.5 MMOL/L (ref 3.5–5.1)
POTASSIUM SERPL-SCNC: 5.2 MMOL/L (ref 3.5–5.1)
PROTHROMBIN TIME: 22.7 SECONDS (ref 11.6–14.8)
PROTHROMBIN TIME: 23 SECONDS (ref 11.6–14.8)
PROTHROMBIN TIME: 29.7 SECONDS (ref 11.6–14.8)
PROTHROMBIN TIME: 33.4 SECONDS (ref 11.6–14.8)
PUNCTURE CHARGE: NO
PUNCTURE CHARGE: YES
Q-T INTERVAL: 334 MS
QRS DURATION: 84 MS
QTC CALCULATION (BEZET): 439 MS
R AXIS: -8 DEGREES
RBC # BLD AUTO: 2.72 X10(6)UL
RBC # BLD AUTO: 2.82 X10(6)UL
RBC # BLD AUTO: 2.9 X10(6)UL
RESP RATE: 18 BPM
RESP RATE: 18 BPM
SAO2 % BLDA: 98 % (ref 94–100)
SAO2 % BLDA: >99 % (ref 94–100)
SODIUM BLD-SCNC: 134 MMOL/L (ref 135–145)
SODIUM BLD-SCNC: 134 MMOL/L (ref 135–145)
SODIUM BLD-SCNC: 135 MMOL/L (ref 135–145)
SODIUM SERPL-SCNC: 138 MMOL/L (ref 136–145)
SODIUM SERPL-SCNC: 138 MMOL/L (ref 136–145)
SPECIMEN VOL 24H UR: 550 ML
SPECIMEN VOL 24H UR: 550 ML
T AXIS: 52 DEGREES
TRIGL SERPL-MCNC: 134 MG/DL (ref 30–149)
VENTRICULAR RATE: 104 BPM
WBC # BLD AUTO: 11.4 X10(3) UL (ref 4–11)
WBC # BLD AUTO: 12 X10(3) UL (ref 4–11)
WBC # BLD AUTO: 16.8 X10(3) UL (ref 4–11)

## 2023-05-24 PROCEDURE — 5A12012 PERFORMANCE OF CARDIAC OUTPUT, SINGLE, MANUAL: ICD-10-PCS | Performed by: HOSPITALIST

## 2023-05-24 PROCEDURE — 99223 1ST HOSP IP/OBS HIGH 75: CPT | Performed by: INTERNAL MEDICINE

## 2023-05-24 PROCEDURE — 99291 CRITICAL CARE FIRST HOUR: CPT | Performed by: HOSPITALIST

## 2023-05-24 PROCEDURE — 0BH17EZ INSERTION OF ENDOTRACHEAL AIRWAY INTO TRACHEA, VIA NATURAL OR ARTIFICIAL OPENING: ICD-10-PCS | Performed by: HOSPITALIST

## 2023-05-24 PROCEDURE — 71045 X-RAY EXAM CHEST 1 VIEW: CPT | Performed by: INTERNAL MEDICINE

## 2023-05-24 PROCEDURE — 5A1D90Z PERFORMANCE OF URINARY FILTRATION, CONTINUOUS, GREATER THAN 18 HOURS PER DAY: ICD-10-PCS | Performed by: INTERNAL MEDICINE

## 2023-05-24 PROCEDURE — 3E033XZ INTRODUCTION OF VASOPRESSOR INTO PERIPHERAL VEIN, PERCUTANEOUS APPROACH: ICD-10-PCS | Performed by: HOSPITALIST

## 2023-05-24 PROCEDURE — 5A1945Z RESPIRATORY VENTILATION, 24-96 CONSECUTIVE HOURS: ICD-10-PCS | Performed by: HOSPITALIST

## 2023-05-24 PROCEDURE — 30233K1 TRANSFUSION OF NONAUTOLOGOUS FROZEN PLASMA INTO PERIPHERAL VEIN, PERCUTANEOUS APPROACH: ICD-10-PCS | Performed by: SURGERY

## 2023-05-24 PROCEDURE — 0DBU0ZZ EXCISION OF OMENTUM, OPEN APPROACH: ICD-10-PCS | Performed by: SURGERY

## 2023-05-24 PROCEDURE — 99291 CRITICAL CARE FIRST HOUR: CPT | Performed by: INTERNAL MEDICINE

## 2023-05-24 PROCEDURE — 31500 INSERT EMERGENCY AIRWAY: CPT | Performed by: HOSPITALIST

## 2023-05-24 PROCEDURE — 0W3G0ZZ CONTROL BLEEDING IN PERITONEAL CAVITY, OPEN APPROACH: ICD-10-PCS | Performed by: SURGERY

## 2023-05-24 PROCEDURE — 36556 INSERT NON-TUNNEL CV CATH: CPT | Performed by: HOSPITALIST

## 2023-05-24 PROCEDURE — 02HV33Z INSERTION OF INFUSION DEVICE INTO SUPERIOR VENA CAVA, PERCUTANEOUS APPROACH: ICD-10-PCS | Performed by: RADIOLOGY

## 2023-05-24 PROCEDURE — 30233R1 TRANSFUSION OF NONAUTOLOGOUS PLATELETS INTO PERIPHERAL VEIN, PERCUTANEOUS APPROACH: ICD-10-PCS | Performed by: ANESTHESIOLOGY

## 2023-05-24 RX ORDER — POLYETHYLENE GLYCOL 3350 17 G/17G
17 POWDER, FOR SOLUTION ORAL DAILY PRN
Status: DISCONTINUED | OUTPATIENT
Start: 2023-05-24 | End: 2023-05-27

## 2023-05-24 RX ORDER — ETOMIDATE 2 MG/ML
INJECTION INTRAVENOUS
Status: COMPLETED
Start: 2023-05-24 | End: 2023-05-24

## 2023-05-24 RX ORDER — HYDROMORPHONE HYDROCHLORIDE 1 MG/ML
0.6 INJECTION, SOLUTION INTRAMUSCULAR; INTRAVENOUS; SUBCUTANEOUS EVERY 5 MIN PRN
Status: ACTIVE | OUTPATIENT
Start: 2023-05-24 | End: 2023-05-24

## 2023-05-24 RX ORDER — HEPARIN SODIUM 1000 [USP'U]/ML
INJECTION, SOLUTION INTRAVENOUS; SUBCUTANEOUS
Status: COMPLETED
Start: 2023-05-24 | End: 2023-05-24

## 2023-05-24 RX ORDER — SODIUM CHLORIDE, SODIUM LACTATE, POTASSIUM CHLORIDE, CALCIUM CHLORIDE 600; 310; 30; 20 MG/100ML; MG/100ML; MG/100ML; MG/100ML
INJECTION, SOLUTION INTRAVENOUS CONTINUOUS
Status: DISCONTINUED | OUTPATIENT
Start: 2023-05-24 | End: 2023-05-26

## 2023-05-24 RX ORDER — SODIUM CHLORIDE, SODIUM LACTATE, POTASSIUM CHLORIDE, CALCIUM CHLORIDE 600; 310; 30; 20 MG/100ML; MG/100ML; MG/100ML; MG/100ML
INJECTION, SOLUTION INTRAVENOUS CONTINUOUS PRN
Status: DISCONTINUED | OUTPATIENT
Start: 2023-05-24 | End: 2023-05-24 | Stop reason: SURG

## 2023-05-24 RX ORDER — HYDROMORPHONE HYDROCHLORIDE 1 MG/ML
0.2 INJECTION, SOLUTION INTRAMUSCULAR; INTRAVENOUS; SUBCUTANEOUS EVERY 5 MIN PRN
Status: ACTIVE | OUTPATIENT
Start: 2023-05-24 | End: 2023-05-24

## 2023-05-24 RX ORDER — SODIUM CHLORIDE 9 MG/ML
INJECTION, SOLUTION INTRAVENOUS ONCE
Status: DISCONTINUED | OUTPATIENT
Start: 2023-05-24 | End: 2023-05-26

## 2023-05-24 RX ORDER — ACETAMINOPHEN 160 MG/5ML
650 SOLUTION ORAL EVERY 4 HOURS PRN
Status: DISCONTINUED | OUTPATIENT
Start: 2023-05-24 | End: 2023-05-25

## 2023-05-24 RX ORDER — ACETAMINOPHEN 10 MG/ML
1000 INJECTION, SOLUTION INTRAVENOUS EVERY 6 HOURS PRN
Status: DISCONTINUED | OUTPATIENT
Start: 2023-05-24 | End: 2023-05-25

## 2023-05-24 RX ORDER — NALOXONE HYDROCHLORIDE 0.4 MG/ML
80 INJECTION, SOLUTION INTRAMUSCULAR; INTRAVENOUS; SUBCUTANEOUS AS NEEDED
Status: ACTIVE | OUTPATIENT
Start: 2023-05-24 | End: 2023-05-24

## 2023-05-24 RX ORDER — LIDOCAINE HYDROCHLORIDE 20 MG/ML
INJECTION, SOLUTION EPIDURAL; INFILTRATION; INTRACAUDAL; PERINEURAL
Status: COMPLETED
Start: 2023-05-24 | End: 2023-05-24

## 2023-05-24 RX ORDER — ROCURONIUM BROMIDE 10 MG/ML
INJECTION, SOLUTION INTRAVENOUS AS NEEDED
Status: DISCONTINUED | OUTPATIENT
Start: 2023-05-24 | End: 2023-05-24 | Stop reason: SURG

## 2023-05-24 RX ORDER — SODIUM BICARBONATE 150 MEQ/1,000 ML IN DEXTROSE 5 % INTRAVENOUS
150 SOLUTION CONTINUOUS
Status: DISCONTINUED | OUTPATIENT
Start: 2023-05-24 | End: 2023-05-25

## 2023-05-24 RX ORDER — BISACODYL 10 MG
10 SUPPOSITORY, RECTAL RECTAL
Status: DISCONTINUED | OUTPATIENT
Start: 2023-05-24 | End: 2023-05-27

## 2023-05-24 RX ORDER — PHENYLEPHRINE HCL IN 0.9% NACL 1 MG/10 ML
VIAL (ML) INTRAVENOUS
Status: DISPENSED
Start: 2023-05-24 | End: 2023-05-24

## 2023-05-24 RX ORDER — HYDROMORPHONE HYDROCHLORIDE 1 MG/ML
0.4 INJECTION, SOLUTION INTRAMUSCULAR; INTRAVENOUS; SUBCUTANEOUS EVERY 5 MIN PRN
Status: ACTIVE | OUTPATIENT
Start: 2023-05-24 | End: 2023-05-24

## 2023-05-24 RX ORDER — ACETAMINOPHEN 650 MG/1
650 SUPPOSITORY RECTAL EVERY 4 HOURS PRN
Status: DISCONTINUED | OUTPATIENT
Start: 2023-05-24 | End: 2023-05-25

## 2023-05-24 RX ORDER — HEPARIN SODIUM 1000 [USP'U]/ML
1.5 INJECTION, SOLUTION INTRAVENOUS; SUBCUTANEOUS AS NEEDED
Status: DISCONTINUED | OUTPATIENT
Start: 2023-05-24 | End: 2023-05-30

## 2023-05-24 RX ORDER — ONDANSETRON 2 MG/ML
4 INJECTION INTRAMUSCULAR; INTRAVENOUS ONCE AS NEEDED
Status: ACTIVE | OUTPATIENT
Start: 2023-05-24 | End: 2023-05-24

## 2023-05-24 RX ORDER — MORPHINE SULFATE 4 MG/ML
4 INJECTION, SOLUTION INTRAMUSCULAR; INTRAVENOUS EVERY 10 MIN PRN
Status: ACTIVE | OUTPATIENT
Start: 2023-05-24 | End: 2023-05-24

## 2023-05-24 RX ORDER — PROCHLORPERAZINE EDISYLATE 5 MG/ML
5 INJECTION INTRAMUSCULAR; INTRAVENOUS ONCE AS NEEDED
Status: ACTIVE | OUTPATIENT
Start: 2023-05-24 | End: 2023-05-24

## 2023-05-24 RX ORDER — PHENYLEPHRINE HCL IN 0.9% NACL 1 MG/10 ML
40 VIAL (ML) INTRAVENOUS ONCE
Status: DISCONTINUED | OUTPATIENT
Start: 2023-05-24 | End: 2023-05-26

## 2023-05-24 RX ORDER — ACETAMINOPHEN 325 MG/1
650 TABLET ORAL EVERY 4 HOURS PRN
Status: DISCONTINUED | OUTPATIENT
Start: 2023-05-24 | End: 2023-05-25

## 2023-05-24 RX ORDER — MORPHINE SULFATE 4 MG/ML
6 INJECTION, SOLUTION INTRAMUSCULAR; INTRAVENOUS EVERY 10 MIN PRN
Status: ACTIVE | OUTPATIENT
Start: 2023-05-24 | End: 2023-05-24

## 2023-05-24 RX ORDER — CHLORHEXIDINE GLUCONATE 0.12 MG/ML
15 RINSE ORAL
Status: DISCONTINUED | OUTPATIENT
Start: 2023-05-24 | End: 2023-05-26

## 2023-05-24 RX ORDER — MORPHINE SULFATE 2 MG/ML
2 INJECTION, SOLUTION INTRAMUSCULAR; INTRAVENOUS EVERY 10 MIN PRN
Status: ACTIVE | OUTPATIENT
Start: 2023-05-24 | End: 2023-05-24

## 2023-05-24 RX ORDER — SENNOSIDES 8.8 MG/5ML
10 LIQUID ORAL NIGHTLY PRN
Status: DISCONTINUED | OUTPATIENT
Start: 2023-05-24 | End: 2023-05-27

## 2023-05-24 RX ORDER — HALOPERIDOL 5 MG/ML
0.25 INJECTION INTRAMUSCULAR ONCE AS NEEDED
Status: ACTIVE | OUTPATIENT
Start: 2023-05-24 | End: 2023-05-24

## 2023-05-24 RX ORDER — PHENYLEPHRINE HCL 10 MG/ML
VIAL (ML) INJECTION AS NEEDED
Status: DISCONTINUED | OUTPATIENT
Start: 2023-05-24 | End: 2023-05-24 | Stop reason: SURG

## 2023-05-24 RX ORDER — LIDOCAINE HYDROCHLORIDE 40 MG/ML
SOLUTION TOPICAL
Status: DISPENSED
Start: 2023-05-24 | End: 2023-05-24

## 2023-05-24 RX ADMIN — SODIUM CHLORIDE: 9 INJECTION, SOLUTION INTRAVENOUS at 09:01:00

## 2023-05-24 RX ADMIN — PHENYLEPHRINE HCL 100 MCG: 10 MG/ML VIAL (ML) INJECTION at 08:11:00

## 2023-05-24 RX ADMIN — SODIUM CHLORIDE, SODIUM LACTATE, POTASSIUM CHLORIDE, CALCIUM CHLORIDE: 600; 310; 30; 20 INJECTION, SOLUTION INTRAVENOUS at 07:22:00

## 2023-05-24 RX ADMIN — SODIUM CHLORIDE, SODIUM LACTATE, POTASSIUM CHLORIDE, CALCIUM CHLORIDE: 600; 310; 30; 20 INJECTION, SOLUTION INTRAVENOUS at 09:01:00

## 2023-05-24 RX ADMIN — SODIUM CHLORIDE: 9 INJECTION, SOLUTION INTRAVENOUS at 07:22:00

## 2023-05-24 RX ADMIN — PHENYLEPHRINE HCL 100 MCG: 10 MG/ML VIAL (ML) INJECTION at 07:41:00

## 2023-05-24 RX ADMIN — PHENYLEPHRINE HCL 100 MCG: 10 MG/ML VIAL (ML) INJECTION at 07:45:00

## 2023-05-24 RX ADMIN — PHENYLEPHRINE HCL 100 MCG: 10 MG/ML VIAL (ML) INJECTION at 07:39:00

## 2023-05-24 RX ADMIN — ROCURONIUM BROMIDE 30 MG: 10 INJECTION, SOLUTION INTRAVENOUS at 07:28:00

## 2023-05-24 RX ADMIN — SODIUM CHLORIDE: 9 INJECTION, SOLUTION INTRAVENOUS at 08:20:00

## 2023-05-24 NOTE — SIGNIFICANT EVENT
Update:     Paged by DERICK Burch became was c/o being dizzy and found to be hypotensive again BP 70/50 . Given 2nd L IVF bolus. I called Dr Hamlet Glynn to discuss and ordered stat labs H/H, coags troponin, lactic acid, EKG, stat CT Chest (PE protocol), CT A/P with contrast     Transfer to ICU   D/w critical care Dr Padmini Vallejo who will see patient shortly. Will give one dose of IV zosyn to cover for possible sepsis     Patient critically ill. Transfer to ICU. Rpt BP 93/58.      Dominique Cedeño MD

## 2023-05-24 NOTE — BRIEF OP NOTE
Pre-Operative Diagnosis: GI bleed [K92.2]     Post-Operative Diagnosis: GI bleed [K92.2]      Procedure Performed:   EXPLORATORY LAPAROTOMY, wash out, control of abdominal bleeding, omentectomy    Surgeon(s) and Role:     * Beltran Ferrell MD - Primary    Assistant(s):  Surgical Assistant.: Sukhwinder Rosamond     Surgical Findings: generalized oozing, no active bleeding     Specimen: omentum     Estimated Blood Loss: No data recorded    Dictation Number:  NA    Eugene Conrad MD  5/24/2023  9:55 AM

## 2023-05-24 NOTE — PROCEDURES
On call Nocturnist 05/24/23    Patient profoundly acidotic decision made to intubate patient. 8.0 ET tube used intubated under direct visualization 24 cm at the lip bilateral breath sounds with CO2 detector indicating color change. Patient in need of pressor support fluids and antibiotics with poor IV access. Urgent central venous access required. Right femoral artery palpated, vein cannulated at first attempt, guidewire passed, dilated, triple-lumen catheter passed over guidewire. 3 ports flushed anchored to site Tegaderm applied.

## 2023-05-24 NOTE — PROGRESS NOTES
Pulmonary critical care addendum    I spoke at length with the surrogate decision maker Silke White and her sister to give an update. The patient has received additional bicarbonate, additional pressors, additional blood products, vitamin K. I also had discussed at length with the head of our blood bank Dr. Constance Cortez regarding the O positive blood. Dialysis catheter was placed and CRRT will be initiated. Discussed further with surgery. Pointed inquiry was made regarding possible [facility] to [facility] and I believe the patient is too unstable at this moment.     Srinivasan Davis MD

## 2023-05-24 NOTE — PLAN OF CARE
Patient w/ bilateral soft wrist restraints for safety. Peripheral neurovascular assessment: WDL. Family/ Friends at bedside present during rounds.    Problem: Safety Risk - Non-Violent Restraints  Goal: Patient will remain free from self-harm  Description: INTERVENTIONS:  - Apply the least restrictive restraint to prevent harm  - Notify patient and family of reasons restraints applied  - Assess for any contributing factors to confusion (electrolyte disturbances, delirium, medications)  - Discontinue any unnecessary medical devices as soon as possible  - Assess the patient's physical comfort, circulation, skin condition, hydration, nutrition and elimination needs   - Reorient and redirection as needed  - Assess for the need to continue restraints    Outcome: Progressing

## 2023-05-24 NOTE — PROGRESS NOTES
Patient received this am alert and oriented X4, vitals stable. Continued with IVFs and heparin drip. Tolerating diet. Dressings to abdomen intact, 1 site noted to be saturated and pooling blood within the tegaderm. That dressing changed, lower abdomen also noted to be purple. RODRIGO Boyd notified. Heparin dripped dc'd and lovenox given per order. Informed by OT that patient bp had dropped to 80s/60s, upon rechecking the BP it was found to be 100/70. Upon reassessing patient he was found to be hypotensive again with c/o dizziness, Dr Tita Mcnamara notified, bolus given with a slight response, another bolus ordered. Deb and Dr Donna Menon both came back to bedside to reexamine patient. Patient unable to void after having wilkes removed. Bladder scan showed 39-64 ml's. Patient placed in bed and made NPO. Continued with IVFs. Then became hypotensive and diaphoretic. STAT orders obtained. Orders to transfuse 2 units of blood with al least 1 unit being transfused before sending patient off the unit was given. Per blood bank they only had O+ bllod and patient is O- and doctor would have to ok the transfusion. Dr Donna Menon approved the blood but the patient was not comfortable with that. Dr Donna Menon stated he would come ini to speak with the patient. After informing blood bank that the blood would be sent back, they informed me that they did have o- in the house and to reorder the prepare blood and they would send the O-. Patient sent to his STAT CT scans and then to CCU rm 227. report called to [facility]. Belongings to be taken to patients new room by PCT.

## 2023-05-24 NOTE — CODE DOCUMENTATION
Dr Janine Bishop here, requested pt be intubated for extreme acidosis, and pt will go back to OR for exploration, Dr Rod Abdalla here to intubate, just plrior to intubation, pts bp drops to 60s, levo started at 10 mcg and ivf open, given etomidate to intubate ett #8 taped at 24cm lip. Pt hr bradyed down to 35s JR?, no pulse,  CPR begun immediately with RT doing compressions, code bluedean called, team responded quickly and epie, sod bicarb and calcium chlorde 1 gm ivp given, ROSc achieved in a few min, Dr Gayle Aguirre here ekg done, afib rvr, w/o evidence of ischemia, Dr Rod Abdalla inserted rt fem tlc, Dr Radha Villaseñor and Dr Radha Cota also present to help manage pt.   Dr Radha Cota spoke with friend Zeina Holcomb, pt to OR with surgical team.

## 2023-05-24 NOTE — SIGNIFICANT EVENT
On call Harikaurnist 05/24/23    Called to evaluate patient in room 227. On my arrival patient extremely lethargic, blood pressure 60/30, breathing at 45/minute. Status post right hemicolectomy 5/22, received blood and fluids after drop in hemoglobin and CT scan suggestive of hemoperitoneum. Decision made to intubate patient, started on Levophed. Patient becoming progressively hypotensive and bradycardic, eventually PEA. CODE BLUE was called patient given 1 amp of epi along with sodium bicarb and calcium chloride. ROSC achieved within 3 minutes. Critical care, surgery and cardiology present at bedside. 35 minutes of critical care time spent with patient occluding coordinating care with ancillary staff and consultants. Primary team notified.

## 2023-05-24 NOTE — PROGRESS NOTES
Patient was identified and prepped in sterile fashion, time out completed. Ultrasound was utilized for visualization, 2% lidocaine 5 ml was administered by subcutaneous infiltration by Dr. Dr. Hari Clinton. Temporary dialysis catheter inserted, verification needs to be obtained by x ray. Patient was monitored throughout the procedure and tolerated procedure well, report given to [facility]. Patient stable at this time.

## 2023-05-24 NOTE — ANESTHESIA POSTPROCEDURE EVALUATION
Patient: James Trimble    Procedure Summary     Date: 05/24/23 Room / Location: [facility] MAIN OR 01 / [address] [facility] [place]    Anesthesia Start: 6341 Anesthesia Stop: 0291    Procedure: EXPLORATORY LAPAROTOMY, wash out, control of abdominal bleeding, omentectomy (Abdomen) Diagnosis:       GI bleed      (GI bleed [K92.2])    Surgeons: Summer Hansen MD Anesthesiologist: Jennifer Rao MD    Anesthesia Type: general ASA Status: 4 - Emergent          Anesthesia Type: general    Vitals Value Taken Time   /51 05/24/23 0930   Temp 97.1 05/24/23 0930   Pulse 101 05/24/23 0930   Resp 25 05/24/23 0930   SpO2 100 05/24/23 0930       [facility] AN Post Evaluation:   Patient Evaluated in ICU  Patient Participation: complete - patient participated  Level of Consciousness: Post-procedure mental status: sedated. Pain Management: adequate  Airway Patency:patent  Dental exam unchanged from preop  Yes    Cardiovascular Status: acceptable  Respiratory Status: acceptable and intubated  Postoperative Hydration acceptable  Comments: Patient transported to ICU with ambu ventilation. Returned to ventilator by respiratory therapy upon arrival to ICU.        Prabha Jones CRNA  5/24/2023 9:30 AM

## 2023-05-24 NOTE — PLAN OF CARE
Patient sedated on the ventilator. Propofol titrated as needed. Levo and vaso at maximum dosage, rae added and titrated as needed. Bicarb gtt. Vit K given. PRN dilaudid given per MD Egan. Minimal urine output in wilkes catheter. CRRT started at [id number] by dialysis RN. Family at bedside, updated on patient status and plan of care. Patient resting in bed, frequent nursing rounds made. Restraints remain in place for patient safety.      Problem: Patient Centered Care  Goal: Patient preferences are identified and integrated in the patient's plan of care  Description: Interventions:  - What would you like us to know as we care for you?   - Provide timely, complete, and accurate information to patient/family  - Incorporate patient and family knowledge, values, beliefs, and cultural backgrounds into the planning and delivery of care  - Encourage patient/family to participate in care and decision-making at the level they choose  - Honor patient and family perspectives and choices  Outcome: Not Progressing     Problem: PAIN - ADULT  Goal: Verbalizes/displays adequate comfort level or patient's stated pain goal  Description: INTERVENTIONS:  - Encourage pt to monitor pain and request assistance  - Assess pain using appropriate pain scale  - Administer analgesics based on type and severity of pain and evaluate response  - Implement non-pharmacological measures as appropriate and evaluate response  - Consider cultural and social influences on pain and pain management  - Manage/alleviate anxiety  - Utilize distraction and/or relaxation techniques  - Monitor for opioid side effects  - Notify MD/LIP if interventions unsuccessful or patient reports new pain  - Anticipate increased pain with activity and pre-medicate as appropriate  Outcome: Not Progressing     Problem: RISK FOR INFECTION - ADULT  Goal: Absence of fever/infection during anticipated neutropenic period  Description: INTERVENTIONS  - Monitor WBC  - Administer growth factors as ordered  - Implement neutropenic guidelines  Outcome: Not Progressing     Problem: SAFETY ADULT - FALL  Goal: Free from fall injury  Description: INTERVENTIONS:  - Assess pt frequently for physical needs  - Identify cognitive and physical deficits and behaviors that affect risk of falls.   - [place] fall precautions as indicated by assessment.  - Educate pt/family on patient safety including physical limitations  - Instruct pt to call for assistance with activity based on assessment  - Modify environment to reduce risk of injury  - Provide assistive devices as appropriate  - Consider OT/PT consult to assist with strengthening/mobility  - Encourage toileting schedule  Outcome: Not Progressing     Problem: DISCHARGE PLANNING  Goal: Discharge to home or other facility with appropriate resources  Description: INTERVENTIONS:  - Identify barriers to discharge w/pt and caregiver  - Include patient/family/discharge partner in discharge planning  - Arrange for needed discharge resources and transportation as appropriate  - Identify discharge learning needs (meds, wound care, etc)  - Arrange for interpreters to assist at discharge as needed  - Consider post-discharge preferences of patient/family/discharge partner  - Complete POLST form as appropriate  - Assess patient's ability to be responsible for managing their own health  - Refer to Case Management Department for coordinating discharge planning if the patient needs post-hospital services based on physician/LIP order or complex needs related to functional status, cognitive ability or social support system  Outcome: Not Progressing     Problem: Patient/Family Goals  Goal: Patient/Family Long Term Goal  Description: Patient's Long Term Goal: to go home    Interventions:  - interdisciplinary care  - See additional Care Plan goals for specific interventions  Outcome: Not Progressing  Goal: Patient/Family Short Term Goal  Description: Patient's Short Term Goal: manage bleeding and lactic    Interventions:   - blood products, abx, iv fluids  - See additional Care Plan goals for specific interventions  Outcome: Not Progressing     Problem: Safety Risk - Non-Violent Restraints  Goal: Patient will remain free from self-harm  Description: INTERVENTIONS:  - Apply the least restrictive restraint to prevent harm  - Notify patient and family of reasons restraints applied  - Assess for any contributing factors to confusion (electrolyte disturbances, delirium, medications)  - Discontinue any unnecessary medical devices as soon as possible  - Assess the patient's physical comfort, circulation, skin condition, hydration, nutrition and elimination needs   - Reorient and redirection as needed  - Assess for the need to continue restraints  Outcome: Not Progressing

## 2023-05-24 NOTE — PHYSICAL THERAPY NOTE
Patient in ICU intubated/restrained following Exploratory laparotomy, evacuation of intra-abdominal hematoma, washout. Will place on hold, please place new orders when pt becomes appropriate to resume therapy. Thank you.        Bethel Olvera, PT

## 2023-05-24 NOTE — ANESTHESIA PROCEDURE NOTES
Arterial Line    Date/Time: 5/24/2023 7:25 AM    Performed by: Mickey Huff CRNA  Authorized by: Tamera Jon MD    General Information and Staff    Procedure Start:  5/24/2023 7:25 AM  Procedure End:  5/24/2023 7:30 AM  Anesthesiologist:  Tamera Jon MD  Performed By:  CRNA and anesthesiologist  Patient Location:  OR  Indication: continuous blood pressure monitoring and blood sampling needed    Site Identification: surface landmarks    Preanesthetic Checklist: 2 patient identifiers, IV checked, risks and benefits discussed, monitors and equipment checked, pre-op evaluation, timeout performed, anesthesia consent and sterile technique used    Procedure Details    Catheter Size:  20 G  Catheter Length:  1 and 3/4 inch  Catheter Type:  Arrow  Seldinger Technique?: Yes    Laterality:  Right  Site:  Radial artery  Site Prep: chlorhexidine    Line Secured:  Wrist Brace, tape and Tegaderm    Assessment    Events: patient tolerated procedure well with no complications      Medications  5/24/2023 7:25 AM      Additional Comments GEN: Well appearing, well nourished, awake, alert, oriented to person, place, time/situation and in no apparent distress.  ENT: Airway patent, Nasal mucosa clear. Mouth with normal mucosa.  EYES: Clear bilaterally.  RESPIRATORY: Breathing comfortably with normal RR.  CARDIAC: Regular rate and rhythm  ABDOMEN: Soft, nontender, +bowel sounds, no rebound, rigidity, or guarding.  MSK: Range of motion is not limited, no deformities noted.  NEURO: Alert and oriented, no focal deficits.  SKIN: Skin normal color for race, warm, dry and intact. scattered folliculitis to genital area does not appear vesicular, no cellulitis, crepitus or gangrene.   PSYCH: Alert and oriented to person, place, time/situation. normal mood and affect. no apparent risk to self or others.

## 2023-05-24 NOTE — CODE DOCUMENTATION
Pt awake and alert, taking depakote, stated he felt very, very anxk Po w/o dificulty when he suddenly made a grimace and bore down, then stared off into space and idid not answer, 20 sec of apnea, code blue called, came back after name called, stated he felt better now, tho cont to have pain  Code cancelled, Denies hx of seizure disorder, Dr Mile Lagos here to examine pt, will give 1L of ns bolus for lactic acide 5.6, and may give small doses of dilaudid ivp as bp  Tolerates.

## 2023-05-24 NOTE — OPERATIVE REPORT
Date of operation 5/24/2023    Preoperative diagnosis:  1. Shock  2. Recent history of right hemicolectomy    Operations performed:  1. Exploratory laparotomy, evacuation of intra-abdominal hematoma, washout  2. Omentectomy    Postoperative diagnosis:  1. Same    Operating surgeon:  1. Eugene Conrad MD    Assistant:  1. Donna Austin SA    Indications:  69-year-old gentleman who was diagnosed with mass in the terminal ileum. Work-up was consistent clinically with a neuroendocrine tumor. His case was reviewed in tumor board. Ultimately decision was made to proceed with resection of the terminal ileum with en bloc hemicolectomy. This took place 2 days ago. Patient also has a history of a mechanical aortic valve and has been followed by cardiology. Recommendation was to pursue anticoagulation postoperatively. The patient was placed on heparin drip and hemoglobin remained stable in the initial 24 hours after surgery and was then switched to therapeutic low-dose enoxaparin. He became more hypotensive 1 to 2 hours later and his hemoglobin trended by 1 g over 6 hours. I obtained a CT scan yesterday which revealed minimal to moderate hemoperitoneum without evidence of active bleeding. I evaluated him overnight and felt the cause of bleeding was directly related to anticoagulation and decision was made to resuscitate him and reverse any potential coagulopathy. Overnight, patient remained in the progressive care unit under close monitoring. He was mentating and was not on pressors. His hemoglobin remained above 8 and his blood pressure remained relatively normal.  This morning, repeat labs revealed profound lactic acidemia and acute kidney injury, in addition to that, the patient was becoming more distended.   When I evaluated him at around 5:30 AM, he was off pressors however I made the decision to preemptively have him intubated to protect his airway and, after consulting with our intensive care physicians, reexplore him to rule out an intra-abdominal source for his lactic acidemia. After he was intubated, patient became bradycardic and code was called. He never lost a pulse however was in CPR for less than 2 minutes. He presents for emergent reexploration. Details of the operation:  The patient was brought to the operating room and laid supine on the operating table. General endotracheal anesthesia was induced without complications. All pressure points were padded properly. The arms were extended. The abdomen was prepped and draped in the standard sterile manner. Timeout completed verifying the patient's name, procedure to be performed. The patient was already on antibiotics which was verified. Midline laparotomy incision was made from the xiphoid process towards the pubis. This was deepened through subcutaneous tissue with electrocautery. The fascia was incised at the linea alba and the abdomen was entered straight traumatically. Immediately upon entry, there was considerable amount of intraperitoneal blood mixed in with clots. I immediately packed the abdomen with multiple lap pads in an effort to tamponade potentially existing bleeding. The Anglin retractor was installed per usual to allow for adequate exposure. I remain communication with our anesthesia colleagues and the patient was transfused 2 units as we continue to explore the abdomen. I systematically explored the abdominal cavity from the area of least concern today of most concern. I started with the left upper quadrant and there was no active bleeding, the right upper quadrant there was no active bleeding, the pelvis, there was no active bleeding. I then turned my attention towards the retroperitoneum along the resection bed. There was a portion of the omentum which appeared devascularized. I resected that using the harmonic device and sent that off for final pathological analysis.   I then examined the retroperitoneum carefully. There was no areas of active arterial extravasation or venous extravasation for that matter. I washed out the retroperitoneum carefully with normal saline. Small less than 2 mm venous tributaries along the retroperitoneum consistent with raw surface were coagulated when encountered. Those certainly did not explain the large amount of intraperitoneal bleeding. I left multiple pieces of Surgicel and reexplored the abdomen 1 more time documenting no ongoing bleeding. The Doppler was used to evaluate the blood supply to the ileocolonic anastomosis as well as the small bowel, this seemed intact with a robust blood supply. It should be noted that at that time, his blood pressure considerably improved to around 120 and her anesthesiology colleagues informing his pressor requirements was decreasing. Decision was made at that point to conclude the case. Counts were correct. The fascia was approximated in a running manner using #1 looped PDS. The skin and subcu tissue were irrigated and the skin was approximated with a stapler. The wounds were dressed. The patient remained intubated and in a guarded state. He was taken back to the ICU for continued resuscitation and care. I was present for the entire case. Eugene Conrad MD  [facility] General Surgical Oncology  [place] [id number]  Pager [phone]  [email or web address]

## 2023-05-24 NOTE — PROGRESS NOTES
Had a long conversation with the patient friend Bill Hamilton who was in the waiting room outside the ICU. I explained to her in detail findings from the exploration. I explained that this condition remains guarded as he continues to exhibit lactic acidemia. At this point, I believe that everything can be explained by hemorrhagic shock which led to acute kidney injury and exacerbated his lactic acidosis. We have our nephrology colleagues on board. There are plans to pursue CRRT today if his condition does not improve with resuscitation alone. I do feel we have good control of the bleeding. My impression is that this was all related to initiating his anticoagulation which was necessary given his mechanical valve in the postoperative period. Bill Hamilton had many questions which I attempted to address to the best of my ability. Specifically she asked about what the prognosis looks like and what to expect from a cancer standpoint once the pathology has been finalized. We will continue to monitor him closely and resuscitate him aggressively. Nora Nuñez MD  [facility] General Surgical Oncology  [facility] [address]  Pager [phone]  [email or web address]

## 2023-05-24 NOTE — BRIEF PROCEDURE NOTE
[facility]  Procedure Note    Gina Doyle Patient Status:  Inpatient    1/15/1955 MRN [id number]   Location [facility] Attending Tami Allen MD   Hosp Day # 2 PCP Robinette Nissen, DO     Procedure: Temporary dialysis catheter    Pre-Procedure Diagnosis: Sepsis, acidosis    Post-Procedure Diagnosis: Same    Anesthesia:  Local    Findings: Emergent temporary Alcon dialysis catheter placed via right internal jugular vein    Specimens: 0    Blood Loss:  0     Complications:  None    Plan:   Chest x-ray to follow to confirm catheter tip placement prior to initiation of hemodialysis    Conchita Kinsey MD  2023

## 2023-05-24 NOTE — PROGRESS NOTES
Reevaluated patient this afternoon  Awaiting CRRT  Continues to be in a guarded state, on 2 pressors at this point  Urine output marginal    Had a long conversation with patient's family about prognosis. He remains in a guarded state, I am less optimistic about his prognosis. Continue maximum ICU care, appreciate input from multidisciplinary team      Tomasz Villegas MD  Complex General Surgical Oncology  [facility] 112  Pager [phone]  [email or web address]

## 2023-05-25 ENCOUNTER — APPOINTMENT (OUTPATIENT)
Dept: GENERAL RADIOLOGY | Facility: HOSPITAL | Age: 68
End: 2023-05-25
Attending: INTERNAL MEDICINE
Payer: MEDICARE

## 2023-05-25 LAB
ALBUMIN SERPL-MCNC: 1.8 G/DL (ref 3.4–5)
ALBUMIN SERPL-MCNC: 1.8 G/DL (ref 3.4–5)
ALBUMIN SERPL-MCNC: 2 G/DL (ref 3.4–5)
ALBUMIN/GLOB SERPL: 0.8 {RATIO} (ref 1–2)
ALBUMIN/GLOB SERPL: 0.9 {RATIO} (ref 1–2)
ALP LIVER SERPL-CCNC: 74 U/L
ALP LIVER SERPL-CCNC: 81 U/L
ALT SERPL-CCNC: 4604 U/L
ALT SERPL-CCNC: 6900 U/L
AMMONIA PLAS-MCNC: 154 UMOL/L (ref 11–32)
ANION GAP SERPL CALC-SCNC: 13 MMOL/L (ref 0–18)
ANION GAP SERPL CALC-SCNC: 5 MMOL/L (ref 0–18)
ANION GAP SERPL CALC-SCNC: 8 MMOL/L (ref 0–18)
AST SERPL-CCNC: 4859 U/L (ref 15–37)
AST SERPL-CCNC: ABNORMAL U/L (ref 15–37)
BASOPHILS # BLD AUTO: 0.01 X10(3) UL (ref 0–0.2)
BASOPHILS NFR BLD AUTO: 0.1 %
BILIRUB SERPL-MCNC: 1 MG/DL (ref 0.1–2)
BILIRUB SERPL-MCNC: 1.1 MG/DL (ref 0.1–2)
BLOOD TYPE BARCODE: 5100
BLOOD TYPE BARCODE: 9500
BLOOD TYPE BARCODE: 9500
BUN BLD-MCNC: 14 MG/DL (ref 7–18)
BUN BLD-MCNC: 16 MG/DL (ref 7–18)
BUN BLD-MCNC: 21 MG/DL (ref 7–18)
BUN/CREAT SERPL: 6.4 (ref 10–20)
BUN/CREAT SERPL: 6.9 (ref 10–20)
BUN/CREAT SERPL: 8.1 (ref 10–20)
CALCIUM BLD-MCNC: 7.1 MG/DL (ref 8.5–10.1)
CALCIUM BLD-MCNC: 7.3 MG/DL (ref 8.5–10.1)
CALCIUM BLD-MCNC: 7.3 MG/DL (ref 8.5–10.1)
CHLORIDE SERPL-SCNC: 106 MMOL/L (ref 98–112)
CHLORIDE SERPL-SCNC: 106 MMOL/L (ref 98–112)
CHLORIDE SERPL-SCNC: 107 MMOL/L (ref 98–112)
CO2 SERPL-SCNC: 24 MMOL/L (ref 21–32)
CO2 SERPL-SCNC: 25 MMOL/L (ref 21–32)
CO2 SERPL-SCNC: 27 MMOL/L (ref 21–32)
CREAT BLD-MCNC: 2.19 MG/DL
CREAT BLD-MCNC: 2.32 MG/DL
CREAT BLD-MCNC: 2.59 MG/DL
DEPRECATED RDW RBC AUTO: 47.5 FL (ref 35.1–46.3)
EOSINOPHIL # BLD AUTO: 0 X10(3) UL (ref 0–0.7)
EOSINOPHIL NFR BLD AUTO: 0 %
ERYTHROCYTE [DISTWIDTH] IN BLOOD BY AUTOMATED COUNT: 14.8 % (ref 11–15)
GFR SERPLBLD BASED ON 1.73 SQ M-ARVRAT: 26 ML/MIN/1.73M2 (ref 60–?)
GFR SERPLBLD BASED ON 1.73 SQ M-ARVRAT: 30 ML/MIN/1.73M2 (ref 60–?)
GFR SERPLBLD BASED ON 1.73 SQ M-ARVRAT: 32 ML/MIN/1.73M2 (ref 60–?)
GLOBULIN PLAS-MCNC: 2.2 G/DL (ref 2.8–4.4)
GLOBULIN PLAS-MCNC: 2.4 G/DL (ref 2.8–4.4)
GLUCOSE BLD-MCNC: 110 MG/DL (ref 70–99)
GLUCOSE BLD-MCNC: 122 MG/DL (ref 70–99)
GLUCOSE BLD-MCNC: 124 MG/DL (ref 70–99)
HCT VFR BLD AUTO: 21.8 %
HCT VFR BLD AUTO: 22.7 %
HCT VFR BLD AUTO: 23 %
HCT VFR BLD AUTO: 25.5 %
HCT VFR BLD AUTO: 25.5 %
HGB BLD-MCNC: 7.7 G/DL
HGB BLD-MCNC: 8.2 G/DL
HGB BLD-MCNC: 8.2 G/DL
HGB BLD-MCNC: 9 G/DL
HGB BLD-MCNC: 9 G/DL
IMM GRANULOCYTES # BLD AUTO: 0.09 X10(3) UL (ref 0–1)
IMM GRANULOCYTES NFR BLD: 0.8 %
INR BLD: 2.33 (ref 0.85–1.16)
LYMPHOCYTES # BLD AUTO: 1.16 X10(3) UL (ref 1–4)
LYMPHOCYTES NFR BLD AUTO: 10.4 %
MAGNESIUM SERPL-MCNC: 1.5 MG/DL (ref 1.6–2.6)
MAGNESIUM SERPL-MCNC: 1.8 MG/DL (ref 1.6–2.6)
MCH RBC QN AUTO: 30.9 PG (ref 26–34)
MCHC RBC AUTO-ENTMCNC: 35.3 G/DL (ref 31–37)
MCV RBC AUTO: 87.6 FL
MONOCYTES # BLD AUTO: 0.78 X10(3) UL (ref 0.1–1)
MONOCYTES NFR BLD AUTO: 7 %
NEUTROPHILS # BLD AUTO: 9.15 X10 (3) UL (ref 1.5–7.7)
NEUTROPHILS # BLD AUTO: 9.15 X10(3) UL (ref 1.5–7.7)
NEUTROPHILS NFR BLD AUTO: 81.7 %
OSMOLALITY SERPL CALC.SUM OF ELEC: 290 MOSM/KG (ref 275–295)
OSMOLALITY SERPL CALC.SUM OF ELEC: 290 MOSM/KG (ref 275–295)
OSMOLALITY SERPL CALC.SUM OF ELEC: 300 MOSM/KG (ref 275–295)
PHOSPHATE SERPL-MCNC: 2 MG/DL (ref 2.5–4.9)
PHOSPHATE SERPL-MCNC: 3.8 MG/DL (ref 2.5–4.9)
PLATELET # BLD AUTO: 40 10(3)UL (ref 150–450)
PLATELET # BLD AUTO: 41 10(3)UL (ref 150–450)
POTASSIUM SERPL-SCNC: 3.4 MMOL/L (ref 3.5–5.1)
POTASSIUM SERPL-SCNC: 3.5 MMOL/L (ref 3.5–5.1)
POTASSIUM SERPL-SCNC: 3.5 MMOL/L (ref 3.5–5.1)
PROT SERPL-MCNC: 4.2 G/DL (ref 6.4–8.2)
PROT SERPL-MCNC: 4.2 G/DL (ref 6.4–8.2)
PROTHROMBIN TIME: 25.1 SECONDS (ref 11.6–14.8)
Q-T INTERVAL: 354 MS
QRS DURATION: 94 MS
QTC CALCULATION (BEZET): 516 MS
R AXIS: 22 DEGREES
RBC # BLD AUTO: 2.91 X10(6)UL
SARS-COV-2 RNA RESP QL NAA+PROBE: NOT DETECTED
SODIUM SERPL-SCNC: 138 MMOL/L (ref 136–145)
SODIUM SERPL-SCNC: 139 MMOL/L (ref 136–145)
SODIUM SERPL-SCNC: 144 MMOL/L (ref 136–145)
T AXIS: 53 DEGREES
UNIT VOLUME: 206 ML
UNIT VOLUME: 280 ML
UNIT VOLUME: 316 ML
UNIT VOLUME: 350 ML
VENTRICULAR RATE: 128 BPM
WBC # BLD AUTO: 11.2 X10(3) UL (ref 4–11)

## 2023-05-25 PROCEDURE — 71045 X-RAY EXAM CHEST 1 VIEW: CPT | Performed by: INTERNAL MEDICINE

## 2023-05-25 PROCEDURE — 90945 DIALYSIS ONE EVALUATION: CPT | Performed by: INTERNAL MEDICINE

## 2023-05-25 PROCEDURE — 99291 CRITICAL CARE FIRST HOUR: CPT | Performed by: INTERNAL MEDICINE

## 2023-05-25 RX ORDER — ACETAMINOPHEN 10 MG/ML
1000 INJECTION, SOLUTION INTRAVENOUS EVERY 12 HOURS PRN
Status: DISCONTINUED | OUTPATIENT
Start: 2023-05-25 | End: 2023-05-25

## 2023-05-25 RX ORDER — SODIUM BICARBONATE 150 MEQ/1,000 ML IN DEXTROSE 5 % INTRAVENOUS
150 SOLUTION CONTINUOUS
Status: DISCONTINUED | OUTPATIENT
Start: 2023-05-25 | End: 2023-05-25

## 2023-05-25 RX ORDER — ACETAMINOPHEN 325 MG/1
650 TABLET ORAL EVERY 8 HOURS PRN
Status: DISCONTINUED | OUTPATIENT
Start: 2023-05-25 | End: 2023-05-25

## 2023-05-25 RX ORDER — SODIUM CHLORIDE 9 MG/ML
INJECTION, SOLUTION INTRAVENOUS ONCE
Status: COMPLETED | OUTPATIENT
Start: 2023-05-25 | End: 2023-05-25

## 2023-05-25 RX ORDER — ACETAMINOPHEN 160 MG/5ML
650 SOLUTION ORAL EVERY 8 HOURS PRN
Status: DISCONTINUED | OUTPATIENT
Start: 2023-05-25 | End: 2023-05-25

## 2023-05-25 RX ORDER — VANCOMYCIN HYDROCHLORIDE 50 MG/ML
125 KIT ORAL DAILY
Status: DISCONTINUED | OUTPATIENT
Start: 2023-05-25 | End: 2023-05-29

## 2023-05-25 RX ORDER — MAGNESIUM SULFATE HEPTAHYDRATE 40 MG/ML
2 INJECTION, SOLUTION INTRAVENOUS ONCE
Status: COMPLETED | OUTPATIENT
Start: 2023-05-25 | End: 2023-05-25

## 2023-05-25 RX ORDER — ACETAMINOPHEN 650 MG/1
650 SUPPOSITORY RECTAL EVERY 8 HOURS PRN
Status: DISCONTINUED | OUTPATIENT
Start: 2023-05-25 | End: 2023-05-25

## 2023-05-25 NOTE — RESPIRATORY THERAPY NOTE
Patient received intubated, sedated, and on multiple vasopressors. Awakening trial performed by RN. No SBT performed per Dr. Brandy Ba. Patient is tolerating ventilator well. Breathing at or slightly above set respiratory rate. Achieving set tidal volumes +/- 50 mLs. FiO2 weaned to 30% today. Alarms set appropriately. RT will continue to ensure adequate ventilation and oxygenation.      Vent settings:  VC+ 18/550/+5/30%  8.0 ETT/24 cm @ lip     Problem: RESPIRATORY - ADULT  Goal: Achieves optimal ventilation and oxygenation  Description: INTERVENTIONS:  - Assess for changes in respiratory status  - Assess for changes in mentation and behavior  - Position to facilitate oxygenation and minimize respiratory effort  - Oxygen supplementation based on oxygen saturation or ABGs  - Provide Smoking Cessation handout, if applicable  - Encourage broncho-pulmonary hygiene including cough, deep breathe, Incentive Spirometry  - Assess the need for suctioning and perform as needed  - Assess and instruct to report SOB or any respiratory difficulty  - Respiratory Therapy support as indicated  - Manage/alleviate anxiety  - Monitor for signs/symptoms of CO2 retention  Outcome: Progressing

## 2023-05-25 NOTE — PLAN OF CARE
Problem: RESPIRATORY - ADULT  Goal: Achieves optimal ventilation and oxygenation  Description: INTERVENTIONS:  - Assess for changes in respiratory status  - Assess for changes in mentation and behavior  - Position to facilitate oxygenation and minimize respiratory effort  - Oxygen supplementation based on oxygen saturation or ABGs  - Provide Smoking Cessation handout, if applicable  - Encourage broncho-pulmonary hygiene including cough, deep breathe, Incentive Spirometry  - Assess the need for suctioning and perform as needed  - Assess and instruct to report SOB or any respiratory difficulty  - Respiratory Therapy support as indicated  - Manage/alleviate anxiety  - Monitor for signs/symptoms of CO2 retention  Outcome: Progressing   Patient received on ventilator and parameters charted. No changes or distress throughout the night. RT will monitor.

## 2023-05-25 NOTE — PLAN OF CARE
Patient remains restrained for safety while intubated.      Problem: Safety Risk - Non-Violent Restraints  Goal: Patient will remain free from self-harm  Description: INTERVENTIONS:  - Apply the least restrictive restraint to prevent harm  - Notify patient and family of reasons restraints applied  - Assess for any contributing factors to confusion (electrolyte disturbances, delirium, medications)  - Discontinue any unnecessary medical devices as soon as possible  - Assess the patient's physical comfort, circulation, skin condition, hydration, nutrition and elimination needs   - Reorient and redirection as needed  - Assess for the need to continue restraints  Outcome: Progressing

## 2023-05-25 NOTE — PROGRESS NOTES
While doing bedside shift report, day RN was attempting to fix CRRT machine. Air bubbles were seen in the filter and were unable to be removed. Dialysis RN called to bedside to restart CRRT.    8pm: Mo from dialysis at bedside setting up CRRT.

## 2023-05-26 ENCOUNTER — APPOINTMENT (OUTPATIENT)
Dept: GENERAL RADIOLOGY | Facility: HOSPITAL | Age: 68
End: 2023-05-26
Attending: INTERNAL MEDICINE
Payer: MEDICARE

## 2023-05-26 LAB
ALBUMIN SERPL-MCNC: 1.7 G/DL (ref 3.4–5)
ALBUMIN/GLOB SERPL: 0.8 {RATIO} (ref 1–2)
ALP LIVER SERPL-CCNC: 72 U/L
ALT SERPL-CCNC: 4467 U/L
ANION GAP SERPL CALC-SCNC: 8 MMOL/L (ref 0–18)
AST SERPL-CCNC: 3723 U/L (ref 15–37)
BASOPHILS # BLD AUTO: 0.03 X10(3) UL (ref 0–0.2)
BASOPHILS NFR BLD AUTO: 0.3 %
BILIRUB DIRECT SERPL-MCNC: 0.6 MG/DL (ref 0–0.2)
BILIRUB SERPL-MCNC: 1 MG/DL (ref 0.1–2)
BLOOD TYPE BARCODE: 5100
BLOOD TYPE BARCODE: 5100
BUN BLD-MCNC: 14 MG/DL (ref 7–18)
BUN/CREAT SERPL: 5.9 (ref 10–20)
CALCIUM BLD-MCNC: 7.3 MG/DL (ref 8.5–10.1)
CHLORIDE SERPL-SCNC: 105 MMOL/L (ref 98–112)
CO2 SERPL-SCNC: 25 MMOL/L (ref 21–32)
CREAT BLD-MCNC: 2.36 MG/DL
DEPRECATED RDW RBC AUTO: 47.8 FL (ref 35.1–46.3)
EOSINOPHIL # BLD AUTO: 0.02 X10(3) UL (ref 0–0.7)
EOSINOPHIL NFR BLD AUTO: 0.2 %
ERYTHROCYTE [DISTWIDTH] IN BLOOD BY AUTOMATED COUNT: 15 % (ref 11–15)
GFR SERPLBLD BASED ON 1.73 SQ M-ARVRAT: 29 ML/MIN/1.73M2 (ref 60–?)
GLOBULIN PLAS-MCNC: 2.2 G/DL (ref 2.8–4.4)
GLUCOSE BLD-MCNC: 121 MG/DL (ref 70–99)
HCT VFR BLD AUTO: 21.4 %
HCT VFR BLD AUTO: 21.4 %
HCT VFR BLD AUTO: 21.8 %
HCT VFR BLD AUTO: 22.4 %
HGB BLD-MCNC: 7.4 G/DL
HGB BLD-MCNC: 7.6 G/DL
HGB BLD-MCNC: 7.6 G/DL
HGB BLD-MCNC: 7.8 G/DL
IMM GRANULOCYTES # BLD AUTO: 0.1 X10(3) UL (ref 0–1)
IMM GRANULOCYTES NFR BLD: 1.1 %
INR BLD: 1.73 (ref 0.85–1.16)
LYMPHOCYTES # BLD AUTO: 0.7 X10(3) UL (ref 1–4)
LYMPHOCYTES NFR BLD AUTO: 7.8 %
MAGNESIUM SERPL-MCNC: 1.6 MG/DL (ref 1.6–2.6)
MCH RBC QN AUTO: 30.9 PG (ref 26–34)
MCHC RBC AUTO-ENTMCNC: 35.5 G/DL (ref 31–37)
MCV RBC AUTO: 87 FL
MONOCYTES # BLD AUTO: 0.48 X10(3) UL (ref 0.1–1)
MONOCYTES NFR BLD AUTO: 5.4 %
NEUTROPHILS # BLD AUTO: 7.59 X10 (3) UL (ref 1.5–7.7)
NEUTROPHILS # BLD AUTO: 7.59 X10(3) UL (ref 1.5–7.7)
NEUTROPHILS NFR BLD AUTO: 85.2 %
OSMOLALITY SERPL CALC.SUM OF ELEC: 288 MOSM/KG (ref 275–295)
PHOSPHATE SERPL-MCNC: 2 MG/DL (ref 2.5–4.9)
PLATELET # BLD AUTO: 41 10(3)UL (ref 150–450)
POTASSIUM SERPL-SCNC: 3.5 MMOL/L (ref 3.5–5.1)
PROT SERPL-MCNC: 3.9 G/DL (ref 6.4–8.2)
PROTHROMBIN TIME: 19.9 SECONDS (ref 11.6–14.8)
RBC # BLD AUTO: 2.46 X10(6)UL
SODIUM SERPL-SCNC: 138 MMOL/L (ref 136–145)
UNIT VOLUME: 206 ML
UNIT VOLUME: 324 ML
WBC # BLD AUTO: 8.9 X10(3) UL (ref 4–11)

## 2023-05-26 PROCEDURE — 71045 X-RAY EXAM CHEST 1 VIEW: CPT | Performed by: INTERNAL MEDICINE

## 2023-05-26 PROCEDURE — 90945 DIALYSIS ONE EVALUATION: CPT | Performed by: INTERNAL MEDICINE

## 2023-05-26 PROCEDURE — 99291 CRITICAL CARE FIRST HOUR: CPT | Performed by: INTERNAL MEDICINE

## 2023-05-26 RX ORDER — MAGNESIUM SULFATE 1 G/100ML
1 INJECTION INTRAVENOUS ONCE
Status: COMPLETED | OUTPATIENT
Start: 2023-05-26 | End: 2023-05-26

## 2023-05-26 RX ORDER — DEXTROSE AND SODIUM CHLORIDE 5; .45 G/100ML; G/100ML
INJECTION, SOLUTION INTRAVENOUS CONTINUOUS
Status: DISCONTINUED | OUTPATIENT
Start: 2023-05-26 | End: 2023-05-29

## 2023-05-26 RX ORDER — HEPARIN SODIUM 5000 [USP'U]/ML
5000 INJECTION, SOLUTION INTRAVENOUS; SUBCUTANEOUS EVERY 8 HOURS SCHEDULED
Status: DISCONTINUED | OUTPATIENT
Start: 2023-05-26 | End: 2023-05-29

## 2023-05-26 NOTE — PLAN OF CARE
Patient extubated at 1012 per order to 6L NC. SpO2 reads 95. BS are clear/diminished. Ausculation of upper airway reveals no stridor. See note below for more details. Problem: RESPIRATORY - ADULT  Goal: Achieves optimal ventilation and oxygenation  Description: INTERVENTIONS:  - Assess for changes in respiratory status  - Assess for changes in mentation and behavior  - Position to facilitate oxygenation and minimize respiratory effort  - Oxygen supplementation based on oxygen saturation or ABGs  - Provide Smoking Cessation handout, if applicable  - Encourage broncho-pulmonary hygiene including cough, deep breathe, Incentive Spirometry  - Assess the need for suctioning and perform as needed  - Assess and instruct to report SOB or any respiratory difficulty  - Respiratory Therapy support as indicated  - Manage/alleviate anxiety  - Monitor for signs/symptoms of CO2 retention  Outcome: Progressing       SBT Safety Screen: Performed by R.T. Spontaneous respiratory effort present  FiO2: 30   / PEEP: 5  Off or decreasing vasopressors/inotrope doses  Stable hemodynamics, BP  100/41  HR    HR = 102  MAP = 61  Saturation = 97    SBT: PASS/FAIL? PASS  Start time:  0820  Settings:  Pressure Support: 5   CPAP: +5    FiO2:  30%  Actual RSBI score: 44    Weaning Parameters:  RR: 22  RSBI: 44  NIF: - 28   VT: 785 ml  VC: 785 ml  (Note: If VC unable, enter same as VT in Epic to charge)    MD Notified:   Values reported to Dr. Kenya Chacko. Dr. Kenya Chacko at bedside. Notified RT to let trial continue for another 30 minutes. After approx 30 mins, Dr. Kenya Chacko ordered RT to extubate. Patient extubated at 1012 per order to 6L NC. SpO2 reads 95. BS are clear/diminished. Ausculation of upper airway reveals no stridor. IS performed per order x10. Patient's personal best reads 900. Encouraged it use. Technique acceptable.

## 2023-05-27 ENCOUNTER — APPOINTMENT (OUTPATIENT)
Dept: CV DIAGNOSTICS | Facility: HOSPITAL | Age: 68
End: 2023-05-27
Attending: INTERNAL MEDICINE
Payer: MEDICARE

## 2023-05-27 LAB
ALBUMIN SERPL-MCNC: 1.8 G/DL (ref 3.4–5)
ALBUMIN/GLOB SERPL: 0.7 {RATIO} (ref 1–2)
ALP LIVER SERPL-CCNC: 87 U/L
ALT SERPL-CCNC: 2899 U/L
ANION GAP SERPL CALC-SCNC: 4 MMOL/L (ref 0–18)
AST SERPL-CCNC: 1884 U/L (ref 15–37)
BASOPHILS # BLD AUTO: 0.05 X10(3) UL (ref 0–0.2)
BASOPHILS NFR BLD AUTO: 0.5 %
BILIRUB SERPL-MCNC: 1.4 MG/DL (ref 0.1–2)
BUN BLD-MCNC: 12 MG/DL (ref 7–18)
BUN/CREAT SERPL: 6.3 (ref 10–20)
CALCIUM BLD-MCNC: 7.9 MG/DL (ref 8.5–10.1)
CHLORIDE SERPL-SCNC: 107 MMOL/L (ref 98–112)
CO2 SERPL-SCNC: 28 MMOL/L (ref 21–32)
CREAT BLD-MCNC: 1.92 MG/DL
DEPRECATED RDW RBC AUTO: 49.8 FL (ref 35.1–46.3)
EOSINOPHIL # BLD AUTO: 0.23 X10(3) UL (ref 0–0.7)
EOSINOPHIL NFR BLD AUTO: 2.1 %
ERYTHROCYTE [DISTWIDTH] IN BLOOD BY AUTOMATED COUNT: 15.2 % (ref 11–15)
GFR SERPLBLD BASED ON 1.73 SQ M-ARVRAT: 37 ML/MIN/1.73M2 (ref 60–?)
GLOBULIN PLAS-MCNC: 2.5 G/DL (ref 2.8–4.4)
GLUCOSE BLD-MCNC: 98 MG/DL (ref 70–99)
HCT VFR BLD AUTO: 21.1 %
HCT VFR BLD AUTO: 21.4 %
HCT VFR BLD AUTO: 21.7 %
HCT VFR BLD AUTO: 21.7 %
HCT VFR BLD AUTO: 22.3 %
HGB BLD-MCNC: 7.1 G/DL
HGB BLD-MCNC: 7.2 G/DL
HGB BLD-MCNC: 7.2 G/DL
HGB BLD-MCNC: 7.3 G/DL
HGB BLD-MCNC: 7.5 G/DL
IMM GRANULOCYTES # BLD AUTO: 0.16 X10(3) UL (ref 0–1)
IMM GRANULOCYTES NFR BLD: 1.5 %
INR BLD: 1.3 (ref 0.85–1.16)
LYMPHOCYTES # BLD AUTO: 1.01 X10(3) UL (ref 1–4)
LYMPHOCYTES NFR BLD AUTO: 9.3 %
MAGNESIUM SERPL-MCNC: 1.8 MG/DL (ref 1.6–2.6)
MCH RBC QN AUTO: 30 PG (ref 26–34)
MCHC RBC AUTO-ENTMCNC: 33.2 G/DL (ref 31–37)
MCV RBC AUTO: 90.4 FL
MONOCYTES # BLD AUTO: 1.3 X10(3) UL (ref 0.1–1)
MONOCYTES NFR BLD AUTO: 12 %
NEUTROPHILS # BLD AUTO: 8.08 X10 (3) UL (ref 1.5–7.7)
NEUTROPHILS # BLD AUTO: 8.08 X10(3) UL (ref 1.5–7.7)
NEUTROPHILS NFR BLD AUTO: 74.6 %
OSMOLALITY SERPL CALC.SUM OF ELEC: 288 MOSM/KG (ref 275–295)
PHOSPHATE SERPL-MCNC: 1.8 MG/DL (ref 2.5–4.9)
PLATELET # BLD AUTO: 48 10(3)UL (ref 150–450)
POTASSIUM SERPL-SCNC: 3.9 MMOL/L (ref 3.5–5.1)
PROT SERPL-MCNC: 4.3 G/DL (ref 6.4–8.2)
PROTHROMBIN TIME: 16.1 SECONDS (ref 11.6–14.8)
RBC # BLD AUTO: 2.4 X10(6)UL
SODIUM SERPL-SCNC: 139 MMOL/L (ref 136–145)
WBC # BLD AUTO: 10.8 X10(3) UL (ref 4–11)

## 2023-05-27 PROCEDURE — 90945 DIALYSIS ONE EVALUATION: CPT | Performed by: INTERNAL MEDICINE

## 2023-05-27 PROCEDURE — 93306 TTE W/DOPPLER COMPLETE: CPT | Performed by: INTERNAL MEDICINE

## 2023-05-27 PROCEDURE — 99291 CRITICAL CARE FIRST HOUR: CPT | Performed by: INTERNAL MEDICINE

## 2023-05-27 PROCEDURE — 36410 VNPNXR 3YR/> PHY/QHP DX/THER: CPT | Performed by: NURSE PRACTITIONER

## 2023-05-27 NOTE — PLAN OF CARE
Pt alert and orientated, O2 weaned to 5 L NC, VSS, art line intact, central line dc'ed, wilkes in place w/no UO, tolerating clear liquid diet, family updated on POC, pain managed w/prn dilaudid,  Acetylcysteine gtt to end after current bag is done infusing, CRRT to stop once filter is due to be changed overnight and plan to start HD tomorrow. Problem: PAIN - ADULT  Goal: Verbalizes/displays adequate comfort level or patient's stated pain goal  Description: INTERVENTIONS:  - Encourage pt to monitor pain and request assistance  - Assess pain using appropriate pain scale  - Administer analgesics based on type and severity of pain and evaluate response  - Implement non-pharmacological measures as appropriate and evaluate response  - Consider cultural and social influences on pain and pain management  - Manage/alleviate anxiety  - Utilize distraction and/or relaxation techniques  - Monitor for opioid side effects  - Notify MD/LIP if interventions unsuccessful or patient reports new pain  - Anticipate increased pain with activity and pre-medicate as appropriate  Outcome: Progressing     Problem: SAFETY ADULT - FALL  Goal: Free from fall injury  Description: INTERVENTIONS:  - Assess pt frequently for physical needs  - Identify cognitive and physical deficits and behaviors that affect risk of falls.   - [place] fall precautions as indicated by assessment.  - Educate pt/family on patient safety including physical limitations  - Instruct pt to call for assistance with activity based on assessment  - Modify environment to reduce risk of injury  - Provide assistive devices as appropriate  - Consider OT/PT consult to assist with strengthening/mobility  - Encourage toileting schedule  Outcome: Progressing     Problem: DISCHARGE PLANNING  Goal: Discharge to home or other facility with appropriate resources  Description: INTERVENTIONS:  - Identify barriers to discharge w/pt and caregiver  - Include patient/family/discharge partner in discharge planning  - Arrange for needed discharge resources and transportation as appropriate  - Identify discharge learning needs (meds, wound care, etc)  - Arrange for interpreters to assist at discharge as needed  - Consider post-discharge preferences of patient/family/discharge partner  - Complete POLST form as appropriate  - Assess patient's ability to be responsible for managing their own health  - Refer to Case Management Department for coordinating discharge planning if the patient needs post-hospital services based on physician/LIP order or complex needs related to functional status, cognitive ability or social support system  Outcome: Progressing     Problem: Patient/Family Goals  Goal: Patient/Family Long Term Goal  Description: Patient's Long Term Goal: to go home    Interventions:  - See additional Care Plan goals for specific interventions  Outcome: Progressing  Goal: Patient/Family Short Term Goal  Description: Patient's Short Term Goal: to get out of bed and move more    Interventions:   - See additional Care Plan goals for specific interventions  Outcome: Progressing     Problem: RESPIRATORY - ADULT  Goal: Achieves optimal ventilation and oxygenation  Description: INTERVENTIONS:  - Assess for changes in respiratory status  - Assess for changes in mentation and behavior  - Position to facilitate oxygenation and minimize respiratory effort  - Oxygen supplementation based on oxygen saturation or ABGs  - Provide Smoking Cessation handout, if applicable  - Encourage broncho-pulmonary hygiene including cough, deep breathe, Incentive Spirometry  - Assess the need for suctioning and perform as needed  - Assess and instruct to report SOB or any respiratory difficulty  - Respiratory Therapy support as indicated  - Manage/alleviate anxiety  - Monitor for signs/symptoms of CO2 retention  Outcome: Progressing     Problem: CARDIOVASCULAR - ADULT  Goal: Maintains optimal cardiac output and hemodynamic stability  Description: INTERVENTIONS:  - Monitor vital signs, rhythm, and trends  - Monitor for bleeding, hypotension and signs of decreased cardiac output  - Evaluate effectiveness of vasoactive medications to optimize hemodynamic stability  - Monitor arterial and/or venous puncture sites for bleeding and/or hematoma  - Assess quality of pulses, skin color and temperature  - Assess for signs of decreased coronary artery perfusion - ex.  Angina  - Evaluate fluid balance, assess for edema, trend weights  Outcome: Progressing     Problem: SKIN/TISSUE INTEGRITY - ADULT  Goal: Skin integrity remains intact  Description: INTERVENTIONS  - Assess and document risk factors for pressure ulcer development  - Assess and document skin integrity  - Monitor for areas of redness and/or skin breakdown  - Initiate interventions, skin care algorithm/standards of care as needed  Outcome: Progressing     Problem: HEMATOLOGIC - ADULT  Goal: Free from bleeding injury  Description: (Example usage: patient with low platelets)  INTERVENTIONS:  - Avoid intramuscular injections, enemas and rectal medication administration  - Ensure safe mobilization of patient  - Hold pressure on venipuncture sites to achieve adequate hemostasis  - Assess for signs and symptoms of internal bleeding  - Monitor lab trends  - Patient is to report abnormal signs of bleeding to staff  - Avoid use of toothpicks and dental floss  - Use electric shaver for shaving  - Use soft bristle tooth brush  - Limit straining and forceful nose blowing  Outcome: Progressing

## 2023-05-28 ENCOUNTER — APPOINTMENT (OUTPATIENT)
Dept: CT IMAGING | Facility: HOSPITAL | Age: 68
End: 2023-05-28
Attending: SURGERY
Payer: MEDICARE

## 2023-05-28 ENCOUNTER — APPOINTMENT (OUTPATIENT)
Dept: GENERAL RADIOLOGY | Facility: HOSPITAL | Age: 68
End: 2023-05-28
Attending: INTERNAL MEDICINE
Payer: MEDICARE

## 2023-05-28 LAB
ALBUMIN SERPL-MCNC: 1.7 G/DL (ref 3.4–5)
ALBUMIN/GLOB SERPL: 0.7 {RATIO} (ref 1–2)
ALP LIVER SERPL-CCNC: 90 U/L
ALT SERPL-CCNC: 1904 U/L
ANION GAP SERPL CALC-SCNC: 6 MMOL/L (ref 0–18)
ANTIBODY SCREEN: NEGATIVE
AST SERPL-CCNC: 844 U/L (ref 15–37)
BASOPHILS # BLD AUTO: 0.05 X10(3) UL (ref 0–0.2)
BASOPHILS NFR BLD AUTO: 0.5 %
BILIRUB SERPL-MCNC: 1.4 MG/DL (ref 0.1–2)
BUN BLD-MCNC: 19 MG/DL (ref 7–18)
BUN/CREAT SERPL: 7.2 (ref 10–20)
CALCIUM BLD-MCNC: 7.6 MG/DL (ref 8.5–10.1)
CHLORIDE SERPL-SCNC: 106 MMOL/L (ref 98–112)
CO2 SERPL-SCNC: 26 MMOL/L (ref 21–32)
CREAT BLD-MCNC: 2.63 MG/DL
DEPRECATED RDW RBC AUTO: 50.4 FL (ref 35.1–46.3)
DEPRECATED RDW RBC AUTO: 50.5 FL (ref 35.1–46.3)
EOSINOPHIL # BLD AUTO: 0.53 X10(3) UL (ref 0–0.7)
EOSINOPHIL NFR BLD AUTO: 5.3 %
ERYTHROCYTE [DISTWIDTH] IN BLOOD BY AUTOMATED COUNT: 15.4 % (ref 11–15)
ERYTHROCYTE [DISTWIDTH] IN BLOOD BY AUTOMATED COUNT: 15.5 % (ref 11–15)
GFR SERPLBLD BASED ON 1.73 SQ M-ARVRAT: 26 ML/MIN/1.73M2 (ref 60–?)
GLOBULIN PLAS-MCNC: 2.5 G/DL (ref 2.8–4.4)
GLUCOSE BLD-MCNC: 91 MG/DL (ref 70–99)
HCT VFR BLD AUTO: 20.1 %
HCT VFR BLD AUTO: 20.5 %
HCT VFR BLD AUTO: 22.6 %
HCT VFR BLD AUTO: 23 %
HCT VFR BLD AUTO: 24.4 %
HEPARIN AB PPP QL PL AGG: NEGATIVE
HGB BLD-MCNC: 6.8 G/DL
HGB BLD-MCNC: 6.8 G/DL
HGB BLD-MCNC: 7.5 G/DL
HGB BLD-MCNC: 7.7 G/DL
HGB BLD-MCNC: 8.2 G/DL
IMM GRANULOCYTES # BLD AUTO: 0.17 X10(3) UL (ref 0–1)
IMM GRANULOCYTES NFR BLD: 1.7 %
INR BLD: 1.18 (ref 0.85–1.16)
LYMPHOCYTES # BLD AUTO: 1.34 X10(3) UL (ref 1–4)
LYMPHOCYTES NFR BLD AUTO: 13.5 %
MAGNESIUM SERPL-MCNC: 1.6 MG/DL (ref 1.6–2.6)
MCH RBC QN AUTO: 30.6 PG (ref 26–34)
MCH RBC QN AUTO: 30.9 PG (ref 26–34)
MCHC RBC AUTO-ENTMCNC: 33.2 G/DL (ref 31–37)
MCHC RBC AUTO-ENTMCNC: 33.8 G/DL (ref 31–37)
MCV RBC AUTO: 91.4 FL
MCV RBC AUTO: 92.3 FL
MONOCYTES # BLD AUTO: 1.89 X10(3) UL (ref 0.1–1)
MONOCYTES NFR BLD AUTO: 19.1 %
NEUTROPHILS # BLD AUTO: 5.94 X10 (3) UL (ref 1.5–7.7)
NEUTROPHILS # BLD AUTO: 5.94 X10(3) UL (ref 1.5–7.7)
NEUTROPHILS NFR BLD AUTO: 59.9 %
OSMOLALITY SERPL CALC.SUM OF ELEC: 288 MOSM/KG (ref 275–295)
PHOSPHATE SERPL-MCNC: 2.2 MG/DL (ref 2.5–4.9)
PLATELET # BLD AUTO: 49 10(3)UL (ref 150–450)
PLATELET # BLD AUTO: 51 10(3)UL (ref 150–450)
POTASSIUM SERPL-SCNC: 3.7 MMOL/L (ref 3.5–5.1)
PROT SERPL-MCNC: 4.2 G/DL (ref 6.4–8.2)
PROTHROMBIN TIME: 14.9 SECONDS (ref 11.6–14.8)
RBC # BLD AUTO: 2.2 X10(6)UL
RBC # BLD AUTO: 2.22 X10(6)UL
RH BLOOD TYPE: NEGATIVE
SODIUM SERPL-SCNC: 138 MMOL/L (ref 136–145)
WBC # BLD AUTO: 9.4 X10(3) UL (ref 4–11)
WBC # BLD AUTO: 9.9 X10(3) UL (ref 4–11)

## 2023-05-28 PROCEDURE — 99232 SBSQ HOSP IP/OBS MODERATE 35: CPT | Performed by: INTERNAL MEDICINE

## 2023-05-28 PROCEDURE — 74176 CT ABD & PELVIS W/O CONTRAST: CPT | Performed by: SURGERY

## 2023-05-28 PROCEDURE — 99233 SBSQ HOSP IP/OBS HIGH 50: CPT | Performed by: INTERNAL MEDICINE

## 2023-05-28 PROCEDURE — 71045 X-RAY EXAM CHEST 1 VIEW: CPT | Performed by: INTERNAL MEDICINE

## 2023-05-28 RX ORDER — SODIUM CHLORIDE 9 MG/ML
INJECTION, SOLUTION INTRAVENOUS ONCE
Status: COMPLETED | OUTPATIENT
Start: 2023-05-28 | End: 2023-05-28

## 2023-05-28 RX ORDER — ALBUMIN (HUMAN) 12.5 G/50ML
100 SOLUTION INTRAVENOUS AS NEEDED
Status: DISCONTINUED | OUTPATIENT
Start: 2023-05-28 | End: 2023-05-30

## 2023-05-28 RX ORDER — HEPARIN SODIUM 1000 [USP'U]/ML
1.5 INJECTION, SOLUTION INTRAVENOUS; SUBCUTANEOUS ONCE
Status: DISCONTINUED | OUTPATIENT
Start: 2023-05-28 | End: 2023-05-29

## 2023-05-28 NOTE — PROGRESS NOTES
CRRT was stopped at 10:15pm due to venous air detected in the line. I was unable to remove all the air, therefore the blood was not able to be returned to the patient safely. CRRT was disconnected from the patient and his line was heparin locked per hospital protocol.

## 2023-05-28 NOTE — PLAN OF CARE
Problem: Patient Centered Care  Goal: Patient preferences are identified and integrated in the patient's plan of care  Description: Interventions:  - What would you like us to know as we care for you? I get cold easily   - Provide timely, complete, and accurate information to patient/family  - Incorporate patient and family knowledge, values, beliefs, and cultural backgrounds into the planning and delivery of care  - Encourage patient/family to participate in care and decision-making at the level they choose  - Honor patient and family perspectives and choices  Outcome: Progressing     Problem: PAIN - ADULT  Goal: Verbalizes/displays adequate comfort level or patient's stated pain goal  Description: INTERVENTIONS:  - Encourage pt to monitor pain and request assistance  - Assess pain using appropriate pain scale  - Administer analgesics based on type and severity of pain and evaluate response  - Implement non-pharmacological measures as appropriate and evaluate response  - Consider cultural and social influences on pain and pain management  - Manage/alleviate anxiety  - Utilize distraction and/or relaxation techniques  - Monitor for opioid side effects  - Notify MD/LIP if interventions unsuccessful or patient reports new pain  - Anticipate increased pain with activity and pre-medicate as appropriate  Outcome: Progressing     Problem: RISK FOR INFECTION - ADULT  Goal: Absence of fever/infection during anticipated neutropenic period  Description: INTERVENTIONS  - Monitor WBC  - Administer growth factors as ordered  - Implement neutropenic guidelines  Outcome: Progressing     Problem: SAFETY ADULT - FALL  Goal: Free from fall injury  Description: INTERVENTIONS:  - Assess pt frequently for physical needs  - Identify cognitive and physical deficits and behaviors that affect risk of falls.   - [place] fall precautions as indicated by assessment.  - Educate pt/family on patient safety including physical limitations  - Instruct pt to call for assistance with activity based on assessment  - Modify environment to reduce risk of injury  - Provide assistive devices as appropriate  - Consider OT/PT consult to assist with strengthening/mobility  - Encourage toileting schedule  Outcome: Progressing     Problem: DISCHARGE PLANNING  Goal: Discharge to home or other facility with appropriate resources  Description: INTERVENTIONS:  - Identify barriers to discharge w/pt and caregiver  - Include patient/family/discharge partner in discharge planning  - Arrange for needed discharge resources and transportation as appropriate  - Identify discharge learning needs (meds, wound care, etc)  - Arrange for interpreters to assist at discharge as needed  - Consider post-discharge preferences of patient/family/discharge partner  - Complete POLST form as appropriate  - Assess patient's ability to be responsible for managing their own health  - Refer to Case Management Department for coordinating discharge planning if the patient needs post-hospital services based on physician/LIP order or complex needs related to functional status, cognitive ability or social support system  Outcome: Progressing     Problem: RESPIRATORY - ADULT  Goal: Achieves optimal ventilation and oxygenation  Description: INTERVENTIONS:  - Assess for changes in respiratory status  - Assess for changes in mentation and behavior  - Position to facilitate oxygenation and minimize respiratory effort  - Oxygen supplementation based on oxygen saturation or ABGs  - Provide Smoking Cessation handout, if applicable  - Encourage broncho-pulmonary hygiene including cough, deep breathe, Incentive Spirometry  - Assess the need for suctioning and perform as needed  - Assess and instruct to report SOB or any respiratory difficulty  - Respiratory Therapy support as indicated  - Manage/alleviate anxiety  - Monitor for signs/symptoms of CO2 retention  Outcome: Progressing     Problem: Delirium  Goal: Minimize duration of delirium  Description: Interventions:  - Encourage use of hearing aids, eye glasses  - Promote highest level of mobility daily  - Provide frequent reorientation  - Promote wakefulness i.e. lights on, blinds open  - Promote sleep, encourage patient's normal rest cycle i.e. lights off, TV off, minimize noise and interruptions  - Encourage family to assist in orientation and promotion of home routines  Outcome: Progressing     Problem: CARDIOVASCULAR - ADULT  Goal: Maintains optimal cardiac output and hemodynamic stability  Description: INTERVENTIONS:  - Monitor vital signs, rhythm, and trends  - Monitor for bleeding, hypotension and signs of decreased cardiac output  - Evaluate effectiveness of vasoactive medications to optimize hemodynamic stability  - Monitor arterial and/or venous puncture sites for bleeding and/or hematoma  - Assess quality of pulses, skin color and temperature  - Assess for signs of decreased coronary artery perfusion - ex.  Angina  - Evaluate fluid balance, assess for edema, trend weights  Outcome: Progressing     Problem: SKIN/TISSUE INTEGRITY - ADULT  Goal: Skin integrity remains intact  Description: INTERVENTIONS  - Assess and document risk factors for pressure ulcer development  - Assess and document skin integrity  - Monitor for areas of redness and/or skin breakdown  - Initiate interventions, skin care algorithm/standards of care as needed  Outcome: Progressing     Problem: HEMATOLOGIC - ADULT  Goal: Free from bleeding injury  Description: (Example usage: patient with low platelets)  INTERVENTIONS:  - Avoid intramuscular injections, enemas and rectal medication administration  - Ensure safe mobilization of patient  - Hold pressure on venipuncture sites to achieve adequate hemostasis  - Assess for signs and symptoms of internal bleeding  - Monitor lab trends  - Patient is to report abnormal signs of bleeding to staff  - Avoid use of toothpicks and dental floss  - Use electric shaver for shaving  - Use soft bristle tooth brush  - Limit straining and forceful nose blowing  Outcome: Progressing

## 2023-05-28 NOTE — PROGRESS NOTES
Assumed care of pt from [facility] around 0500. Finished the packed red blood cells, started a 2nd PIV with ultrasound guidance and maintained pt's safety. Attempted to maintain pt's comfort, but he wants the [facility] and then he throws off his blankets.  Care was endorsed to DERICK Moncada this a.m. including that pt needs a post-transfusion H&H.

## 2023-05-28 NOTE — PROGRESS NOTES
Updated patient on current hemoglobin 6.8 and the need for a unit of RBC's per MD. Patient is requesting to only be given O negative blood and nothing else.  Blood bank called by this RN and informed of patient's request.

## 2023-05-28 NOTE — PROGRESS NOTES
[facility] Pharmacy Note:  Renal Adjustment for piperacillin/tazobactam (Margarita Seay)    Fadi Lucia is a 76year old patient who has been prescribed piperacillin/tazobactam (ZOSYN) 4.5 mg every 8 hrs. The estimated creatinine clearance is 31.3 mL/min (A) (based on SCr of 2.63 mg/dL (H)). The dose has been adjusted to piperacillin/tazobactam (ZOSYN) 4.5 mg every 12 hrs per hospital renal dose adjustment protocol for treatment of sepsis. Pharmacy will follow and adjust dose as warranted for additional renal function changes.     Thank you,    Karthikeyan Basilio, PharmD, BCPS  Phone [phone]

## 2023-05-29 LAB
ALBUMIN SERPL-MCNC: 1.9 G/DL (ref 3.4–5)
ALBUMIN/GLOB SERPL: 0.7 {RATIO} (ref 1–2)
ALP LIVER SERPL-CCNC: 86 U/L
ALT SERPL-CCNC: 1255 U/L
ANION GAP SERPL CALC-SCNC: 10 MMOL/L (ref 0–18)
APTT PPP: 28.3 SECONDS (ref 23.3–35.6)
APTT PPP: 51.6 SECONDS (ref 23.3–35.6)
AST SERPL-CCNC: 393 U/L (ref 15–37)
BILIRUB SERPL-MCNC: 1.2 MG/DL (ref 0.1–2)
BLOOD TYPE BARCODE: 9500
BUN BLD-MCNC: 41 MG/DL (ref 7–18)
BUN/CREAT SERPL: 8.2 (ref 10–20)
CALCIUM BLD-MCNC: 8 MG/DL (ref 8.5–10.1)
CHLORIDE SERPL-SCNC: 101 MMOL/L (ref 98–112)
CO2 SERPL-SCNC: 23 MMOL/L (ref 21–32)
CREAT BLD-MCNC: 4.99 MG/DL
GFR SERPLBLD BASED ON 1.73 SQ M-ARVRAT: 12 ML/MIN/1.73M2 (ref 60–?)
GLOBULIN PLAS-MCNC: 2.6 G/DL (ref 2.8–4.4)
GLUCOSE BLD-MCNC: 266 MG/DL (ref 70–99)
GLUCOSE BLDC GLUCOMTR-MCNC: 90 MG/DL (ref 70–99)
HBV SURFACE AG SER-ACNC: <0.1 [IU]/L
HBV SURFACE AG SERPL QL IA: NONREACTIVE
HCT VFR BLD AUTO: 23.7 %
HCT VFR BLD AUTO: 24.1 %
HGB BLD-MCNC: 8 G/DL
HGB BLD-MCNC: 8.2 G/DL
INR BLD: 1.11 (ref 0.85–1.16)
OSMOLALITY SERPL CALC.SUM OF ELEC: 297 MOSM/KG (ref 275–295)
POTASSIUM SERPL-SCNC: 4 MMOL/L (ref 3.5–5.1)
PROT SERPL-MCNC: 4.5 G/DL (ref 6.4–8.2)
PROTHROMBIN TIME: 14.1 SECONDS (ref 11.6–14.8)
SODIUM SERPL-SCNC: 134 MMOL/L (ref 136–145)
TRIGL SERPL-MCNC: 191 MG/DL (ref 30–149)
UNIT VOLUME: 350 ML

## 2023-05-29 PROCEDURE — 99233 SBSQ HOSP IP/OBS HIGH 50: CPT | Performed by: INTERNAL MEDICINE

## 2023-05-29 RX ORDER — HEPARIN SODIUM AND DEXTROSE 10000; 5 [USP'U]/100ML; G/100ML
500 INJECTION INTRAVENOUS CONTINUOUS
Status: DISCONTINUED | OUTPATIENT
Start: 2023-05-29 | End: 2023-05-30

## 2023-05-29 RX ORDER — HEPARIN SODIUM AND DEXTROSE 10000; 5 [USP'U]/100ML; G/100ML
1000 INJECTION INTRAVENOUS ONCE
Status: COMPLETED | OUTPATIENT
Start: 2023-05-29 | End: 2023-05-29

## 2023-05-29 RX ORDER — DIVALPROEX SODIUM 250 MG/1
500 TABLET, EXTENDED RELEASE ORAL NIGHTLY
Status: DISCONTINUED | OUTPATIENT
Start: 2023-05-29 | End: 2023-05-30

## 2023-05-29 NOTE — OCCUPATIONAL THERAPY NOTE
Orders received, chart reviewed for occupational therapy evaluation. Pt currently on HD at the bedside. Will continue to follow.

## 2023-05-29 NOTE — PLAN OF CARE
This shift Estela Medina did not rest well. His safety was maintained with keeping the call light at hand, frequent rounding and keeping the water cup within reach. Problem: Patient Centered Care  Goal: Patient preferences are identified and integrated in the patient's plan of care  Description: Interventions:  - What would you like us to know as we care for you? I get cold easily   - Provide timely, complete, and accurate information to patient/family  - Incorporate patient and family knowledge, values, beliefs, and cultural backgrounds into the planning and delivery of care  - Encourage patient/family to participate in care and decision-making at the level they choose  - Honor patient and family perspectives and choices  Outcome: Progressing     Problem: PAIN - ADULT  Goal: Verbalizes/displays adequate comfort level or patient's stated pain goal  Description: INTERVENTIONS:  - Encourage pt to monitor pain and request assistance  - Assess pain using appropriate pain scale  - Administer analgesics based on type and severity of pain and evaluate response  - Implement non-pharmacological measures as appropriate and evaluate response  - Consider cultural and social influences on pain and pain management  - Manage/alleviate anxiety  - Utilize distraction and/or relaxation techniques  - Monitor for opioid side effects  - Notify MD/LIP if interventions unsuccessful or patient reports new pain  - Anticipate increased pain with activity and pre-medicate as appropriate  Outcome: Progressing     Problem: SAFETY ADULT - FALL  Goal: Free from fall injury  Description: INTERVENTIONS:  - Assess pt frequently for physical needs  - Identify cognitive and physical deficits and behaviors that affect risk of falls.   - [place] fall precautions as indicated by assessment.  - Educate pt/family on patient safety including physical limitations  - Instruct pt to call for assistance with activity based on assessment  - Modify environment to reduce risk of injury  - Provide assistive devices as appropriate  - Consider OT/PT consult to assist with strengthening/mobility  - Encourage toileting schedule  Outcome: Progressing     Problem: DISCHARGE PLANNING  Goal: Discharge to home or other facility with appropriate resources  Description: INTERVENTIONS:  - Identify barriers to discharge w/pt and caregiver  - Include patient/family/discharge partner in discharge planning  - Arrange for needed discharge resources and transportation as appropriate  - Identify discharge learning needs (meds, wound care, etc)  - Arrange for interpreters to assist at discharge as needed  - Consider post-discharge preferences of patient/family/discharge partner  - Complete POLST form as appropriate  - Assess patient's ability to be responsible for managing their own health  - Refer to Case Management Department for coordinating discharge planning if the patient needs post-hospital services based on physician/LIP order or complex needs related to functional status, cognitive ability or social support system  Outcome: Not Progressing     Problem: RESPIRATORY - ADULT  Goal: Achieves optimal ventilation and oxygenation  Description: INTERVENTIONS:  - Assess for changes in respiratory status  - Assess for changes in mentation and behavior  - Position to facilitate oxygenation and minimize respiratory effort  - Oxygen supplementation based on oxygen saturation or ABGs  - Provide Smoking Cessation handout, if applicable  - Encourage broncho-pulmonary hygiene including cough, deep breathe, Incentive Spirometry  - Assess the need for suctioning and perform as needed  - Assess and instruct to report SOB or any respiratory difficulty  - Respiratory Therapy support as indicated  - Manage/alleviate anxiety  - Monitor for signs/symptoms of CO2 retention  Outcome: Progressing     Problem: Delirium  Goal: Minimize duration of delirium  Description: Interventions:  - Encourage use of hearing aids, eye glasses  - Promote highest level of mobility daily  - Provide frequent reorientation  - Promote wakefulness i.e. lights on, blinds open  - Promote sleep, encourage patient's normal rest cycle i.e. lights off, TV off, minimize noise and interruptions  - Encourage family to assist in orientation and promotion of home routines  Outcome: Adequate for Discharge     Problem: CARDIOVASCULAR - ADULT  Goal: Maintains optimal cardiac output and hemodynamic stability  Description: INTERVENTIONS:  - Monitor vital signs, rhythm, and trends  - Monitor for bleeding, hypotension and signs of decreased cardiac output  - Evaluate effectiveness of vasoactive medications to optimize hemodynamic stability  - Monitor arterial and/or venous puncture sites for bleeding and/or hematoma  - Assess quality of pulses, skin color and temperature  - Assess for signs of decreased coronary artery perfusion - ex.  Angina  - Evaluate fluid balance, assess for edema, trend weights  Outcome: Progressing     Problem: SKIN/TISSUE INTEGRITY - ADULT  Goal: Skin integrity remains intact  Description: INTERVENTIONS  - Assess and document risk factors for pressure ulcer development  - Assess and document skin integrity  - Monitor for areas of redness and/or skin breakdown  - Initiate interventions, skin care algorithm/standards of care as needed  Outcome: Progressing     Problem: HEMATOLOGIC - ADULT  Goal: Free from bleeding injury  Description: (Example usage: patient with low platelets)  INTERVENTIONS:  - Avoid intramuscular injections, enemas and rectal medication administration  - Ensure safe mobilization of patient  - Hold pressure on venipuncture sites to achieve adequate hemostasis  - Assess for signs and symptoms of internal bleeding  - Monitor lab trends  - Patient is to report abnormal signs of bleeding to staff  - Avoid use of toothpicks and dental floss  - Use electric shaver for shaving  - Use soft bristle tooth brush  - Limit straining and forceful nose blowing  Outcome: Progressing

## 2023-05-30 ENCOUNTER — APPOINTMENT (OUTPATIENT)
Dept: GENERAL RADIOLOGY | Facility: HOSPITAL | Age: 68
End: 2023-05-30
Attending: INTERNAL MEDICINE
Payer: MEDICARE

## 2023-05-30 LAB
ALBUMIN SERPL-MCNC: 1.8 G/DL (ref 3.4–5)
ANION GAP SERPL CALC-SCNC: 8 MMOL/L (ref 0–18)
APTT PPP: 113.5 SECONDS (ref 23.3–35.6)
APTT PPP: 23.9 SECONDS (ref 23.3–35.6)
APTT PPP: 38.2 SECONDS (ref 23.3–35.6)
APTT PPP: 42.3 SECONDS (ref 23.3–35.6)
BASOPHILS # BLD: 0 X10(3) UL (ref 0–0.2)
BASOPHILS NFR BLD: 0 %
BUN BLD-MCNC: 41 MG/DL (ref 7–18)
BUN/CREAT SERPL: 8.9 (ref 10–20)
CALCIUM BLD-MCNC: 7.3 MG/DL (ref 8.5–10.1)
CHLORIDE SERPL-SCNC: 109 MMOL/L (ref 98–112)
CO2 SERPL-SCNC: 22 MMOL/L (ref 21–32)
CREAT BLD-MCNC: 4.62 MG/DL
DEPRECATED RDW RBC AUTO: 50.3 FL (ref 35.1–46.3)
EOSINOPHIL # BLD: 0.4 X10(3) UL (ref 0–0.7)
EOSINOPHIL NFR BLD: 4 %
ERYTHROCYTE [DISTWIDTH] IN BLOOD BY AUTOMATED COUNT: 15.6 % (ref 11–15)
GFR SERPLBLD BASED ON 1.73 SQ M-ARVRAT: 13 ML/MIN/1.73M2 (ref 60–?)
GLUCOSE BLD-MCNC: 85 MG/DL (ref 70–99)
HCT VFR BLD AUTO: 23.4 %
HGB BLD-MCNC: 7.9 G/DL
HGB BLD-MCNC: 8.3 G/DL
LYMPHOCYTES NFR BLD: 1.01 X10(3) UL (ref 1–4)
LYMPHOCYTES NFR BLD: 10 %
MAGNESIUM SERPL-MCNC: 1.9 MG/DL (ref 1.6–2.6)
MCH RBC QN AUTO: 31.1 PG (ref 26–34)
MCHC RBC AUTO-ENTMCNC: 33.8 G/DL (ref 31–37)
MCV RBC AUTO: 92.1 FL
MONOCYTES # BLD: 2.53 X10(3) UL (ref 0.1–1)
MONOCYTES NFR BLD: 25 %
NEUTROPHILS # BLD AUTO: 5.78 X10 (3) UL (ref 1.5–7.7)
NEUTROPHILS NFR BLD: 61 %
NEUTS HYPERSEG # BLD: 6.16 X10(3) UL (ref 1.5–7.7)
OSMOLALITY SERPL CALC.SUM OF ELEC: 297 MOSM/KG (ref 275–295)
PHOSPHATE SERPL-MCNC: 4.3 MG/DL (ref 2.5–4.9)
PLATELET # BLD AUTO: 83 10(3)UL (ref 150–450)
PLATELET MORPHOLOGY: NORMAL
POTASSIUM SERPL-SCNC: 3.7 MMOL/L (ref 3.5–5.1)
RBC # BLD AUTO: 2.54 X10(6)UL
SODIUM SERPL-SCNC: 139 MMOL/L (ref 136–145)
TOTAL CELLS COUNTED BLD: 100
WBC # BLD AUTO: 10.1 X10(3) UL (ref 4–11)

## 2023-05-30 PROCEDURE — 99233 SBSQ HOSP IP/OBS HIGH 50: CPT | Performed by: INTERNAL MEDICINE

## 2023-05-30 PROCEDURE — 71045 X-RAY EXAM CHEST 1 VIEW: CPT | Performed by: INTERNAL MEDICINE

## 2023-05-30 RX ORDER — HEPARIN SODIUM 1000 [USP'U]/ML
1.5 INJECTION, SOLUTION INTRAVENOUS; SUBCUTANEOUS ONCE
Status: COMPLETED | OUTPATIENT
Start: 2023-05-30 | End: 2023-05-31

## 2023-05-30 RX ORDER — TRAMADOL HYDROCHLORIDE 50 MG/1
50 TABLET ORAL EVERY 8 HOURS
Status: DISCONTINUED | OUTPATIENT
Start: 2023-05-30 | End: 2023-06-01

## 2023-05-30 RX ORDER — ALBUMIN (HUMAN) 12.5 G/50ML
100 SOLUTION INTRAVENOUS AS NEEDED
Status: DISCONTINUED | OUTPATIENT
Start: 2023-05-30 | End: 2023-06-05

## 2023-05-30 NOTE — CM/SW NOTE
Therapy recommendations are for acute rehabilitation. PMR order placed. Referrals started in [place]. / to remain available for support and/or discharge planning.      Jose Luis Méndez MBA BSN RN [address]  RN Case Manager  [phone]

## 2023-05-31 ENCOUNTER — APPOINTMENT (OUTPATIENT)
Dept: GENERAL RADIOLOGY | Facility: HOSPITAL | Age: 68
End: 2023-05-31
Attending: INTERNAL MEDICINE
Payer: MEDICARE

## 2023-05-31 LAB
ALBUMIN SERPL-MCNC: 2.1 G/DL (ref 3.4–5)
ALBUMIN/GLOB SERPL: 0.7 {RATIO} (ref 1–2)
ALP LIVER SERPL-CCNC: 84 U/L
ALT SERPL-CCNC: 650 U/L
ANION GAP SERPL CALC-SCNC: 11 MMOL/L (ref 0–18)
APTT PPP: 27.3 SECONDS (ref 23.3–35.6)
APTT PPP: 27.5 SECONDS (ref 23.3–35.6)
APTT PPP: 27.9 SECONDS (ref 23.3–35.6)
APTT PPP: 28.5 SECONDS (ref 23.3–35.6)
AST SERPL-CCNC: 125 U/L (ref 15–37)
BASOPHILS # BLD: 0 X10(3) UL (ref 0–0.2)
BASOPHILS NFR BLD: 0 %
BILIRUB SERPL-MCNC: 0.8 MG/DL (ref 0.1–2)
BUN BLD-MCNC: 67 MG/DL (ref 7–18)
BUN/CREAT SERPL: 9.3 (ref 10–20)
CA-I BLD-SCNC: 1.1 MMOL/L (ref 0.95–1.32)
CALCIUM BLD-MCNC: 8.2 MG/DL (ref 8.5–10.1)
CHLORIDE SERPL-SCNC: 102 MMOL/L (ref 98–112)
CO2 SERPL-SCNC: 22 MMOL/L (ref 21–32)
CREAT BLD-MCNC: 7.22 MG/DL
DEPRECATED RDW RBC AUTO: 51.5 FL (ref 35.1–46.3)
DEPRECATED RDW RBC AUTO: 52.2 FL (ref 35.1–46.3)
EOSINOPHIL # BLD: 0.35 X10(3) UL (ref 0–0.7)
EOSINOPHIL NFR BLD: 3 %
ERYTHROCYTE [DISTWIDTH] IN BLOOD BY AUTOMATED COUNT: 15.9 % (ref 11–15)
ERYTHROCYTE [DISTWIDTH] IN BLOOD BY AUTOMATED COUNT: 16.1 % (ref 11–15)
FIBRINOGEN PPP-MCNC: 501 MG/DL (ref 180–480)
GFR SERPLBLD BASED ON 1.73 SQ M-ARVRAT: 8 ML/MIN/1.73M2 (ref 60–?)
GLOBULIN PLAS-MCNC: 3.1 G/DL (ref 2.8–4.4)
GLUCOSE BLD-MCNC: 94 MG/DL (ref 70–99)
HCT VFR BLD AUTO: 25.5 %
HCT VFR BLD AUTO: 25.8 %
HGB BLD-MCNC: 8.7 G/DL
HGB BLD-MCNC: 8.7 G/DL
HGB BLD-MCNC: 8.8 G/DL
HGB BLD-MCNC: 9 G/DL
LYMPHOCYTES NFR BLD: 1.28 X10(3) UL (ref 1–4)
LYMPHOCYTES NFR BLD: 11 %
MCH RBC QN AUTO: 31.1 PG (ref 26–34)
MCH RBC QN AUTO: 31.2 PG (ref 26–34)
MCHC RBC AUTO-ENTMCNC: 33.7 G/DL (ref 31–37)
MCHC RBC AUTO-ENTMCNC: 34.5 G/DL (ref 31–37)
MCV RBC AUTO: 90.1 FL
MCV RBC AUTO: 92.5 FL
MONOCYTES # BLD: 2.44 X10(3) UL (ref 0.1–1)
MONOCYTES NFR BLD: 21 %
NEUTROPHILS # BLD AUTO: 7.08 X10 (3) UL (ref 1.5–7.7)
NEUTROPHILS NFR BLD: 63 %
NEUTS BAND NFR BLD: 2 %
NEUTS HYPERSEG # BLD: 7.54 X10(3) UL (ref 1.5–7.7)
NEUTS VAC BLD QL SMEAR: PRESENT
OSMOLALITY SERPL CALC.SUM OF ELEC: 299 MOSM/KG (ref 275–295)
PLATELET # BLD AUTO: 103 10(3)UL (ref 150–450)
PLATELET # BLD AUTO: 107 10(3)UL (ref 150–450)
PLATELET MORPHOLOGY: NORMAL
POTASSIUM SERPL-SCNC: 4.4 MMOL/L (ref 3.5–5.1)
PROT SERPL-MCNC: 5.2 G/DL (ref 6.4–8.2)
PROTHROMBIN TIME: 13.7 SECONDS (ref 11.6–14.8)
PUNCTURE CHARGE: NO
RBC # BLD AUTO: 2.79 X10(6)UL
RBC # BLD AUTO: 2.83 X10(6)UL
SODIUM SERPL-SCNC: 135 MMOL/L (ref 136–145)
THROMBIN TIME: 17.5 SECONDS (ref 15.2–20.9)
TOTAL CELLS COUNTED BLD: 100
WBC # BLD AUTO: 11.6 X10(3) UL (ref 4–11)
WBC # BLD AUTO: 11.6 X10(3) UL (ref 4–11)

## 2023-05-31 PROCEDURE — 99233 SBSQ HOSP IP/OBS HIGH 50: CPT | Performed by: INTERNAL MEDICINE

## 2023-05-31 PROCEDURE — 99223 1ST HOSP IP/OBS HIGH 75: CPT | Performed by: STUDENT IN AN ORGANIZED HEALTH CARE EDUCATION/TRAINING PROGRAM

## 2023-05-31 PROCEDURE — 71045 X-RAY EXAM CHEST 1 VIEW: CPT | Performed by: INTERNAL MEDICINE

## 2023-05-31 PROCEDURE — 5A1D70Z PERFORMANCE OF URINARY FILTRATION, INTERMITTENT, LESS THAN 6 HOURS PER DAY: ICD-10-PCS | Performed by: INTERNAL MEDICINE

## 2023-05-31 RX ORDER — BISACODYL 10 MG
10 SUPPOSITORY, RECTAL RECTAL
Status: DISCONTINUED | OUTPATIENT
Start: 2023-05-31 | End: 2023-06-13

## 2023-05-31 RX ORDER — POLYETHYLENE GLYCOL 3350 17 G/17G
17 POWDER, FOR SOLUTION ORAL DAILY
Status: DISCONTINUED | OUTPATIENT
Start: 2023-05-31 | End: 2023-06-13

## 2023-05-31 RX ORDER — HEPARIN SODIUM 5000 [USP'U]/ML
5000 INJECTION, SOLUTION INTRAVENOUS; SUBCUTANEOUS EVERY 8 HOURS SCHEDULED
Status: DISCONTINUED | OUTPATIENT
Start: 2023-05-31 | End: 2023-06-01

## 2023-05-31 NOTE — PROGRESS NOTES
[facility]    Progress Note    Rogelio Giordano Patient Status:  Inpatient    1/15/1955 MRN [id number]   Location [facility] 2W/SW Attending Marco Antonio Cazares MD   [facility] Day # 9 PCP Wright Aschoff, DO       Subjective:   Rogelio Giordano is a(n) 76year old male     ROS:     Constitutional: Feeling stronger  ENMT:  Negative for ear drainage, hearing loss and nasal drainage  Eyes:  Negative for eye discharge and vision loss  Cardiovascular:  Negative for chest pain, sob, irregular heartbeat/palpitations  Respiratory:  Negative for cough, dyspnea and wheezing  Gastrointestinal: Complains of constipation  Genitourinary:  Negative for dysuria and hematuria  Endocrine:  Negative for abnormal sleep patterns, increased activity, polydipsia and polyphagia  Hema/Lymph:  Negative for easy bleeding and easy bruising  Integumentary:  Negative for pruritus and rash  Musculoskeletal:  Negative for bone/joint symptoms  Neurological:  Negative for gait disturbance  Psychiatric:  Negative for inappropriate interaction and psychiatric symptoms       23  1400   BP: 145/79   Pulse: 77   Resp: 18   Temp:            PHYSICAL EXAM:   Constitutional: appears well hydrated alert and responsive no acute distress noted  Head/Face: normocephalic  Eyes/Vision: normal extraocular motion is intact  Nose/Mouth/Throat:mucous membranes are moist and no oral lesions are noted  Neck/Thyroid: neck is supple without adenopathy  Lymphatic: no abnormal cervical, supraclavicular adenopathy is noted  Respiratory:  lungs are clear to auscultation bilaterally, normal respiratory effort  Cardiovascular: regular rate and rhythm no murmurs, gallups, or rubs  Abdomen: soft, non-tender, non-distended, BS normal  Vascular: well perfused femoral, and pedal pulses normal  Skin/Hair: no unusual rashes present, no abnormal bruising noted  Back/Spine: no abnormalities noted  Musculoskeletal: full ROM all extremities good strength  no deformities  Extremities: 1+ edema  Neurological:  Grossly normal    Results:     Laboratory Data:  Lab Results   Component Value Date    WBC 11.6 (H) 05/31/2023    HGB 8.7 (L) 05/31/2023    HGB 8.7 (L) 05/31/2023    HCT 25.8 (L) 05/31/2023    .0 (L) 05/31/2023    CREATSERUM 7.22 (H) 05/31/2023    BUN 67 (H) 05/31/2023     (L) 05/31/2023    K 4.4 05/31/2023     05/31/2023    CO2 22.0 05/31/2023    GLU 94 05/31/2023    CA 8.2 (L) 05/31/2023    ALB 2.1 (L) 05/31/2023    ALKPHO 84 05/31/2023    BILT 0.8 05/31/2023    TP 5.2 (L) 05/31/2023     (H) 05/31/2023     (H) 05/31/2023    PTT 27.3 05/31/2023    INR 1.11 05/29/2023    PT 25.7 (H) 09/06/2019    LIP 54 (L) 04/07/2022    MG 1.9 05/30/2023    PHOS 4.3 05/30/2023       Imaging:  [unfilled]   XR CHEST AP PORTABLE  (CPT=71045)    Result Date: 5/31/2023  CONCLUSION:  1. Unchanged chest re-demonstrating mild cardiomegaly and normal pulmonary vascularity. Patchy bibasilar airspace disease suggesting bibasilar pneumonia and or atelectasis with probable small bilateral pleural effusions. Correlate clinically and follow-up studies are advised. Dictated by (CST): Arlyn Hope MD on 5/31/2023 at 9:27 AM     Finalized by (CST): Arlyn Hope MD on 5/31/2023 at 9:29 AM          XR CHEST AP PORTABLE  (CPT=71045)    Result Date: 5/30/2023  CONCLUSION:  1. Cardiomegaly. AVR. 2. Right IJ catheter with the tip at the atrial caval junction. No pneumothorax 3. Bibasilar atelectatic changes and/or lung consolidation worse on the left. 4. Small bilateral effusions.     Dictated by (CST): Karen Box MD on 5/30/2023 at 9:23 AM     Finalized by (CST): Karen Box MD on 5/30/2023 at 9:25 AM              ASSESSMENT/PLAN:   Assessment       1 FADY status post surgical complications hemorrhagic shock doing better urine output still minimal had dialysis today  #2 neuroendocrine tumor per oncology  #3 constipation try MiraLAX prune juice as well as suppository  #4 anemia on Epogen     Discussed with partner      5/31/2023  Juan Daniel Raza.  Petra Everett MD

## 2023-05-31 NOTE — PROGRESS NOTES
[facility]    Progress Note    Jennifer Anderson Patient Status:  Inpatient    1/15/1955 MRN [id number]   Location [facility] 2W/SW Attending Fritz Viramontes MD   [facility] Day # 8 PCP Lynne Duran,        Subjective:   Jennifer Anderson is a(n) 76year old male     ROS:     Constitutional: Very weak  ENMT:  Negative for ear drainage, hearing loss and nasal drainage  Eyes:  Negative for eye discharge and vision loss  Cardiovascular:  Negative for chest pain, sob, irregular heartbeat/palpitations  Respiratory:  Negative for cough, dyspnea and wheezing  Gastrointestinal: Appetite poor  Genitourinary:  Negative for dysuria and hematuria  Endocrine:  Negative for abnormal sleep patterns, increased activity, polydipsia and polyphagia  Hema/Lymph:  Negative for easy bleeding and easy bruising  Integumentary:  Negative for pruritus and rash  Musculoskeletal:  Negative for bone/joint symptoms  Neurological:  Negative for gait disturbance  Psychiatric:  Negative for inappropriate interaction and psychiatric symptoms       23  1800   BP: 137/76   Pulse: 74   Resp: 21   Temp:            PHYSICAL EXAM:   Constitutional: appears well hydrated alert and responsive no acute distress noted  Head/Face: normocephalic  Eyes/Vision: normal extraocular motion is intact  Nose/Mouth/Throat:mucous membranes are moist and no oral lesions are noted  Neck/Thyroid: neck is supple without adenopathy  Lymphatic: no abnormal cervical, supraclavicular adenopathy is noted  Respiratory:  lungs are clear to auscultation bilaterally, normal respiratory effort  Cardiovascular: regular rate and rhythm no murmurs, gallups, or rubs  Abdomen: soft, non-tender, non-distended, BS normal  Vascular: well perfused femoral, and pedal pulses normal  Skin/Hair: no unusual rashes present, no abnormal bruising noted  Back/Spine: no abnormalities noted  Musculoskeletal: full ROM all extremities good strength  no deformities  Extremities: 2+ edema  Neurological:  Grossly normal    Results:     Laboratory Data:  Lab Results   Component Value Date    WBC 10.1 05/30/2023    HGB 7.9 (L) 05/30/2023    HCT 23.4 (L) 05/30/2023    PLT 83.0 (L) 05/30/2023    CREATSERUM 4.62 (H) 05/30/2023    BUN 41 (H) 05/30/2023     05/30/2023    K 3.7 05/30/2023     05/30/2023    CO2 22.0 05/30/2023    GLU 85 05/30/2023    CA 7.3 (L) 05/30/2023    ALB 1.8 (L) 05/30/2023    ALKPHO 86 05/29/2023    BILT 1.2 05/29/2023    TP 4.5 (L) 05/29/2023     (H) 05/29/2023    ALT 1,255 (H) 05/29/2023    PTT 23.9 05/30/2023    INR 1.11 05/29/2023    PT 25.7 (H) 09/06/2019    LIP 54 (L) 04/07/2022    MG 1.9 05/30/2023    PHOS 4.3 05/30/2023       Imaging:  @[email or web address]@   XR CHEST AP PORTABLE  (CPT=71045)    Result Date: 5/30/2023  CONCLUSION:  1. Cardiomegaly. AVR. 2. Right IJ catheter with the tip at the atrial caval junction. No pneumothorax 3. Bibasilar atelectatic changes and/or lung consolidation worse on the left. 4. Small bilateral effusions. Dictated by (CST): Cleo Kirkland MD on 5/30/2023 at 9:23 AM     Finalized by (CST): Cleo Kirkland MD on 5/30/2023 at 9:25 AM              ASSESSMENT/PLAN:   Assessment       #1 neuroendocrine tumor stable  #2 hemorrhagic shock stable  #3 acute renal failure    Urine output minimal discussed with girlfriend [facility] plan dialysis tomorrow     #4 anemia on Epogen      5/30/2023  Donita Burgos MD

## 2023-06-01 ENCOUNTER — APPOINTMENT (OUTPATIENT)
Dept: GENERAL RADIOLOGY | Facility: HOSPITAL | Age: 68
End: 2023-06-01
Attending: INTERNAL MEDICINE
Payer: MEDICARE

## 2023-06-01 LAB
ANION GAP SERPL CALC-SCNC: 11 MMOL/L (ref 0–18)
APTT PPP: 28.3 SECONDS (ref 23.3–35.6)
APTT PPP: 29.7 SECONDS (ref 23.3–35.6)
APTT PPP: 30.7 SECONDS (ref 23.3–35.6)
APTT PPP: 31.9 SECONDS (ref 23.3–35.6)
ATRIAL RATE: 77 BPM
BASOPHILS # BLD AUTO: 0.02 X10(3) UL (ref 0–0.2)
BASOPHILS NFR BLD AUTO: 0.2 %
BUN BLD-MCNC: 54 MG/DL (ref 7–18)
BUN/CREAT SERPL: 8.4 (ref 10–20)
CALCIUM BLD-MCNC: 8.1 MG/DL (ref 8.5–10.1)
CHLORIDE SERPL-SCNC: 100 MMOL/L (ref 98–112)
CO2 SERPL-SCNC: 27 MMOL/L (ref 21–32)
CREAT BLD-MCNC: 6.41 MG/DL
DEPRECATED RDW RBC AUTO: 52.8 FL (ref 35.1–46.3)
EOSINOPHIL # BLD AUTO: 0.26 X10(3) UL (ref 0–0.7)
EOSINOPHIL NFR BLD AUTO: 2.6 %
ERYTHROCYTE [DISTWIDTH] IN BLOOD BY AUTOMATED COUNT: 16.1 % (ref 11–15)
GFR SERPLBLD BASED ON 1.73 SQ M-ARVRAT: 9 ML/MIN/1.73M2 (ref 60–?)
GLUCOSE BLD-MCNC: 100 MG/DL (ref 70–99)
HCT VFR BLD AUTO: 24 %
HGB BLD-MCNC: 8 G/DL
HGB BLD-MCNC: 9 G/DL
IMM GRANULOCYTES # BLD AUTO: 0.15 X10(3) UL (ref 0–1)
IMM GRANULOCYTES NFR BLD: 1.5 %
LYMPHOCYTES # BLD AUTO: 0.89 X10(3) UL (ref 1–4)
LYMPHOCYTES NFR BLD AUTO: 9 %
MCH RBC QN AUTO: 30.9 PG (ref 26–34)
MCHC RBC AUTO-ENTMCNC: 33.3 G/DL (ref 31–37)
MCV RBC AUTO: 92.7 FL
MONOCYTES # BLD AUTO: 2.7 X10(3) UL (ref 0.1–1)
MONOCYTES NFR BLD AUTO: 27.4 %
NEUTROPHILS # BLD AUTO: 5.85 X10 (3) UL (ref 1.5–7.7)
NEUTROPHILS # BLD AUTO: 5.85 X10(3) UL (ref 1.5–7.7)
NEUTROPHILS NFR BLD AUTO: 59.3 %
OSMOLALITY SERPL CALC.SUM OF ELEC: 301 MOSM/KG (ref 275–295)
P AXIS: 56 DEGREES
P-R INTERVAL: 190 MS
PLATELET # BLD AUTO: 103 10(3)UL (ref 150–450)
POTASSIUM SERPL-SCNC: 3.8 MMOL/L (ref 3.5–5.1)
Q-T INTERVAL: 350 MS
QRS DURATION: 90 MS
QTC CALCULATION (BEZET): 396 MS
R AXIS: -24 DEGREES
RBC # BLD AUTO: 2.59 X10(6)UL
SODIUM SERPL-SCNC: 138 MMOL/L (ref 136–145)
T AXIS: 144 DEGREES
VENTRICULAR RATE: 77 BPM
WBC # BLD AUTO: 9.9 X10(3) UL (ref 4–11)

## 2023-06-01 PROCEDURE — 99232 SBSQ HOSP IP/OBS MODERATE 35: CPT | Performed by: STUDENT IN AN ORGANIZED HEALTH CARE EDUCATION/TRAINING PROGRAM

## 2023-06-01 PROCEDURE — 71045 X-RAY EXAM CHEST 1 VIEW: CPT | Performed by: INTERNAL MEDICINE

## 2023-06-01 PROCEDURE — 99232 SBSQ HOSP IP/OBS MODERATE 35: CPT | Performed by: INTERNAL MEDICINE

## 2023-06-01 PROCEDURE — 99233 SBSQ HOSP IP/OBS HIGH 50: CPT | Performed by: INTERNAL MEDICINE

## 2023-06-01 RX ORDER — HEPARIN SODIUM 1000 [USP'U]/ML
1.5 INJECTION, SOLUTION INTRAVENOUS; SUBCUTANEOUS ONCE
Status: DISCONTINUED | OUTPATIENT
Start: 2023-06-01 | End: 2023-06-05

## 2023-06-01 RX ORDER — HEPARIN SODIUM 1000 [USP'U]/ML
3000 INJECTION, SOLUTION INTRAVENOUS; SUBCUTANEOUS ONCE
Status: DISCONTINUED | OUTPATIENT
Start: 2023-06-01 | End: 2023-06-02 | Stop reason: ALTCHOICE

## 2023-06-01 RX ORDER — HEPARIN SODIUM AND DEXTROSE 10000; 5 [USP'U]/100ML; G/100ML
500 INJECTION INTRAVENOUS ONCE
Status: COMPLETED | OUTPATIENT
Start: 2023-06-01 | End: 2023-06-01

## 2023-06-01 RX ORDER — HYDROMORPHONE HYDROCHLORIDE 4 MG/1
TABLET ORAL EVERY 4 HOURS PRN
Status: DISCONTINUED | OUTPATIENT
Start: 2023-06-01 | End: 2023-06-13

## 2023-06-01 RX ORDER — ALBUMIN (HUMAN) 12.5 G/50ML
100 SOLUTION INTRAVENOUS AS NEEDED
Status: DISCONTINUED | OUTPATIENT
Start: 2023-06-01 | End: 2023-06-05

## 2023-06-01 RX ORDER — HEPARIN SODIUM AND DEXTROSE 10000; 5 [USP'U]/100ML; G/100ML
INJECTION INTRAVENOUS CONTINUOUS
Status: DISCONTINUED | OUTPATIENT
Start: 2023-06-01 | End: 2023-06-07

## 2023-06-01 NOTE — OCCUPATIONAL THERAPY NOTE
RN approving of pt participation in therapy. Treatment coordinated w/ PT. Mask worn. Pt received up sitting crossed leg in bed, visitors at bedside. Pt refusing therapy at this time. Pt stated that he felt things were 'moving too fast' w/ discharge planning and that he was not up for being 'challenged'. Treatment deferred per pt.  RN aware

## 2023-06-01 NOTE — PHYSICAL THERAPY NOTE
Attempted to see pt this afternoon, chart reviewed. Pt with family at bedside, declining therapy at this time. States he doesn't feel he is mentally capable of being challenged today. Discussed importance of getting to chair for dinner and to ambulate to restroom with staff. Will follow up tomorrow as schedule allows.      [address], [address] [place] [address]  [phone]

## 2023-06-01 NOTE — PLAN OF CARE
Problem: Patient Centered Care  Goal: Patient preferences are identified and integrated in the patient's plan of care  Description: Interventions:  - What would you like us to know as we care for you? I get cold easily   - Provide timely, complete, and accurate information to patient/family  - Incorporate patient and family knowledge, values, beliefs, and cultural backgrounds into the planning and delivery of care  - Encourage patient/family to participate in care and decision-making at the level they choose  - Honor patient and family perspectives and choices  Outcome: Progressing     Problem: PAIN - ADULT  Goal: Verbalizes/displays adequate comfort level or patient's stated pain goal  Description: INTERVENTIONS:  - Encourage pt to monitor pain and request assistance  - Assess pain using appropriate pain scale  - Administer analgesics based on type and severity of pain and evaluate response  - Implement non-pharmacological measures as appropriate and evaluate response  - Consider cultural and social influences on pain and pain management  - Manage/alleviate anxiety  - Utilize distraction and/or relaxation techniques  - Monitor for opioid side effects  - Notify MD/LIP if interventions unsuccessful or patient reports new pain  - Anticipate increased pain with activity and pre-medicate as appropriate  Outcome: Progressing     Problem: RISK FOR INFECTION - ADULT  Goal: Absence of fever/infection during anticipated neutropenic period  Description: INTERVENTIONS  - Monitor WBC  - Administer growth factors as ordered  - Implement neutropenic guidelines  Outcome: Progressing    Heparin drip started at 500 units/hr. Will stop infusion at 1 AM for hemodialysis cath exchange tomorrow by IR. Regular diet patient is having fair appetite tolerating food denies nausea, had small BM. NPO after midnight. Pain controlled with IVP Dilaudid, PO Dilaudid added to pain management.  Patient up in the room very weak, tolerating activity fairly, plan for discharge to Rehab patient will need outpatient hemodialysis. Fall precautions in place.

## 2023-06-01 NOTE — CM/SW NOTE
Per chart review, PMR rec is acute rehab. No referrals sent as of yet. SW sent AR referrals via [employer], will share choice list with patient when avail. 139pm  SW met with patient at bedside, introduced self and role. Patient alert and oriented at time of visit. Patient is aware of recommendation for acute rehab. States that he is still processing everything and would like to see how he progresses, however is interested in [facility]. Patient asked SW to send paperwork there. SW provided patient with [employer] generated AR choice list. Abeba Aguilar on list). Notified patient that he is tentatively accepted there pending progress/bed avail. Patient expressed understanding. Patient does not have any further questions or concerns at this time. SW provided patient with SW contact information, encouraged him to call with questions. Patient expressed understanding. PLAN: [facility] pending medical progress, therapy participation, HD plan.       TAMELA Whitehead, [facility]    [id number]

## 2023-06-02 ENCOUNTER — APPOINTMENT (OUTPATIENT)
Dept: INTERVENTIONAL RADIOLOGY/VASCULAR | Facility: HOSPITAL | Age: 68
End: 2023-06-02
Attending: PHYSICIAN ASSISTANT
Payer: MEDICARE

## 2023-06-02 LAB
ANION GAP SERPL CALC-SCNC: 13 MMOL/L (ref 0–18)
APTT PPP: 32.2 SECONDS (ref 23.3–35.6)
APTT PPP: 38.1 SECONDS (ref 23.3–35.6)
BASOPHILS # BLD AUTO: 0.03 X10(3) UL (ref 0–0.2)
BASOPHILS NFR BLD AUTO: 0.3 %
BUN BLD-MCNC: 66 MG/DL (ref 7–18)
BUN/CREAT SERPL: 7.7 (ref 10–20)
CALCIUM BLD-MCNC: 8.4 MG/DL (ref 8.5–10.1)
CHLORIDE SERPL-SCNC: 97 MMOL/L (ref 98–112)
CO2 SERPL-SCNC: 24 MMOL/L (ref 21–32)
CREAT BLD-MCNC: 8.57 MG/DL
DEPRECATED RDW RBC AUTO: 54.3 FL (ref 35.1–46.3)
EOSINOPHIL # BLD AUTO: 0.25 X10(3) UL (ref 0–0.7)
EOSINOPHIL NFR BLD AUTO: 2.5 %
ERYTHROCYTE [DISTWIDTH] IN BLOOD BY AUTOMATED COUNT: 16.1 % (ref 11–15)
GFR SERPLBLD BASED ON 1.73 SQ M-ARVRAT: 6 ML/MIN/1.73M2 (ref 60–?)
GLUCOSE BLD-MCNC: 94 MG/DL (ref 70–99)
HCT VFR BLD AUTO: 23.4 %
HGB BLD-MCNC: 7.7 G/DL
HGB BLD-MCNC: 8 G/DL
IMM GRANULOCYTES # BLD AUTO: 0.13 X10(3) UL (ref 0–1)
IMM GRANULOCYTES NFR BLD: 1.3 %
LYMPHOCYTES # BLD AUTO: 0.86 X10(3) UL (ref 1–4)
LYMPHOCYTES NFR BLD AUTO: 8.6 %
MCH RBC QN AUTO: 30.7 PG (ref 26–34)
MCHC RBC AUTO-ENTMCNC: 32.9 G/DL (ref 31–37)
MCV RBC AUTO: 93.2 FL
MONOCYTES # BLD AUTO: 2.13 X10(3) UL (ref 0.1–1)
MONOCYTES NFR BLD AUTO: 21.3 %
NEUTROPHILS # BLD AUTO: 6.61 X10 (3) UL (ref 1.5–7.7)
NEUTROPHILS # BLD AUTO: 6.61 X10(3) UL (ref 1.5–7.7)
NEUTROPHILS NFR BLD AUTO: 66 %
OSMOLALITY SERPL CALC.SUM OF ELEC: 297 MOSM/KG (ref 275–295)
PLATELET # BLD AUTO: 145 10(3)UL (ref 150–450)
POTASSIUM SERPL-SCNC: 4.3 MMOL/L (ref 3.5–5.1)
RBC # BLD AUTO: 2.51 X10(6)UL
SODIUM SERPL-SCNC: 134 MMOL/L (ref 136–145)
WBC # BLD AUTO: 10 X10(3) UL (ref 4–11)

## 2023-06-02 PROCEDURE — 99233 SBSQ HOSP IP/OBS HIGH 50: CPT | Performed by: INTERNAL MEDICINE

## 2023-06-02 PROCEDURE — 0JH63XZ INSERTION OF TUNNELED VASCULAR ACCESS DEVICE INTO CHEST SUBCUTANEOUS TISSUE AND FASCIA, PERCUTANEOUS APPROACH: ICD-10-PCS | Performed by: RADIOLOGY

## 2023-06-02 PROCEDURE — 02HV33Z INSERTION OF INFUSION DEVICE INTO SUPERIOR VENA CAVA, PERCUTANEOUS APPROACH: ICD-10-PCS | Performed by: RADIOLOGY

## 2023-06-02 RX ORDER — MIDAZOLAM HYDROCHLORIDE 1 MG/ML
INJECTION INTRAMUSCULAR; INTRAVENOUS
Status: COMPLETED
Start: 2023-06-02 | End: 2023-06-02

## 2023-06-02 RX ORDER — HEPARIN SODIUM AND DEXTROSE 10000; 5 [USP'U]/100ML; G/100ML
700 INJECTION INTRAVENOUS ONCE
Status: COMPLETED | OUTPATIENT
Start: 2023-06-02 | End: 2023-06-02

## 2023-06-02 RX ORDER — HEPARIN SODIUM 1000 [USP'U]/ML
INJECTION, SOLUTION INTRAVENOUS; SUBCUTANEOUS
Status: COMPLETED
Start: 2023-06-02 | End: 2023-06-02

## 2023-06-02 RX ORDER — CEFAZOLIN SODIUM/WATER 2 G/20 ML
SYRINGE (ML) INTRAVENOUS
Status: COMPLETED
Start: 2023-06-02 | End: 2023-06-02

## 2023-06-02 RX ORDER — LIDOCAINE HYDROCHLORIDE 20 MG/ML
INJECTION, SOLUTION EPIDURAL; INFILTRATION; INTRACAUDAL; PERINEURAL
Status: COMPLETED
Start: 2023-06-02 | End: 2023-06-02

## 2023-06-02 NOTE — PROCEDURES
Post-Operative Note     Procedure Date: 06/02/23    Patient Name: Lupe Sainz    Preoperative Diagnosis: ESRD requiring hemodialysis    Postoperative Diagnosis: As Above. Physician: Tremaine Grant    Anesthesia: Moderate sedation. Ancef    Procedure and Findings: Technically successful right internal jugular temporary dialysis catheter to tunneled dialysis catheter conversion. The tunneled dialysis catheter is ready to use.      Specimen: None    Complication(s): None immediate    Estimated Blood Loss: < 20 cc    Latricia Herrera MD  Interventional Radiologist

## 2023-06-02 NOTE — INTERVAL H&P NOTE
The above referenced H&P was reviewed by Tae Todd. Lakeshia Hampton MD on 6/2/2023, the patient was examined and no significant changes have occurred in the patient's condition since the H&P was performed. Risks, benefits, alternative treatments and consequences of no treatment were discussed. We will proceed with procedure as planned. - Temporary to tunneled HD line conversion    Tae Todd.  Lakeshia Hampton MD  6/2/2023  7:54 AM

## 2023-06-02 NOTE — IVS NOTE
Inpatient Throughput Communication:    Called inpatient RN Barbara Noriega and notified of scheduled procedure Tunneled Catheter placement today at 0800. Verified that appropriate consent is signed: No  Appropriate Consent Signed: No  Access Site Hair Clipped and skin prepped: Not Applicable  Patient has functional IV site: Yes  Patient received all pre-treatment medications: Not Applicable  Family aware of approximate time of procedure: Yes       Pt consented for the procedure.

## 2023-06-02 NOTE — PLAN OF CARE
Problem: Patient Centered Care  Goal: Patient preferences are identified and integrated in the patient's plan of care  Description: Interventions:  - What would you like us to know as we care for you? I get cold easily   - Provide timely, complete, and accurate information to patient/family  - Incorporate patient and family knowledge, values, beliefs, and cultural backgrounds into the planning and delivery of care  - Encourage patient/family to participate in care and decision-making at the level they choose  - Honor patient and family perspectives and choices  Outcome: Progressing     Problem: RISK FOR INFECTION - ADULT  Goal: Absence of fever/infection during anticipated neutropenic period  Description: INTERVENTIONS  - Monitor WBC  - Administer growth factors as ordered  - Implement neutropenic guidelines  Outcome: Progressing     Problem: SAFETY ADULT - FALL  Goal: Free from fall injury  Description: INTERVENTIONS:  - Assess pt frequently for physical needs  - Identify cognitive and physical deficits and behaviors that affect risk of falls. - [place] fall precautions as indicated by assessment.  - Educate pt/family on patient safety including physical limitations  - Instruct pt to call for assistance with activity based on assessment  - Modify environment to reduce risk of injury  - Provide assistive devices as appropriate  - Consider OT/PT consult to assist with strengthening/mobility  - Encourage toileting schedule  Outcome: Progressing   Hemodialysis catheter was exchanged this morning. Patient is receiving HD at present. Had 100 ml of urine output today. Heparin drip is infusing at 700 units /hr next PTT at 19:00, Low dose infusion NO HEPARIN BOLUSES per MD order. Patient complains of left arm pain MD aware patient needs to elevate arm. Low fiber diet , lactose free, tolerating food well, had BM. Patient up in the room with assist and a walker, generalized weakness. Fall precautions in place.

## 2023-06-02 NOTE — CM/SW NOTE
Patient discussed in rounds. Notified by RN that patient will require 1 month of HD. SW notified [facility]. Will need approval for in house HD.      TAMELA Coelho, [facility]    [id number]

## 2023-06-03 LAB
ALBUMIN SERPL-MCNC: 1.9 G/DL (ref 3.4–5)
ALBUMIN/GLOB SERPL: 0.6 {RATIO} (ref 1–2)
ALP LIVER SERPL-CCNC: 74 U/L
ALT SERPL-CCNC: 216 U/L
ANION GAP SERPL CALC-SCNC: 11 MMOL/L (ref 0–18)
APTT PPP: 38.5 SECONDS (ref 23.3–35.6)
APTT PPP: 39.9 SECONDS (ref 23.3–35.6)
APTT PPP: 42.8 SECONDS (ref 23.3–35.6)
AST SERPL-CCNC: 37 U/L (ref 15–37)
BILIRUB SERPL-MCNC: 0.5 MG/DL (ref 0.1–2)
BUN BLD-MCNC: 48 MG/DL (ref 7–18)
BUN/CREAT SERPL: 6.9 (ref 10–20)
CALCIUM BLD-MCNC: 8.1 MG/DL (ref 8.5–10.1)
CHLORIDE SERPL-SCNC: 98 MMOL/L (ref 98–112)
CO2 SERPL-SCNC: 28 MMOL/L (ref 21–32)
CREAT BLD-MCNC: 6.91 MG/DL
DEPRECATED RDW RBC AUTO: 54 FL (ref 35.1–46.3)
ERYTHROCYTE [DISTWIDTH] IN BLOOD BY AUTOMATED COUNT: 16 % (ref 11–15)
GFR SERPLBLD BASED ON 1.73 SQ M-ARVRAT: 8 ML/MIN/1.73M2 (ref 60–?)
GLOBULIN PLAS-MCNC: 3.2 G/DL (ref 2.8–4.4)
GLUCOSE BLD-MCNC: 124 MG/DL (ref 70–99)
HCT VFR BLD AUTO: 22.5 %
HGB BLD-MCNC: 7.4 G/DL
HGB BLD-MCNC: 7.5 G/DL
HGB BLD-MCNC: 8.1 G/DL
MCH RBC QN AUTO: 31.1 PG (ref 26–34)
MCHC RBC AUTO-ENTMCNC: 33.3 G/DL (ref 31–37)
MCV RBC AUTO: 93.4 FL
OSMOLALITY SERPL CALC.SUM OF ELEC: 298 MOSM/KG (ref 275–295)
PLATELET # BLD AUTO: 199 10(3)UL (ref 150–450)
POTASSIUM SERPL-SCNC: 4.1 MMOL/L (ref 3.5–5.1)
PROT SERPL-MCNC: 5.1 G/DL (ref 6.4–8.2)
RBC # BLD AUTO: 2.41 X10(6)UL
SODIUM SERPL-SCNC: 137 MMOL/L (ref 136–145)
TRIGL SERPL-MCNC: 122 MG/DL (ref 30–149)
WBC # BLD AUTO: 9.4 X10(3) UL (ref 4–11)

## 2023-06-03 PROCEDURE — 99233 SBSQ HOSP IP/OBS HIGH 50: CPT | Performed by: INTERNAL MEDICINE

## 2023-06-03 PROCEDURE — 99232 SBSQ HOSP IP/OBS MODERATE 35: CPT | Performed by: INTERNAL MEDICINE

## 2023-06-03 RX ORDER — ACETAMINOPHEN 325 MG/1
325 TABLET ORAL EVERY 6 HOURS PRN
Status: DISCONTINUED | OUTPATIENT
Start: 2023-06-03 | End: 2023-06-13

## 2023-06-04 LAB
ALBUMIN SERPL-MCNC: 1.9 G/DL (ref 3.4–5)
ANION GAP SERPL CALC-SCNC: 11 MMOL/L (ref 0–18)
APTT PPP: 55.6 SECONDS (ref 23.3–35.6)
BUN BLD-MCNC: 59 MG/DL (ref 7–18)
BUN/CREAT SERPL: 6.7 (ref 10–20)
CALCIUM BLD-MCNC: 8.1 MG/DL (ref 8.5–10.1)
CHLORIDE SERPL-SCNC: 97 MMOL/L (ref 98–112)
CO2 SERPL-SCNC: 27 MMOL/L (ref 21–32)
CREAT BLD-MCNC: 8.84 MG/DL
GFR SERPLBLD BASED ON 1.73 SQ M-ARVRAT: 6 ML/MIN/1.73M2 (ref 60–?)
GLUCOSE BLD-MCNC: 102 MG/DL (ref 70–99)
HGB BLD-MCNC: 7.3 G/DL
HGB BLD-MCNC: 7.4 G/DL
OSMOLALITY SERPL CALC.SUM OF ELEC: 297 MOSM/KG (ref 275–295)
PHOSPHATE SERPL-MCNC: 5.5 MG/DL (ref 2.5–4.9)
POTASSIUM SERPL-SCNC: 4.2 MMOL/L (ref 3.5–5.1)
SODIUM SERPL-SCNC: 135 MMOL/L (ref 136–145)

## 2023-06-04 PROCEDURE — 99233 SBSQ HOSP IP/OBS HIGH 50: CPT | Performed by: INTERNAL MEDICINE

## 2023-06-04 PROCEDURE — 99232 SBSQ HOSP IP/OBS MODERATE 35: CPT | Performed by: INTERNAL MEDICINE

## 2023-06-04 RX ORDER — WARFARIN SODIUM 7.5 MG/1
7.5 TABLET ORAL NIGHTLY
Status: DISCONTINUED | OUTPATIENT
Start: 2023-06-04 | End: 2023-06-07

## 2023-06-04 NOTE — PROGRESS NOTES
[facility]    Progress Note    Elisabeth Lakhani Patient Status:  Inpatient    1/15/1955 MRN [id number]   Location [facility] 4W/SW/SE Attending Liyah Darden MD   [facility] Day # 15 PCP Tyree Gibbs DO       Subjective:   Elisabeth Lakhani is a(n) 76year old male     ROS:     Constitutional: Feeling better  ENMT:  Negative for ear drainage, hearing loss and nasal drainage  Eyes:  Negative for eye discharge and vision loss  Cardiovascular:  Negative for chest pain, sob, irregular heartbeat/palpitations  Respiratory:  Negative for cough, dyspnea and wheezing  Gastrointestinal:  Negative for abdominal pain, constipation, decreased appetite, diarrhea and vomiting  Genitourinary:  Negative for dysuria and hematuria  Endocrine:  Negative for abnormal sleep patterns, increased activity, polydipsia and polyphagia  Hema/Lymph:  Negative for easy bleeding and easy bruising  Integumentary:  Negative for pruritus and rash  Musculoskeletal:  Negative for bone/joint symptoms  Neurological:  Negative for gait disturbance  Psychiatric:  Negative for inappropriate interaction and psychiatric symptoms       23  1354   BP: 108/67   Pulse: 90   Resp: 20   Temp:            PHYSICAL EXAM:   Constitutional: appears well hydrated alert and responsive no acute distress noted  Head/Face: normocephalic  Eyes/Vision: normal extraocular motion is intact  Nose/Mouth/Throat:mucous membranes are moist and no oral lesions are noted  Neck/Thyroid: neck is supple without adenopathy  Lymphatic: no abnormal cervical, supraclavicular adenopathy is noted  Respiratory:  lungs are clear to auscultation bilaterally, normal respiratory effort  Cardiovascular: regular rate and rhythm no murmurs, gallups, or rubs  Abdomen: soft, non-tender, non-distended, BS normal  Vascular: well perfused femoral, and pedal pulses normal  Skin/Hair: no unusual rashes present, no abnormal bruising noted  Back/Spine: no abnormalities noted  Musculoskeletal: full ROM all extremities good strength  no deformities  Extremities: no edema, cyanosis  Neurological:  Grossly normal    Results:     Laboratory Data:  Lab Results   Component Value Date    WBC 9.4 06/03/2023    HGB 8.1 (L) 06/03/2023    HCT 22.5 (L) 06/03/2023    .0 06/03/2023    CREATSERUM 6.91 (H) 06/03/2023    BUN 48 (H) 06/03/2023     06/03/2023    K 4.1 06/03/2023    CL 98 06/03/2023    CO2 28.0 06/03/2023     (H) 06/03/2023    CA 8.1 (L) 06/03/2023    ALB 1.9 (L) 06/03/2023    ALKPHO 74 06/03/2023    BILT 0.5 06/03/2023    TP 5.1 (L) 06/03/2023    AST 37 06/03/2023     (H) 06/03/2023    PTT 42.8 (H) 06/03/2023    INR 1.11 05/29/2023    PT 25.7 (H) 09/06/2019    LIP 54 (L) 04/07/2022    MG 1.9 05/30/2023    PHOS 4.3 05/30/2023       Imaging:  [unfilled]    CENTRAL VENOUS ACCESS    Result Date: 6/2/2023  CONCLUSION:   Technically successful temporary right internal jugular dialysis catheter to tunneled right chest wall dialysis catheter exchange as detailed above. The catheter is ready for use. Dictated by (CST): Lucia Cantor MD on 6/02/2023 at 8:51 AM     Finalized by (CST): Lucia Cantor MD on 6/02/2023 at 8:54 AM              ASSESSMENT/PLAN:   Assessment       1 neuroendocrine tumor status post removal  #2 hemorrhagic shock resolved  #3 anemia on Epogen  #4 FADY urine output picking up  Still minimal probably will that need dialysis on Monday  Will need to go to rehab soon           6/3/2023  Víctor Grant.  Mario Fernandez MD

## 2023-06-04 NOTE — PROGRESS NOTES
[facility]    Progress Note    Griselda Quinones Patient Status:  Inpatient    1/15/1955 MRN [id number]   Location [facility] 4W/SW/SE Attending Raji Painter MD   [address] Day # 15 PCP Mckenna Montiel DO       Subjective:   Griselda Quinones is a(n) 76year old male     ROS:     Constitutional:  Negative for decreased activity, fever, irritability and lethargy  ENMT:  Negative for ear drainage, hearing loss and nasal drainage  Eyes:  Negative for eye discharge and vision loss  Cardiovascular:  Negative for chest pain, sob, irregular heartbeat/palpitations  Respiratory:  Negative for cough, dyspnea and wheezing  Gastrointestinal:  Negative for abdominal pain, constipation, decreased appetite, diarrhea and vomiting regaining appetite  Genitourinary:  Negative for dysuria and hematuria  Endocrine:  Negative for abnormal sleep patterns, increased activity, polydipsia and polyphagia  Hema/Lymph:  Negative for easy bleeding and easy bruising  Integumentary:  Negative for pruritus and rash  Musculoskeletal:  Negative for bone/joint symptoms  Neurological:  Negative for gait disturbance ambulated in carbone with walker  Psychiatric:  Negative for inappropriate interaction and psychiatric symptoms       23  1300   BP: 122/73   Pulse: 84   Resp: 18   Temp:            PHYSICAL EXAM:   Constitutional: appears well hydrated alert and responsive no acute distress noted  Head/Face: normocephalic  Eyes/Vision: normal extraocular motion is intact  Nose/Mouth/Throat:mucous membranes are moist and no oral lesions are noted  Neck/Thyroid: neck is supple without adenopathy  Lymphatic: no abnormal cervical, supraclavicular adenopathy is noted  Respiratory:  lungs are clear to auscultation bilaterally, normal respiratory effort  Cardiovascular: regular rate and rhythm no murmurs, gallups, or rubs  Abdomen: soft, non-tender, non-distended, BS normal  Vascular: well perfused femoral, and pedal pulses normal  Skin/Hair: no unusual rashes present, no abnormal bruising noted  Back/Spine: no abnormalities noted  Musculoskeletal: full ROM all extremities good strength  no deformities  Extremities: 1+ edema  Neurological:  Grossly normal    Results:     Laboratory Data:  Lab Results   Component Value Date    WBC 9.4 06/03/2023    HGB 7.3 (L) 06/04/2023    HCT 22.5 (L) 06/03/2023    .0 06/03/2023    CREATSERUM 8.84 (H) 06/04/2023    BUN 59 (H) 06/04/2023     (L) 06/04/2023    K 4.2 06/04/2023    CL 97 (L) 06/04/2023    CO2 27.0 06/04/2023     (H) 06/04/2023    CA 8.1 (L) 06/04/2023    ALB 1.9 (L) 06/04/2023    ALKPHO 74 06/03/2023    BILT 0.5 06/03/2023    TP 5.1 (L) 06/03/2023    AST 37 06/03/2023     (H) 06/03/2023    PTT 55.6 (H) 06/04/2023    INR 1.11 05/29/2023    PT 25.7 (H) 09/06/2019    LIP 54 (L) 04/07/2022    MG 1.9 05/30/2023    PHOS 5.5 (H) 06/04/2023       Imaging:  [unfilled]   No results found. ASSESSMENT/PLAN:   Assessment       #1 neuroendocrine tumor removed follow-up with oncology  #2 anemia on Epogen  #3 weakness will go to rehab regaining strength  #4 FADY urine output improving but not enough plan dialysis tomorrow  #5 anemia on Epogen watch hemoglobin  Discussed with patient             6/4/2023  Redmond Dike L. Chaney Koyanagi, MD

## 2023-06-05 LAB
ALBUMIN SERPL-MCNC: 1.8 G/DL (ref 3.4–5)
ALBUMIN/GLOB SERPL: 0.5 {RATIO} (ref 1–2)
ALP LIVER SERPL-CCNC: 69 U/L
ALT SERPL-CCNC: 82 U/L
ANION GAP SERPL CALC-SCNC: 10 MMOL/L (ref 0–18)
ANION GAP SERPL CALC-SCNC: 10 MMOL/L (ref 0–18)
APTT PPP: 60.4 SECONDS (ref 23.3–35.6)
AST SERPL-CCNC: 27 U/L (ref 15–37)
BASOPHILS # BLD AUTO: 0.05 X10(3) UL (ref 0–0.2)
BASOPHILS NFR BLD AUTO: 0.5 %
BILIRUB SERPL-MCNC: 0.5 MG/DL (ref 0.1–2)
BUN BLD-MCNC: 67 MG/DL (ref 7–18)
BUN BLD-MCNC: 67 MG/DL (ref 7–18)
BUN/CREAT SERPL: 6.2 (ref 10–20)
BUN/CREAT SERPL: 6.2 (ref 10–20)
CALCIUM BLD-MCNC: 8.5 MG/DL (ref 8.5–10.1)
CALCIUM BLD-MCNC: 8.5 MG/DL (ref 8.5–10.1)
CHLORIDE SERPL-SCNC: 96 MMOL/L (ref 98–112)
CHLORIDE SERPL-SCNC: 96 MMOL/L (ref 98–112)
CO2 SERPL-SCNC: 24 MMOL/L (ref 21–32)
CO2 SERPL-SCNC: 24 MMOL/L (ref 21–32)
CREAT BLD-MCNC: 10.8 MG/DL
CREAT BLD-MCNC: 10.8 MG/DL
DEPRECATED HBV CORE AB SER IA-ACNC: 542.4 NG/ML
DEPRECATED RDW RBC AUTO: 54.3 FL (ref 35.1–46.3)
EOSINOPHIL # BLD AUTO: 0.37 X10(3) UL (ref 0–0.7)
EOSINOPHIL NFR BLD AUTO: 3.6 %
ERYTHROCYTE [DISTWIDTH] IN BLOOD BY AUTOMATED COUNT: 15.9 % (ref 11–15)
GFR SERPLBLD BASED ON 1.73 SQ M-ARVRAT: 5 ML/MIN/1.73M2 (ref 60–?)
GFR SERPLBLD BASED ON 1.73 SQ M-ARVRAT: 5 ML/MIN/1.73M2 (ref 60–?)
GLOBULIN PLAS-MCNC: 3.6 G/DL (ref 2.8–4.4)
GLUCOSE BLD-MCNC: 86 MG/DL (ref 70–99)
GLUCOSE BLD-MCNC: 86 MG/DL (ref 70–99)
HCT VFR BLD AUTO: 22.4 %
HGB BLD-MCNC: 7.2 G/DL
HGB BLD-MCNC: 7.3 G/DL
HGB BLD-MCNC: 7.9 G/DL
IMM GRANULOCYTES # BLD AUTO: 0.2 X10(3) UL (ref 0–1)
IMM GRANULOCYTES NFR BLD: 1.9 %
INR BLD: 1.18 (ref 0.85–1.16)
IRON SATN MFR SERPL: 8 %
IRON SERPL-MCNC: 17 UG/DL
LYMPHOCYTES # BLD AUTO: 1.17 X10(3) UL (ref 1–4)
LYMPHOCYTES NFR BLD AUTO: 11.2 %
MCH RBC QN AUTO: 30.8 PG (ref 26–34)
MCHC RBC AUTO-ENTMCNC: 32.6 G/DL (ref 31–37)
MCV RBC AUTO: 94.5 FL
MONOCYTES # BLD AUTO: 1.82 X10(3) UL (ref 0.1–1)
MONOCYTES NFR BLD AUTO: 17.5 %
NEUTROPHILS # BLD AUTO: 6.81 X10 (3) UL (ref 1.5–7.7)
NEUTROPHILS # BLD AUTO: 6.81 X10(3) UL (ref 1.5–7.7)
NEUTROPHILS NFR BLD AUTO: 65.3 %
OSMOLALITY SERPL CALC.SUM OF ELEC: 289 MOSM/KG (ref 275–295)
OSMOLALITY SERPL CALC.SUM OF ELEC: 289 MOSM/KG (ref 275–295)
PLATELET # BLD AUTO: 357 10(3)UL (ref 150–450)
POTASSIUM SERPL-SCNC: 4.5 MMOL/L (ref 3.5–5.1)
POTASSIUM SERPL-SCNC: 4.5 MMOL/L (ref 3.5–5.1)
PROT SERPL-MCNC: 5.4 G/DL (ref 6.4–8.2)
PROTHROMBIN TIME: 14.9 SECONDS (ref 11.6–14.8)
RBC # BLD AUTO: 2.37 X10(6)UL
SODIUM SERPL-SCNC: 130 MMOL/L (ref 136–145)
SODIUM SERPL-SCNC: 130 MMOL/L (ref 136–145)
TIBC SERPL-MCNC: 215 UG/DL (ref 240–450)
TRANSFERRIN SERPL-MCNC: 144 MG/DL (ref 200–360)
WBC # BLD AUTO: 10.4 X10(3) UL (ref 4–11)

## 2023-06-05 PROCEDURE — 99233 SBSQ HOSP IP/OBS HIGH 50: CPT | Performed by: INTERNAL MEDICINE

## 2023-06-05 PROCEDURE — 99232 SBSQ HOSP IP/OBS MODERATE 35: CPT | Performed by: INTERNAL MEDICINE

## 2023-06-05 RX ORDER — ALBUMIN (HUMAN) 12.5 G/50ML
100 SOLUTION INTRAVENOUS AS NEEDED
Status: DISCONTINUED | OUTPATIENT
Start: 2023-06-05 | End: 2023-06-07 | Stop reason: ALTCHOICE

## 2023-06-05 RX ORDER — HEPARIN SODIUM 1000 [USP'U]/ML
1.5 INJECTION, SOLUTION INTRAVENOUS; SUBCUTANEOUS ONCE
Status: DISCONTINUED | OUTPATIENT
Start: 2023-06-05 | End: 2023-06-07 | Stop reason: ALTCHOICE

## 2023-06-05 NOTE — CM/SW NOTE
Updates sent to [address] lab including HD flow sheets. Called and spoke with Bia at [facility] who is following clinical updates. Bia aware of need for onsite HD, will notify SW of addition onsite HD requirements after speaking with [facility] team.     159pm  Confirmed [facility] in house HD checklist with Bia. SW sent requested clinical information: Recent H&P, Recent Chest X ray report, Recent EKG, Initial neph consult, most recent neph consult, MAR, CBC, CMP    Will still need the following for in house HD at [facility]:  -3 HD flow sheets (2/3 sent)  -HEP B screen (HBsAg, anti-HBs, anti-HBc) (w/in 30d)  -HEP C screen (w/in 6 mo)  -Confirmation of HD order set    Notified MD and RN of the above needs. 429pm  Met with patient and friend, Migel Lockwood at bedside to provide update, answered all questions. PLAN: [address] pending medical progress, therapy participation, acceptance to in house HD.      NEED:   -3 HD flow sheets (2/3 sent)  -HEP B screen (HBsAg, anti-HBs, anti-HBc) (w/in 30d)  -HEP C screen (w/in 6 mo)  -Confirmation of HD order set        TAMELA Gonzalez, [facility]    [id number]

## 2023-06-06 LAB
ANION GAP SERPL CALC-SCNC: 9 MMOL/L (ref 0–18)
APTT PPP: 61.4 SECONDS (ref 23.3–35.6)
BASOPHILS # BLD AUTO: 0.06 X10(3) UL (ref 0–0.2)
BASOPHILS NFR BLD AUTO: 0.6 %
BUN BLD-MCNC: 44 MG/DL (ref 7–18)
BUN/CREAT SERPL: 5.2 (ref 10–20)
CALCIUM BLD-MCNC: 8.6 MG/DL (ref 8.5–10.1)
CHLORIDE SERPL-SCNC: 98 MMOL/L (ref 98–112)
CO2 SERPL-SCNC: 29 MMOL/L (ref 21–32)
CREAT BLD-MCNC: 8.47 MG/DL
DEPRECATED RDW RBC AUTO: 54.3 FL (ref 35.1–46.3)
EOSINOPHIL # BLD AUTO: 0.32 X10(3) UL (ref 0–0.7)
EOSINOPHIL NFR BLD AUTO: 3.4 %
ERYTHROCYTE [DISTWIDTH] IN BLOOD BY AUTOMATED COUNT: 15.9 % (ref 11–15)
GFR SERPLBLD BASED ON 1.73 SQ M-ARVRAT: 6 ML/MIN/1.73M2 (ref 60–?)
GLUCOSE BLD-MCNC: 106 MG/DL (ref 70–99)
HCT VFR BLD AUTO: 21.5 %
HCT VFR BLD AUTO: 22.7 %
HGB BLD-MCNC: 7 G/DL
HGB BLD-MCNC: 7 G/DL
HGB BLD-MCNC: 7.4 G/DL
IMM GRANULOCYTES # BLD AUTO: 0.17 X10(3) UL (ref 0–1)
IMM GRANULOCYTES NFR BLD: 1.8 %
INR BLD: 1.36 (ref 0.85–1.16)
LYMPHOCYTES # BLD AUTO: 1.1 X10(3) UL (ref 1–4)
LYMPHOCYTES NFR BLD AUTO: 11.7 %
MCH RBC QN AUTO: 30.8 PG (ref 26–34)
MCHC RBC AUTO-ENTMCNC: 32.6 G/DL (ref 31–37)
MCV RBC AUTO: 94.7 FL
MONOCYTES # BLD AUTO: 2.15 X10(3) UL (ref 0.1–1)
MONOCYTES NFR BLD AUTO: 22.8 %
NEUTROPHILS # BLD AUTO: 5.64 X10 (3) UL (ref 1.5–7.7)
NEUTROPHILS # BLD AUTO: 5.64 X10(3) UL (ref 1.5–7.7)
NEUTROPHILS NFR BLD AUTO: 59.7 %
OSMOLALITY SERPL CALC.SUM OF ELEC: 294 MOSM/KG (ref 275–295)
PLATELET # BLD AUTO: 397 10(3)UL (ref 150–450)
POTASSIUM SERPL-SCNC: 4.4 MMOL/L (ref 3.5–5.1)
PROTHROMBIN TIME: 16.6 SECONDS (ref 11.6–14.8)
RBC # BLD AUTO: 2.27 X10(6)UL
SODIUM SERPL-SCNC: 136 MMOL/L (ref 136–145)
WBC # BLD AUTO: 9.4 X10(3) UL (ref 4–11)

## 2023-06-06 PROCEDURE — 99232 SBSQ HOSP IP/OBS MODERATE 35: CPT | Performed by: INTERNAL MEDICINE

## 2023-06-06 PROCEDURE — 99233 SBSQ HOSP IP/OBS HIGH 50: CPT | Performed by: INTERNAL MEDICINE

## 2023-06-06 RX ORDER — ALBUMIN (HUMAN) 12.5 G/50ML
100 SOLUTION INTRAVENOUS AS NEEDED
Status: DISCONTINUED | OUTPATIENT
Start: 2023-06-06 | End: 2023-06-13

## 2023-06-06 RX ORDER — HEPARIN SODIUM 1000 [USP'U]/ML
1.5 INJECTION, SOLUTION INTRAVENOUS; SUBCUTANEOUS ONCE
Status: DISCONTINUED | OUTPATIENT
Start: 2023-06-06 | End: 2023-06-13

## 2023-06-07 LAB
ANION GAP SERPL CALC-SCNC: 11 MMOL/L (ref 0–18)
APTT PPP: 79.9 SECONDS (ref 23.3–35.6)
BASOPHILS # BLD AUTO: 0.06 X10(3) UL (ref 0–0.2)
BASOPHILS NFR BLD AUTO: 0.6 %
BUN BLD-MCNC: 50 MG/DL (ref 7–18)
BUN/CREAT SERPL: 5 (ref 10–20)
CALCIUM BLD-MCNC: 8.6 MG/DL (ref 8.5–10.1)
CHLORIDE SERPL-SCNC: 97 MMOL/L (ref 98–112)
CO2 SERPL-SCNC: 28 MMOL/L (ref 21–32)
CREAT BLD-MCNC: 10.1 MG/DL
DEPRECATED RDW RBC AUTO: 53.6 FL (ref 35.1–46.3)
EOSINOPHIL # BLD AUTO: 0.31 X10(3) UL (ref 0–0.7)
EOSINOPHIL NFR BLD AUTO: 3.3 %
ERYTHROCYTE [DISTWIDTH] IN BLOOD BY AUTOMATED COUNT: 15.6 % (ref 11–15)
GFR SERPLBLD BASED ON 1.73 SQ M-ARVRAT: 5 ML/MIN/1.73M2 (ref 60–?)
GLUCOSE BLD-MCNC: 106 MG/DL (ref 70–99)
HCT VFR BLD AUTO: 22.9 %
HGB BLD-MCNC: 7.4 G/DL
IMM GRANULOCYTES # BLD AUTO: 0.17 X10(3) UL (ref 0–1)
IMM GRANULOCYTES NFR BLD: 1.8 %
INR BLD: 2.18 (ref 0.85–1.16)
LYMPHOCYTES # BLD AUTO: 1.1 X10(3) UL (ref 1–4)
LYMPHOCYTES NFR BLD AUTO: 11.8 %
MCH RBC QN AUTO: 30.5 PG (ref 26–34)
MCHC RBC AUTO-ENTMCNC: 32.3 G/DL (ref 31–37)
MCV RBC AUTO: 94.2 FL
MONOCYTES # BLD AUTO: 2.24 X10(3) UL (ref 0.1–1)
MONOCYTES NFR BLD AUTO: 23.9 %
NEUTROPHILS # BLD AUTO: 5.48 X10 (3) UL (ref 1.5–7.7)
NEUTROPHILS # BLD AUTO: 5.48 X10(3) UL (ref 1.5–7.7)
NEUTROPHILS NFR BLD AUTO: 58.6 %
OSMOLALITY SERPL CALC.SUM OF ELEC: 296 MOSM/KG (ref 275–295)
PLATELET # BLD AUTO: 407 10(3)UL (ref 150–450)
POTASSIUM SERPL-SCNC: 4.2 MMOL/L (ref 3.5–5.1)
PROTHROMBIN TIME: 23.9 SECONDS (ref 11.6–14.8)
RBC # BLD AUTO: 2.43 X10(6)UL
SODIUM SERPL-SCNC: 136 MMOL/L (ref 136–145)
WBC # BLD AUTO: 9.4 X10(3) UL (ref 4–11)

## 2023-06-07 PROCEDURE — 99233 SBSQ HOSP IP/OBS HIGH 50: CPT | Performed by: HOSPITALIST

## 2023-06-07 PROCEDURE — 90935 HEMODIALYSIS ONE EVALUATION: CPT | Performed by: INTERNAL MEDICINE

## 2023-06-07 PROCEDURE — 99232 SBSQ HOSP IP/OBS MODERATE 35: CPT | Performed by: INTERNAL MEDICINE

## 2023-06-07 RX ORDER — WARFARIN SODIUM 3 MG/1
3 TABLET ORAL NIGHTLY
Status: DISCONTINUED | OUTPATIENT
Start: 2023-06-07 | End: 2023-06-09

## 2023-06-07 RX ORDER — HEPARIN SODIUM 1000 [USP'U]/ML
INJECTION, SOLUTION INTRAVENOUS; SUBCUTANEOUS
Status: DISPENSED
Start: 2023-06-07 | End: 2023-06-07

## 2023-06-08 ENCOUNTER — APPOINTMENT (OUTPATIENT)
Dept: GENERAL RADIOLOGY | Facility: HOSPITAL | Age: 68
End: 2023-06-08
Attending: HOSPITALIST
Payer: MEDICARE

## 2023-06-08 LAB
ANION GAP SERPL CALC-SCNC: 9 MMOL/L (ref 0–18)
BASOPHILS # BLD: 0.19 X10(3) UL (ref 0–0.2)
BASOPHILS NFR BLD: 2 %
BUN BLD-MCNC: 26 MG/DL (ref 7–18)
BUN/CREAT SERPL: 3.6 (ref 10–20)
CALCIUM BLD-MCNC: 8.5 MG/DL (ref 8.5–10.1)
CHLORIDE SERPL-SCNC: 101 MMOL/L (ref 98–112)
CO2 SERPL-SCNC: 28 MMOL/L (ref 21–32)
CREAT BLD-MCNC: 7.24 MG/DL
DEPRECATED RDW RBC AUTO: 53.5 FL (ref 35.1–46.3)
EOSINOPHIL # BLD: 0.38 X10(3) UL (ref 0–0.7)
EOSINOPHIL NFR BLD: 4 %
ERYTHROCYTE [DISTWIDTH] IN BLOOD BY AUTOMATED COUNT: 15.8 % (ref 11–15)
GFR SERPLBLD BASED ON 1.73 SQ M-ARVRAT: 8 ML/MIN/1.73M2 (ref 60–?)
GLUCOSE BLD-MCNC: 105 MG/DL (ref 70–99)
HAV AB SER QL IA: REACTIVE
HAV IGM SER QL: NONREACTIVE
HBV CORE AB SERPL QL IA: NONREACTIVE
HBV SURFACE AB SER QL: NONREACTIVE
HBV SURFACE AB SERPL IA-ACNC: <3.1 MIU/ML
HBV SURFACE AG SERPL QL IA: NONREACTIVE
HCT VFR BLD AUTO: 21.7 %
HCV AB SERPL QL IA: NONREACTIVE
HGB BLD-MCNC: 7 G/DL
HGB BLD-MCNC: 7.7 G/DL
INR BLD: 2.6 (ref 0.85–1.16)
LYMPHOCYTES NFR BLD: 1.14 X10(3) UL (ref 1–4)
LYMPHOCYTES NFR BLD: 12 %
MAGNESIUM SERPL-MCNC: 1.9 MG/DL (ref 1.6–2.6)
MCH RBC QN AUTO: 30.6 PG (ref 26–34)
MCHC RBC AUTO-ENTMCNC: 32.3 G/DL (ref 31–37)
MCV RBC AUTO: 94.8 FL
MONOCYTES # BLD: 1.71 X10(3) UL (ref 0.1–1)
MONOCYTES NFR BLD: 18 %
NEUTROPHILS # BLD AUTO: 5.49 X10 (3) UL (ref 1.5–7.7)
NEUTROPHILS NFR BLD: 64 %
NEUTS HYPERSEG # BLD: 6.08 X10(3) UL (ref 1.5–7.7)
OSMOLALITY SERPL CALC.SUM OF ELEC: 291 MOSM/KG (ref 275–295)
PHOSPHATE SERPL-MCNC: 4.5 MG/DL (ref 2.5–4.9)
PLATELET # BLD AUTO: 398 10(3)UL (ref 150–450)
PLATELET MORPHOLOGY: NORMAL
POTASSIUM SERPL-SCNC: 3.7 MMOL/L (ref 3.5–5.1)
PROTHROMBIN TIME: 27.3 SECONDS (ref 11.6–14.8)
RBC # BLD AUTO: 2.29 X10(6)UL
SODIUM SERPL-SCNC: 138 MMOL/L (ref 136–145)
TOTAL CELLS COUNTED BLD: 100
TRIGL SERPL-MCNC: 168 MG/DL (ref 30–149)
WBC # BLD AUTO: 9.5 X10(3) UL (ref 4–11)

## 2023-06-08 PROCEDURE — 99232 SBSQ HOSP IP/OBS MODERATE 35: CPT | Performed by: INTERNAL MEDICINE

## 2023-06-08 PROCEDURE — 71045 X-RAY EXAM CHEST 1 VIEW: CPT | Performed by: HOSPITALIST

## 2023-06-08 PROCEDURE — 99233 SBSQ HOSP IP/OBS HIGH 50: CPT | Performed by: HOSPITALIST

## 2023-06-08 RX ORDER — FUROSEMIDE 40 MG/1
40 TABLET ORAL ONCE
Status: COMPLETED | OUTPATIENT
Start: 2023-06-08 | End: 2023-06-08

## 2023-06-09 ENCOUNTER — APPOINTMENT (OUTPATIENT)
Dept: GENERAL RADIOLOGY | Facility: HOSPITAL | Age: 68
End: 2023-06-09
Attending: HOSPITALIST
Payer: MEDICARE

## 2023-06-09 LAB
ANION GAP SERPL CALC-SCNC: 10 MMOL/L (ref 0–18)
BASOPHILS # BLD AUTO: 0.08 X10(3) UL (ref 0–0.2)
BASOPHILS NFR BLD AUTO: 0.7 %
BUN BLD-MCNC: 28 MG/DL (ref 7–18)
BUN/CREAT SERPL: 3.1 (ref 10–20)
CALCIUM BLD-MCNC: 8.2 MG/DL (ref 8.5–10.1)
CHLORIDE SERPL-SCNC: 100 MMOL/L (ref 98–112)
CO2 SERPL-SCNC: 27 MMOL/L (ref 21–32)
CREAT BLD-MCNC: 8.93 MG/DL
DEPRECATED RDW RBC AUTO: 54.9 FL (ref 35.1–46.3)
EOSINOPHIL # BLD AUTO: 0.16 X10(3) UL (ref 0–0.7)
EOSINOPHIL NFR BLD AUTO: 1.4 %
ERYTHROCYTE [DISTWIDTH] IN BLOOD BY AUTOMATED COUNT: 15.9 % (ref 11–15)
GFR SERPLBLD BASED ON 1.73 SQ M-ARVRAT: 6 ML/MIN/1.73M2 (ref 60–?)
GLUCOSE BLD-MCNC: 87 MG/DL (ref 70–99)
HCT VFR BLD AUTO: 23.3 %
HGB BLD-MCNC: 7.3 G/DL
IMM GRANULOCYTES # BLD AUTO: 0.17 X10(3) UL (ref 0–1)
IMM GRANULOCYTES NFR BLD: 1.5 %
INR BLD: 2.86 (ref 0.85–1.16)
LYMPHOCYTES # BLD AUTO: 1.28 X10(3) UL (ref 1–4)
LYMPHOCYTES NFR BLD AUTO: 11.1 %
MAGNESIUM SERPL-MCNC: 2 MG/DL (ref 1.6–2.6)
MCH RBC QN AUTO: 30.3 PG (ref 26–34)
MCHC RBC AUTO-ENTMCNC: 31.3 G/DL (ref 31–37)
MCV RBC AUTO: 96.7 FL
MONOCYTES # BLD AUTO: 2.71 X10(3) UL (ref 0.1–1)
MONOCYTES NFR BLD AUTO: 23.6 %
NEUTROPHILS # BLD AUTO: 7.1 X10 (3) UL (ref 1.5–7.7)
NEUTROPHILS # BLD AUTO: 7.1 X10(3) UL (ref 1.5–7.7)
NEUTROPHILS NFR BLD AUTO: 61.7 %
OSMOLALITY SERPL CALC.SUM OF ELEC: 289 MOSM/KG (ref 275–295)
PHOSPHATE SERPL-MCNC: 5.1 MG/DL (ref 2.5–4.9)
PLATELET # BLD AUTO: 436 10(3)UL (ref 150–450)
POTASSIUM SERPL-SCNC: 3.7 MMOL/L (ref 3.5–5.1)
PROTHROMBIN TIME: 29.4 SECONDS (ref 11.6–14.8)
RBC # BLD AUTO: 2.41 X10(6)UL
SODIUM SERPL-SCNC: 137 MMOL/L (ref 136–145)
WBC # BLD AUTO: 11.5 X10(3) UL (ref 4–11)

## 2023-06-09 PROCEDURE — 99233 SBSQ HOSP IP/OBS HIGH 50: CPT | Performed by: INTERNAL MEDICINE

## 2023-06-09 PROCEDURE — 71045 X-RAY EXAM CHEST 1 VIEW: CPT | Performed by: HOSPITALIST

## 2023-06-09 PROCEDURE — 99232 SBSQ HOSP IP/OBS MODERATE 35: CPT | Performed by: INTERNAL MEDICINE

## 2023-06-09 PROCEDURE — 99233 SBSQ HOSP IP/OBS HIGH 50: CPT | Performed by: HOSPITALIST

## 2023-06-09 RX ORDER — WARFARIN SODIUM 2 MG/1
2 TABLET ORAL NIGHTLY
Status: DISCONTINUED | OUTPATIENT
Start: 2023-06-09 | End: 2023-06-13

## 2023-06-09 RX ORDER — WARFARIN SODIUM 2 MG/1
2 TABLET ORAL NIGHTLY
Qty: 30 TABLET | Refills: 3 | Status: SHIPPED | OUTPATIENT
Start: 2023-06-09

## 2023-06-09 RX ORDER — FUROSEMIDE 40 MG/1
40 TABLET ORAL DAILY
Status: DISCONTINUED | OUTPATIENT
Start: 2023-06-09 | End: 2023-06-13

## 2023-06-09 RX ORDER — PANTOPRAZOLE SODIUM 40 MG/1
40 TABLET, DELAYED RELEASE ORAL DAILY
Status: DISCONTINUED | OUTPATIENT
Start: 2023-06-09 | End: 2023-06-13

## 2023-06-09 NOTE — PROGRESS NOTES
[facility] Note: Route Optimization for Pantoprazole (Protonix)    Patient is currently on Pantoprazole (Protonix) 40 mg IV every 24 hours. The patient meets the criteria to convert to the oral equivalent as established by the IV to Oral conversion protocol approved by the P&T committee. Medication was changed from IV formulation to pantoprazole (Protonix)  40 mg PO every 24 hours per protocol.       Eliceo Salvador PharmD, BCPS  Phone [id number]

## 2023-06-09 NOTE — PROGRESS NOTES
Brief progress note. Hemoglobin stable. INR at goal.  Medically clear for discharge, awaiting outpatient dialysis chair and placement. He will follow-up with myself and surgical oncology in the outpatient setting. No need for further hematologic input while awaiting discharge. I will sign off at this time.     Ray Lal, [facility]

## 2023-06-10 LAB
ANION GAP SERPL CALC-SCNC: 10 MMOL/L (ref 0–18)
BASOPHILS # BLD AUTO: 0.12 X10(3) UL (ref 0–0.2)
BASOPHILS NFR BLD AUTO: 1.1 %
BUN BLD-MCNC: 19 MG/DL (ref 7–18)
BUN/CREAT SERPL: 3.1 (ref 10–20)
CALCIUM BLD-MCNC: 8.4 MG/DL (ref 8.5–10.1)
CHLORIDE SERPL-SCNC: 103 MMOL/L (ref 98–112)
CO2 SERPL-SCNC: 25 MMOL/L (ref 21–32)
CREAT BLD-MCNC: 6.16 MG/DL
DEPRECATED RDW RBC AUTO: 56.6 FL (ref 35.1–46.3)
EOSINOPHIL # BLD AUTO: 0.17 X10(3) UL (ref 0–0.7)
EOSINOPHIL NFR BLD AUTO: 1.5 %
ERYTHROCYTE [DISTWIDTH] IN BLOOD BY AUTOMATED COUNT: 16.2 % (ref 11–15)
GFR SERPLBLD BASED ON 1.73 SQ M-ARVRAT: 9 ML/MIN/1.73M2 (ref 60–?)
GLUCOSE BLD-MCNC: 91 MG/DL (ref 70–99)
HCT VFR BLD AUTO: 23.7 %
HGB BLD-MCNC: 7.4 G/DL
IMM GRANULOCYTES # BLD AUTO: 0.17 X10(3) UL (ref 0–1)
IMM GRANULOCYTES NFR BLD: 1.5 %
INR BLD: 2.67 (ref 0.85–1.16)
LYMPHOCYTES # BLD AUTO: 1.26 X10(3) UL (ref 1–4)
LYMPHOCYTES NFR BLD AUTO: 11.1 %
MAGNESIUM SERPL-MCNC: 1.9 MG/DL (ref 1.6–2.6)
MCH RBC QN AUTO: 30.5 PG (ref 26–34)
MCHC RBC AUTO-ENTMCNC: 31.2 G/DL (ref 31–37)
MCV RBC AUTO: 97.5 FL
MONOCYTES # BLD AUTO: 2.79 X10(3) UL (ref 0.1–1)
MONOCYTES NFR BLD AUTO: 24.7 %
NEUTROPHILS # BLD AUTO: 6.8 X10 (3) UL (ref 1.5–7.7)
NEUTROPHILS # BLD AUTO: 6.8 X10(3) UL (ref 1.5–7.7)
NEUTROPHILS NFR BLD AUTO: 60.1 %
OSMOLALITY SERPL CALC.SUM OF ELEC: 288 MOSM/KG (ref 275–295)
PLATELET # BLD AUTO: 420 10(3)UL (ref 150–450)
POTASSIUM SERPL-SCNC: 3.8 MMOL/L (ref 3.5–5.1)
PROTHROMBIN TIME: 27.9 SECONDS (ref 11.6–14.8)
RBC # BLD AUTO: 2.43 X10(6)UL
SODIUM SERPL-SCNC: 138 MMOL/L (ref 136–145)
WBC # BLD AUTO: 11.3 X10(3) UL (ref 4–11)

## 2023-06-10 PROCEDURE — 99232 SBSQ HOSP IP/OBS MODERATE 35: CPT | Performed by: INTERNAL MEDICINE

## 2023-06-10 PROCEDURE — 99233 SBSQ HOSP IP/OBS HIGH 50: CPT | Performed by: HOSPITALIST

## 2023-06-10 NOTE — PLAN OF CARE
Problem: Patient Centered Care  Goal: Patient preferences are identified and integrated in the patient's plan of care  Description: Interventions:  - What would you like us to know as we care for you? I get cold easily   - Provide timely, complete, and accurate information to patient/family  - Incorporate patient and family knowledge, values, beliefs, and cultural backgrounds into the planning and delivery of care  - Encourage patient/family to participate in care and decision-making at the level they choose  - Honor patient and family perspectives and choices  Outcome: Progressing     Problem: PAIN - ADULT  Goal: Verbalizes/displays adequate comfort level or patient's stated pain goal  Description: INTERVENTIONS:  - Encourage pt to monitor pain and request assistance  - Assess pain using appropriate pain scale  - Administer analgesics based on type and severity of pain and evaluate response  - Implement non-pharmacological measures as appropriate and evaluate response  - Consider cultural and social influences on pain and pain management  - Manage/alleviate anxiety  - Utilize distraction and/or relaxation techniques  - Monitor for opioid side effects  - Notify MD/LIP if interventions unsuccessful or patient reports new pain  - Anticipate increased pain with activity and pre-medicate as appropriate  Outcome: Progressing     Problem: RISK FOR INFECTION - ADULT  Goal: Absence of fever/infection during anticipated neutropenic period  Description: INTERVENTIONS  - Monitor WBC  - Administer growth factors as ordered  - Implement neutropenic guidelines  Outcome: Progressing     Problem: SAFETY ADULT - FALL  Goal: Free from fall injury  Description: INTERVENTIONS:  - Assess pt frequently for physical needs  - Identify cognitive and physical deficits and behaviors that affect risk of falls.   - [place] fall precautions as indicated by assessment.  - Educate pt/family on patient safety including physical limitations  - Instruct pt to call for assistance with activity based on assessment  - Modify environment to reduce risk of injury  - Provide assistive devices as appropriate  - Consider OT/PT consult to assist with strengthening/mobility  - Encourage toileting schedule  Outcome: Progressing   Patient had HD today per report 3 L of fluid removed. Patient is voiding as well. Generalized pain managed with Tylenol. Dose of Venofer IVP given. Patient is feeling weak able to ambulate in hallway tolerating activity fairly well. Fall precautions in place.

## 2023-06-11 LAB
ANION GAP SERPL CALC-SCNC: 11 MMOL/L (ref 0–18)
BASOPHILS # BLD AUTO: 0.09 X10(3) UL (ref 0–0.2)
BASOPHILS NFR BLD AUTO: 0.8 %
BUN BLD-MCNC: 23 MG/DL (ref 7–18)
BUN/CREAT SERPL: 3.1 (ref 10–20)
CALCIUM BLD-MCNC: 8.4 MG/DL (ref 8.5–10.1)
CHLORIDE SERPL-SCNC: 104 MMOL/L (ref 98–112)
CO2 SERPL-SCNC: 25 MMOL/L (ref 21–32)
CREAT BLD-MCNC: 7.33 MG/DL
DEPRECATED RDW RBC AUTO: 57.1 FL (ref 35.1–46.3)
EOSINOPHIL # BLD AUTO: 0.25 X10(3) UL (ref 0–0.7)
EOSINOPHIL NFR BLD AUTO: 2.3 %
ERYTHROCYTE [DISTWIDTH] IN BLOOD BY AUTOMATED COUNT: 16.5 % (ref 11–15)
GFR SERPLBLD BASED ON 1.73 SQ M-ARVRAT: 8 ML/MIN/1.73M2 (ref 60–?)
GLUCOSE BLD-MCNC: 95 MG/DL (ref 70–99)
HCT VFR BLD AUTO: 23.9 %
HGB BLD-MCNC: 7.5 G/DL
IMM GRANULOCYTES # BLD AUTO: 0.18 X10(3) UL (ref 0–1)
IMM GRANULOCYTES NFR BLD: 1.7 %
INR BLD: 2.5 (ref 0.85–1.16)
LYMPHOCYTES # BLD AUTO: 1.28 X10(3) UL (ref 1–4)
LYMPHOCYTES NFR BLD AUTO: 12 %
MAGNESIUM SERPL-MCNC: 2 MG/DL (ref 1.6–2.6)
MCH RBC QN AUTO: 30.6 PG (ref 26–34)
MCHC RBC AUTO-ENTMCNC: 31.4 G/DL (ref 31–37)
MCV RBC AUTO: 97.6 FL
MONOCYTES # BLD AUTO: 2.83 X10(3) UL (ref 0.1–1)
MONOCYTES NFR BLD AUTO: 26.6 %
NEUTROPHILS # BLD AUTO: 6.02 X10 (3) UL (ref 1.5–7.7)
NEUTROPHILS # BLD AUTO: 6.02 X10(3) UL (ref 1.5–7.7)
NEUTROPHILS NFR BLD AUTO: 56.6 %
OSMOLALITY SERPL CALC.SUM OF ELEC: 293 MOSM/KG (ref 275–295)
PLATELET # BLD AUTO: 364 10(3)UL (ref 150–450)
POTASSIUM SERPL-SCNC: 3.6 MMOL/L (ref 3.5–5.1)
PROTHROMBIN TIME: 26.5 SECONDS (ref 11.6–14.8)
RBC # BLD AUTO: 2.45 X10(6)UL
SODIUM SERPL-SCNC: 140 MMOL/L (ref 136–145)
WBC # BLD AUTO: 10.7 X10(3) UL (ref 4–11)

## 2023-06-11 PROCEDURE — 99232 SBSQ HOSP IP/OBS MODERATE 35: CPT | Performed by: INTERNAL MEDICINE

## 2023-06-11 PROCEDURE — 99232 SBSQ HOSP IP/OBS MODERATE 35: CPT | Performed by: HOSPITALIST

## 2023-06-11 RX ORDER — ALBUTEROL SULFATE 90 UG/1
2 AEROSOL, METERED RESPIRATORY (INHALATION) EVERY 6 HOURS PRN
Status: DISCONTINUED | OUTPATIENT
Start: 2023-06-11 | End: 2023-06-13

## 2023-06-11 NOTE — PLAN OF CARE
Problem: Patient Centered Care  Goal: Patient preferences are identified and integrated in the patient's plan of care  Description: Interventions:  - What would you like us to know as we care for you? I get cold easily   - Provide timely, complete, and accurate information to patient/family  - Incorporate patient and family knowledge, values, beliefs, and cultural backgrounds into the planning and delivery of care  - Encourage patient/family to participate in care and decision-making at the level they choose  - Honor patient and family perspectives and choices    Outcome: Progressing       Problem: PAIN - ADULT  Goal: Verbalizes/displays adequate comfort level or patient's stated pain goal  Description: INTERVENTIONS:  - Encourage pt to monitor pain and request assistance  - Assess pain using appropriate pain scale  - Administer analgesics based on type and severity of pain and evaluate response  - Implement non-pharmacological measures as appropriate and evaluate response  - Consider cultural and social influences on pain and pain management  - Manage/alleviate anxiety  - Utilize distraction and/or relaxation techniques  - Monitor for opioid side effects  - Notify MD/LIP if interventions unsuccessful or patient reports new pain  - Anticipate increased pain with activity and pre-medicate as appropriate    Outcome: Progressing       Problem: RISK FOR INFECTION - ADULT  Goal: Absence of fever/infection during anticipated neutropenic period  Description: INTERVENTIONS  - Monitor WBC  - Administer growth factors as ordered  - Implement neutropenic guidelines  Outcome: Progressing   No acute changes during the shift, VS WNL, on RA. No complains of pain. Patient up in the chair and walked to the balcony this afternoon, tolerating activity well . Fall precautions in place. General diet patient had food from outside and tolerated well, appetite is slowly improving.

## 2023-06-12 LAB
ANION GAP SERPL CALC-SCNC: 10 MMOL/L (ref 0–18)
BASOPHILS # BLD AUTO: 0.07 X10(3) UL (ref 0–0.2)
BASOPHILS NFR BLD AUTO: 0.8 %
BUN BLD-MCNC: 26 MG/DL (ref 7–18)
BUN/CREAT SERPL: 3.4 (ref 10–20)
CALCIUM BLD-MCNC: 8.6 MG/DL (ref 8.5–10.1)
CHLORIDE SERPL-SCNC: 104 MMOL/L (ref 98–112)
CO2 SERPL-SCNC: 25 MMOL/L (ref 21–32)
CREAT BLD-MCNC: 7.71 MG/DL
DEPRECATED RDW RBC AUTO: 57.6 FL (ref 35.1–46.3)
EOSINOPHIL # BLD AUTO: 0.24 X10(3) UL (ref 0–0.7)
EOSINOPHIL NFR BLD AUTO: 2.7 %
ERYTHROCYTE [DISTWIDTH] IN BLOOD BY AUTOMATED COUNT: 16.3 % (ref 11–15)
GFR SERPLBLD BASED ON 1.73 SQ M-ARVRAT: 7 ML/MIN/1.73M2 (ref 60–?)
GLUCOSE BLD-MCNC: 96 MG/DL (ref 70–99)
HCT VFR BLD AUTO: 24.4 %
HGB BLD-MCNC: 7.6 G/DL
IMM GRANULOCYTES # BLD AUTO: 0.15 X10(3) UL (ref 0–1)
IMM GRANULOCYTES NFR BLD: 1.7 %
INR BLD: 2.34 (ref 0.85–1.16)
INR BLD: 2.34 (ref 0.85–1.16)
LYMPHOCYTES # BLD AUTO: 1.16 X10(3) UL (ref 1–4)
LYMPHOCYTES NFR BLD AUTO: 13.2 %
MAGNESIUM SERPL-MCNC: 2 MG/DL (ref 1.6–2.6)
MCH RBC QN AUTO: 30.5 PG (ref 26–34)
MCHC RBC AUTO-ENTMCNC: 31.1 G/DL (ref 31–37)
MCV RBC AUTO: 98 FL
MONOCYTES # BLD AUTO: 2.21 X10(3) UL (ref 0.1–1)
MONOCYTES NFR BLD AUTO: 25.1 %
NEUTROPHILS # BLD AUTO: 4.97 X10 (3) UL (ref 1.5–7.7)
NEUTROPHILS # BLD AUTO: 4.97 X10(3) UL (ref 1.5–7.7)
NEUTROPHILS NFR BLD AUTO: 56.5 %
OSMOLALITY SERPL CALC.SUM OF ELEC: 293 MOSM/KG (ref 275–295)
PHOSPHATE SERPL-MCNC: 5.1 MG/DL (ref 2.5–4.9)
PLATELET # BLD AUTO: 381 10(3)UL (ref 150–450)
POTASSIUM SERPL-SCNC: 3.4 MMOL/L (ref 3.5–5.1)
PROTHROMBIN TIME: 25.2 SECONDS (ref 11.6–14.8)
PROTHROMBIN TIME: 25.2 SECONDS (ref 11.6–14.8)
RBC # BLD AUTO: 2.49 X10(6)UL
SODIUM SERPL-SCNC: 139 MMOL/L (ref 136–145)
WBC # BLD AUTO: 8.8 X10(3) UL (ref 4–11)

## 2023-06-12 PROCEDURE — 99232 SBSQ HOSP IP/OBS MODERATE 35: CPT | Performed by: HOSPITALIST

## 2023-06-12 PROCEDURE — 90945 DIALYSIS ONE EVALUATION: CPT | Performed by: INTERNAL MEDICINE

## 2023-06-12 RX ORDER — HEPARIN SODIUM 1000 [USP'U]/ML
INJECTION, SOLUTION INTRAVENOUS; SUBCUTANEOUS
Status: DISPENSED
Start: 2023-06-12 | End: 2023-06-12

## 2023-06-12 NOTE — PROGRESS NOTES
PT am note: PT treatment attempted pt is away from room for dialysis not available to participate at this time. PT will continue to follow and progress as medical progress and pt availability allows.

## 2023-06-12 NOTE — CM/SW NOTE
Heron Amezquita notified CM that [facility] in [facility] [address] no longer has HD bed available until July. Elvi Newell agreed to review availability at other locations providing [facility] acute rehab. CM notified pt and spouse that bed at preferred facility is no longer available. Acute rehab referral search reopened in [address]. Dialysis referral search reopened in [address] in case pt decides to dc home. Plan: Acute rehab pending facility acceptance/choice and medical clearance.     Claudean August, BSN    [phone]

## 2023-06-13 VITALS
DIASTOLIC BLOOD PRESSURE: 75 MMHG | RESPIRATION RATE: 20 BRPM | HEART RATE: 85 BPM | OXYGEN SATURATION: 94 % | SYSTOLIC BLOOD PRESSURE: 136 MMHG | HEIGHT: 74 IN | TEMPERATURE: 99 F | WEIGHT: 272.81 LBS | BODY MASS INDEX: 35.01 KG/M2

## 2023-06-13 LAB
DEPRECATED RDW RBC AUTO: 59.1 FL (ref 35.1–46.3)
ERYTHROCYTE [DISTWIDTH] IN BLOOD BY AUTOMATED COUNT: 16.6 % (ref 11–15)
HCT VFR BLD AUTO: 23.1 %
HGB BLD-MCNC: 7.2 G/DL
INR BLD: 2.25 (ref 0.85–1.16)
MCH RBC QN AUTO: 30.5 PG (ref 26–34)
MCHC RBC AUTO-ENTMCNC: 31.2 G/DL (ref 31–37)
MCV RBC AUTO: 97.9 FL
PLATELET # BLD AUTO: 335 10(3)UL (ref 150–450)
PROTHROMBIN TIME: 24.5 SECONDS (ref 11.6–14.8)
RBC # BLD AUTO: 2.36 X10(6)UL
WBC # BLD AUTO: 8.7 X10(3) UL (ref 4–11)

## 2023-06-13 PROCEDURE — 99232 SBSQ HOSP IP/OBS MODERATE 35: CPT | Performed by: HOSPITALIST

## 2023-06-13 RX ORDER — PANTOPRAZOLE SODIUM 40 MG/1
40 TABLET, DELAYED RELEASE ORAL DAILY
Refills: 0 | Status: SHIPPED | COMMUNITY
Start: 2023-06-14

## 2023-06-13 RX ORDER — FUROSEMIDE 40 MG/1
40 TABLET ORAL DAILY
Refills: 0 | Status: SHIPPED | COMMUNITY
Start: 2023-06-14

## 2023-06-13 RX ORDER — OXYCODONE HYDROCHLORIDE 5 MG/1
5 TABLET ORAL EVERY 4 HOURS PRN
Qty: 20 TABLET | Refills: 0 | Status: SHIPPED | OUTPATIENT
Start: 2023-06-13

## 2023-06-13 RX ORDER — HEPARIN SODIUM 1000 [USP'U]/ML
1.5 INJECTION, SOLUTION INTRAVENOUS; SUBCUTANEOUS
Status: DISCONTINUED | OUTPATIENT
Start: 2023-06-14 | End: 2023-06-13

## 2023-06-13 RX ORDER — POLYETHYLENE GLYCOL 3350 17 G/17G
17 POWDER, FOR SOLUTION ORAL DAILY
Refills: 0 | Status: SHIPPED | COMMUNITY
Start: 2023-06-14

## 2023-06-13 RX ORDER — DIVALPROEX SODIUM 250 MG/1
1250 TABLET, EXTENDED RELEASE ORAL NIGHTLY
Refills: 0 | Status: SHIPPED | COMMUNITY
Start: 2023-06-13

## 2023-06-13 RX ORDER — ACETAMINOPHEN 325 MG/1
325 TABLET ORAL EVERY 6 HOURS PRN
Refills: 0 | Status: SHIPPED | COMMUNITY
Start: 2023-06-13

## 2023-06-13 RX ORDER — ALBUTEROL SULFATE 90 UG/1
2 AEROSOL, METERED RESPIRATORY (INHALATION) EVERY 6 HOURS PRN
Refills: 0 | Status: SHIPPED | COMMUNITY
Start: 2023-06-13

## 2023-06-13 NOTE — CM/SW NOTE
06/13/23 1444   Discharge disposition   Expected discharge disposition Rehab [address] Provider [facility]   Discharge transportation [facility]     Pt discussed during nursing rounds. Pt is stable for dc today. MD dc order entered. Pt and SO requesting if pt can dc to [facility] for acute since no bed is available at pt's #1 choice [facility]. Heron Saleh at [facility] confirmed pt is accepted and bed is available today. Pt and SO agreeable to transfer today and cost of medicar which is $85. [facility] is scheduled for 6:30pm . Number for nurse report is [phone]. Plan: MJ for acute rehab today. / to remain available for support and/or discharge planning.      RICK Dasilva    [phone]

## 2023-06-14 ENCOUNTER — TELEPHONE (OUTPATIENT)
Dept: SURGERY | Facility: CLINIC | Age: 68
End: 2023-06-14

## 2023-06-14 NOTE — TELEPHONE ENCOUNTER
I attempt to reach patient to help schedule a 2 week follow up. Per patient he cant speak right now, he's on another line. He would like to communicate via my chart. Will do.

## 2023-06-15 ENCOUNTER — TELEPHONE (OUTPATIENT)
Dept: SURGERY | Facility: CLINIC | Age: 68
End: 2023-06-15

## 2023-06-15 NOTE — TELEPHONE ENCOUNTER
Contacted pt to check on him after his discharge from the hospital. Pt doing well, rehabbing in [facility]. Pt is eating/drinking without any nausea or vomiting. Pt denies any fevers or chills or issues with surgical site. Post op appt scheduled on 6/30 at 1:15pm at [facility] with Dr. Rosy Frederick.

## 2023-06-23 ENCOUNTER — TELEPHONE (OUTPATIENT)
Facility: CLINIC | Age: 68
End: 2023-06-23

## 2023-06-23 NOTE — TELEPHONE ENCOUNTER
The patient called looking for an appointment because he is being released from an inpatient rehab center tomorrow and he needs to follow up in one week.

## 2023-06-26 ENCOUNTER — TELEPHONE (OUTPATIENT)
Dept: NEPHROLOGY | Facility: CLINIC | Age: 68
End: 2023-06-26

## 2023-06-26 DIAGNOSIS — N17.9 AKI (ACUTE KIDNEY INJURY) (HCC): Primary | ICD-10-CM

## 2023-06-27 ENCOUNTER — OFFICE VISIT (OUTPATIENT)
Facility: CLINIC | Age: 68
End: 2023-06-27
Payer: MEDICARE

## 2023-06-27 ENCOUNTER — LAB ENCOUNTER (OUTPATIENT)
Dept: LAB | Facility: REFERENCE LAB | Age: 68
End: 2023-06-27
Attending: FAMILY MEDICINE
Payer: MEDICARE

## 2023-06-27 ENCOUNTER — PATIENT MESSAGE (OUTPATIENT)
Dept: SURGERY | Facility: CLINIC | Age: 68
End: 2023-06-27

## 2023-06-27 VITALS
WEIGHT: 215 LBS | HEART RATE: 85 BPM | HEIGHT: 74 IN | DIASTOLIC BLOOD PRESSURE: 82 MMHG | BODY MASS INDEX: 27.59 KG/M2 | SYSTOLIC BLOOD PRESSURE: 130 MMHG | OXYGEN SATURATION: 98 %

## 2023-06-27 DIAGNOSIS — E72.20 HYPERAMMONEMIA (HCC): ICD-10-CM

## 2023-06-27 DIAGNOSIS — D64.9 ANEMIA, UNSPECIFIED TYPE: ICD-10-CM

## 2023-06-27 DIAGNOSIS — F34.0 CYCLOTHYMIA: ICD-10-CM

## 2023-06-27 DIAGNOSIS — Z95.2 H/O MECHANICAL AORTIC VALVE REPLACEMENT: ICD-10-CM

## 2023-06-27 DIAGNOSIS — D3A.8 NEUROENDOCRINE NEOPLASM OF SMALL INTESTINE: Primary | ICD-10-CM

## 2023-06-27 DIAGNOSIS — N17.9 AKI (ACUTE KIDNEY INJURY) (HCC): ICD-10-CM

## 2023-06-27 LAB
AMMONIA PLAS-MCNC: 31 UMOL/L (ref 11–32)
ANION GAP SERPL CALC-SCNC: 7 MMOL/L (ref 0–18)
BASOPHILS # BLD AUTO: 0.12 X10(3) UL (ref 0–0.2)
BASOPHILS NFR BLD AUTO: 2.4 %
BUN BLD-MCNC: 20 MG/DL (ref 7–18)
BUN/CREAT SERPL: 11.8 (ref 10–20)
CALCIUM BLD-MCNC: 9.4 MG/DL (ref 8.5–10.1)
CHLORIDE SERPL-SCNC: 110 MMOL/L (ref 98–112)
CO2 SERPL-SCNC: 24 MMOL/L (ref 21–32)
CREAT BLD-MCNC: 1.7 MG/DL
DEPRECATED HBV CORE AB SER IA-ACNC: 666.8 NG/ML
DEPRECATED RDW RBC AUTO: 59.2 FL (ref 35.1–46.3)
EOSINOPHIL # BLD AUTO: 0.28 X10(3) UL (ref 0–0.7)
EOSINOPHIL NFR BLD AUTO: 5.5 %
ERYTHROCYTE [DISTWIDTH] IN BLOOD BY AUTOMATED COUNT: 15.9 % (ref 11–15)
FASTING STATUS PATIENT QL REPORTED: YES
FOLATE SERPL-MCNC: 19.8 NG/ML (ref 8.7–?)
GFR SERPLBLD BASED ON 1.73 SQ M-ARVRAT: 43 ML/MIN/1.73M2 (ref 60–?)
GLUCOSE BLD-MCNC: 107 MG/DL (ref 70–99)
HCT VFR BLD AUTO: 29.5 %
HGB BLD-MCNC: 9.1 G/DL
IMM GRANULOCYTES # BLD AUTO: 0.02 X10(3) UL (ref 0–1)
IMM GRANULOCYTES NFR BLD: 0.4 %
INR BLD: 1.88 (ref 0.85–1.16)
IRON SATN MFR SERPL: 19 %
IRON SERPL-MCNC: 52 UG/DL
LYMPHOCYTES # BLD AUTO: 1.48 X10(3) UL (ref 1–4)
LYMPHOCYTES NFR BLD AUTO: 29.2 %
MCH RBC QN AUTO: 31 PG (ref 26–34)
MCHC RBC AUTO-ENTMCNC: 30.8 G/DL (ref 31–37)
MCV RBC AUTO: 100.3 FL
MONOCYTES # BLD AUTO: 0.98 X10(3) UL (ref 0.1–1)
MONOCYTES NFR BLD AUTO: 19.4 %
NEUTROPHILS # BLD AUTO: 2.18 X10 (3) UL (ref 1.5–7.7)
NEUTROPHILS # BLD AUTO: 2.18 X10(3) UL (ref 1.5–7.7)
NEUTROPHILS NFR BLD AUTO: 43.1 %
OSMOLALITY SERPL CALC.SUM OF ELEC: 295 MOSM/KG (ref 275–295)
PLATELET # BLD AUTO: 308 10(3)UL (ref 150–450)
POTASSIUM SERPL-SCNC: 4.2 MMOL/L (ref 3.5–5.1)
PROTHROMBIN TIME: 21.5 SECONDS (ref 11.6–14.8)
RBC # BLD AUTO: 2.94 X10(6)UL
SODIUM SERPL-SCNC: 141 MMOL/L (ref 136–145)
TIBC SERPL-MCNC: 279 UG/DL (ref 240–450)
TRANSFERRIN SERPL-MCNC: 187 MG/DL (ref 200–360)
VALPROATE SERPL-MCNC: 82.5 UG/ML (ref 50–100)
VIT B12 SERPL-MCNC: 872 PG/ML (ref 193–986)
WBC # BLD AUTO: 5.1 X10(3) UL (ref 4–11)

## 2023-06-27 PROCEDURE — 83540 ASSAY OF IRON: CPT

## 2023-06-27 PROCEDURE — 80048 BASIC METABOLIC PNL TOTAL CA: CPT

## 2023-06-27 PROCEDURE — 1111F DSCHRG MED/CURRENT MED MERGE: CPT | Performed by: FAMILY MEDICINE

## 2023-06-27 PROCEDURE — 99214 OFFICE O/P EST MOD 30 MIN: CPT | Performed by: FAMILY MEDICINE

## 2023-06-27 PROCEDURE — 82607 VITAMIN B-12: CPT

## 2023-06-27 PROCEDURE — 84466 ASSAY OF TRANSFERRIN: CPT

## 2023-06-27 PROCEDURE — 82746 ASSAY OF FOLIC ACID SERUM: CPT

## 2023-06-27 PROCEDURE — 80164 ASSAY DIPROPYLACETIC ACD TOT: CPT

## 2023-06-27 PROCEDURE — 82728 ASSAY OF FERRITIN: CPT

## 2023-06-27 PROCEDURE — 82140 ASSAY OF AMMONIA: CPT

## 2023-06-27 PROCEDURE — 85025 COMPLETE CBC W/AUTO DIFF WBC: CPT

## 2023-06-27 PROCEDURE — 85610 PROTHROMBIN TIME: CPT

## 2023-06-27 PROCEDURE — 36415 COLL VENOUS BLD VENIPUNCTURE: CPT

## 2023-06-27 RX ORDER — MELATONIN
325
COMMUNITY

## 2023-06-28 ENCOUNTER — TELEPHONE (OUTPATIENT)
Facility: CLINIC | Age: 68
End: 2023-06-28

## 2023-06-29 ENCOUNTER — TELEPHONE (OUTPATIENT)
Facility: CLINIC | Age: 68
End: 2023-06-29

## 2023-06-29 ENCOUNTER — PATIENT MESSAGE (OUTPATIENT)
Dept: NEPHROLOGY | Facility: CLINIC | Age: 68
End: 2023-06-29

## 2023-06-30 ENCOUNTER — OFFICE VISIT (OUTPATIENT)
Dept: SURGERY | Facility: CLINIC | Age: 68
End: 2023-06-30
Payer: MEDICARE

## 2023-06-30 VITALS
HEART RATE: 76 BPM | TEMPERATURE: 99 F | RESPIRATION RATE: 16 BRPM | OXYGEN SATURATION: 99 % | BODY MASS INDEX: 27.59 KG/M2 | SYSTOLIC BLOOD PRESSURE: 106 MMHG | DIASTOLIC BLOOD PRESSURE: 68 MMHG | WEIGHT: 215 LBS | HEIGHT: 74 IN

## 2023-06-30 DIAGNOSIS — Z90.49 S/P RIGHT HEMICOLECTOMY: Primary | ICD-10-CM

## 2023-06-30 DIAGNOSIS — N17.1 ACUTE RENAL FAILURE WITH ACUTE CORTICAL NECROSIS (HCC): Primary | ICD-10-CM

## 2023-06-30 PROCEDURE — 1125F AMNT PAIN NOTED PAIN PRSNT: CPT | Performed by: SURGERY

## 2023-06-30 PROCEDURE — 99024 POSTOP FOLLOW-UP VISIT: CPT | Performed by: SURGERY

## 2023-06-30 PROCEDURE — 1111F DSCHRG MED/CURRENT MED MERGE: CPT | Performed by: SURGERY

## 2023-07-03 ENCOUNTER — TELEPHONE (OUTPATIENT)
Dept: FAMILY MEDICINE CLINIC | Facility: CLINIC | Age: 68
End: 2023-07-03

## 2023-07-03 RX ORDER — SODIUM CHLORIDE 9 MG/ML
INJECTION, SOLUTION INTRAVENOUS CONTINUOUS
Status: CANCELLED | OUTPATIENT
Start: 2023-07-03

## 2023-07-05 ENCOUNTER — HOSPITAL ENCOUNTER (OUTPATIENT)
Dept: INTERVENTIONAL RADIOLOGY/VASCULAR | Facility: HOSPITAL | Age: 68
Discharge: HOME | End: 2023-07-05
Attending: INTERNAL MEDICINE | Admitting: STUDENT IN AN ORGANIZED HEALTH CARE EDUCATION/TRAINING PROGRAM
Payer: MEDICARE

## 2023-07-05 VITALS
BODY MASS INDEX: 27.59 KG/M2 | TEMPERATURE: 98 F | RESPIRATION RATE: 11 BRPM | HEIGHT: 74 IN | SYSTOLIC BLOOD PRESSURE: 138 MMHG | HEART RATE: 72 BPM | WEIGHT: 215 LBS | DIASTOLIC BLOOD PRESSURE: 74 MMHG | OXYGEN SATURATION: 98 %

## 2023-07-05 DIAGNOSIS — N17.1 ACUTE RENAL FAILURE WITH ACUTE CORTICAL NECROSIS (HCC): ICD-10-CM

## 2023-07-05 LAB
INR BLD: 1.64 (ref 0.85–1.16)
PROTHROMBIN TIME: 19.3 SECONDS (ref 11.6–14.8)

## 2023-07-05 PROCEDURE — 85610 PROTHROMBIN TIME: CPT | Performed by: STUDENT IN AN ORGANIZED HEALTH CARE EDUCATION/TRAINING PROGRAM

## 2023-07-05 PROCEDURE — 36589 REMOVAL TUNNELED CV CATH: CPT | Performed by: RADIOLOGY

## 2023-07-05 PROCEDURE — 05PY03Z REMOVAL OF INFUSION DEVICE FROM UPPER VEIN, OPEN APPROACH: ICD-10-PCS | Performed by: NURSE PRACTITIONER

## 2023-07-05 RX ORDER — SODIUM CHLORIDE 9 MG/ML
INJECTION, SOLUTION INTRAVENOUS CONTINUOUS
Status: DISCONTINUED | OUTPATIENT
Start: 2023-07-05 | End: 2023-07-05

## 2023-07-05 RX ADMIN — SODIUM CHLORIDE: 9 INJECTION, SOLUTION INTRAVENOUS at 10:45:00

## 2023-07-05 NOTE — IVS NOTE
DISCHARGE NOTE    1, PATIENT AWAKE AND ALERT X 4    2. MARTINEZ, VSS, NO PONV, NO PAIN    3. INSTRUCTIONS GIVEN TO PATIENT AND FAMILY    4. IV ACCESS WAS REMOVED BEFORE DISCHARGE    5. DR. Devin Wood         SPOKE WITH PATIENT AND FAMILY POST PROCEDURE    6. PATIENT ABLE TO DRINK, [address], VOID    7. PROCEDURAL SITE REMAINS DRY, INTACT , no hematoma   8. Natalio araya as needed    9. Local only, patient walked    10.  NEW PRESCRIPTION: n/a to resume warfarin tomorrow

## 2023-07-05 NOTE — DISCHARGE INSTRUCTIONS
Pr-14  4.2  [phone]     Patient Name:  Griselda Quinones    Procedure:  removal of Dialysis catheter    Site Care: do not remove dressing for 48 hours                                       Activity/Diet  No heavy lifting or strenuous activity for 48 hours. Drink plenty of fluids, unless you have otherwise been told to restrict your fluid intake. Do not drink alcohol for 24 hours. Do not drive,  operate heavy machinery, make important decisions or sign legal documents today. Medications:  Do not take blood thinners, aspirin, or Plavix for 24 hours. Resume Warfarin tomorrow    Contact Interventional Radiology at [phone] if you have severe/unrelieved pain, fever, chills, dizziness/lightheadedness, or drainage/bleeding from your incision site.

## 2023-07-05 NOTE — IVS NOTE
Bedside tunneled catheter removal performed with no patient complaints.  Report given to [facility]

## 2023-07-06 ENCOUNTER — TELEPHONE (OUTPATIENT)
Dept: HEMATOLOGY/ONCOLOGY | Facility: HOSPITAL | Age: 68
End: 2023-07-06

## 2023-07-06 DIAGNOSIS — D3A.8 NEUROENDOCRINE NEOPLASM OF SMALL INTESTINE: Primary | ICD-10-CM

## 2023-07-06 NOTE — TELEPHONE ENCOUNTER
Writer called patient and discussed planning of MRI and follow up with Dr. Naman Villalba in October. Patient is available on Fridays, therefore will have lab&MRI mid morning/early afternoon on 10/13 and follow up with Dr. Naman Villalba on 10/20.

## 2023-07-17 ENCOUNTER — PATIENT MESSAGE (OUTPATIENT)
Facility: CLINIC | Age: 68
End: 2023-07-17

## 2023-07-17 DIAGNOSIS — D64.9 ANEMIA, UNSPECIFIED TYPE: Primary | ICD-10-CM

## 2023-07-23 NOTE — TELEPHONE ENCOUNTER
Please review. Protocol failed / No Protocol.     Medication never prescribed by clinic,  [facility]  6/23 patient was 25 mg BID    Requested Prescriptions   Pending Prescriptions Disp Refills    METOPROLOL TARTRATE 25 MG Oral Tab [Pharmacy Med Name: METOPROLOL TARTRATE 25 MG TAB] 135 tablet 0     Sig: TAKE ONE TABLET BY MOUTH EVERY MORNING AND ONE-HALF TABLET EVERY EVENING       Hypertensive Medications Protocol Failed - 7/23/2023  5:54 AM        Failed - EGFRCR or GFRNAA > 50     GFR Evaluation  EGFRCR: 43 , resulted on 6/27/2023          Passed - In person appointment in the past 12 or next 3 months     Recent Outpatient Visits              3 weeks ago S/P right hemicolectomy    [facility], [address] [facility], Regina Luevano MD    Office Visit    3 weeks ago Neuroendocrine neoplasm of small intestine    [facility], [address], [address] [place], [place]    Office Visit    3 months ago Preop examination    Elias Long, [address] [place], [place]    Office Visit    4 months ago Small bowel mass    [address], [address] [facility] [place] Jacy Merida MD    Office Visit    7 months ago Encounter for [employer] annual wellness exam    [address]    Office Visit          Future Appointments         Provider Department Appt Notes    In 2 months EM [address] - Infusion [id number] A-serotonin-insert IV for MRI    In 2 months 300 [facility] MRI RM2 (3T WIDE) [facility] AND [facility] MRI Epic order from Dr. Jacklyn Fuentes 7/6/23 - scheduled with Liz Cardona at 's office for patient - [facility]    In 2 months Myah Camargo, [address] [facility] Hematology Oncology follow up with MRI and lab (10/13/23)    In 4 months Siddhartha Nguyễn, [address], [address], [place]     In 5 months Jacy Merida MD [address], [address] [facility] [place] 6 month follow up               Passed - Last BP reading less than 140/90     BP Readings from Last 1 Encounters:  07/05/23 : 138/74              Passed - CMP or BMP in past 6 months     Recent Results (from the past 4392 hour(s))   BASIC METABOLIC PANEL (8)    Collection Time: 06/27/23 11:33 AM   Result Value Ref Range    Glucose 107 (H) 70 - 99 mg/dL    Sodium 141 136 - 145 mmol/L    Potassium 4.2 3.5 - 5.1 mmol/L    Chloride 110 98 - 112 mmol/L    CO2 24.0 21.0 - 32.0 mmol/L    Anion Gap 7 0 - 18 mmol/L    BUN 20 (H) 7 - 18 mg/dL    Creatinine 1.70 (H) 0.70 - 1.30 mg/dL    BUN/CREA Ratio 11.8 10.0 - 20.0    Calcium, Total 9.4 8.5 - 10.1 mg/dL    Calculated Osmolality 295 275 - 295 mOsm/kg    eGFR-Cr 43 (L) >=60 mL/min/1.73m2    Patient Fasting for BMP? Yes      *Note: Due to a large number of results and/or encounters for the requested time period, some results have not been displayed. A complete set of results can be found in Results Review.                Passed - In person appointment or virtual visit in the past 6 months     Recent Outpatient Visits              3 weeks ago [facility]    [facility] [place]    Office Visit    3 weeks ago Neuroendocrine neoplasm of small intestine    [facility], [address], [address] [place], [place]    Office Visit    3 months ago Preop examination    [address] [facility], [place] [address] [place], [place]    Office Visit    4 months ago Small bowel mass    [address], [address] [facility] [place] Summer Hansen MD    Office Visit    7 months ago Encounter for [employer] annual wellness exam    [address] [facility], [address] [place], [place]    Office Visit          Future Appointments         Provider Department Appt Notes    In 2 months EM [address] - Infusion [id number] A-serotonin-insert IV for MRI    In 2 months [facility] MRI RM2 ([address]) [facility] AND [facility] MRI Epic order from Dr. Zoe Beltran 7/6/23 - scheduled with Wilfredo Valle at 's office for patient [facility]    In 2 months Rockey Dakin, [address] [facility] Hematology Oncology follow up with MRI and lab (10/13/23)    In 4 months Warden Vasquez, [address] [facility],[address] Floor, [place] [address], [place]     In 5 months Niko Perez MD [facility], [address] [place] [address] 6 month follow up

## 2023-07-26 ENCOUNTER — TELEPHONE (OUTPATIENT)
Facility: CLINIC | Age: 68
End: 2023-07-26

## 2023-07-26 NOTE — TELEPHONE ENCOUNTER
[address] [place] [address] Drive called, verified patient's Name and . She states she faxed a plan of care form to [phone] at 9:53 am today. Form needs to be signed by Dr. Sofie Mckee and faxed back to [phone]. [facility] 14 Staff kindly assist. Thank you.

## 2023-07-26 NOTE — TELEPHONE ENCOUNTER
Nehal Romero from [address] [place] [address] is calling requesting order be signed and faxed back over for the pt. Please assist.    [facility].[phone]  Ph.[phone].

## 2023-08-04 ENCOUNTER — PATIENT MESSAGE (OUTPATIENT)
Facility: CLINIC | Age: 68
End: 2023-08-04

## 2023-08-04 NOTE — TELEPHONE ENCOUNTER
From: Rocio Law  To: Violette Garay MD  Sent: 8/4/2023 3:45 PM CDT  Subject: Clarification to previous question    The protein powder has only one ingredient. Lisa Piedra the whey protein.

## 2023-08-04 NOTE — TELEPHONE ENCOUNTER
Dr Blair Dawkins please see note below. The protein powder has only one ingredient. Mony Led the whey protein.

## 2023-08-04 NOTE — TELEPHONE ENCOUNTER
From: Huseyin Cadena  To: Ayse Schaffer MD  Sent: 8/4/2023 3:43 PM CDT  Subject: Protein powder    Hello Dr. Cornelio Humphreys,    I am thinking of taking 100% whey protein powder isolate. In the suggested dose from the container, 25g packets. Is that OK for my kidneys? I don't understand if it has creatine or creatinine or what it might do to my system. I know nothing about the chemistry.     Thanks,     Tatiana Leon

## 2023-08-06 RX ORDER — SILDENAFIL 100 MG/1
100 TABLET, FILM COATED ORAL
Qty: 15 TABLET | Refills: 1 | Status: SHIPPED | OUTPATIENT
Start: 2023-08-06

## 2023-08-06 NOTE — TELEPHONE ENCOUNTER
Medication discontinued-- Stop taking at discharge. Patient had IR procedure on 7/5/23 (Procedure: removal of Dialysis catheter). Dr. Reinaldo Barbosa- please advise on Viagra refill request.       From: James Trimble  To: Nicole Gonsalez DO  Sent: 8/4/2023  3:58 PM CDT  Subject: Sildenafil    Mechelle Beckwith Eth,    Can I get a refill of my Viagra? I'm out of it.  It's not listed on the refill options in [place],    Matt Isauro

## 2023-08-07 NOTE — TELEPHONE ENCOUNTER
Good morning  I have not seen him in clinic and not sure of his recent kidney function. However I do not recommend extra protein supplement for CKD patients.  They can get their source of protein from food like poultry fish etc.

## 2023-08-24 ENCOUNTER — LAB ENCOUNTER (OUTPATIENT)
Dept: LAB | Facility: HOSPITAL | Age: 68
End: 2023-08-24
Attending: FAMILY MEDICINE
Payer: MEDICARE

## 2023-08-24 DIAGNOSIS — N17.9 AKI (ACUTE KIDNEY INJURY) (HCC): ICD-10-CM

## 2023-08-24 DIAGNOSIS — D64.9 ANEMIA, UNSPECIFIED TYPE: ICD-10-CM

## 2023-08-24 LAB
ANION GAP SERPL CALC-SCNC: 6 MMOL/L (ref 0–18)
BASOPHILS # BLD AUTO: 0.07 X10(3) UL (ref 0–0.2)
BASOPHILS NFR BLD AUTO: 1.3 %
BUN BLD-MCNC: 14 MG/DL (ref 7–18)
BUN/CREAT SERPL: 14.6 (ref 10–20)
CALCIUM BLD-MCNC: 8.5 MG/DL (ref 8.5–10.1)
CHLORIDE SERPL-SCNC: 113 MMOL/L (ref 98–112)
CO2 SERPL-SCNC: 25 MMOL/L (ref 21–32)
CREAT BLD-MCNC: 0.96 MG/DL
DEPRECATED HBV CORE AB SER IA-ACNC: 104.9 NG/ML
DEPRECATED RDW RBC AUTO: 55 FL (ref 35.1–46.3)
EGFRCR SERPLBLD CKD-EPI 2021: 86 ML/MIN/1.73M2 (ref 60–?)
EOSINOPHIL # BLD AUTO: 0.17 X10(3) UL (ref 0–0.7)
EOSINOPHIL NFR BLD AUTO: 3.3 %
ERYTHROCYTE [DISTWIDTH] IN BLOOD BY AUTOMATED COUNT: 15.5 % (ref 11–15)
FASTING STATUS PATIENT QL REPORTED: NO
GLUCOSE BLD-MCNC: 112 MG/DL (ref 70–99)
HCT VFR BLD AUTO: 36.2 %
HGB BLD-MCNC: 11.9 G/DL
IMM GRANULOCYTES # BLD AUTO: 0.01 X10(3) UL (ref 0–1)
IMM GRANULOCYTES NFR BLD: 0.2 %
IRON SATN MFR SERPL: 17 %
IRON SERPL-MCNC: 57 UG/DL
LYMPHOCYTES # BLD AUTO: 1.73 X10(3) UL (ref 1–4)
LYMPHOCYTES NFR BLD AUTO: 33.3 %
MCH RBC QN AUTO: 31.5 PG (ref 26–34)
MCHC RBC AUTO-ENTMCNC: 32.9 G/DL (ref 31–37)
MCV RBC AUTO: 95.8 FL
MONOCYTES # BLD AUTO: 0.64 X10(3) UL (ref 0.1–1)
MONOCYTES NFR BLD AUTO: 12.3 %
NEUTROPHILS # BLD AUTO: 2.57 X10 (3) UL (ref 1.5–7.7)
NEUTROPHILS # BLD AUTO: 2.57 X10(3) UL (ref 1.5–7.7)
NEUTROPHILS NFR BLD AUTO: 49.6 %
OSMOLALITY SERPL CALC.SUM OF ELEC: 299 MOSM/KG (ref 275–295)
PLATELET # BLD AUTO: 228 10(3)UL (ref 150–450)
POTASSIUM SERPL-SCNC: 4.1 MMOL/L (ref 3.5–5.1)
RBC # BLD AUTO: 3.78 X10(6)UL
SODIUM SERPL-SCNC: 144 MMOL/L (ref 136–145)
TIBC SERPL-MCNC: 343 UG/DL (ref 240–450)
TRANSFERRIN SERPL-MCNC: 230 MG/DL (ref 200–360)
WBC # BLD AUTO: 5.2 X10(3) UL (ref 4–11)

## 2023-08-24 PROCEDURE — 85025 COMPLETE CBC W/AUTO DIFF WBC: CPT

## 2023-08-24 PROCEDURE — 80048 BASIC METABOLIC PNL TOTAL CA: CPT

## 2023-08-24 PROCEDURE — 82728 ASSAY OF FERRITIN: CPT

## 2023-08-24 PROCEDURE — 84466 ASSAY OF TRANSFERRIN: CPT

## 2023-08-24 PROCEDURE — 83540 ASSAY OF IRON: CPT

## 2023-08-24 PROCEDURE — 36415 COLL VENOUS BLD VENIPUNCTURE: CPT

## 2023-09-01 ENCOUNTER — PATIENT MESSAGE (OUTPATIENT)
Facility: CLINIC | Age: 68
End: 2023-09-01

## 2023-09-01 NOTE — TELEPHONE ENCOUNTER
From: Jose Braswell  To: Marissa Kenyon MD  Sent: 9/1/2023 2:28 AM CDT  Subject: Kidney function and protein    My recent blood tests , stored in [place], show that my kidney function is normal. OK for me to take a standard intake of whey protein powder with breakfast?    Thanks,    [facility]

## 2023-10-13 ENCOUNTER — HOSPITAL ENCOUNTER (OUTPATIENT)
Dept: MRI IMAGING | Facility: HOSPITAL | Age: 68
Discharge: HOME | End: 2023-10-13
Attending: STUDENT IN AN ORGANIZED HEALTH CARE EDUCATION/TRAINING PROGRAM
Payer: MEDICARE

## 2023-10-13 ENCOUNTER — NURSE ONLY (OUTPATIENT)
Dept: HEMATOLOGY/ONCOLOGY | Facility: HOSPITAL | Age: 68
End: 2023-10-13
Attending: FAMILY MEDICINE
Payer: MEDICARE

## 2023-10-13 DIAGNOSIS — D3A.8 NEUROENDOCRINE NEOPLASM OF SMALL INTESTINE: ICD-10-CM

## 2023-10-13 LAB
ALBUMIN SERPL-MCNC: 3.7 G/DL (ref 3.4–5)
ALBUMIN/GLOB SERPL: 0.9 {RATIO} (ref 1–2)
ALP LIVER SERPL-CCNC: 92 U/L
ALT SERPL-CCNC: 28 U/L
ANION GAP SERPL CALC-SCNC: 6 MMOL/L (ref 0–18)
AST SERPL-CCNC: 32 U/L (ref 15–37)
BASOPHILS # BLD AUTO: 0.06 X10(3) UL (ref 0–0.2)
BASOPHILS NFR BLD AUTO: 1 %
BILIRUB SERPL-MCNC: 0.3 MG/DL (ref 0.1–2)
BUN BLD-MCNC: 17 MG/DL (ref 7–18)
BUN/CREAT SERPL: 17 (ref 10–20)
CALCIUM BLD-MCNC: 9.4 MG/DL (ref 8.5–10.1)
CHLORIDE SERPL-SCNC: 108 MMOL/L (ref 98–112)
CO2 SERPL-SCNC: 28 MMOL/L (ref 21–32)
CREAT BLD-MCNC: 1 MG/DL
DEPRECATED RDW RBC AUTO: 46.1 FL (ref 35.1–46.3)
EGFRCR SERPLBLD CKD-EPI 2021: 82 ML/MIN/1.73M2 (ref 60–?)
EOSINOPHIL # BLD AUTO: 0.14 X10(3) UL (ref 0–0.7)
EOSINOPHIL NFR BLD AUTO: 2.4 %
ERYTHROCYTE [DISTWIDTH] IN BLOOD BY AUTOMATED COUNT: 13.6 % (ref 11–15)
GLOBULIN PLAS-MCNC: 3.9 G/DL (ref 2.8–4.4)
GLUCOSE BLD-MCNC: 91 MG/DL (ref 70–99)
HCT VFR BLD AUTO: 40.9 %
HGB BLD-MCNC: 13.5 G/DL
IMM GRANULOCYTES # BLD AUTO: 0.02 X10(3) UL (ref 0–1)
IMM GRANULOCYTES NFR BLD: 0.3 %
LYMPHOCYTES # BLD AUTO: 1.65 X10(3) UL (ref 1–4)
LYMPHOCYTES NFR BLD AUTO: 28 %
MCH RBC QN AUTO: 30.8 PG (ref 26–34)
MCHC RBC AUTO-ENTMCNC: 33 G/DL (ref 31–37)
MCV RBC AUTO: 93.2 FL
MONOCYTES # BLD AUTO: 0.79 X10(3) UL (ref 0.1–1)
MONOCYTES NFR BLD AUTO: 13.4 %
NEUTROPHILS # BLD AUTO: 3.24 X10 (3) UL (ref 1.5–7.7)
NEUTROPHILS # BLD AUTO: 3.24 X10(3) UL (ref 1.5–7.7)
NEUTROPHILS NFR BLD AUTO: 54.9 %
OSMOLALITY SERPL CALC.SUM OF ELEC: 295 MOSM/KG (ref 275–295)
PLATELET # BLD AUTO: 212 10(3)UL (ref 150–450)
POTASSIUM SERPL-SCNC: 4.4 MMOL/L (ref 3.5–5.1)
PROT SERPL-MCNC: 7.6 G/DL (ref 6.4–8.2)
RBC # BLD AUTO: 4.39 X10(6)UL
SODIUM SERPL-SCNC: 142 MMOL/L (ref 136–145)
WBC # BLD AUTO: 5.9 X10(3) UL (ref 4–11)

## 2023-10-13 PROCEDURE — 85025 COMPLETE CBC W/AUTO DIFF WBC: CPT

## 2023-10-13 PROCEDURE — 74183 MRI ABD W/O CNTR FLWD CNTR: CPT | Performed by: STUDENT IN AN ORGANIZED HEALTH CARE EDUCATION/TRAINING PROGRAM

## 2023-10-13 PROCEDURE — 72197 MRI PELVIS W/O & W/DYE: CPT | Performed by: STUDENT IN AN ORGANIZED HEALTH CARE EDUCATION/TRAINING PROGRAM

## 2023-10-13 PROCEDURE — 36415 COLL VENOUS BLD VENIPUNCTURE: CPT

## 2023-10-13 PROCEDURE — A9575 INJ GADOTERATE MEGLUMI 0.1ML: HCPCS | Performed by: STUDENT IN AN ORGANIZED HEALTH CARE EDUCATION/TRAINING PROGRAM

## 2023-10-13 PROCEDURE — 86316 IMMUNOASSAY TUMOR OTHER: CPT

## 2023-10-13 PROCEDURE — 84260 ASSAY OF SEROTONIN: CPT

## 2023-10-13 PROCEDURE — 80053 COMPREHEN METABOLIC PANEL: CPT

## 2023-10-13 RX ORDER — GADOTERATE MEGLUMINE 376.9 MG/ML
20 INJECTION INTRAVENOUS
Status: COMPLETED | OUTPATIENT
Start: 2023-10-13 | End: 2023-10-13

## 2023-10-13 RX ADMIN — GADOTERATE MEGLUMINE 20 ML: 376.9 INJECTION INTRAVENOUS at 16:24:00

## 2023-10-13 NOTE — PROGRESS NOTES
I explained to patient I would place a IV for his MRI and draw Labs. Patient declined. Stated he didn't want to live with IV for the next 45 minutes.

## 2023-10-17 LAB
CHROMOGRANIN A: 66.3 NG/ML
SEROTONIN: 80 NG/ML

## 2023-10-18 NOTE — OCCUPATIONAL THERAPY NOTE
Chart reviewed, Pt not therapeutic PTT and per nurse Pt to receive heparin drip, will hold OT today, reschedule for 6-20. Target Cumulative Dosage (In Mg/Kg): 135

## 2023-10-20 ENCOUNTER — OFFICE VISIT (OUTPATIENT)
Dept: HEMATOLOGY/ONCOLOGY | Facility: HOSPITAL | Age: 68
End: 2023-10-20
Attending: STUDENT IN AN ORGANIZED HEALTH CARE EDUCATION/TRAINING PROGRAM
Payer: MEDICARE

## 2023-10-20 VITALS
RESPIRATION RATE: 16 BRPM | SYSTOLIC BLOOD PRESSURE: 125 MMHG | HEIGHT: 74 IN | WEIGHT: 233 LBS | BODY MASS INDEX: 29.9 KG/M2 | OXYGEN SATURATION: 96 % | TEMPERATURE: 98 F | DIASTOLIC BLOOD PRESSURE: 80 MMHG | HEART RATE: 63 BPM

## 2023-10-20 DIAGNOSIS — D3A.8 NEUROENDOCRINE NEOPLASM OF SMALL INTESTINE: Primary | ICD-10-CM

## 2023-10-20 PROCEDURE — 99214 OFFICE O/P EST MOD 30 MIN: CPT | Performed by: STUDENT IN AN ORGANIZED HEALTH CARE EDUCATION/TRAINING PROGRAM

## 2023-12-11 RX ORDER — SILDENAFIL 100 MG/1
100 TABLET, FILM COATED ORAL DAILY PRN
Qty: 30 TABLET | Refills: 0 | Status: SHIPPED | OUTPATIENT
Start: 2023-12-11

## 2023-12-11 NOTE — TELEPHONE ENCOUNTER
[facility]     Requested Prescriptions   Pending Prescriptions Disp Refills    SILDENAFIL CITRATE 100 MG Oral Tab [Pharmacy Med Name: SILDENAFIL 100 MG TABLET] 30 tablet 0     Sig: TAKE 1 TABLET BY MOUTH DAILY AS NEEDED FOR ERECTILE DYSFUNCTION       Genitourinary Medications Passed - 12/9/2023  2:13 PM        Passed - Patient does not have pulmonary hypertension on problem list        Passed - In person appointment or virtual visit in the past 12 mos or appointment in next 3 mos     Recent Outpatient Visits              1 month ago Neuroendocrine neoplasm of small intestine    [facility] Hematology Oncology Simone Thompson,     Office Visit    1 month ago Neuroendocrine neoplasm of small intestine    [facility] - Infusion    Nurse Only    5 months ago [facility]    Office Visit    5 months ago Neuroendocrine neoplasm of small intestine    [facility] Bernie Doss DO    Office Visit    7 months ago Preop examination    [facility] Bernie Doss DO    Office Visit          Future Appointments         Provider Department Appt Notes    In 1 week Bernie Doss DO [facility]     In 1 month [facility] 6 month follow up    In 4 months [facility] RESOURCE [facility] Hematology Oncology lab    In 4 months Simone Thompson,  [facility] Hematology Oncology follow up with MRI and lab (10/13/23)                    [email or web address]      [email or web address]

## 2023-12-18 ENCOUNTER — OFFICE VISIT (OUTPATIENT)
Facility: CLINIC | Age: 68
End: 2023-12-18
Payer: MEDICARE

## 2023-12-18 VITALS
BODY MASS INDEX: 31.7 KG/M2 | OXYGEN SATURATION: 98 % | WEIGHT: 247 LBS | HEART RATE: 67 BPM | DIASTOLIC BLOOD PRESSURE: 68 MMHG | SYSTOLIC BLOOD PRESSURE: 104 MMHG | HEIGHT: 74 IN

## 2023-12-18 DIAGNOSIS — Z95.2 H/O MECHANICAL AORTIC VALVE REPLACEMENT: ICD-10-CM

## 2023-12-18 DIAGNOSIS — G89.29 CHRONIC RIGHT-SIDED LOW BACK PAIN WITHOUT SCIATICA: ICD-10-CM

## 2023-12-18 DIAGNOSIS — I25.10 ARTERIOSCLEROSIS OF CORONARY ARTERY: ICD-10-CM

## 2023-12-18 DIAGNOSIS — M54.50 CHRONIC RIGHT-SIDED LOW BACK PAIN WITHOUT SCIATICA: ICD-10-CM

## 2023-12-18 DIAGNOSIS — E72.20 HYPERAMMONEMIA (HCC): ICD-10-CM

## 2023-12-18 DIAGNOSIS — H57.89 IRRITATION OF RIGHT EYE: ICD-10-CM

## 2023-12-18 DIAGNOSIS — R91.1 NODULE OF APEX OF RIGHT LUNG: ICD-10-CM

## 2023-12-18 DIAGNOSIS — Z13.6 SCREENING FOR CARDIOVASCULAR CONDITION: ICD-10-CM

## 2023-12-18 DIAGNOSIS — E66.9 OBESITY (BMI 30.0-34.9): ICD-10-CM

## 2023-12-18 DIAGNOSIS — D3A.8 NEUROENDOCRINE NEOPLASM OF SMALL INTESTINE: ICD-10-CM

## 2023-12-18 DIAGNOSIS — Z00.00 ENCOUNTER FOR MEDICARE ANNUAL WELLNESS EXAM: Primary | ICD-10-CM

## 2023-12-18 DIAGNOSIS — I48.0 PAROXYSMAL ATRIAL FIBRILLATION (HCC): ICD-10-CM

## 2023-12-18 PROBLEM — D64.9 ANEMIA: Status: RESOLVED | Noted: 2023-06-27 | Resolved: 2023-12-18

## 2024-01-15 NOTE — PROGRESS NOTES
[facility]    Patient Name:  Josh Jones   YOB: 1955   Gender:  Male   Appt Date:  1/16/2024   Provider:  Tyrone Danielle MD   Insurance:  MEDICARE PART B ONLY     PATIENT PROVIDERS  Referring Provider: Dr. Lagos    Primary Care Provider:Bernie Doss DO   Address: [address] [place] [address]   Phone #: [phone]       CHIEF COMPLAINT  No chief complaint on file.  Neuroendocrine tumor of small bowel     PROBLEMS  Reviewed   Patient Active Problem List   Diagnosis    Requires lifelong warfarin therapy    Paroxysmal atrial fibrillation (HCC)    History of colonoscopy    Recurrent HSV (herpes simplex virus)    Cyclothymia    Allergic rhinitis    Benign prostatic hyperplasia with incomplete bladder emptying    History of diverticulitis    H/O mechanical aortic valve replacement    Paroxysmal supraventricular tachycardia    Verruca plantaris    Hx SBO    Hyperammonemia (HCC)    Diverticulosis    Ascending aorta dilation (HCC)    Arteriosclerosis of coronary artery    Bipolar affective (HCC)    Neuroendocrine neoplasm of small intestine    S/P right hemicolectomy    Anticoagulation management encounter        History of Present Illness:  Josh Jones is a 69 year old Male with PMHx paroxysmal atrial fibrillation, aortic valve replacement on warfarin, diverticulitis s/p sigmoid colectomy who is following up after surgical management of neuroendocrine tumor of small bowel. History is summarized below, but he mass was identified after patient had recurrent small bowel obstruction and given features on imaging, recommended surgery for treatment and to establish diagnosis. Underwent robotic assisted right hemicolectomy 5/22 and pathology showed neuroendocrine tumor grade 1 with 5/21 lymph nodes involved. Stage pT2, N1. Course complicated by hemorrhagic shock requiring ex lap and dialysis, but ultimately with renal recovery. He  established with Dr. Thopmson for monitoring, and last MRI A/P 10/2023 was YAMILEX.    He returns today for a follow up visit.     Oncologic history:  Patient initially presented with recurrent small bowel obstruction, first 11/2021 and second 4/2022. This was thought to be secondary to adhesions after patient partial sigmoid colectomy (for diverticulitis) with Dr. Lagos in June 2020. However, due to patient's recurrent obstruction, CT enterography ordered to check for intraluminal pathology. CT completed 11/18/2022 demonstrates a focus of wall thickening in distal ileum 6cm from ileocecal junction measuring 15 x 14 x 11mm. This was several centimeters downstream from bowel caliber transition demonstrated from inpatient CT for obstruction. Follow up MRI enterography 3/14/2023 demonstrates a 1.8cm intraluminal nodule in the distal ileum approximately 6-8cm proximal to the ileocecal valve. MRI notes a small adjacent nodule within the mesentery which is probably a normal-sized lymph node.  5/22/2023: Robotic assisted right hemicolectomy. Pathology: neuroendocrine tumor grade 1 with 5/21 lymph nodes involved. Stage pT2, N1. Course complicated by hemorrhagic shock requiring ex lap and dialysis, but ultimately with renal recovery  10/13/2023: MRI A/P without evidence of recurrent or metastatic disease.     Vital Signs:  There were no vitals taken for this visit.     Medications Reviewed:    Current Outpatient Medications:     Pyridoxine HCl (VITAMIN B-6 OR), Take by mouth., Disp: , Rfl:     Cyanocobalamin (VITAMIN B-12 OR), Take by mouth., Disp: , Rfl:     Sildenafil Citrate 100 MG Oral Tab, Take 1 tablet (100 mg total) by mouth daily as needed for Erectile Dysfunction., Disp: 30 tablet, Rfl: 0    metoprolol tartrate 25 MG Oral Tab, Take one tablet by mouth every morning and one half tablet by mouth every evening, Disp: 135 tablet, Rfl: 2    Multiple Vitamin (VITAMIN E/FOLIC ACID/B-6/B-12 OR), Take by mouth As Directed., Disp: ,  Rfl:     divalproex  MG Oral Tablet 24 Hr, Take 1 tablet (250 mg total) by mouth at bedtime. And 2x 500 mg, Disp: , Rfl: 0    acetaminophen 325 MG Oral Tab, Take 1 tablet (325 mg total) by mouth every 6 (six) hours as needed., Disp: , Rfl: 0    pantoprazole 40 MG Oral Tab EC, Take 1 tablet (40 mg total) by mouth daily., Disp: , Rfl: 0    warfarin 2 MG Oral Tab, Take 1 tablet (2 mg total) by mouth nightly. (Patient taking differently: Take 7.5 mg by mouth nightly.), Disp: 30 tablet, Rfl: 3    melatonin 5 MG Oral Cap, Take 1 capsule (5 mg total) by mouth nightly., Disp: , Rfl:     folic acid 400 MCG Oral Tab, Take 1 tablet (400 mcg total) by mouth daily., Disp: , Rfl:     lamoTRIgine 100 MG Oral Tab, Take 1 tablet (100 mg total) by mouth every morning., Disp: , Rfl:      Allergies Reviewed:  Allergies   Allergen Reactions    Cat Hair Extract ASTHMA    Dog Epithelium WHEEZING        History:  Reviewed:  Past Medical History:   Diagnosis Date    Actinic keratosis     AF (paroxysmal atrial fibrillation) (HCC) 04/01/2016    Asthma     AS CHILD    Bicuspid aortic valve     s/p AVR    Bipolar affective (HCC)     Cyclothymia     Sees Dr. Bravo     Diverticulitis 2010, 2007    High blood pressure     High cholesterol     Hx of motion sickness     Hyperammonemia (HCC)     Due to Depakote     Pneumonia due to organism     S/P AVR (aortic valve replacement) 2001    SVT (supraventricular tachycardia)     Visual impairment     Readers      Reviewed:  Past Surgical History:   Procedure Laterality Date    CATH TRANSCATHETER AORTIC VALVE REPLACEMENT  2001    [facility]     COLECTOMY  06/18/2020    Colon resection due to diverticulitis    COLECTOMY  05/2023    Underwent robotic assisted right hemicolectomy 5/22    COLONOSCOPY SCREENING - REFERRAL N/A 06/18/2020    Procedure: COLONOSCOPY-SCREENING;  Surgeon: Rahul Lagos MD;  Location: [facility] MAIN OR      Reviewed Social History:  Social History      Socioeconomic History    Marital status: Single   Tobacco Use    Smoking status: Never     Passive exposure: Past    Smokeless tobacco: Never   Vaping Use    Vaping Use: Never used   Substance and Sexual Activity    Alcohol use: No    Drug use: Not Currently     Types: Cannabis      Reviewed:  Family History   Problem Relation Age of Onset    Cancer Father         lung cancer    Stroke Mother         Review of Systems:  Review of Systems     Physical Examination:  Physical Exam  Constitutional:       General: He is not in acute distress.     Appearance: He is well-developed.   HENT:      Head: Normocephalic and atraumatic.   Eyes:      General: No scleral icterus.  Pulmonary:      Effort: Pulmonary effort is normal. No respiratory distress.   Abdominal:      General: There is no distension.      Palpations: Abdomen is soft. There is no mass.      Tenderness: There is no abdominal tenderness. There is no guarding or rebound.       Musculoskeletal:         General: Normal range of motion.      Cervical back: Normal range of motion and neck supple.   Skin:     General: Skin is warm and dry.   Neurological:      Mental Status: He is alert and oriented to person, place, and time.          Document Review:  Surgical Pathology - 5/22/2023  Final Diagnosis:      Ileum, vermiform appendix and right colon; robot-assisted right hemicolectomy:  Infiltrating neuroendocrine tumor, grade 1, measuring 14 mm in maximum dimension.  Tumor penetrates the muscularis propria.  Lipomatous ileocecal valve.  Vermiform appendix, negative for tumor.  Uninvolved ileal mucosa with no significant histopathological change.  Colonic mucosa with mild submucosal congestion.  Small mesenteric neuroendocrine tumor nodule (10 mm in maximum dimension).  Five of twenty one lymph nodes involved by tumor (5/21).  Largest tumor focus involving lymph node measures 2.5 mm.  Focal extracapsular extension is present.  Pericolonic adipose tissue with single  discontinuous tumor deposit measuring 4.5 mm.  All inked margins are free of tumor.  Preliminary pathologic staging pT2, N1     Comment:  For  purposes, the diagnosis is reviewed by another pathologist concurs with the above diagnosis.     A limited panel of immunohistochemical stains (appropriate control reactivity) performed for further characterization.  Tumor cells show diffuse positivity by synaptophysin and chromogranin.  Ki-67 highlights proliferative index and is estimated to be <3%.  These findings support the diagnosis of neuroendocrine tumor, grade 1.  Clinical correlation with close clinical follow-up is recommended.     MRI Abdomen/Pelvis - 10/13/2023  FINDINGS:  Evaluation is degraded by patient-related motion artifact.  LIVER: Multiple benign hepatic cysts.  No suspicious hepatic lesion.  BILIARY: Cholelithiasis.  No evidence for cholecystitis or biliary dilatation.  SPLEEN: Normal.    PANCREAS: Normal.    ADRENALS: Normal.  KIDNEYS: Multiple stable simple renal cysts and a few stable mildly complicated renal cysts.  No unequivocal solid lesion.  No renal obstruction.  VASCULAR:                     No aneurysm. Flow in major intra- abdominal arteries and veins.  LYMPHADENOPATHY: None.  GI/MESENTERY: Post right hemicolectomy.  Evaluation of bowel limited by significant motion artifact.  Nonobstructed small bowel adhesed to the ventral abdominal wall most pronounced in the left periumbilical region.  Colonic diverticulosis.  No  obstruction, bowel wall thickening, or mesenteric mass.  ABDOMINAL WALL: Abdominal wall scarring small fat containing periumbilical incisional umbilical hernia..  URINARY BLADDER: Normal.  ASCITES:   Trace ascites.  PELVIC ORGANS: BPH changes in the prostate.  BONES: No suspect bone lesion.  LUNG BASES: Normal.  No visible pulmonary or pleural disease.    OTHER: Aortic valve prosthesis.          Impression   CONCLUSION:  1. Post right distal small-bowel  resection and right hemicolectomy.  No evidence of recurrent or metastatic disease.  2. Evaluation limited by patient related motion artifact.        Assessment / Plan:  Neuroendocrine tumor of small bowel status post right hemicolectomy complicated by postoperative bleed and acute kidney injury, now with full recovery.    Patient is doing well.  YAMILEX on MRI in October  He follows with Dr. Thompson for surveillance of NET    Tyrone Danielle MD  [facility]  [facility]  Pager [phone]  [email or web address]        Follow Up:  No follow-ups on file.  6 months       Electronically Signed by: Kiley Paul PA-C

## 2024-01-16 ENCOUNTER — OFFICE VISIT (OUTPATIENT)
Dept: SURGERY | Facility: CLINIC | Age: 69
End: 2024-01-16
Payer: MEDICARE

## 2024-01-16 VITALS
HEIGHT: 74 IN | RESPIRATION RATE: 18 BRPM | BODY MASS INDEX: 31.11 KG/M2 | SYSTOLIC BLOOD PRESSURE: 126 MMHG | OXYGEN SATURATION: 95 % | HEART RATE: 69 BPM | TEMPERATURE: 98 F | DIASTOLIC BLOOD PRESSURE: 67 MMHG | WEIGHT: 242.38 LBS

## 2024-01-16 DIAGNOSIS — D3A.8 NEUROENDOCRINE NEOPLASM OF SMALL INTESTINE: Primary | ICD-10-CM

## 2024-01-16 PROCEDURE — 1126F AMNT PAIN NOTED NONE PRSNT: CPT | Performed by: SURGERY

## 2024-01-16 PROCEDURE — 99024 POSTOP FOLLOW-UP VISIT: CPT | Performed by: SURGERY

## 2024-02-07 NOTE — TELEPHONE ENCOUNTER
I spoke with patient- appointment with PCP on 02/07/2024 will be virtual   Patient scheduled for Laparoscopic Colon Resection with Dr. Doc Saab on 5- at [facility]. Per Rigoberto Hinkle, Dr. Dipti Dozier has agreed to do Colonoscopy in the OR on the same day. Dr. Dipti Dozier. Will start Colonoscopy at 7:30 am 5-.

## 2024-04-01 ENCOUNTER — TELEPHONE (OUTPATIENT)
Dept: HEMATOLOGY/ONCOLOGY | Facility: HOSPITAL | Age: 69
End: 2024-04-01

## 2024-04-01 NOTE — TELEPHONE ENCOUNTER
Per after hour message the patient want to know if he need a MRI before his next appointment on 4/26/24.

## 2024-04-01 NOTE — TELEPHONE ENCOUNTER
Left voicemail for patient informing him that MRI is needed before appointment with Dr. Thompson. Number for central scheduling provided, as well as number for call center if needed.

## 2024-04-02 ENCOUNTER — TELEPHONE (OUTPATIENT)
Dept: HEMATOLOGY/ONCOLOGY | Facility: HOSPITAL | Age: 69
End: 2024-04-02

## 2024-04-18 NOTE — TELEPHONE ENCOUNTER
Refill passed per [facility] protocol.  Requested Prescriptions   Pending Prescriptions Disp Refills    METOPROLOL TARTRATE 25 MG Oral Tab [Pharmacy Med Name: METOPROLOL TARTRATE 25 MG TAB] 135 tablet 2     Sig: TAKE ONE TABLET BY MOUTH EVERY MORNING AND ONE-HALF TABLET BY MOUTH EVERY EVENING       Hypertension Medications Protocol Passed - 4/17/2024  5:24 AM        Passed - CMP or BMP in past 12 months        Passed - Last BP reading less than 140/90     BP Readings from Last 1 Encounters:   01/16/24 126/67               Passed - In person appointment or virtual visit in the past 12 mos or appointment in next 3 mos     Recent Outpatient Visits              3 months ago Neuroendocrine neoplasm of small intestine (HCC)    [facility], [facility], Tyrone Chan MD    Office Visit    4 months ago Encounter for Medicare annual wellness exam    [facility]    [facility]    6 months ago Neuroendocrine neoplasm of small intestine (HCC)    [facility] Hematology Oncology Simone Thompson DO    Office Visit    6 months ago Neuroendocrine neoplasm of small intestine (HCC)    [facility] - Infusion    Nurse Only    9 months ago S/P right hemicolectomy    [facility]    [facility]          Future Appointments         Provider Department Appt Notes    In 1 week [facility] CT RM1 CARD [facility] CT     In 2 months [facility] MRI RM1 (1.5T WIDE) [facility] MRI     In 2 months [facility] RESOURCE [facility] Hematology Oncology lab    In 2 months Simone Thompson DO [facility] Hematology Oncology follow up with MRI and lab (10/13/23)                    Passed - EGFRCR or GFRNAA > 50     GFR Evaluation  EGFRCR: 82 , resulted on 10/13/2023             Recent Outpatient Visits              3 months ago Neuroendocrine neoplasm of  small intestine (HCC)    [facility], [facility], Tyrone Chan MD    Office Visit    4 months ago Encounter for Medicare annual wellness exam    [facility], [facility] Bernie Doss,     Office Visit    6 months ago Neuroendocrine neoplasm of small intestine (HCC)    [facility] Hematology Oncology Simone Thompson,     Office Visit    6 months ago Neuroendocrine neoplasm of small intestine (HCC)    [facility] - Infusion    Nurse Only    9 months ago S/P right hemicolectomy    [facility], [facility] Tyrone Danielle MD    Office Visit          Future Appointments         Provider Department Appt Notes    In 1 week [facility] CT RM1 CARD [facility] CT     In 2 months [facility] MRI RM1 (1.5T WIDE) [facility] MRI     In 2 months [facility] RESOURCE [facility] Hematology Oncology lab    In 2 months Simone Thompson,  [facility] Hematology Oncology follow up with MRI and lab (10/13/23)

## 2024-04-26 ENCOUNTER — APPOINTMENT (OUTPATIENT)
Dept: HEMATOLOGY/ONCOLOGY | Facility: HOSPITAL | Age: 69
End: 2024-04-26
Attending: STUDENT IN AN ORGANIZED HEALTH CARE EDUCATION/TRAINING PROGRAM
Payer: MEDICARE

## 2024-04-26 ENCOUNTER — HOSPITAL ENCOUNTER (OUTPATIENT)
Dept: CT IMAGING | Facility: HOSPITAL | Age: 69
Discharge: HOME | End: 2024-04-26
Attending: FAMILY MEDICINE
Payer: MEDICARE

## 2024-04-26 DIAGNOSIS — R91.1 NODULE OF APEX OF RIGHT LUNG: ICD-10-CM

## 2024-04-26 PROCEDURE — 71250 CT THORAX DX C-: CPT | Performed by: FAMILY MEDICINE

## 2024-05-21 ENCOUNTER — TELEPHONE (OUTPATIENT)
Facility: CLINIC | Age: 69
End: 2024-05-21

## 2024-05-21 NOTE — TELEPHONE ENCOUNTER
Left message for patient to scheduled his medicare physical. Pt can schedule any day after 12/1/24

## 2024-05-22 NOTE — TELEPHONE ENCOUNTER
RN to inform pt - would hold off on digestive enzymes. We only use those medically for pancreatic insufficiency which he does not have. Other (specify diet and fluid)

## 2024-06-07 ENCOUNTER — HOSPITAL ENCOUNTER (OUTPATIENT)
Dept: MRI IMAGING | Facility: HOSPITAL | Age: 69
Discharge: HOME | End: 2024-06-07
Attending: STUDENT IN AN ORGANIZED HEALTH CARE EDUCATION/TRAINING PROGRAM
Payer: MEDICARE

## 2024-06-07 DIAGNOSIS — D3A.8: ICD-10-CM

## 2024-06-07 PROCEDURE — A9575 INJ GADOTERATE MEGLUMI 0.1ML: HCPCS | Performed by: STUDENT IN AN ORGANIZED HEALTH CARE EDUCATION/TRAINING PROGRAM

## 2024-06-07 PROCEDURE — 72197 MRI PELVIS W/O & W/DYE: CPT | Performed by: STUDENT IN AN ORGANIZED HEALTH CARE EDUCATION/TRAINING PROGRAM

## 2024-06-07 PROCEDURE — 74183 MRI ABD W/O CNTR FLWD CNTR: CPT | Performed by: STUDENT IN AN ORGANIZED HEALTH CARE EDUCATION/TRAINING PROGRAM

## 2024-06-07 RX ORDER — GADOTERATE MEGLUMINE 376.9 MG/ML
20 INJECTION INTRAVENOUS
Status: COMPLETED | OUTPATIENT
Start: 2024-06-07 | End: 2024-06-07

## 2024-06-07 RX ADMIN — GADOTERATE MEGLUMINE 20 ML: 376.9 INJECTION INTRAVENOUS at 10:25:00

## 2024-06-10 ENCOUNTER — NURSE ONLY (OUTPATIENT)
Dept: HEMATOLOGY/ONCOLOGY | Facility: HOSPITAL | Age: 69
End: 2024-06-10
Attending: FAMILY MEDICINE
Payer: MEDICARE

## 2024-06-10 ENCOUNTER — OFFICE VISIT (OUTPATIENT)
Dept: HEMATOLOGY/ONCOLOGY | Facility: HOSPITAL | Age: 69
End: 2024-06-10
Attending: STUDENT IN AN ORGANIZED HEALTH CARE EDUCATION/TRAINING PROGRAM
Payer: MEDICARE

## 2024-06-10 VITALS
DIASTOLIC BLOOD PRESSURE: 82 MMHG | BODY MASS INDEX: 31.1 KG/M2 | HEIGHT: 74.02 IN | TEMPERATURE: 98 F | RESPIRATION RATE: 16 BRPM | WEIGHT: 242.31 LBS | SYSTOLIC BLOOD PRESSURE: 133 MMHG | OXYGEN SATURATION: 96 % | HEART RATE: 62 BPM

## 2024-06-10 DIAGNOSIS — D3A.8: ICD-10-CM

## 2024-06-10 DIAGNOSIS — D3A.8: Primary | ICD-10-CM

## 2024-06-10 LAB
ALBUMIN SERPL-MCNC: 4.1 G/DL (ref 3.2–4.8)
ALBUMIN/GLOB SERPL: 1.8 {RATIO} (ref 1–2)
ALP LIVER SERPL-CCNC: 75 U/L
ALT SERPL-CCNC: 40 U/L
ANION GAP SERPL CALC-SCNC: 6 MMOL/L (ref 0–18)
AST SERPL-CCNC: 41 U/L (ref ?–34)
BASOPHILS # BLD AUTO: 0.07 X10(3) UL (ref 0–0.2)
BASOPHILS NFR BLD AUTO: 1.3 %
BILIRUB SERPL-MCNC: 0.3 MG/DL (ref 0.2–1.1)
BUN BLD-MCNC: 14 MG/DL (ref 9–23)
BUN/CREAT SERPL: 14.9 (ref 10–20)
CALCIUM BLD-MCNC: 9.7 MG/DL (ref 8.7–10.4)
CHLORIDE SERPL-SCNC: 108 MMOL/L (ref 98–112)
CO2 SERPL-SCNC: 27 MMOL/L (ref 21–32)
CREAT BLD-MCNC: 0.94 MG/DL
DEPRECATED RDW RBC AUTO: 48.6 FL (ref 35.1–46.3)
EGFRCR SERPLBLD CKD-EPI 2021: 88 ML/MIN/1.73M2 (ref 60–?)
EOSINOPHIL # BLD AUTO: 0.3 X10(3) UL (ref 0–0.7)
EOSINOPHIL NFR BLD AUTO: 5.4 %
ERYTHROCYTE [DISTWIDTH] IN BLOOD BY AUTOMATED COUNT: 14.1 % (ref 11–15)
GLOBULIN PLAS-MCNC: 2.3 G/DL (ref 2–3.5)
GLUCOSE BLD-MCNC: 129 MG/DL (ref 70–99)
HCT VFR BLD AUTO: 42 %
HGB BLD-MCNC: 14 G/DL
IMM GRANULOCYTES # BLD AUTO: 0.01 X10(3) UL (ref 0–1)
IMM GRANULOCYTES NFR BLD: 0.2 %
LYMPHOCYTES # BLD AUTO: 1.41 X10(3) UL (ref 1–4)
LYMPHOCYTES NFR BLD AUTO: 25.5 %
MCH RBC QN AUTO: 31.5 PG (ref 26–34)
MCHC RBC AUTO-ENTMCNC: 33.3 G/DL (ref 31–37)
MCV RBC AUTO: 94.6 FL
MONOCYTES # BLD AUTO: 0.76 X10(3) UL (ref 0.1–1)
MONOCYTES NFR BLD AUTO: 13.7 %
NEUTROPHILS # BLD AUTO: 2.99 X10 (3) UL (ref 1.5–7.7)
NEUTROPHILS # BLD AUTO: 2.99 X10(3) UL (ref 1.5–7.7)
NEUTROPHILS NFR BLD AUTO: 53.9 %
OSMOLALITY SERPL CALC.SUM OF ELEC: 294 MOSM/KG (ref 275–295)
PLATELET # BLD AUTO: 209 10(3)UL (ref 150–450)
POTASSIUM SERPL-SCNC: 4.3 MMOL/L (ref 3.5–5.1)
PROT SERPL-MCNC: 6.4 G/DL (ref 5.7–8.2)
RBC # BLD AUTO: 4.44 X10(6)UL
SODIUM SERPL-SCNC: 141 MMOL/L (ref 136–145)
WBC # BLD AUTO: 5.5 X10(3) UL (ref 4–11)

## 2024-06-10 PROCEDURE — 80053 COMPREHEN METABOLIC PANEL: CPT

## 2024-06-10 PROCEDURE — 86316 IMMUNOASSAY TUMOR OTHER: CPT

## 2024-06-10 PROCEDURE — 36415 COLL VENOUS BLD VENIPUNCTURE: CPT

## 2024-06-10 PROCEDURE — 99213 OFFICE O/P EST LOW 20 MIN: CPT | Performed by: STUDENT IN AN ORGANIZED HEALTH CARE EDUCATION/TRAINING PROGRAM

## 2024-06-10 PROCEDURE — 85025 COMPLETE CBC W/AUTO DIFF WBC: CPT

## 2024-06-10 NOTE — PROGRESS NOTES
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     Interval history:   Josh is doing very well today.  He will occasionally have abdominal cramping or discomfort but the symptoms are mostly fleeting.  He is going to the [employer] in [place] soon and is excited about this.  No other concerns or complaints.      Oncologic history:  Patient initially presented with recurrent small bowel obstruction, first 11/2021 and second 4/2022. This was thought to be secondary to adhesions after patient partial sigmoid colectomy (for diverticulitis) with Dr. Lagos in June 2020. However, due to patient's recurrent obstruction, CT enterography ordered to check for intraluminal pathology. CT completed 11/18/2022 demonstrates a focus of wall thickening in distal ileum 6cm from ileocecal junction measuring 15 x 14 x 11mm. This was several centimeters downstream from bowel caliber transition demonstrated from inpatient CT for obstruction. Follow up MRI enterography 3/14/2023 demonstrates a 1.8cm intraluminal nodule in the distal ileum approximately 6-8cm proximal to the ileocecal valve. MRI notes a small adjacent nodule within the mesentery which is probably a normal-sized lymph node. Given features on imaging, recommended surgery for treatment and to establish diagnosis. Underwent robotic assisted right hemicolectomy 5/22 and pathology showed neuroendocrine tumor grade 1 with 5/21 lymph nodes involved. Stage pT2, N1. Postoperative course was complicated by hemorrhagic shock requiring ex lap, developed hepatic failure and renal failure,  required dialysis. Was eventually discharged to acute rehab on intermittent dialysis with improving renal function. He renal function recovered. He is now on surveillance.     Review of Systems:  Hematology/Oncology ROS performed and negative except as above in HPI    History/Other:   Current Medications:   metoprolol tartrate 25 MG Oral Tab TAKE ONE TABLET BY MOUTH EVERY MORNING AND ONE-HALF TABLET BY MOUTH EVERY EVENING 135 tablet 3    Pyridoxine HCl (VITAMIN B-6 OR) Take by mouth. Vit b6      Cyanocobalamin (VITAMIN B-12 OR) Take by mouth.      Sildenafil Citrate 100 MG Oral Tab Take 1 tablet (100 mg total) by mouth daily as needed for Erectile Dysfunction. 30 tablet 0    Multiple Vitamin (VITAMIN E/FOLIC ACID/B-6/B-12 OR) Take by mouth As Directed.      divalproex  MG Oral Tablet 24 Hr Take 1 tablet (250 mg total) by mouth at bedtime. And 2x 500 mg  0    acetaminophen 325 MG Oral Tab Take 1 tablet (325 mg total) by mouth every 6 (six) hours as needed.  0    warfarin 2 MG Oral Tab Take 1 tablet (2 mg total) by mouth nightly. (Patient taking differently: Take 7.5 mg by mouth nightly.) 30 tablet 3    melatonin 5 MG Oral Cap Take 1 capsule (5 mg total) by mouth nightly.      folic acid 400 MCG Oral Tab Take 1 tablet (400 mcg total) by mouth daily.      lamoTRIgine 100 MG Oral Tab Take 1 tablet (100 mg total) by mouth every morning.         Allergies:   Allergies   Allergen Reactions    Cat Hair Extract ASTHMA    Dog Epithelium WHEEZING     Objective:   Blood pressure 133/82, pulse 62, temperature 98 °F (36.7 °C), temperature source Oral, resp. rate 16, height 1.88 m (6' 2.02\"), weight 109.9 kg (242 lb 4.8 oz), SpO2 96%.  Physical Exam:  ECOG PS: 0  General: A&Ox3, NAD  HEENT: PERRL, OP clear  CV: RRR, no murmurs  Pulm: CTA b/l, no w/r/r  Abd: soft, ntnd  Extremities: no edema  Neurological: grossly intact    Results:   Labs:  Lab Results   Component Value Date    WBC 5.5 06/10/2024    HGB 14.0 06/10/2024     HCT 42.0 06/10/2024    .0 06/10/2024    CREATSERUM 0.94 06/10/2024    BUN 14 06/10/2024     06/10/2024    K 4.3 06/10/2024     06/10/2024    CO2 27.0 06/10/2024     (H) 06/10/2024    CA 9.7 06/10/2024    ALB 4.1 06/10/2024    ALKPHO 75 06/10/2024    BILT 0.3 06/10/2024    TP 6.4 06/10/2024    AST 41 (H) 06/10/2024    ALT 40 06/10/2024    PTT 79.9 (H) 06/07/2023    INR 1.64 (H) 07/05/2023    PT 25.7 (H) 09/06/2019    LIP 54 (L) 04/07/2022    MG 2.0 06/12/2023    PHOS 5.1 (H) 06/12/2023    B12 872 06/27/2023       Imaging:  MRI 10/14/23:  1. Post right distal small-bowel resection and right hemicolectomy.  No evidence of recurrent or metastatic disease.     MRI ABDOMEN+PELVIS (ALL W+WO) (CPT=74183/51306)    Result Date: 6/7/2024  CONCLUSION: Stable study.  No evidence of disease       Dictated by (CST): Abdoul Rubio MD on 6/07/2024 at 11:52 AM     Finalized by (CST): Abdoul Rubio MD on 6/07/2024 at 12:07 PM           Pathology:  Ileum, vermiform appendix and right colon; robot-assisted right hemicolectomy 5/22/23:  Infiltrating neuroendocrine tumor, grade 1, measuring 14 mm in maximum dimension.  Tumor penetrates the muscularis propria.  Lipomatous ileocecal valve.  Vermiform appendix, negative for tumor.  Uninvolved ileal mucosa with no significant histopathological change.  Colonic mucosa with mild submucosal congestion.  Small mesenteric neuroendocrine tumor nodule (10 mm in maximum dimension).  Five of twenty one lymph nodes involved by tumor (5/21).  Largest tumor focus involving lymph node measures 2.5 mm.  Focal extracapsular extension is present.  Pericolonic adipose tissue with single discontinuous tumor deposit measuring 4.5 mm.  All inked margins are free of tumor.  Preliminary pathologic staging pT2, N1    Assessment & Plan:   Josh Jones is a 69 year old male with a hx of HTN, HLD, Afib of warfarin, and other comorbidities who presents for medical oncology surveillance  followup regarding stage III ([id number]) G1 NET of the small bowel.     # Stage III (pH2O6E2) G1 NET of the small bowel.   -Previously reviewed the patients pathology report and discussed the natural history of NET of the small bowel and general prognosis  -pt has recovered quite well from his post-operative course  -surveillance MRI remains YAMILEX at this time  -followup in 6 months for H&P and labs, consider imaging at yearly intervals now, next in June 2025    HOLA Thompson DO    [facility]  [facility] [facility]    This note was created using a voice-recognition transcribing system. Incorrect words or phrases may have been missed during proofreading. Please interpret accordingly.

## 2024-06-12 LAB — CHROMOGRANIN A: 64.1 NG/ML

## 2024-06-24 ENCOUNTER — APPOINTMENT (OUTPATIENT)
Dept: HEMATOLOGY/ONCOLOGY | Facility: HOSPITAL | Age: 69
End: 2024-06-24
Attending: FAMILY MEDICINE
Payer: MEDICARE

## 2024-12-06 ENCOUNTER — TELEPHONE (OUTPATIENT)
Dept: HEMATOLOGY/ONCOLOGY | Facility: HOSPITAL | Age: 69
End: 2024-12-06

## 2024-12-06 NOTE — TELEPHONE ENCOUNTER
Pt called and would like to know if he need to have an MRI before his appt with Dr. Thompson.    I advised yes according to the note on appt, however I didn't see an order    Pt would like a call back

## 2024-12-06 NOTE — TELEPHONE ENCOUNTER
LVM for patient informing him that MRI is not needed for follow up. Per last visit note in June 2024, \"-followup in 6 months for H&P and labs, consider imaging at yearly intervals now, next in June 2025 .\" Reminder provided for lab and follow up 12/13.

## 2024-12-13 ENCOUNTER — OFFICE VISIT (OUTPATIENT)
Dept: HEMATOLOGY/ONCOLOGY | Facility: HOSPITAL | Age: 69
End: 2024-12-13
Attending: STUDENT IN AN ORGANIZED HEALTH CARE EDUCATION/TRAINING PROGRAM
Payer: MEDICARE

## 2024-12-13 VITALS
HEIGHT: 74 IN | BODY MASS INDEX: 30.03 KG/M2 | DIASTOLIC BLOOD PRESSURE: 79 MMHG | WEIGHT: 234 LBS | OXYGEN SATURATION: 96 % | TEMPERATURE: 98 F | SYSTOLIC BLOOD PRESSURE: 155 MMHG | HEART RATE: 65 BPM | RESPIRATION RATE: 18 BRPM

## 2024-12-13 DIAGNOSIS — Z09 ONCOLOGY FOLLOW-UP ENCOUNTER: ICD-10-CM

## 2024-12-13 DIAGNOSIS — D3A.8: Primary | ICD-10-CM

## 2024-12-13 DIAGNOSIS — D3A.8: ICD-10-CM

## 2024-12-13 LAB
ALBUMIN SERPL-MCNC: 4.4 G/DL (ref 3.2–4.8)
ALBUMIN/GLOB SERPL: 1.8 {RATIO} (ref 1–2)
ALP LIVER SERPL-CCNC: 78 U/L
ALT SERPL-CCNC: 29 U/L
ANION GAP SERPL CALC-SCNC: 5 MMOL/L (ref 0–18)
AST SERPL-CCNC: 37 U/L (ref ?–34)
BASOPHILS # BLD AUTO: 0.07 X10(3) UL (ref 0–0.2)
BASOPHILS NFR BLD AUTO: 1.5 %
BILIRUB SERPL-MCNC: 0.4 MG/DL (ref 0.2–1.1)
BUN BLD-MCNC: 15 MG/DL (ref 9–23)
BUN/CREAT SERPL: 15.5 (ref 10–20)
CALCIUM BLD-MCNC: 9.6 MG/DL (ref 8.7–10.4)
CHLORIDE SERPL-SCNC: 109 MMOL/L (ref 98–112)
CO2 SERPL-SCNC: 29 MMOL/L (ref 21–32)
CREAT BLD-MCNC: 0.97 MG/DL
DEPRECATED RDW RBC AUTO: 48.4 FL (ref 35.1–46.3)
EGFRCR SERPLBLD CKD-EPI 2021: 85 ML/MIN/1.73M2 (ref 60–?)
EOSINOPHIL # BLD AUTO: 0.13 X10(3) UL (ref 0–0.7)
EOSINOPHIL NFR BLD AUTO: 2.7 %
ERYTHROCYTE [DISTWIDTH] IN BLOOD BY AUTOMATED COUNT: 13.9 % (ref 11–15)
GLOBULIN PLAS-MCNC: 2.5 G/DL (ref 2–3.5)
GLUCOSE BLD-MCNC: 114 MG/DL (ref 70–99)
HCT VFR BLD AUTO: 43.1 %
HGB BLD-MCNC: 14.5 G/DL
IMM GRANULOCYTES # BLD AUTO: 0.01 X10(3) UL (ref 0–1)
IMM GRANULOCYTES NFR BLD: 0.2 %
LYMPHOCYTES # BLD AUTO: 1.42 X10(3) UL (ref 1–4)
LYMPHOCYTES NFR BLD AUTO: 29.7 %
MCH RBC QN AUTO: 31.7 PG (ref 26–34)
MCHC RBC AUTO-ENTMCNC: 33.6 G/DL (ref 31–37)
MCV RBC AUTO: 94.3 FL
MONOCYTES # BLD AUTO: 0.61 X10(3) UL (ref 0.1–1)
MONOCYTES NFR BLD AUTO: 12.8 %
NEUTROPHILS # BLD AUTO: 2.54 X10 (3) UL (ref 1.5–7.7)
NEUTROPHILS # BLD AUTO: 2.54 X10(3) UL (ref 1.5–7.7)
NEUTROPHILS NFR BLD AUTO: 53.1 %
OSMOLALITY SERPL CALC.SUM OF ELEC: 298 MOSM/KG (ref 275–295)
PLATELET # BLD AUTO: 201 10(3)UL (ref 150–450)
POTASSIUM SERPL-SCNC: 4.6 MMOL/L (ref 3.5–5.1)
PROT SERPL-MCNC: 6.9 G/DL (ref 5.7–8.2)
RBC # BLD AUTO: 4.57 X10(6)UL
SODIUM SERPL-SCNC: 143 MMOL/L (ref 136–145)
WBC # BLD AUTO: 4.8 X10(3) UL (ref 4–11)

## 2024-12-13 PROCEDURE — 99213 OFFICE O/P EST LOW 20 MIN: CPT | Performed by: STUDENT IN AN ORGANIZED HEALTH CARE EDUCATION/TRAINING PROGRAM

## 2024-12-13 PROCEDURE — 80053 COMPREHEN METABOLIC PANEL: CPT

## 2024-12-13 PROCEDURE — 86316 IMMUNOASSAY TUMOR OTHER: CPT

## 2024-12-13 PROCEDURE — 85025 COMPLETE CBC W/AUTO DIFF WBC: CPT

## 2024-12-13 PROCEDURE — 36415 COLL VENOUS BLD VENIPUNCTURE: CPT

## 2024-12-13 NOTE — PROGRESS NOTES
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     Interval history:   Josh is doing very well today.  He will occasionally have abdominal cramping or discomfort but the symptoms are mostly fleeting.  He had a great time at the [place].  He does notice the abdominal protrusion either incisional hernia or diastases but this is overall not too bothersome for him.     Oncologic history:  Patient initially presented with recurrent small bowel obstruction, first 11/2021 and second 4/2022. This was thought to be secondary to adhesions after patient partial sigmoid colectomy (for diverticulitis) with Dr. Lagos in June 2020. However, due to patient's recurrent obstruction, CT enterography ordered to check for intraluminal pathology. CT completed 11/18/2022 demonstrates a focus of wall thickening in distal ileum 6cm from ileocecal junction measuring 15 x 14 x 11mm. This was several centimeters downstream from bowel caliber transition demonstrated from inpatient CT for obstruction. Follow up MRI enterography 3/14/2023 demonstrates a 1.8cm intraluminal nodule in the distal ileum approximately 6-8cm proximal to the ileocecal valve. MRI notes a small adjacent nodule within the mesentery which is probably a normal-sized lymph node. Given features on imaging, recommended surgery for treatment and to establish diagnosis. Underwent robotic assisted right hemicolectomy 5/22 and pathology showed neuroendocrine tumor grade 1 with 5/21 lymph nodes involved. Stage pT2, N1. Postoperative course was complicated by hemorrhagic shock requiring  ex lap, developed hepatic failure and renal failure, required dialysis. Was eventually discharged to acute rehab on intermittent dialysis with improving renal function. He renal function recovered. He is now on surveillance.     Review of Systems:  Hematology/Oncology ROS performed and negative except as above in HPI    History/Other:   Current Medications:   metoprolol tartrate 25 MG Oral Tab TAKE ONE TABLET BY MOUTH EVERY MORNING AND ONE-HALF TABLET BY MOUTH EVERY EVENING 135 tablet 3    Pyridoxine HCl (VITAMIN B-6 OR) Take by mouth. Vit b6      Cyanocobalamin (VITAMIN B-12 OR) Take by mouth.      Sildenafil Citrate 100 MG Oral Tab Take 1 tablet (100 mg total) by mouth daily as needed for Erectile Dysfunction. 30 tablet 0    Multiple Vitamin (VITAMIN E/FOLIC ACID/B-6/B-12 OR) Take by mouth As Directed.      divalproex  MG Oral Tablet 24 Hr Take 1 tablet (250 mg total) by mouth at bedtime. And 2x 500 mg  0    acetaminophen 325 MG Oral Tab Take 1 tablet (325 mg total) by mouth every 6 (six) hours as needed.  0    warfarin 2 MG Oral Tab Take 1 tablet (2 mg total) by mouth nightly. (Patient taking differently: Take 7.5 mg by mouth nightly.) 30 tablet 3    melatonin 5 MG Oral Cap Take 1 capsule (5 mg total) by mouth nightly.      folic acid 400 MCG Oral Tab Take 1 tablet (400 mcg total) by mouth daily.      lamoTRIgine 100 MG Oral Tab Take 1 tablet (100 mg total) by mouth every morning.         Allergies:   Allergies   Allergen Reactions    Cat Hair Extract ASTHMA    Dog Epithelium WHEEZING     Objective:   Blood pressure 155/79, pulse 65, temperature 97.5 °F (36.4 °C), temperature source Oral, resp. rate 18, height 1.88 m (6' 2\"), weight 106.1 kg (234 lb), SpO2 96%.  Physical Exam:  ECOG PS: 0  General: A&Ox3, NAD  HEENT: PERRL, OP clear  CV: RRR, no murmurs  Pulm: CTA b/l, no w/r/r  Abd: soft, ntnd  Extremities: no edema  Neurological: grossly intact    Results:   Labs:  Lab Results   Component Value Date     WBC 4.8 12/13/2024    HGB 14.5 12/13/2024    HCT 43.1 12/13/2024    .0 12/13/2024    CREATSERUM 0.97 12/13/2024    BUN 15 12/13/2024     12/13/2024    K 4.6 12/13/2024     12/13/2024    CO2 29.0 12/13/2024     (H) 12/13/2024    CA 9.6 12/13/2024    ALB 4.4 12/13/2024    ALKPHO 78 12/13/2024    BILT 0.4 12/13/2024    TP 6.9 12/13/2024    AST 37 (H) 12/13/2024    ALT 29 12/13/2024    PTT 79.9 (H) 06/07/2023    INR 1.64 (H) 07/05/2023    PT 25.7 (H) 09/06/2019    LIP 54 (L) 04/07/2022    MG 2.0 06/12/2023    PHOS 5.1 (H) 06/12/2023    B12 872 06/27/2023     Imaging:  MRI 10/14/23:  1. Post right distal small-bowel resection and right hemicolectomy.  No evidence of recurrent or metastatic disease.           Pathology:  Ileum, vermiform appendix and right colon; robot-assisted right hemicolectomy 5/22/23:  Infiltrating neuroendocrine tumor, grade 1, measuring 14 mm in maximum dimension.  Tumor penetrates the muscularis propria.  Lipomatous ileocecal valve.  Vermiform appendix, negative for tumor.  Uninvolved ileal mucosa with no significant histopathological change.  Colonic mucosa with mild submucosal congestion.  Small mesenteric neuroendocrine tumor nodule (10 mm in maximum dimension).  Five of twenty one lymph nodes involved by tumor (5/21).  Largest tumor focus involving lymph node measures 2.5 mm.  Focal extracapsular extension is present.  Pericolonic adipose tissue with single discontinuous tumor deposit measuring 4.5 mm.  All inked margins are free of tumor.  Preliminary pathologic staging pT2, N1    Assessment & Plan:   Josh Jones is a 69 year old male with a hx of HTN, HLD, Afib of warfarin, and other comorbidities who presents for medical oncology surveillance followup regarding stage III ([id number]) G1 NET of the small bowel.     # Stage III ([id number]) G1 NET of the small bowel.   -Previously reviewed the patients pathology report and discussed the natural history of NET of  the small bowel and general prognosis, he continues to do well on surveillance  -No laboratory or clinical concerns for recurrence at this time, follow-up in 6 months with MRI a/p if he remains YAMILEX at that time we can likely plan for yearly follow-up and yearly imaging thereafter     MDM Fabian Thompson DO    [facility]  [facility]OT[facility]    This note was created using a voice-recognition transcribing system. Incorrect words or phrases may have been missed during proofreading. Please interpret accordingly.

## 2024-12-16 LAB — CHROMOGRANIN A: 55.1 NG/ML

## 2024-12-24 ENCOUNTER — OFFICE VISIT (OUTPATIENT)
Facility: CLINIC | Age: 69
End: 2024-12-24
Payer: MEDICARE

## 2024-12-24 VITALS
BODY MASS INDEX: 30.16 KG/M2 | WEIGHT: 235 LBS | DIASTOLIC BLOOD PRESSURE: 76 MMHG | HEIGHT: 74 IN | HEART RATE: 63 BPM | SYSTOLIC BLOOD PRESSURE: 124 MMHG | OXYGEN SATURATION: 98 %

## 2024-12-24 DIAGNOSIS — B00.9 RECURRENT HSV (HERPES SIMPLEX VIRUS): ICD-10-CM

## 2024-12-24 DIAGNOSIS — I77.810 ASCENDING AORTA DILATION (HCC): ICD-10-CM

## 2024-12-24 DIAGNOSIS — Z95.2 H/O MECHANICAL AORTIC VALVE REPLACEMENT: ICD-10-CM

## 2024-12-24 DIAGNOSIS — Z00.00 ENCOUNTER FOR MEDICARE ANNUAL WELLNESS EXAM: Primary | ICD-10-CM

## 2024-12-24 DIAGNOSIS — Z13.6 SCREENING FOR CARDIOVASCULAR CONDITION: ICD-10-CM

## 2024-12-24 DIAGNOSIS — I25.10 ARTERIOSCLEROSIS OF CORONARY ARTERY: ICD-10-CM

## 2024-12-24 DIAGNOSIS — N40.1 BENIGN PROSTATIC HYPERPLASIA WITH INCOMPLETE BLADDER EMPTYING: ICD-10-CM

## 2024-12-24 DIAGNOSIS — R39.14 BENIGN PROSTATIC HYPERPLASIA WITH INCOMPLETE BLADDER EMPTYING: ICD-10-CM

## 2024-12-24 DIAGNOSIS — F31.71 BIPOLAR DISORDER, IN PARTIAL REMISSION, MOST RECENT EPISODE HYPOMANIC (HCC): ICD-10-CM

## 2024-12-24 DIAGNOSIS — Z12.5 PROSTATE CANCER SCREENING: ICD-10-CM

## 2024-12-24 DIAGNOSIS — K43.9 VENTRAL HERNIA WITHOUT OBSTRUCTION OR GANGRENE: ICD-10-CM

## 2024-12-24 DIAGNOSIS — Z85.89 HISTORY OF NEUROENDOCRINE CANCER: ICD-10-CM

## 2024-12-24 DIAGNOSIS — I47.10 PAROXYSMAL SUPRAVENTRICULAR TACHYCARDIA (HCC): ICD-10-CM

## 2024-12-24 DIAGNOSIS — Z12.11 COLON CANCER SCREENING: ICD-10-CM

## 2024-12-24 DIAGNOSIS — I48.0 PAROXYSMAL ATRIAL FIBRILLATION (HCC): ICD-10-CM

## 2024-12-24 RX ORDER — WARFARIN SODIUM 7.5 MG/1
7.5 TABLET ORAL NIGHTLY
COMMUNITY
Start: 2024-12-24

## 2024-12-24 NOTE — PROGRESS NOTES
Subjective:   Josh Jones is a 69 year old male who presents for a Medicare Subsequent Annual Wellness visit (Pt already had Initial Annual Wellness) and stable chronic illnesses (not addressed in visit).     Recently saw his oncologist, and since his scans have been stable he may be able to go back to annual imaging starting in May.  He is worried that his stomach sticks out in a certain way, and it seems bigger on the left side. It is not very painful, but it can be if he pushes on it.   Saw the dermatologist yesterday, and had some precancerous spots removed.   He has been drinking a lot of coffee, but also drinks a lot of seltzer water. Still eating very well during the day, but eats too much sugar late at night. Will have a banana and bowl of quinoa with nuts in the morning in addition to unsweetened soy milk. Eats some fried foods, but not a lot of processed foods. He walks and bikes daily.   His sleep is not always very good as he stays up too late, but tends to watch TV, play games with friends, and do work for school.   Went to the [employer] in [place] this summer.   Tests weekly for his Warfarin through the Coumadin clinic, and his levels have been stable.  No other concerns today.      History/Other:   Fall Risk Assessment:   He has been screened for Falls and is low risk.      Cognitive Assessment:   He had a completely normal cognitive assessment - see flowsheet entries     Functional Ability/Status:   Josh Jones has a completely normal functional assessment. See flowsheet for details.        Depression Screening (PHQ):  PHQ-2 SCORE: 0  , done 12/24/2024             Advanced Directives:   He does NOT have a Living Will. [Do you have a living will?: No]  He does NOT have a Power of  for Health Care. [Do you have a healthcare power of ?: No]  Discussed Advance Care Planning with patient (and family/surrogate if present). Standard forms made available to patient in  After Visit Summary.      Patient Active Problem List   Diagnosis    Requires lifelong warfarin therapy    Paroxysmal atrial fibrillation (HCC)    Recurrent HSV (herpes simplex virus)    Cyclothymia    Allergic rhinitis    Benign prostatic hyperplasia with incomplete bladder emptying    History of diverticulitis    H/O mechanical aortic valve replacement    Paroxysmal supraventricular tachycardia (HCC)    Verruca plantaris    Hx SBO    Diverticulosis    Ascending aorta dilation (HCC)    Arteriosclerosis of coronary artery    Bipolar affective (HCC)    Neuroendocrine neoplasm of small intestine (HCC)    S/P right hemicolectomy    Anticoagulation management encounter     Allergies:  He is allergic to cat hair extract and dog epithelium.    Current Medications:  Outpatient Medications Marked as Taking for the 12/24/24 encounter (Office Visit) with Bernie Doss DO   Medication Sig    warfarin 7.5 MG Oral Tab Take 1 tablet (7.5 mg total) by mouth nightly.    metoprolol tartrate 25 MG Oral Tab TAKE ONE TABLET BY MOUTH EVERY MORNING AND ONE-HALF TABLET BY MOUTH EVERY EVENING    Pyridoxine HCl (VITAMIN B-6 OR) Take by mouth. Vit b6    Cyanocobalamin (VITAMIN B-12 OR) Take by mouth.    Multiple Vitamin (VITAMIN E/FOLIC ACID/B-6/B-12 OR) Take by mouth As Directed.    divalproex  MG Oral Tablet 24 Hr Take 1 tablet (250 mg total) by mouth at bedtime. And 2x 500 mg    acetaminophen 325 MG Oral Tab Take 1 tablet (325 mg total) by mouth every 6 (six) hours as needed.    melatonin 5 MG Oral Cap Take 1 capsule (5 mg total) by mouth nightly.    folic acid 400 MCG Oral Tab Take 1 tablet (400 mcg total) by mouth daily.    lamoTRIgine 100 MG Oral Tab Take 1 tablet (100 mg total) by mouth every morning.       Medical History:  He  has a past medical history of Actinic keratosis, AF (paroxysmal atrial fibrillation) ([facility]) (04/01/2016), Asthma (HCC), Bicuspid aortic valve, Bipolar affective (HCC), Cyclothymia, Diverticulitis (2010,  2007), High blood pressure, High cholesterol, motion sickness, Hyperammonemia (HCC), Pneumonia due to organism, S/P AVR (aortic valve replacement) (2001), SVT (supraventricular tachycardia) (HCC), and Visual impairment.  Surgical History:  He  has a past surgical history that includes cath transcatheter aortic valve replacement (2001); colonoscopy screening - referral (N/A, 06/18/2020); Colectomy (06/18/2020); and Colectomy (05/2023).   Family History:  His family history includes Cancer in his father and sister; Stroke in his mother.  Social History:  He  reports that he has never smoked. He has been exposed to tobacco smoke. He has never used smokeless tobacco. He reports that he does not currently use drugs after having used the following drugs: Cannabis. He reports that he does not drink alcohol.    Tobacco:  He has never smoked tobacco.    CAGE Alcohol Screen:   CAGE screening score of 0 on 12/24/2024, showing low risk of alcohol abuse.      Patient Care Team:  Bernie Doss DO as PCP - General (Family Medicine)  Juancarlos Avilez MD as Consulting Physician (DERMATOLOGY)  Magnus Shine as Consulting Physician (UROLOGY)  Manuel Horvath MD as Psychiatrist/APN (Psychiatry)  Yann Salgado MD (Interventional, Cardiology)  Tyrone Danielle MD (Surgical Oncology)  Rahul Lagos MD (GASTROENTEROLOGY)    Review of Systems  GENERAL: feels well otherwise  SKIN: denies any unusual skin lesions  EYES: denies blurred vision or double vision  HEENT: denies nasal congestion, sinus pain or ST  LUNGS: denies shortness of breath with exertion  CARDIOVASCULAR: denies chest pain on exertion, no palpitations   GI: left-sided abdominal pain and swelling, denies heartburn  : 1 per night nocturia, no complaint of urinary incontinence  MUSCULOSKELETAL: denies back pain  NEURO: denies headaches  PSYCHE: denies depression or anxiety  HEMATOLOGIC: denies hx of anemia  ENDOCRINE: denies thyroid history  ALL/ASTHMA: denies  hx of allergy or asthma    Objective:   Physical Exam  General Appearance:  Alert, cooperative, no distress, appears stated age   Head:  Normocephalic, without obvious abnormality, atraumatic   Eyes:  PERRL, conjunctiva/corneas clear, EOM's intact, both eyes   Ears:  Normal TM's and external ear canals, both ears   Nose: Nares normal, septum midline, mucosa normal, no drainage or sinus tenderness   Throat: Lips, mucosa, and tongue normal; teeth and gums normal   Neck: Supple, symmetrical, trachea midline, no adenopathy, thyroid: not enlarged, symmetric, no tenderness/mass/nodules, no carotid bruit or JVD   Back:   Symmetric, no curvature, ROM normal, no CVA tenderness   Lungs:   Clear to auscultation bilaterally, respirations unlabored   Chest Wall:  No tenderness or deformity   Heart:  Regular rate and rhythm, S1, S2 normal, no murmur, rub or gallop   Abdomen:   Soft, non-tender, bowel sounds active all four quadrants, left mid-quadrant swelling and mild tenderness adjacent to umbilicus, no organomegaly   Genitalia: Deferred   Rectal: Deferred   Extremities: Extremities normal, atraumatic, no cyanosis or edema   Pulses: 2+ and symmetric   Skin: Skin color, texture, turgor normal, no rashes or lesions   Lymph nodes: Cervical, supraclavicular, and axillary nodes normal   Neurologic: Normal     /76   Pulse 63   Ht 6' 2\" (1.88 m)   Wt 235 lb (106.6 kg)   SpO2 98%   BMI 30.17 kg/m²  Estimated body mass index is 30.17 kg/m² as calculated from the following:    Height as of this encounter: 6' 2\" (1.88 m).    Weight as of this encounter: 235 lb (106.6 kg).    Medicare Hearing Assessment:   Hearing Screening    Time taken: 12/24/2024 12:26 PM  Entry User: Brissa Templeton  Screening Method: Finger Rub  Finger Rub Result: Pass         Assessment & Plan:   Josh Jones is a 69 year old male who presents for a Medicare Assessment.     1. Encounter for Medicare annual wellness exam (Primary)  -     Comp  Metabolic Panel (14); Future; Expected date: 12/24/2024  2. Bipolar disorder, in partial remission, most recent episode hypomanic (HCC)  -     Valproic Acid, (Depakene); Future; Expected date: 12/24/2024  3. Paroxysmal atrial fibrillation (HCC)  4. Ascending aorta dilation (HCC)  -     MRA CHEST (CPT=71555); Future; Expected date: 12/24/2024  5. Paroxysmal supraventricular tachycardia (HCC)  6. H/O mechanical aortic valve replacement  Overview:  due to aortic insufficiency  congentital.  Needs amoxicillin prior to dental procedures. On coumadin.  Needs monthly INR checks.  Goal INR 2-3  7. Arteriosclerosis of coronary artery  8. Benign prostatic hyperplasia with incomplete bladder emptying  9. History of neuroendocrine cancer  10. Ventral hernia without obstruction or gangrene  -     Surgery Referral - In Network  11. Recurrent HSV (herpes simplex virus)  12. Prostate cancer screening  -     PSA Total, Screen; Future; Expected date: 12/24/2024  13. Screening for cardiovascular condition  -     Lipid Panel; Future; Expected date: 12/24/2024  14. Colon cancer screening  -     Gastro Referral - In Network  Other orders  -     Ammonia, Plasma; Future; Expected date: 12/24/2024    Bipolar disorder stable on current medication regimen.  Will repeat valproic acid and ammonia levels for monitoring.  Paroxysmal atrial fibrillation and SVT stable on medication regimen.  Will repeat imaging to monitor dilated ascending aorta, though imaging is remained stable on the annual screenings.  Continue Coumadin and monitoring in the Coumadin clinic due to history of mechanical aortic valve replacement.  Continue medical management of coronary arteriosclerosis.  BPH stable.  Continue follow-up with oncologist regarding previous neuroendocrine cancer, though has remained in remission.  Referral to general surgeon given for evaluation and management of likely ventral hernia near his previous surgical site.  Recurrent HSV without any  recent flareups.  Due for colonoscopy screening next year, and referral given.    The patient indicates understanding of these issues and agrees to the plan.  Imaging studies ordered.  Lab work ordered.  Reinforced healthy diet, lifestyle, and exercise.      Return in about 1 year (around 12/24/2025), or if symptoms worsen or fail to improve, for Medicare physical .     Bernie Doss DO, 12/24/2024     Supplementary Documentation:   General Health:  In the past six months, have you lost more than 10 pounds without trying?: 2 - No  Has your appetite been poor?: No  Type of Diet: Balanced  How does the patient maintain a good energy level?: Appropriate Exercise  How would you describe your daily physical activity?: Moderate  How would you describe your current health state?: Good  How do you maintain positive mental well-being?: Social Interaction;Visiting Family;Puzzles;Games;Visiting Friends  On a scale of 0 to 10, with 0 being no pain and 10 being severe pain, what is your pain level?: 3 - (Mild)  In the past six months, have you experienced urine leakage?: 1-Yes  At any time do you feel concerned for the safety/well-being of yourself and/or your children, in your home or elsewhere?: No  Have you had any immunizations at another office such as Influenza, Hepatitis B, Tetanus, or Pneumococcal?: Yes    Health Maintenance   Topic Date Due    Annual Physical  12/18/2024    Colorectal Cancer Screening  06/18/2025    PSA  03/21/2025    Influenza Vaccine  Completed    Annual Depression Screening  Completed    Fall Risk Screening (Annual)  Completed    Pneumococcal Vaccine: 65+ Years  Completed    Zoster Vaccines  Completed    COVID-19 Vaccine  Completed

## 2024-12-30 PROBLEM — E72.20 HYPERAMMONEMIA (HCC): Status: RESOLVED | Noted: 2021-12-09 | Resolved: 2024-12-30

## 2025-01-24 ENCOUNTER — PATIENT MESSAGE (OUTPATIENT)
Facility: CLINIC | Age: 70
End: 2025-01-24

## 2025-01-28 NOTE — TELEPHONE ENCOUNTER
Please notify pharmacy of below Post-Care Instructions: I reviewed with the patient in detail post-care instructions. Patient is to wear sunprotection, and avoid picking at any of the treated lesions. Pt may apply Vaseline to crusted or scabbing areas. Medical Necessity Information: It is in your best interest to select a reason for this procedure from the list below. All of these items fulfill various CMS LCD requirements except the new and changing color options. Medical Necessity Clause: This procedure was medically necessary because the lesions that were treated were: Show Topical Anesthesia Variable?: Yes Render Note In Bullet Format When Appropriate: No Duration Of Freeze Thaw-Cycle (Seconds): 10 Consent: The patient's consent was obtained including but not limited to risks of crusting, scabbing, blistering, scarring, darker or lighter pigmentary change, recurrence, incomplete removal and infection. Detail Level: Simple Spray Paint Text: The liquid nitrogen was applied to the skin utilizing a spray paint frosting technique. Number Of Freeze-Thaw Cycles: 2 freeze-thaw cycles Detail Level: Detailed

## 2025-02-04 ENCOUNTER — OFFICE VISIT (OUTPATIENT)
Facility: CLINIC | Age: 70
End: 2025-02-04
Payer: MEDICARE

## 2025-02-04 VITALS
HEART RATE: 66 BPM | SYSTOLIC BLOOD PRESSURE: 118 MMHG | RESPIRATION RATE: 20 BRPM | HEIGHT: 74 IN | TEMPERATURE: 99 F | BODY MASS INDEX: 30.16 KG/M2 | OXYGEN SATURATION: 97 % | DIASTOLIC BLOOD PRESSURE: 75 MMHG | WEIGHT: 235 LBS

## 2025-02-04 DIAGNOSIS — K46.9 HERNIA: Primary | ICD-10-CM

## 2025-02-04 PROCEDURE — 99215 OFFICE O/P EST HI 40 MIN: CPT | Performed by: SURGERY

## 2025-02-04 NOTE — CONSULTS
[facility]    Patient Name:  Josh Jones   YOB: 1955   Gender:  Male   Appt Date: 2/4/2025   Provider:  Tyrone Danielle MD   Insurance:  MEDICARE PART B ONLY     PATIENT PROVIDERS  Referring Provider: Dr. Lagos    Primary Care Provider:Bernie Doss DO   Address: [address] [place] [address]   Phone #: [phone]       CHIEF COMPLAINT  Chief Complaint   Patient presents with    Follow - Up   Neuroendocrine tumor of small bowel     PROBLEMS  Reviewed   Patient Active Problem List   Diagnosis    Requires lifelong warfarin therapy    Paroxysmal atrial fibrillation (HCC)    Recurrent HSV (herpes simplex virus)    Cyclothymia    Allergic rhinitis    Benign prostatic hyperplasia with incomplete bladder emptying    History of diverticulitis    H/O mechanical aortic valve replacement    Paroxysmal supraventricular tachycardia (HCC)    Verruca plantaris    Hx SBO    Diverticulosis    Ascending aorta dilation    Arteriosclerosis of coronary artery    Bipolar affective (HCC)    Neuroendocrine neoplasm of small intestine (HCC)    S/P right hemicolectomy    Anticoagulation management encounter        History of Present Illness:  Josh Jones is a 70 year old Male with PMHx paroxysmal atrial fibrillation, aortic valve replacement on warfarin, diverticulitis s/p sigmoid colectomy who is following up after surgical management of neuroendocrine tumor of small bowel. History is summarized below, but he mass was identified after patient had recurrent small bowel obstruction and given features on imaging, recommended surgery for treatment and to establish diagnosis. Underwent robotic assisted right hemicolectomy 5/22 and pathology showed neuroendocrine tumor grade 1 with 5/21 lymph nodes involved. Stage pT2, N1. Course complicated by hemorrhagic shock requiring ex lap and dialysis, but ultimately with renal recovery. He established with Dr. Thompson  for monitoring, and last MRI A/P 10/2023 was [facility].    More recently, developed a ventral hernia, this has been minimally symptomatic however somewhat uncomfortable.  Was evaluated by Dr. Bullard, recommended to have repair, here for second opinion.    Oncologic history:  Patient initially presented with recurrent small bowel obstruction, first 11/2021 and second 4/2022. This was thought to be secondary to adhesions after patient partial sigmoid colectomy (for diverticulitis) with Dr. Lagos in June 2020. However, due to patient's recurrent obstruction, CT enterography ordered to check for intraluminal pathology. CT completed 11/18/2022 demonstrates a focus of wall thickening in distal ileum 6cm from ileocecal junction measuring 15 x 14 x 11mm. This was several centimeters downstream from bowel caliber transition demonstrated from inpatient CT for obstruction. Follow up MRI enterography 3/14/2023 demonstrates a 1.8cm intraluminal nodule in the distal ileum approximately 6-8cm proximal to the ileocecal valve. MRI notes a small adjacent nodule within the mesentery which is probably a normal-sized lymph node.  5/22/2023: Robotic assisted right hemicolectomy. Pathology: neuroendocrine tumor grade 1 with 5/21 lymph nodes involved. Stage pT2, N1. Course complicated by hemorrhagic shock requiring ex lap and dialysis, but ultimately with renal recovery  10/13/2023: MRI A/P without evidence of recurrent or metastatic disease.     Vital Signs:  /75 (BP Location: Left arm, Patient Position: Sitting, Cuff Size: large)   Pulse 66   Temp 98.6 °F (37 °C) (Temporal)   Resp 20   Ht 1.88 m (6' 2\")   Wt 106.6 kg (235 lb)   SpO2 97%   BMI 30.17 kg/m²      Medications Reviewed:    Current Outpatient Medications:     warfarin 7.5 MG Oral Tab, Take 1 tablet (7.5 mg total) by mouth nightly., Disp: , Rfl:     metoprolol tartrate 25 MG Oral Tab, TAKE ONE TABLET BY MOUTH EVERY MORNING AND ONE-HALF TABLET BY MOUTH EVERY EVENING,  Disp: 135 tablet, Rfl: 3    Pyridoxine HCl (VITAMIN B-6 OR), Take by mouth. Vit b6, Disp: , Rfl:     Cyanocobalamin (VITAMIN B-12 OR), Take by mouth., Disp: , Rfl:     Sildenafil Citrate 100 MG Oral Tab, Take 1 tablet (100 mg total) by mouth daily as needed for Erectile Dysfunction., Disp: 30 tablet, Rfl: 0    Multiple Vitamin (VITAMIN E/FOLIC ACID/B-6/B-12 OR), Take by mouth As Directed., Disp: , Rfl:     divalproex  MG Oral Tablet 24 Hr, Take 1 tablet (250 mg total) by mouth at bedtime. And 2x 500 mg, Disp: , Rfl: 0    acetaminophen 325 MG Oral Tab, Take 1 tablet (325 mg total) by mouth every 6 (six) hours as needed., Disp: , Rfl: 0    melatonin 5 MG Oral Cap, Take 1 capsule (5 mg total) by mouth nightly., Disp: , Rfl:     folic acid 400 MCG Oral Tab, Take 1 tablet (400 mcg total) by mouth daily., Disp: , Rfl:     lamoTRIgine 100 MG Oral Tab, Take 1 tablet (100 mg total) by mouth every morning., Disp: , Rfl:      Allergies Reviewed:  Allergies   Allergen Reactions    Cat Hair Extract ASTHMA    Dog Epithelium WHEEZING        History:  Reviewed:  Past Medical History:    Actinic keratosis    AF (paroxysmal atrial fibrillation) (HCC)    Asthma (HCC)    AS CHILD    Bicuspid aortic valve    s/p AVR    Bipolar affective (HCC)    Cyclothymia    Sees Dr. Bravo     Diverticulitis    High blood pressure    High cholesterol    Hx of motion sickness    Hyperammonemia (HCC)    Due to Depakote     Pneumonia due to organism    S/P AVR (aortic valve replacement)    SVT (supraventricular tachycardia) (HCC)    Visual impairment    Readers      Reviewed:  Past Surgical History:   Procedure Laterality Date    Cath transcatheter aortic valve replacement  2001    [facility]     Colectomy  06/18/2020    Colon resection due to diverticulitis    Colectomy  05/2023    Underwent robotic assisted right hemicolectomy 5/22    Colonoscopy screening - referral N/A 06/18/2020    Procedure: COLONOSCOPY-SCREENING;   Surgeon: Rahul Lagos MD;  Location: [facility] MAIN OR      Reviewed Social History:  Social History     Socioeconomic History    Marital status: Single   Tobacco Use    Smoking status: Never     Passive exposure: Past    Smokeless tobacco: Never   Vaping Use    Vaping status: Never Used   Substance and Sexual Activity    Alcohol use: No    Drug use: Not Currently     Types: Cannabis      Reviewed:  Family History   Problem Relation Age of Onset    Stroke Mother     Cancer Father         lung cancer    Cancer Sister         Review of Systems:  Review of Systems   Constitutional:  Negative for activity change, appetite change, chills, fatigue, fever and unexpected weight change.   HENT:  Negative for mouth sores, nosebleeds and trouble swallowing.    Eyes:  Negative for discharge and redness.   Respiratory:  Negative for cough, shortness of breath and wheezing.    Cardiovascular:  Negative for chest pain.   Gastrointestinal:  Negative for abdominal distention, abdominal pain, blood in stool, nausea and vomiting.   Genitourinary:  Negative for difficulty urinating and dysuria.   Musculoskeletal:  Negative for back pain and gait problem.   Skin:  Negative for color change.   Allergic/Immunologic: Negative for immunocompromised state.   Neurological:  Negative for speech difficulty, weakness and numbness.   Psychiatric/Behavioral:  Negative for confusion.         Physical Examination:  Physical Exam  Constitutional:       General: He is not in acute distress.     Appearance: He is well-developed. He is not diaphoretic.   HENT:      Head: Normocephalic and atraumatic.   Eyes:      General: No scleral icterus.        Right eye: No discharge.         Left eye: No discharge.      Conjunctiva/sclera: Conjunctivae normal.      Pupils: Pupils are equal, round, and reactive to light.   Neck:      Thyroid: No thyromegaly.   Cardiovascular:      Rate and Rhythm: Normal rate and regular rhythm.      Heart sounds: No murmur  heard.  Pulmonary:      Effort: Pulmonary effort is normal. No respiratory distress.      Breath sounds: Normal breath sounds. No wheezing.   Abdominal:      General: Bowel sounds are normal. There is no distension.      Palpations: Abdomen is soft. There is no mass.      Tenderness: There is no abdominal tenderness. There is no guarding or rebound.      Hernia: No hernia is present.       Musculoskeletal:         General: Normal range of motion.      Cervical back: Normal range of motion and neck supple.   Skin:     General: Skin is warm and dry.   Neurological:      Mental Status: He is alert and oriented to person, place, and time.          Document Review:  Surgical Pathology - 5/22/2023  Final Diagnosis:      Ileum, vermiform appendix and right colon; robot-assisted right hemicolectomy:  Infiltrating neuroendocrine tumor, grade 1, measuring 14 mm in maximum dimension.  Tumor penetrates the muscularis propria.  Lipomatous ileocecal valve.  Vermiform appendix, negative for tumor.  Uninvolved ileal mucosa with no significant histopathological change.  Colonic mucosa with mild submucosal congestion.  Small mesenteric neuroendocrine tumor nodule (10 mm in maximum dimension).  Five of twenty one lymph nodes involved by tumor (5/21).  Largest tumor focus involving lymph node measures 2.5 mm.  Focal extracapsular extension is present.  Pericolonic adipose tissue with single discontinuous tumor deposit measuring 4.5 mm.  All inked margins are free of tumor.  Preliminary pathologic staging pT2, N1     Comment:  For  purposes, the diagnosis is reviewed by another pathologist concurs with the above diagnosis.     A limited panel of immunohistochemical stains (appropriate control reactivity) performed for further characterization.  Tumor cells show diffuse positivity by synaptophysin and chromogranin.  Ki-67 highlights proliferative index and is estimated to be <3%.  These findings support the diagnosis of  neuroendocrine tumor, grade 1.  Clinical correlation with close clinical follow-up is recommended.        Assessment / Plan:  Neuroendocrine tumor of small bowel status post right hemicolectomy complicated by postoperative bleed and laparotomy, evacuation of hematoma  Now developed a ventral hernia, contemplating repair  Proceed with CT abdomen pelvis noncontrast to evaluate anatomy, on my exam there is a 3 x 3 cm defect in the midline, associated diastases  I do believe he is a candidate for repair however would like to evaluate remainder of abdominal wall integrity with CT to determine best approach  Follow-up next week    Tyrone Danielle MD  [facility]  [facility]  Pager [phone]  [email or web address]        Follow Up:  No follow-ups on file.  6 months       Electronically Signed by: Kiley Paul PA-C

## 2025-02-06 ENCOUNTER — HOSPITAL ENCOUNTER (OUTPATIENT)
Dept: CT IMAGING | Facility: HOSPITAL | Age: 70
Discharge: HOME | End: 2025-02-06
Payer: MEDICARE

## 2025-02-06 DIAGNOSIS — K46.9 HERNIA: ICD-10-CM

## 2025-02-06 PROCEDURE — 74176 CT ABD & PELVIS W/O CONTRAST: CPT

## 2025-03-03 ENCOUNTER — PATIENT MESSAGE (OUTPATIENT)
Dept: SURGERY | Facility: CLINIC | Age: 70
End: 2025-03-03

## 2025-03-03 ENCOUNTER — DOCUMENTATION ONLY (OUTPATIENT)
Dept: SURGERY | Facility: CLINIC | Age: 70
End: 2025-03-03

## 2025-03-03 NOTE — TELEPHONE ENCOUNTER
Spoke to patient regarding message received. Notified patient that unfortunately, Dr. Danielle is seeing patients at that time during his own clinic. Informed patient that Dr. Szymanski and Dr. Danielle speak regularly about their joint patients and will do so if not during the appointment, then after. All questions answered, advised to call back for any future questions or concerns. Patient verbalized understanding.

## 2025-03-04 ENCOUNTER — OFFICE VISIT (OUTPATIENT)
Facility: CLINIC | Age: 70
End: 2025-03-04
Payer: MEDICARE

## 2025-03-04 VITALS
SYSTOLIC BLOOD PRESSURE: 137 MMHG | WEIGHT: 232.19 LBS | RESPIRATION RATE: 20 BRPM | TEMPERATURE: 97 F | BODY MASS INDEX: 29.8 KG/M2 | HEIGHT: 74 IN | HEART RATE: 60 BPM | DIASTOLIC BLOOD PRESSURE: 77 MMHG | OXYGEN SATURATION: 97 %

## 2025-03-04 DIAGNOSIS — K43.2 INCISIONAL HERNIA, WITHOUT OBSTRUCTION OR GANGRENE: Primary | ICD-10-CM

## 2025-03-04 PROCEDURE — 99203 OFFICE O/P NEW LOW 30 MIN: CPT | Performed by: SURGERY

## 2025-03-04 NOTE — CONSULTS
New Patient Consultation    This is the first visit for this 70 year old male referred for evaluation of abdominal wall hernia.    History of Present Illness:   The patient is a 70 year old male referred by Dr. Danielle to discuss abdominal wall reconstruction.  The patient is a history of right hemicolectomy for a neuroendocrine tumor of the terminal ileum in May 2023.  The patient is a history of a mechanical aortic valve requiring anticoagulation.  He required emergent exploratory laparotomy for bleeding 2 days postoperatively.  He has now developed a hernia and was referred by Dr. Danielle to discuss combined abdominal wall reconstruction.  The patient underwent a CT scan of the abdominal wall which showed a paramedian ventral hernia with a collapsed loop of small bowel.  He reports a normal appetite and denies nausea or vomiting.  He denies pain at the surgical site.    Past Medical History:      Past Medical History:    Actinic keratosis    AF (paroxysmal atrial fibrillation) (HCC)    Asthma (HCC)    AS CHILD    Bicuspid aortic valve    s/p AVR    Bipolar affective (HCC)    Cyclothymia    Sees Dr. Bravo     Diverticulitis    High blood pressure    High cholesterol    Hx of motion sickness    Hyperammonemia (HCC)    Due to Depakote     Pneumonia due to organism    S/P AVR (aortic valve replacement)    SVT (supraventricular tachycardia) (HCC)    Visual impairment    Readers         Past Surgical History:  Past Surgical History:   Procedure Laterality Date    Cath transcatheter aortic valve replacement  2001    [facility]     Colectomy  06/18/2020    Colon resection due to diverticulitis    Colectomy  05/2023    Underwent robotic assisted right hemicolectomy 5/22    Colonoscopy screening - referral N/A 06/18/2020    Procedure: COLONOSCOPY-SCREENING;  Surgeon: Rahul Lagos MD;  Location: [facility] MAIN OR         Medications:    Medications Ordered Prior to Encounter[1]      Allergies:     Allergies[2]      Family History:   Family History   Problem Relation Age of Onset    Stroke Mother     Cancer Father         lung cancer    Cancer Sister          Social History:  History   Alcohol Use No       History   Smoking Status    Never   Smokeless Tobacco    Never       History   Drug Use Unknown           Review of Systems:    General:   The patient denies, fever, chills, night sweats, fatigue, generalized weakness, change in appetite, weight loss, or weight gain.    Endocrine:   There is no history of poor/slow wound healing, weight loss/gain, fertility or hormone problems, cold intolerance, heat intolerance, excessive thirst, or thyroid disease.     Allergic/Immunologic:  There is no history of hives, hay fever, angioedema or anaphylaxis.    HEENT:    The patient denies ear pain, ear drainage, hearing loss, change in vision, double vision, cataracts, glaucoma, nasal congestion, nosebleed, hoarseness, sore throat, or swollen glands    Respiratory:   The patient denies shortness of breath, cough, bloody cough, phlegm, asthma, or wheezing    Cardiovascular:  The patient denies chest pain/pressure, palpitations, irregular heartbeat, high blood pressure, stroke, or leg swelling    Breasts:  Patient denies breast masses, pain, change in the breast skin, skin dimpling, nipple discharge, or rash    Gastrointestinal:   There is no history of difficulty or pain with swallowing, reflux symptoms, nausea, vomiting, dark/ bloody stools, diarrhea, constipation,  change in bowel habits, or abdominal pain.     Genitourinary:  The patient denies frequent urination, needing to get up at night to urinate, urinary hesitancy or retaining urine, painful urination, urinary incontinence, decreased urine stream, blood in the urine, or vaginal/penile discharge.    Skin:   The patient denies rash, itching, skin lesions, dry skin, change in skin color or change in moles, sunburns, or sunburns with blistering.      Hematologic/Lymphatic:  The patient denies easily bruising or bleeding, persistent swollen glands or lymph nodes, bleeding disorders, blood clots, or pulmonary embolism.     Musculoskeletal:  The patient denies muscle aches/pain, joint pain, stiff joints, neck pain, back pain or bone pain.    Neurologic:  There is no history of migraines or severe headaches, seizure/epilepsy, speech problems, coordination problems, trembling/tremors, fainting/black outs, dizziness, memory problems, loss of sensation/numbness, problems walking, weakness, tingling or burning in hands/feet.     Psychiatric:  There is no history of abusive relationship, bipolar disorder, sleep disturbance, anxiety, depression or feeling of despair.    Physical Exam:  Vitals:    03/04/25 1500   BP: 137/77   Pulse: 60   Resp: 20   Temp: 97.4 °F (36.3 °C)     Body mass index is 29.81 kg/m².      The patient is awake, alert, and oriented.  He is a well-nourished, well-developed male who appears his  stated age. His speech patterns and movements are normal, and affect is appropriate.    HEENT: The head is normocephalic. The neck is supple. The thyroid is not enlarged and is without palpable masses.  The trachea is in the midline. Conjunctiva are clear, non-icteric.    Abdomen: A well-healed midline scar is noted.  There is a supraumbilical hernia measuring approximately 8 cm in diameter in the left paramedian position.  Hernia contents are easily reducible.    Lymph Nodes:  There is no cervical, supraclavicular, or axillary lymphadenopathy appreciated.    Back: There is no vertebral column tenderness.    Skin: The skin appears normal. There are no suspicious appearing rashes or lesions.    Extremities: The extremities are without deformity, cyanosis or edema.    Impression:   The patient is a 70 year old male with a ventral incisional hernia.    Discussion and Plan:  We discussed proceeding with abdominal wall reconstruction with Dr. Danielle.  The  nature of the surgery was discussed.  Specifically, we discussed unilateral or bilateral component separation with placement of Strattice acellular dermal dermal matrix.  The nature of the procedure was discussed with the patient and his friend.  We reviewed the elevated risk of hemorrhagic complications given the patient's need for chronic anticoagulation.  We discussed the risk of surgery including but not limited to bleeding, infection, scarring, delayed wound healing, injury intra-abdominal structures, hernia recurrence, seroma, and need for further surgery.  We discussed expected postoperative course including need for drains, activity limitation, and compression.  Multiple questions were answered to patient's satisfaction.  No guarantees as to outcome were offered.  The patient expresses understanding and wishes to proceed.          [1]   Current Outpatient Medications on File Prior to Visit   Medication Sig Dispense Refill    warfarin 7.5 MG Oral Tab Take 1 tablet (7.5 mg total) by mouth nightly.      metoprolol tartrate 25 MG Oral Tab TAKE ONE TABLET BY MOUTH EVERY MORNING AND ONE-HALF TABLET BY MOUTH EVERY EVENING 135 tablet 3    Pyridoxine HCl (VITAMIN B-6 OR) Take by mouth. Vit b6      Cyanocobalamin (VITAMIN B-12 OR) Take by mouth.      Sildenafil Citrate 100 MG Oral Tab Take 1 tablet (100 mg total) by mouth daily as needed for Erectile Dysfunction. 30 tablet 0    Multiple Vitamin (VITAMIN E/FOLIC ACID/B-6/B-12 OR) Take by mouth As Directed.      divalproex  MG Oral Tablet 24 Hr Take 1 tablet (250 mg total) by mouth at bedtime. And 2x 500 mg  0    acetaminophen 325 MG Oral Tab Take 1 tablet (325 mg total) by mouth every 6 (six) hours as needed.  0    melatonin 5 MG Oral Cap Take 1 capsule (5 mg total) by mouth nightly.      folic acid 400 MCG Oral Tab Take 1 tablet (400 mcg total) by mouth daily.      lamoTRIgine 100 MG Oral Tab Take 1 tablet (100 mg total) by mouth every morning.       No current  facility-administered medications on file prior to visit.   [2]   Allergies  Allergen Reactions    Cat Hair Extract ASTHMA    Dog Epithelium WHEEZING

## 2025-03-04 NOTE — PATIENT INSTRUCTIONS
Surgeon:         Dr. Jose Raul Szymanski                                        Tel:         [phone]                                  Fax:        [phone]    Surgery/Procedure: Hernia repair with possible component separation and acellular dermal matrix, joint with Dr. Danielle, 2.5 hours, general anesthesia, inpatient     Dx Code: Hernia K46.9    Hospital:  [facility]: [address] [place] [address]           [phone]  [facility]: [address], [place] [address]               [phone]    1. Someone will need to drive you to and from the hospital if your procedure is outpatient.    2.Do not drink alcohol or smoke 24 hours prior to your procedure.    3. Bring a picture ID and your insurance card.    4. You will be contacted by the hospital the day before to confirm the procedure time and location.     5. Do not take any herbal supplements or blood thinners at least one week before your procedure/surgery. This includes NSAID's (aspirin, baby aspirin, Motrin, Ibuprofen, Aleve, Advil, Naproxen, etc), Fish oil, vitamin E, turmeric, CoQ10, or green tea supplements, etc. *TYLENOL or acetaminophen is ok to take*    6. PRE-OPERATIVE TESTING: History and physical with medical clearance is REQUIRED within 30 days of the surgery date and is mandatory per Dr. Szymanski. *If this is not done, your surgery will be postponed*  MEDICAL CLEARANCE WITH DR. Doss  CBC  CMP  EKG (within 90 days)  *will need cardiac clearance, will also need warfarin dosing recommendations*    7. If you take Coumadin, Plavix, Xarelto, or Eliquis, please contact your prescribing physician for special instructions on how long to hold. If you take insulin, contact your primary care physician for special instructions.     8. Please inform us if you start or change any medications at least one week before surgery (ex: blood thinners, weight loss medications, diabetic medications, herbal supplements, etc)    9. Does  patient have diagnosis of sleep apnea?    [   ] Yes     [x   ]  No    Consent obtained

## 2025-03-12 ENCOUNTER — TELEPHONE (OUTPATIENT)
Facility: HOSPITAL | Age: 70
End: 2025-03-12

## 2025-03-12 DIAGNOSIS — K46.9 HERNIA: Primary | ICD-10-CM

## 2025-03-12 NOTE — TELEPHONE ENCOUNTER
LVM for patient asking them to please give me a call back regarding scheduling their surgery with Dr. Danielle and Dr. Szymanski

## 2025-03-12 NOTE — PROGRESS NOTES
Date of Surgery: 6/25/2025    Diagnosis: Hernia, K46.9    Procedure: Exploratory laparotomy and lysis of adhesion, joint surgery with Dr. Szymanski    Location: [] [facility]  [x] [facility]  [] [facility] Lab    Type: [x] Inpatient  [] Outpatient  [] 23-hour observation    Number of days inpatient: 2 days    Length of Surgery: 2.5 hours    Anesthesia: [] Local  [] MAC [x] General  [] Pec Block     Joint case with: [x] Yes, Dr. Szymanski  [] No   Needs SA:  [x] Yes  [] No    Position/Special Equipment:    Penicillin Allergy: [] Yes [x] No   Nickel Allergy: [] Yes [x] No   Latex Allergy: [] Yes [x] No    Orders:  Colon Bundle/Bowel Prep: [] Yes  [x] No    Type and Cross 2 units PRBC: [x] Yes [] No    Type and Screen: [x] Yes [] No    Advanced Oncology Order Set (HIPEC): [] Yes [x] No    Heparin Pre-op (Heparin 5000 units subQ x 1 dose): [x] Yes  [] No    Nuclear Med Injection: [] Yes  [x] No    Wire localization needed: [] Yes [x] No    Midline placement (HIPEC,Whipple, Esophagectomy, Liver): [] Yes [x] No    CHG Cloths (HIPEC, Whipple, Esophagectomy, Liver): [x] Yes [] No    Tylenol administration prior to surgery: [x] Yes [] No    Does patient have a pacemaker: [] Yes [x] No  Sleep Apnea: [] Yes  [x] No      Is patient diabetic: [] Yes, if so, no Ensure/yes to Sugar free Gatorade [x] No    Antibiotics: [x] /EM prophylactic antibiotic protocol [] No need of antibiotics    PAT Orders: [x]CBC  [x]CMP [x]EKG  []CT Scan []Chest X-ray

## 2025-03-17 DIAGNOSIS — K46.9 HERNIA: Primary | ICD-10-CM

## 2025-03-19 ENCOUNTER — TELEPHONE (OUTPATIENT)
Dept: SURGERY | Facility: CLINIC | Age: 70
End: 2025-03-19

## 2025-03-19 NOTE — TELEPHONE ENCOUNTER
Calling pt in regards to scheduling surgery.  Informed pt that I have 6/25/25 available at [facility] with Dr. Danielle and Dr. Szymanski.  Pt verbalized understanding and in agreement with date and location.  All questions answered.   Encouraged pt to call or Fold[employer] message office with any other questions or concerns.

## 2025-03-28 ENCOUNTER — TELEPHONE (OUTPATIENT)
Facility: CLINIC | Age: 70
End: 2025-03-28

## 2025-03-28 NOTE — TELEPHONE ENCOUNTER
PSR please call this patient and schedule him for Pre-OP before his surgery on 6/25/25 thank you.

## 2025-04-25 ENCOUNTER — HOSPITAL ENCOUNTER (OUTPATIENT)
Dept: MRI IMAGING | Facility: HOSPITAL | Age: 70
Discharge: HOME | End: 2025-04-25
Attending: FAMILY MEDICINE
Payer: MEDICARE

## 2025-04-25 ENCOUNTER — HOSPITAL ENCOUNTER (OUTPATIENT)
Dept: MRI IMAGING | Facility: HOSPITAL | Age: 70
Discharge: HOME | End: 2025-04-25
Attending: STUDENT IN AN ORGANIZED HEALTH CARE EDUCATION/TRAINING PROGRAM
Payer: MEDICARE

## 2025-04-25 DIAGNOSIS — D3A.8: ICD-10-CM

## 2025-04-25 DIAGNOSIS — I77.810 ASCENDING AORTA DILATION: ICD-10-CM

## 2025-04-25 DIAGNOSIS — Z09 ONCOLOGY FOLLOW-UP ENCOUNTER: ICD-10-CM

## 2025-04-25 PROCEDURE — 72197 MRI PELVIS W/O & W/DYE: CPT | Performed by: STUDENT IN AN ORGANIZED HEALTH CARE EDUCATION/TRAINING PROGRAM

## 2025-04-25 PROCEDURE — 74183 MRI ABD W/O CNTR FLWD CNTR: CPT | Performed by: STUDENT IN AN ORGANIZED HEALTH CARE EDUCATION/TRAINING PROGRAM

## 2025-04-25 PROCEDURE — A9575 INJ GADOTERATE MEGLUMI 0.1ML: HCPCS | Performed by: STUDENT IN AN ORGANIZED HEALTH CARE EDUCATION/TRAINING PROGRAM

## 2025-04-25 PROCEDURE — 71555 MRI ANGIO CHEST W OR W/O DYE: CPT | Performed by: FAMILY MEDICINE

## 2025-04-25 RX ORDER — GADOTERATE MEGLUMINE 376.9 MG/ML
20 INJECTION INTRAVENOUS
Status: COMPLETED | OUTPATIENT
Start: 2025-04-25 | End: 2025-04-25

## 2025-04-25 RX ADMIN — GADOTERATE MEGLUMINE 20 ML: 376.9 INJECTION INTRAVENOUS at 14:33:00

## 2025-05-01 ENCOUNTER — TELEPHONE (OUTPATIENT)
Dept: SURGERY | Facility: CLINIC | Age: 70
End: 2025-05-01

## 2025-05-01 ENCOUNTER — TELEPHONE (OUTPATIENT)
Facility: CLINIC | Age: 70
End: 2025-05-01

## 2025-05-01 NOTE — TELEPHONE ENCOUNTER
Contacted Dr. Doss's office to request the patient be seen sooner for pre-op clearance as one week beforehand is too soon.    Also contacted the patient to notify him that we are attempting to get him a sooner appointment with Dr. Doss, and to remind him to schedule an appointment with his cardiologist.

## 2025-05-01 NOTE — TELEPHONE ENCOUNTER
Celina, RN - Plastic and Reconstructive Surgery called, verified patient's Name and . States patient is scheduled for surgery on  for Exploratory laparotomy with lysis of adhesion, hernia repair. Already scheduled for pre-op visit on .    Requesting patient to be seen sooner for pre-op visit, possibly end of May, since patient has a lot of co-morbidities that might make for a complex clearance visit needing further testing or imaging.     Dr. Doss please advise if okay to use SDA and reschedule patient towards the end of May instead for pre-op. Thank you.      Future Appointments   Date Time Provider Department Center   2025 11:30 AM [facility] RESOURCE [facility]   2025 12:00 PM Jack Fay PA [facility]   2025  3:00 PM Bernie Doss DO [facility] 14 FP [facility] OP

## 2025-05-02 ENCOUNTER — NURSE ONLY (OUTPATIENT)
Age: 70
End: 2025-05-02
Attending: STUDENT IN AN ORGANIZED HEALTH CARE EDUCATION/TRAINING PROGRAM
Payer: MEDICARE

## 2025-05-02 ENCOUNTER — TELEPHONE (OUTPATIENT)
Age: 70
End: 2025-05-02

## 2025-05-02 VITALS
DIASTOLIC BLOOD PRESSURE: 74 MMHG | SYSTOLIC BLOOD PRESSURE: 134 MMHG | HEART RATE: 62 BPM | TEMPERATURE: 98 F | RESPIRATION RATE: 18 BRPM | HEIGHT: 74 IN | WEIGHT: 223.81 LBS | OXYGEN SATURATION: 96 % | BODY MASS INDEX: 28.72 KG/M2

## 2025-05-02 DIAGNOSIS — Z09 ONCOLOGY FOLLOW-UP ENCOUNTER: ICD-10-CM

## 2025-05-02 DIAGNOSIS — D3A.8: ICD-10-CM

## 2025-05-02 DIAGNOSIS — D3A.8: Primary | ICD-10-CM

## 2025-05-02 LAB
ALBUMIN SERPL-MCNC: 4.2 G/DL (ref 3.2–4.8)
ALBUMIN/GLOB SERPL: 1.7 {RATIO} (ref 1–2)
ALP LIVER SERPL-CCNC: 71 U/L (ref 45–117)
ALT SERPL-CCNC: 14 U/L (ref 10–49)
ANION GAP SERPL CALC-SCNC: 6 MMOL/L (ref 0–18)
AST SERPL-CCNC: 19 U/L (ref ?–34)
BASOPHILS # BLD AUTO: 0.05 X10(3) UL (ref 0–0.2)
BASOPHILS NFR BLD AUTO: 1.2 %
BILIRUB SERPL-MCNC: 0.4 MG/DL (ref 0.2–1.1)
BUN BLD-MCNC: 15 MG/DL (ref 9–23)
BUN/CREAT SERPL: 17 (ref 10–20)
CALCIUM BLD-MCNC: 9.5 MG/DL (ref 8.7–10.4)
CHLORIDE SERPL-SCNC: 105 MMOL/L (ref 98–112)
CO2 SERPL-SCNC: 30 MMOL/L (ref 21–32)
CREAT BLD-MCNC: 0.88 MG/DL (ref 0.7–1.3)
DEPRECATED RDW RBC AUTO: 47.8 FL (ref 35.1–46.3)
EGFRCR SERPLBLD CKD-EPI 2021: 93 ML/MIN/1.73M2 (ref 60–?)
EOSINOPHIL # BLD AUTO: 0.06 X10(3) UL (ref 0–0.7)
EOSINOPHIL NFR BLD AUTO: 1.4 %
ERYTHROCYTE [DISTWIDTH] IN BLOOD BY AUTOMATED COUNT: 13.6 % (ref 11–15)
GLOBULIN PLAS-MCNC: 2.5 G/DL (ref 2–3.5)
GLUCOSE BLD-MCNC: 105 MG/DL (ref 70–99)
HCT VFR BLD AUTO: 42 % (ref 39–53)
HGB BLD-MCNC: 13.8 G/DL (ref 13–17.5)
IMM GRANULOCYTES # BLD AUTO: 0.01 X10(3) UL (ref 0–1)
IMM GRANULOCYTES NFR BLD: 0.2 %
LYMPHOCYTES # BLD AUTO: 1.22 X10(3) UL (ref 1–4)
LYMPHOCYTES NFR BLD AUTO: 28.7 %
MCH RBC QN AUTO: 31.2 PG (ref 26–34)
MCHC RBC AUTO-ENTMCNC: 32.9 G/DL (ref 31–37)
MCV RBC AUTO: 94.8 FL (ref 80–100)
MONOCYTES # BLD AUTO: 0.63 X10(3) UL (ref 0.1–1)
MONOCYTES NFR BLD AUTO: 14.8 %
NEUTROPHILS # BLD AUTO: 2.28 X10 (3) UL (ref 1.5–7.7)
NEUTROPHILS # BLD AUTO: 2.28 X10(3) UL (ref 1.5–7.7)
NEUTROPHILS NFR BLD AUTO: 53.7 %
OSMOLALITY SERPL CALC.SUM OF ELEC: 293 MOSM/KG (ref 275–295)
PLATELET # BLD AUTO: 186 10(3)UL (ref 150–450)
POTASSIUM SERPL-SCNC: 4.4 MMOL/L (ref 3.5–5.1)
PROT SERPL-MCNC: 6.7 G/DL (ref 5.7–8.2)
RBC # BLD AUTO: 4.43 X10(6)UL (ref 3.8–5.8)
SODIUM SERPL-SCNC: 141 MMOL/L (ref 136–145)
WBC # BLD AUTO: 4.3 X10(3) UL (ref 4–11)

## 2025-05-02 NOTE — PROGRESS NOTES
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     Interval history:   Josh is doing very well today.  He states he has occasional diarrhea, but notes likely secondary to lactose intolerance.  May occur for 1-2 days and resolve.  Has been purposefully eating less calories and increasing activity to lose weight.  Denies any new concerns today.       Oncologic history:  Patient initially presented with recurrent small bowel obstruction, first 11/2021 and second 4/2022. This was thought to be secondary to adhesions after patient partial sigmoid colectomy (for diverticulitis) with Dr. Lagos in June 2020. However, due to patient's recurrent obstruction, CT enterography ordered to check for intraluminal pathology. CT completed 11/18/2022 demonstrates a focus of wall thickening in distal ileum 6cm from ileocecal junction measuring 15 x 14 x 11mm. This was several centimeters downstream from bowel caliber transition demonstrated from inpatient CT for obstruction. Follow up MRI enterography 3/14/2023 demonstrates a 1.8cm intraluminal nodule in the distal ileum approximately 6-8cm proximal to the ileocecal valve. MRI notes a small adjacent nodule within the mesentery which is probably a normal-sized lymph node. Given features on imaging, recommended surgery for treatment and to establish diagnosis. Underwent robotic assisted right hemicolectomy 5/22 and pathology showed neuroendocrine tumor grade 1 with 5/21 lymph nodes involved. Stage pT2, N1. Postoperative course was complicated by hemorrhagic shock requiring ex lap, developed  hepatic failure and renal failure, required dialysis. Was eventually discharged to acute rehab on intermittent dialysis with improving renal function. He renal function recovered. He is now on surveillance.     Review of Systems:  Hematology/Oncology ROS performed and negative except as above in HPI    History/Other:   Current Medications:   warfarin 7.5 MG Oral Tab Take 1 tablet (7.5 mg total) by mouth nightly.      metoprolol tartrate 25 MG Oral Tab TAKE ONE TABLET BY MOUTH EVERY MORNING AND ONE-HALF TABLET BY MOUTH EVERY EVENING 135 tablet 3    Pyridoxine HCl (VITAMIN B-6 OR) Take by mouth. Vit b6      Cyanocobalamin (VITAMIN B-12 OR) Take by mouth.      Sildenafil Citrate 100 MG Oral Tab Take 1 tablet (100 mg total) by mouth daily as needed for Erectile Dysfunction. 30 tablet 0    Multiple Vitamin (VITAMIN E/FOLIC ACID/B-6/B-12 OR) Take by mouth As Directed.      divalproex  MG Oral Tablet 24 Hr Take 1 tablet (250 mg total) by mouth at bedtime. And 2x 500 mg  0    acetaminophen 325 MG Oral Tab Take 1 tablet (325 mg total) by mouth every 6 (six) hours as needed.  0    melatonin 5 MG Oral Cap Take 1 capsule (5 mg total) by mouth nightly.      folic acid 400 MCG Oral Tab Take 1 tablet (400 mcg total) by mouth in the morning.      lamoTRIgine 100 MG Oral Tab Take 1 tablet (100 mg total) by mouth every morning.         Allergies:   Allergies   Allergen Reactions    Cat Hair Extract ASTHMA    Dog Epithelium WHEEZING     Objective:   Blood pressure 134/74, pulse 62, temperature 98.1 °F (36.7 °C), temperature source Oral, resp. rate 18, height 1.88 m (6' 2\"), weight 101.5 kg (223 lb 12.8 oz), SpO2 96%.  Physical Exam:  ECOG PS: 0  General: A&Ox3, NAD  HEENT: Anicteric, OP clear  CV: RRR, no murmurs  Pulm: CTA b/l, no w/r/r  Abd: soft, ntnd, bs+  Extremities: no edema  Neurological: grossly intact    Results:   Labs:  Lab Results   Component Value Date    WBC 4.3 05/02/2025    HGB 13.8 05/02/2025    HCT 42.0  05/02/2025    .0 05/02/2025    CREATSERUM 0.88 05/02/2025    BUN 15 05/02/2025     05/02/2025    K 4.4 05/02/2025     05/02/2025    CO2 30.0 05/02/2025     (H) 05/02/2025    CA 9.5 05/02/2025    ALB 4.2 05/02/2025    ALKPHO 71 05/02/2025    BILT 0.4 05/02/2025    TP 6.7 05/02/2025    AST 19 05/02/2025    ALT 14 05/02/2025    PTT 79.9 (H) 06/07/2023    INR 1.64 (H) 07/05/2023    PT 25.7 (H) 09/06/2019    LIP 54 (L) 04/07/2022    MG 2.0 06/12/2023    PHOS 5.1 (H) 06/12/2023    B12 872 06/27/2023     Imaging:  MRI 10/14/23:  1. Post right distal small-bowel resection and right hemicolectomy.  No evidence of recurrent or metastatic disease.     MRI ABDOMEN+PELVIS (ALL W+WO) (CPT=74183/75845)  Result Date: 5/2/2025  CONCLUSION:  1. No evidence of intra-abdominal/intrapelvic malignancy/metastatic disease.  2. There is extensive ventral abdominal scarring along the midline with shana-incisional herniation and/or scarring along the superior margin.  3. Uncomplicated distal colonic diverticulosis.  4. Cholelithiasis without MR evidence of acute complication.   5. Minimally complex right renal cyst (Bosniak IIF), grossly unchanged.  6. Lesser incidental findings as above.       Dictated by (CST): Brody Barron MD on 5/02/2025 at 2:01 PM     Finalized by (CST): Brody Barron MD on 5/02/2025 at 2:37 PM          MRA CHEST (CPT=71555)  Result Date: 5/2/2025  CONCLUSION:  1. Aneurysmal dilatation of the ascending thoracic aorta measuring up to 4.8 cm, unchanged.  2. Postoperative changes of aortic valvular repair.  3. Lesser incidental findings as above.    Dictated by (CST): Brody Barron MD on 5/02/2025 at 1:45 PM     Finalized by (CST): Brody Barron MD on 5/02/2025 at 1:52 PM            Pathology:  Ileum, vermiform appendix and right colon; robot-assisted right hemicolectomy 5/22/23:  Infiltrating neuroendocrine tumor, grade 1, measuring 14 mm in maximum dimension.  Tumor penetrates the  muscularis propria.  Lipomatous ileocecal valve.  Vermiform appendix, negative for tumor.  Uninvolved ileal mucosa with no significant histopathological change.  Colonic mucosa with mild submucosal congestion.  Small mesenteric neuroendocrine tumor nodule (10 mm in maximum dimension).  Five of twenty one lymph nodes involved by tumor (5/21).  Largest tumor focus involving lymph node measures 2.5 mm.  Focal extracapsular extension is present.  Pericolonic adipose tissue with single discontinuous tumor deposit measuring 4.5 mm.  All inked margins are free of tumor.  Preliminary pathologic staging pT2, N1    Assessment & Plan:   Josh Jones is a 70 year old male with a hx of HTN, HLD, Afib of warfarin, and other comorbidities who presents for medical oncology surveillance followup regarding stage III ([id number]) G1 NET of the small bowel.     # Stage III ([id number]) G1 NET of the small bowel.   -Previously reviewed the patients pathology report and discussed the natural history of NET of the small bowel and general prognosis, he continues to do well on surveillance  -No laboratory or clinical concerns for recurrence at this time, chromogranin A and MRI a/p pending from today so will call patient with results.  Tentatively scheduled a follow-up month follow-up with labs.    Reviewed 4/25/25 MRI a/p that shows YAMILEX.  LMOVM and send [email or web address] message for patient.  Chromogranin A pending.  Revised follow-up to 1 year with labs and repeat MRI.    Note:  6/25/2025 abdominal wall reconstruction with Mark Danielle and Stephon    MDM Mod    Jack Fay PA-C    [facility]  [facility]    This note was created using a voice-recognition transcribing system. Incorrect words or phrases may have been missed during proofreading. Please interpret accordingly.

## 2025-05-02 NOTE — TELEPHONE ENCOUNTER
Outgoing call to patient , a  detail message was left to schedule a pre surgical visit .    See.  message .   Plan: Will supplement with outdoor sun exposure therapy twice weekly for at least 20 minutes Detail Level: Zone Render In Strict Bullet Format?: No Initiate Treatment: Restart Triamcinolone 01% cream BID Discontinue Regimen: VTAMA once daily

## 2025-05-02 NOTE — TELEPHONE ENCOUNTER
FRANCIS notifying patient that his 4/25/25 MRI a/p shows YAMILEX otherwise stable findings.  Chromogranin A pending.    MRA chest, stable ascending aorta aneurysm - follow-up with Dr. Doss as directed.    Call prn

## 2025-05-05 ENCOUNTER — TELEPHONE (OUTPATIENT)
Facility: CLINIC | Age: 70
End: 2025-05-05

## 2025-05-05 NOTE — TELEPHONE ENCOUNTER
Called pt LVM to call  office to schedule a sooner appt for medical clearance, for upcoming surgery.

## 2025-05-06 LAB — CHROMOGRANIN A: 62.2 NG/ML

## 2025-05-07 ENCOUNTER — TELEPHONE (OUTPATIENT)
Dept: SURGERY | Facility: CLINIC | Age: 70
End: 2025-05-07

## 2025-05-07 NOTE — TELEPHONE ENCOUNTER
Contacted patient's cardiologist to see if patient has been scheduled for a cardiac clearance appointment. Staff stated he is scheduled to be seen on 6/18. Expressed concern that this is very close to his surgery, especially since the patient takes anticoagulation. Staff stated they could get him a sooner appointment with an NP. Confirmed this was acceptable from our end. Staff stated they will contact patient to reschedule him.

## 2025-05-15 RX ORDER — METOPROLOL TARTRATE 25 MG/1
TABLET, FILM COATED ORAL
Qty: 135 TABLET | Refills: 2 | Status: SHIPPED | OUTPATIENT
Start: 2025-05-15

## 2025-06-10 ENCOUNTER — TELEPHONE (OUTPATIENT)
Facility: CLINIC | Age: 70
End: 2025-06-10

## 2025-06-10 NOTE — TELEPHONE ENCOUNTER
Contacted patient's cardiologist's office to request a fax of the patient's EKG tracing.  Also contacted the warfarin clinic to ensure patient will begin warfarin bridging as recommended by his cardiologist. Left message on secure voicemail.

## 2025-06-10 NOTE — TELEPHONE ENCOUNTER
RN from [facility] warfarin clinic called back and left voicemail on secure line to explain patient has been given instructions for warfarin bridging and will begin on 6/19.

## 2025-06-12 ENCOUNTER — TELEPHONE (OUTPATIENT)
Dept: SURGERY | Facility: CLINIC | Age: 70
End: 2025-06-12

## 2025-06-12 NOTE — TELEPHONE ENCOUNTER
Contacted patient and left message to discuss the need for updated labs as labs must be within 30 days of surgery.

## 2025-06-12 NOTE — TELEPHONE ENCOUNTER
Called Dr. Goyal office request that they [facility] new lab orders, for pt, he had them drawn to soon, RN will put in orders and call pt to have them drawn.

## 2025-06-13 ENCOUNTER — LAB ENCOUNTER (OUTPATIENT)
Dept: LAB | Facility: HOSPITAL | Age: 70
End: 2025-06-13
Attending: PHYSICIAN ASSISTANT
Payer: MEDICARE

## 2025-06-13 DIAGNOSIS — Z00.00 ENCOUNTER FOR MEDICARE ANNUAL WELLNESS EXAM: ICD-10-CM

## 2025-06-13 DIAGNOSIS — F31.71 BIPOLAR DISORDER, IN PARTIAL REMISSION, MOST RECENT EPISODE HYPOMANIC (HCC): ICD-10-CM

## 2025-06-13 DIAGNOSIS — D3A.8: ICD-10-CM

## 2025-06-13 DIAGNOSIS — Z13.6 SCREENING FOR CARDIOVASCULAR CONDITION: ICD-10-CM

## 2025-06-13 DIAGNOSIS — Z12.5 PROSTATE CANCER SCREENING: ICD-10-CM

## 2025-06-13 LAB
ALBUMIN SERPL-MCNC: 4.2 G/DL (ref 3.2–4.8)
ALBUMIN/GLOB SERPL: 1.8 {RATIO} (ref 1–2)
ALP LIVER SERPL-CCNC: 79 U/L (ref 45–117)
ALT SERPL-CCNC: 30 U/L (ref 10–49)
ANION GAP SERPL CALC-SCNC: 7 MMOL/L (ref 0–18)
AST SERPL-CCNC: 34 U/L (ref ?–34)
BASOPHILS # BLD AUTO: 0.06 X10(3) UL (ref 0–0.2)
BASOPHILS NFR BLD AUTO: 1.1 %
BILIRUB SERPL-MCNC: 0.3 MG/DL (ref 0.2–1.1)
BUN BLD-MCNC: 17 MG/DL (ref 9–23)
BUN/CREAT SERPL: 19.3 (ref 10–20)
CALCIUM BLD-MCNC: 9.4 MG/DL (ref 8.7–10.4)
CHLORIDE SERPL-SCNC: 108 MMOL/L (ref 98–112)
CHOLEST SERPL-MCNC: 196 MG/DL (ref ?–200)
CO2 SERPL-SCNC: 25 MMOL/L (ref 21–32)
COMPLEXED PSA SERPL-MCNC: 0.72 NG/ML (ref ?–4)
CREAT BLD-MCNC: 0.88 MG/DL (ref 0.7–1.3)
DEPRECATED RDW RBC AUTO: 47.3 FL (ref 35.1–46.3)
EGFRCR SERPLBLD CKD-EPI 2021: 93 ML/MIN/1.73M2 (ref 60–?)
EOSINOPHIL # BLD AUTO: 0.1 X10(3) UL (ref 0–0.7)
EOSINOPHIL NFR BLD AUTO: 1.8 %
ERYTHROCYTE [DISTWIDTH] IN BLOOD BY AUTOMATED COUNT: 13.3 % (ref 11–15)
FASTING PATIENT LIPID ANSWER: NO
FASTING STATUS PATIENT QL REPORTED: NO
GLOBULIN PLAS-MCNC: 2.3 G/DL (ref 2–3.5)
GLUCOSE BLD-MCNC: 92 MG/DL (ref 70–99)
HCT VFR BLD AUTO: 40.4 % (ref 39–53)
HDLC SERPL-MCNC: 41 MG/DL (ref 40–59)
HGB BLD-MCNC: 13.5 G/DL (ref 13–17.5)
IMM GRANULOCYTES # BLD AUTO: 0.02 X10(3) UL (ref 0–1)
IMM GRANULOCYTES NFR BLD: 0.4 %
LDLC SERPL CALC-MCNC: 116 MG/DL (ref ?–100)
LYMPHOCYTES # BLD AUTO: 1.67 X10(3) UL (ref 1–4)
LYMPHOCYTES NFR BLD AUTO: 30.7 %
MCH RBC QN AUTO: 31.8 PG (ref 26–34)
MCHC RBC AUTO-ENTMCNC: 33.4 G/DL (ref 31–37)
MCV RBC AUTO: 95.3 FL (ref 80–100)
MONOCYTES # BLD AUTO: 0.61 X10(3) UL (ref 0.1–1)
MONOCYTES NFR BLD AUTO: 11.2 %
NEUTROPHILS # BLD AUTO: 2.98 X10 (3) UL (ref 1.5–7.7)
NEUTROPHILS # BLD AUTO: 2.98 X10(3) UL (ref 1.5–7.7)
NEUTROPHILS NFR BLD AUTO: 54.8 %
NONHDLC SERPL-MCNC: 155 MG/DL (ref ?–130)
OSMOLALITY SERPL CALC.SUM OF ELEC: 291 MOSM/KG (ref 275–295)
PLATELET # BLD AUTO: 173 10(3)UL (ref 150–450)
POTASSIUM SERPL-SCNC: 4.3 MMOL/L (ref 3.5–5.1)
PROT SERPL-MCNC: 6.5 G/DL (ref 5.7–8.2)
RBC # BLD AUTO: 4.24 X10(6)UL (ref 3.8–5.8)
SODIUM SERPL-SCNC: 140 MMOL/L (ref 136–145)
TRIGL SERPL-MCNC: 225 MG/DL (ref 30–149)
VALPROATE SERPL-MCNC: 50.3 UG/ML (ref 50–100)
VLDLC SERPL CALC-MCNC: 39 MG/DL (ref 0–30)
WBC # BLD AUTO: 5.4 X10(3) UL (ref 4–11)

## 2025-06-13 PROCEDURE — 80164 ASSAY DIPROPYLACETIC ACD TOT: CPT

## 2025-06-13 PROCEDURE — 85025 COMPLETE CBC W/AUTO DIFF WBC: CPT

## 2025-06-13 PROCEDURE — 86316 IMMUNOASSAY TUMOR OTHER: CPT

## 2025-06-13 PROCEDURE — 36415 COLL VENOUS BLD VENIPUNCTURE: CPT

## 2025-06-13 PROCEDURE — 80053 COMPREHEN METABOLIC PANEL: CPT

## 2025-06-13 PROCEDURE — 80061 LIPID PANEL: CPT

## 2025-06-16 ENCOUNTER — TELEPHONE (OUTPATIENT)
Dept: SURGERY | Facility: CLINIC | Age: 70
End: 2025-06-16

## 2025-06-16 NOTE — TELEPHONE ENCOUNTER
Called Dr. Triana office talked to Evelyn  to have them fax over pt EKG, she stated the she will be faxing it over today.

## 2025-06-17 ENCOUNTER — TELEPHONE (OUTPATIENT)
Dept: SURGERY | Facility: CLINIC | Age: 70
End: 2025-06-17

## 2025-06-17 LAB — CHROMOGRANIN A: 66 NG/ML

## 2025-06-17 NOTE — TELEPHONE ENCOUNTER
[facility] Pre Admission called with a question about the pre surgical drink. Please call [phone].

## 2025-06-17 NOTE — PAT NURSING NOTE
CALL MADE TO DR. LEY'S OFFICE NEEDS CLARIFICATION  IF HE WANTS ENSURE PRE SURGICAL OR GATORADE PRE OP, NURSE WILL CALL ME BACK FOR ANSWER

## 2025-06-18 NOTE — TELEPHONE ENCOUNTER
Spoke to Shiv Louise regarding question about pre-surgical drink. Informed PAT that patient can drink Gatorade or pre-surgery Ensure from Dr. Danielle's standpoint. All questions answered, advised to call back for any future questions or concerns. PAT verbalized understanding.

## 2025-06-25 ENCOUNTER — HOSPITAL ENCOUNTER (INPATIENT)
Facility: HOSPITAL | Age: 70
LOS: 8 days | Discharge: HOME HEALTH | DRG: 336 | End: 2025-07-03
Attending: SURGERY | Admitting: SURGERY
Payer: MEDICARE

## 2025-06-25 ENCOUNTER — ANESTHESIA EVENT (OUTPATIENT)
Dept: SURGERY | Facility: HOSPITAL | Age: 70
End: 2025-06-25
Payer: MEDICARE

## 2025-06-25 ENCOUNTER — ANESTHESIA (OUTPATIENT)
Dept: SURGERY | Facility: HOSPITAL | Age: 70
End: 2025-06-25
Payer: MEDICARE

## 2025-06-25 ENCOUNTER — TELEPHONE (OUTPATIENT)
Dept: SURGERY | Facility: CLINIC | Age: 70
End: 2025-06-25

## 2025-06-25 DIAGNOSIS — K46.9 HERNIA: Primary | ICD-10-CM

## 2025-06-25 DIAGNOSIS — Z95.2 H/O MECHANICAL AORTIC VALVE REPLACEMENT: ICD-10-CM

## 2025-06-25 PROBLEM — Z87.19 HX SBO: Status: RESOLVED | Noted: 2021-12-09 | Resolved: 2025-06-25

## 2025-06-25 PROBLEM — Z90.49 S/P RIGHT HEMICOLECTOMY: Status: RESOLVED | Noted: 2023-05-22 | Resolved: 2025-06-25

## 2025-06-25 LAB
ANTIBODY SCREEN: NEGATIVE
INR BLD: 1.08 (ref 0.8–1.2)
PROTHROMBIN TIME: 14.1 SECONDS (ref 11.6–14.8)
RH BLOOD TYPE: NEGATIVE

## 2025-06-25 PROCEDURE — 0DNU0ZZ RELEASE OMENTUM, OPEN APPROACH: ICD-10-PCS | Performed by: SURGERY

## 2025-06-25 PROCEDURE — 0WQF0ZZ REPAIR ABDOMINAL WALL, OPEN APPROACH: ICD-10-PCS | Performed by: SURGERY

## 2025-06-25 PROCEDURE — 99222 1ST HOSP IP/OBS MODERATE 55: CPT | Performed by: HOSPITALIST

## 2025-06-25 DEVICE — IMPLANTABLE DEVICE: Type: IMPLANTABLE DEVICE | Site: ABDOMEN | Status: FUNCTIONAL

## 2025-06-25 RX ORDER — OXYCODONE HYDROCHLORIDE 5 MG/1
5 TABLET ORAL EVERY 4 HOURS PRN
Status: DISCONTINUED | OUTPATIENT
Start: 2025-06-25 | End: 2025-06-29

## 2025-06-25 RX ORDER — MIDAZOLAM HYDROCHLORIDE 1 MG/ML
INJECTION INTRAMUSCULAR; INTRAVENOUS AS NEEDED
Status: DISCONTINUED | OUTPATIENT
Start: 2025-06-25 | End: 2025-06-25 | Stop reason: SURG

## 2025-06-25 RX ORDER — MEPERIDINE HYDROCHLORIDE 25 MG/ML
12.5 INJECTION INTRAMUSCULAR; INTRAVENOUS; SUBCUTANEOUS AS NEEDED
Status: DISCONTINUED | OUTPATIENT
Start: 2025-06-25 | End: 2025-06-25 | Stop reason: HOSPADM

## 2025-06-25 RX ORDER — ROCURONIUM BROMIDE 10 MG/ML
INJECTION, SOLUTION INTRAVENOUS AS NEEDED
Status: DISCONTINUED | OUTPATIENT
Start: 2025-06-25 | End: 2025-06-25 | Stop reason: SURG

## 2025-06-25 RX ORDER — FAMOTIDINE 20 MG/1
20 TABLET, FILM COATED ORAL 2 TIMES DAILY
Status: DISCONTINUED | OUTPATIENT
Start: 2025-06-25 | End: 2025-07-03

## 2025-06-25 RX ORDER — HYDROCODONE BITARTRATE AND ACETAMINOPHEN 5; 325 MG/1; MG/1
1 TABLET ORAL EVERY 6 HOURS PRN
Status: DISCONTINUED | OUTPATIENT
Start: 2025-06-25 | End: 2025-07-03

## 2025-06-25 RX ORDER — ONDANSETRON 2 MG/ML
4 INJECTION INTRAMUSCULAR; INTRAVENOUS EVERY 6 HOURS PRN
Status: DISCONTINUED | OUTPATIENT
Start: 2025-06-25 | End: 2025-06-25 | Stop reason: HOSPADM

## 2025-06-25 RX ORDER — LIDOCAINE HYDROCHLORIDE 10 MG/ML
INJECTION, SOLUTION EPIDURAL; INFILTRATION; INTRACAUDAL; PERINEURAL AS NEEDED
Status: DISCONTINUED | OUTPATIENT
Start: 2025-06-25 | End: 2025-06-25 | Stop reason: SURG

## 2025-06-25 RX ORDER — SODIUM CHLORIDE, SODIUM LACTATE, POTASSIUM CHLORIDE, CALCIUM CHLORIDE 600; 310; 30; 20 MG/100ML; MG/100ML; MG/100ML; MG/100ML
2 INJECTION, SOLUTION INTRAVENOUS CONTINUOUS
Status: DISCONTINUED | OUTPATIENT
Start: 2025-06-25 | End: 2025-06-26

## 2025-06-25 RX ORDER — FAMOTIDINE 10 MG/ML
20 INJECTION, SOLUTION INTRAVENOUS 2 TIMES DAILY
Status: DISCONTINUED | OUTPATIENT
Start: 2025-06-25 | End: 2025-07-03

## 2025-06-25 RX ORDER — HYDROMORPHONE HYDROCHLORIDE 1 MG/ML
0.2 INJECTION, SOLUTION INTRAMUSCULAR; INTRAVENOUS; SUBCUTANEOUS EVERY 2 HOUR PRN
Status: DISCONTINUED | OUTPATIENT
Start: 2025-06-25 | End: 2025-06-25

## 2025-06-25 RX ORDER — HYDROMORPHONE HYDROCHLORIDE 1 MG/ML
0.6 INJECTION, SOLUTION INTRAMUSCULAR; INTRAVENOUS; SUBCUTANEOUS EVERY 5 MIN PRN
Status: DISCONTINUED | OUTPATIENT
Start: 2025-06-25 | End: 2025-06-25 | Stop reason: HOSPADM

## 2025-06-25 RX ORDER — DIVALPROEX SODIUM 500 MG/1
1000 TABLET, FILM COATED, EXTENDED RELEASE ORAL NIGHTLY
Status: DISCONTINUED | OUTPATIENT
Start: 2025-06-25 | End: 2025-07-03

## 2025-06-25 RX ORDER — DIPHENHYDRAMINE HCL 25 MG
25 CAPSULE ORAL EVERY 6 HOURS PRN
Status: DISCONTINUED | OUTPATIENT
Start: 2025-06-25 | End: 2025-06-26

## 2025-06-25 RX ORDER — LAMOTRIGINE 100 MG/1
100 TABLET ORAL EVERY MORNING
Status: DISCONTINUED | OUTPATIENT
Start: 2025-06-26 | End: 2025-07-03

## 2025-06-25 RX ORDER — NEOSTIGMINE METHYLSULFATE 1 MG/ML
INJECTION INTRAVENOUS AS NEEDED
Status: DISCONTINUED | OUTPATIENT
Start: 2025-06-25 | End: 2025-06-25 | Stop reason: SURG

## 2025-06-25 RX ORDER — DIVALPROEX SODIUM 500 MG/1
1000 TABLET, FILM COATED, EXTENDED RELEASE ORAL NIGHTLY
COMMUNITY

## 2025-06-25 RX ORDER — GLYCOPYRROLATE 0.2 MG/ML
INJECTION, SOLUTION INTRAMUSCULAR; INTRAVENOUS AS NEEDED
Status: DISCONTINUED | OUTPATIENT
Start: 2025-06-25 | End: 2025-06-25 | Stop reason: SURG

## 2025-06-25 RX ORDER — BUPIVACAINE HYDROCHLORIDE 5 MG/ML
INJECTION, SOLUTION EPIDURAL; INTRACAUDAL; PERINEURAL AS NEEDED
Status: DISCONTINUED | OUTPATIENT
Start: 2025-06-25 | End: 2025-06-25 | Stop reason: HOSPADM

## 2025-06-25 RX ORDER — HYDROMORPHONE HYDROCHLORIDE 1 MG/ML
0.8 INJECTION, SOLUTION INTRAMUSCULAR; INTRAVENOUS; SUBCUTANEOUS EVERY 2 HOUR PRN
Status: DISPENSED | OUTPATIENT
Start: 2025-06-25 | End: 2025-06-27

## 2025-06-25 RX ORDER — DOCUSATE SODIUM 100 MG/1
100 CAPSULE, LIQUID FILLED ORAL DAILY
Status: DISCONTINUED | OUTPATIENT
Start: 2025-06-26 | End: 2025-07-01

## 2025-06-25 RX ORDER — MAGNESIUM OXIDE 400 MG/1
400 TABLET ORAL DAILY
Status: DISCONTINUED | OUTPATIENT
Start: 2025-06-25 | End: 2025-07-03

## 2025-06-25 RX ORDER — DIPHENHYDRAMINE HYDROCHLORIDE 50 MG/ML
12.5 INJECTION, SOLUTION INTRAMUSCULAR; INTRAVENOUS AS NEEDED
Status: DISCONTINUED | OUTPATIENT
Start: 2025-06-25 | End: 2025-06-25 | Stop reason: HOSPADM

## 2025-06-25 RX ORDER — ENOXAPARIN SODIUM 100 MG/ML
40 INJECTION SUBCUTANEOUS DAILY
Status: DISCONTINUED | OUTPATIENT
Start: 2025-06-26 | End: 2025-06-26

## 2025-06-25 RX ORDER — ACETAMINOPHEN 500 MG
1000 TABLET ORAL ONCE
Status: DISCONTINUED | OUTPATIENT
Start: 2025-06-25 | End: 2025-06-25 | Stop reason: HOSPADM

## 2025-06-25 RX ORDER — DIPHENHYDRAMINE HYDROCHLORIDE 50 MG/ML
25 INJECTION, SOLUTION INTRAMUSCULAR; INTRAVENOUS EVERY 6 HOURS PRN
Status: DISCONTINUED | OUTPATIENT
Start: 2025-06-25 | End: 2025-06-26

## 2025-06-25 RX ORDER — ACETAMINOPHEN 325 MG/1
650 TABLET ORAL EVERY 6 HOURS PRN
Status: DISCONTINUED | OUTPATIENT
Start: 2025-06-25 | End: 2025-07-03

## 2025-06-25 RX ORDER — ONDANSETRON 4 MG/1
8 TABLET, FILM COATED ORAL EVERY 6 HOURS PRN
Status: DISCONTINUED | OUTPATIENT
Start: 2025-06-25 | End: 2025-07-03

## 2025-06-25 RX ORDER — ONDANSETRON 2 MG/ML
INJECTION INTRAMUSCULAR; INTRAVENOUS AS NEEDED
Status: DISCONTINUED | OUTPATIENT
Start: 2025-06-25 | End: 2025-06-25 | Stop reason: SURG

## 2025-06-25 RX ORDER — HYDROMORPHONE HYDROCHLORIDE 1 MG/ML
0.4 INJECTION, SOLUTION INTRAMUSCULAR; INTRAVENOUS; SUBCUTANEOUS EVERY 2 HOUR PRN
Status: DISPENSED | OUTPATIENT
Start: 2025-06-25 | End: 2025-06-27

## 2025-06-25 RX ORDER — ONDANSETRON 2 MG/ML
8 INJECTION INTRAMUSCULAR; INTRAVENOUS EVERY 6 HOURS PRN
Status: DISCONTINUED | OUTPATIENT
Start: 2025-06-25 | End: 2025-07-03

## 2025-06-25 RX ORDER — PHENYLEPHRINE HCL 10 MG/ML
VIAL (ML) INJECTION AS NEEDED
Status: DISCONTINUED | OUTPATIENT
Start: 2025-06-25 | End: 2025-06-25 | Stop reason: SURG

## 2025-06-25 RX ORDER — ACETAMINOPHEN 10 MG/ML
INJECTION, SOLUTION INTRAVENOUS AS NEEDED
Status: DISCONTINUED | OUTPATIENT
Start: 2025-06-25 | End: 2025-06-25 | Stop reason: SURG

## 2025-06-25 RX ORDER — FOLIC ACID 0.4 MG
400 TABLET ORAL EVERY EVENING
Status: DISCONTINUED | OUTPATIENT
Start: 2025-06-25 | End: 2025-07-03

## 2025-06-25 RX ORDER — SODIUM CHLORIDE, SODIUM LACTATE, POTASSIUM CHLORIDE, CALCIUM CHLORIDE 600; 310; 30; 20 MG/100ML; MG/100ML; MG/100ML; MG/100ML
INJECTION, SOLUTION INTRAVENOUS CONTINUOUS
Status: DISCONTINUED | OUTPATIENT
Start: 2025-06-25 | End: 2025-06-26

## 2025-06-25 RX ORDER — HYDROMORPHONE HYDROCHLORIDE 1 MG/ML
0.4 INJECTION, SOLUTION INTRAMUSCULAR; INTRAVENOUS; SUBCUTANEOUS EVERY 2 HOUR PRN
Status: DISCONTINUED | OUTPATIENT
Start: 2025-06-25 | End: 2025-06-25

## 2025-06-25 RX ORDER — NALOXONE HYDROCHLORIDE 0.4 MG/ML
80 INJECTION, SOLUTION INTRAMUSCULAR; INTRAVENOUS; SUBCUTANEOUS AS NEEDED
Status: DISCONTINUED | OUTPATIENT
Start: 2025-06-25 | End: 2025-06-25 | Stop reason: HOSPADM

## 2025-06-25 RX ORDER — ACETAMINOPHEN 160 MG/5ML
1000 SOLUTION ORAL EVERY 8 HOURS SCHEDULED
Status: DISCONTINUED | OUTPATIENT
Start: 2025-06-25 | End: 2025-06-26

## 2025-06-25 RX ORDER — LABETALOL HYDROCHLORIDE 5 MG/ML
5 INJECTION, SOLUTION INTRAVENOUS EVERY 5 MIN PRN
Status: DISCONTINUED | OUTPATIENT
Start: 2025-06-25 | End: 2025-06-25 | Stop reason: HOSPADM

## 2025-06-25 RX ORDER — METOCLOPRAMIDE HYDROCHLORIDE 5 MG/ML
10 INJECTION INTRAMUSCULAR; INTRAVENOUS EVERY 8 HOURS PRN
Status: DISCONTINUED | OUTPATIENT
Start: 2025-06-25 | End: 2025-06-25 | Stop reason: HOSPADM

## 2025-06-25 RX ORDER — METOPROLOL TARTRATE 25 MG/1
25 TABLET, FILM COATED ORAL
Status: DISCONTINUED | OUTPATIENT
Start: 2025-06-26 | End: 2025-07-03

## 2025-06-25 RX ORDER — HYDROCODONE BITARTRATE AND ACETAMINOPHEN 5; 325 MG/1; MG/1
2 TABLET ORAL EVERY 6 HOURS PRN
Status: DISCONTINUED | OUTPATIENT
Start: 2025-06-25 | End: 2025-07-03

## 2025-06-25 RX ORDER — SODIUM CHLORIDE, SODIUM LACTATE, POTASSIUM CHLORIDE, CALCIUM CHLORIDE 600; 310; 30; 20 MG/100ML; MG/100ML; MG/100ML; MG/100ML
INJECTION, SOLUTION INTRAVENOUS CONTINUOUS
Status: DISCONTINUED | OUTPATIENT
Start: 2025-06-25 | End: 2025-06-25 | Stop reason: HOSPADM

## 2025-06-25 RX ORDER — DIVALPROEX SODIUM 250 MG/1
250 TABLET, FILM COATED, EXTENDED RELEASE ORAL NIGHTLY
Status: DISCONTINUED | OUTPATIENT
Start: 2025-06-25 | End: 2025-07-03

## 2025-06-25 RX ORDER — HYDROMORPHONE HYDROCHLORIDE 1 MG/ML
INJECTION, SOLUTION INTRAMUSCULAR; INTRAVENOUS; SUBCUTANEOUS
Status: COMPLETED
Start: 2025-06-25 | End: 2025-06-25

## 2025-06-25 RX ORDER — FOLIC ACID 0.4 MG
400 TABLET ORAL EVERY EVENING
Status: DISCONTINUED | OUTPATIENT
Start: 2025-06-25 | End: 2025-06-25

## 2025-06-25 RX ORDER — ONDANSETRON 2 MG/ML
INJECTION INTRAMUSCULAR; INTRAVENOUS
Status: COMPLETED
Start: 2025-06-25 | End: 2025-06-25

## 2025-06-25 RX ORDER — HYDROMORPHONE HYDROCHLORIDE 1 MG/ML
0.2 INJECTION, SOLUTION INTRAMUSCULAR; INTRAVENOUS; SUBCUTANEOUS EVERY 5 MIN PRN
Status: DISCONTINUED | OUTPATIENT
Start: 2025-06-25 | End: 2025-06-25 | Stop reason: HOSPADM

## 2025-06-25 RX ORDER — SODIUM CHLORIDE, SODIUM LACTATE, POTASSIUM CHLORIDE, CALCIUM CHLORIDE 600; 310; 30; 20 MG/100ML; MG/100ML; MG/100ML; MG/100ML
INJECTION, SOLUTION INTRAVENOUS CONTINUOUS
Status: DISCONTINUED | OUTPATIENT
Start: 2025-06-25 | End: 2025-06-25

## 2025-06-25 RX ORDER — HYDROMORPHONE HYDROCHLORIDE 1 MG/ML
0.4 INJECTION, SOLUTION INTRAMUSCULAR; INTRAVENOUS; SUBCUTANEOUS EVERY 5 MIN PRN
Status: DISCONTINUED | OUTPATIENT
Start: 2025-06-25 | End: 2025-06-25 | Stop reason: HOSPADM

## 2025-06-25 RX ORDER — POLYETHYLENE GLYCOL 3350 17 G/17G
17 POWDER, FOR SOLUTION ORAL DAILY PRN
Status: DISCONTINUED | OUTPATIENT
Start: 2025-06-25 | End: 2025-06-28

## 2025-06-25 RX ORDER — HEPARIN SODIUM 5000 [USP'U]/ML
5000 INJECTION, SOLUTION INTRAVENOUS; SUBCUTANEOUS ONCE
Status: COMPLETED | OUTPATIENT
Start: 2025-06-25 | End: 2025-06-25

## 2025-06-25 RX ORDER — HYDROMORPHONE HYDROCHLORIDE 1 MG/ML
0.2 INJECTION, SOLUTION INTRAMUSCULAR; INTRAVENOUS; SUBCUTANEOUS EVERY 2 HOUR PRN
Status: ACTIVE | OUTPATIENT
Start: 2025-06-25 | End: 2025-06-27

## 2025-06-25 RX ORDER — SENNOSIDES 8.6 MG
17.2 TABLET ORAL NIGHTLY PRN
Status: DISCONTINUED | OUTPATIENT
Start: 2025-06-25 | End: 2025-07-03

## 2025-06-25 RX ADMIN — GLYCOPYRROLATE 0.8 MG: 0.2 INJECTION, SOLUTION INTRAMUSCULAR; INTRAVENOUS at 12:21:00

## 2025-06-25 RX ADMIN — ROCURONIUM BROMIDE 70 MG: 10 INJECTION, SOLUTION INTRAVENOUS at 08:52:00

## 2025-06-25 RX ADMIN — ONDANSETRON 4 MG: 2 INJECTION INTRAMUSCULAR; INTRAVENOUS at 12:10:00

## 2025-06-25 RX ADMIN — NEOSTIGMINE METHYLSULFATE 5 MG: 1 INJECTION INTRAVENOUS at 12:21:00

## 2025-06-25 RX ADMIN — ROCURONIUM BROMIDE 30 MG: 10 INJECTION, SOLUTION INTRAVENOUS at 10:37:00

## 2025-06-25 RX ADMIN — GLYCOPYRROLATE 0.3 MG: 0.2 INJECTION, SOLUTION INTRAMUSCULAR; INTRAVENOUS at 09:10:00

## 2025-06-25 RX ADMIN — LIDOCAINE HYDROCHLORIDE 100 MG: 10 INJECTION, SOLUTION EPIDURAL; INFILTRATION; INTRACAUDAL; PERINEURAL at 08:52:00

## 2025-06-25 RX ADMIN — SODIUM CHLORIDE, SODIUM LACTATE, POTASSIUM CHLORIDE, CALCIUM CHLORIDE: 600; 310; 30; 20 INJECTION, SOLUTION INTRAVENOUS at 09:53:00

## 2025-06-25 RX ADMIN — PHENYLEPHRINE HCL 100 MCG: 10 MG/ML VIAL (ML) INJECTION at 09:10:00

## 2025-06-25 RX ADMIN — MIDAZOLAM HYDROCHLORIDE 2 MG: 1 INJECTION INTRAMUSCULAR; INTRAVENOUS at 08:44:00

## 2025-06-25 RX ADMIN — ACETAMINOPHEN 1000 MG: 10 INJECTION, SOLUTION INTRAVENOUS at 11:45:00

## 2025-06-25 RX ADMIN — SODIUM CHLORIDE, SODIUM LACTATE, POTASSIUM CHLORIDE, CALCIUM CHLORIDE: 600; 310; 30; 20 INJECTION, SOLUTION INTRAVENOUS at 08:43:00

## 2025-06-25 NOTE — H&P
No change in Dr. Goyal's H&P from 6/5. We reviewed the plan for exploratory laparotomy by Dr. Danielle and abdominal wall reconstruction with component separation and biosynthetic mesh. We reviewed the elevated risk of hemorrhagic complications given the patient's need for chronic anticoagulation.  We reviewed the general risks of surgery including but not limited to bleeding, infection, scarring, delayed wound healing, injury intra-abdominal structures, hernia recurrence, seroma, and need for further surgery.  We discussed expected postoperative course including need for drains, activity limitation, and compression.  Multiple questions were answered to patient's satisfaction.  No guarantees as to outcome were offered.  The patient and family express understanding and wish to proceed.

## 2025-06-25 NOTE — CONSULTS
[facility]OSPITAL[facility]  CONSULT     Josh Jones Patient Status:  Inpatient    1/15/1955 MRN [id number]   Location [facility] 7NE-A Attending Tyrone Danielle MD   Hosp Day # 0 PCP [facility]     Reason for consult: Medical management     Requested by: Dr. Danielle     Subjective:   History of Present Illness:     Josh Jones is a 70 year old male with a past medical history of paroxysmal atrial fibrillation, hypertension, hyperlipidemia, bipolar disorder who presented to the hospital for surgical procedure.  Patient was seen postprocedure, he was doing well overall.  His pain is controlled, denies any nausea or vomiting.  No recent fevers, cough, congestion.  He has no other acute complaints at this time.    History/Other:    Past Medical History:  Past Medical History[1]  Past Surgical History:   Past Surgical History[2]   Family History:   Family History[3]  Social History:    reports that he has never smoked. He has been exposed to tobacco smoke. He has never used smokeless tobacco. He reports that he does not currently use drugs after having used the following drugs: Cannabis. He reports that he does not drink alcohol.     Allergies: Allergies[4]    Medications:  Medications Ordered Prior to Encounter[5]    Review of Systems:   A comprehensive review of systems was completed.    Pertinent positives and negatives noted in the HPI.    Objective:   Physical Exam:    BP (!) 133/95 (BP Location: Right arm)   Pulse 67   Temp 97.8 °F (36.6 °C) (Oral)   Resp 17   Ht 6' 2\" (1.88 m)   Wt 219 lb (99.3 kg)   SpO2 96%   BMI 28.12 kg/m²   General: No acute distress, Alert  Respiratory: No rhonchi, no wheezes  Cardiovascular: S1, S2. Regular rate and rhythm  Abdomen: Soft, NT/ND, +BS  Neuro: No new focal deficits  Extremities: No edema    Results:    Labs:      Labs Last 24 Hours:  Recent Labs   Lab 25  0752   INR 1.08       No results for input(s): \"GLU\", \"BUN\", \"CREATSERUM\", \"GFRAA\",  \"GFRNAA\", \"CA\", \"ALB\", \"NA\", \"K\", \"CL\", \"CO2\", \"ALKPHO\", \"AST\", \"ALT\", \"BILT\", \"TP\" in the last 168 hours.    Recent Labs   Lab 06/25/25  0752   PTP 14.1   INR 1.08       No results for input(s): \"TROP\", \"CK\" in the last 168 hours.      Imaging: Imaging data reviewed in Epic.    Assessment & Plan:      #Ventral incisional hernia  S/p abd wall re-construction with plastics and surg onc  Post op care per surgonc  CLD, IVF  Lovenox for DVT proph  Pain control    #SVT  #Paroxysmal a. Fib  #Bicuspid aortic valve s/p AVR  #Bipolar d/o   Patient on warfarin, has been bridged with Lovenox post procedure  Cardiology consult   Resume metoprolol    #Bipolar d/o  Lamictal, Depakote           Plan of care discussed with patient, nurse    Kendrick Cox MD  6/25/2025    The 21st Century Cures Act makes medical notes like these available to patients in the interest of transparency. Please be advised this is a medical document. Medical documents are intended to carry relevant information, facts as evident, and the clinical opinion of the practitioner. The medical note is intended as peer to peer communication and may appear blunt or direct. It is written in medical language and may contain abbreviations or verbiage that are unfamiliar.            [1]   Past Medical History:   Actinic keratosis    AF (paroxysmal atrial fibrillation) (HCC)    Bicuspid aortic valve    s/p AVR    Bipolar affective (HCC)    Cyclothymia    Sees Dr. Bravo     Diverticulitis    High blood pressure    High cholesterol    Hx of motion sickness    Hyperammonemia (HCC)    Due to Depakote     Pneumonia due to organism    S/P AVR (aortic valve replacement)    SVT (supraventricular tachycardia) (HCC)    Visual impairment    Readers   [2]   Past Surgical History:  Procedure Laterality Date    Cath transcatheter aortic valve replacement  2001    [facility]     Colectomy  06/18/2020    Colon resection due to diverticulitis    Colectomy  05/2023     Underwent robotic assisted right hemicolectomy 5/22    Colonoscopy screening - referral N/A 06/18/2020    Procedure: COLONOSCOPY-SCREENING;  Surgeon: Rahul Lagos MD;  Location: [facility] MAIN OR    Valve repair     [3]   Family History  Problem Relation Age of Onset    Stroke Mother     Cancer Father         lung cancer    Cancer Sister    [4]   Allergies  Allergen Reactions    Cat Hair Extract ASTHMA    Dog Epithelium WHEEZING   [5]   No current facility-administered medications on file prior to encounter.     Current Outpatient Medications on File Prior to Encounter   Medication Sig Dispense Refill    divalproex  MG Oral Tablet 24 Hr Take 2 tablets (1,000 mg total) by mouth at bedtime.      warfarin 7.5 MG Oral Tab Take 1 tablet (7.5 mg total) by mouth nightly.      Pyridoxine HCl (VITAMIN B-6 OR) Take 1 tablet by mouth daily.      Cyanocobalamin (VITAMIN B-12 OR) Take 1 tablet by mouth daily.      Sildenafil Citrate 100 MG Oral Tab Take 1 tablet (100 mg total) by mouth daily as needed for Erectile Dysfunction. 30 tablet 0    Multiple Vitamin (VITAMIN E/FOLIC ACID/B-6/B-12 OR) Take 1 tablet by mouth daily.      divalproex  MG Oral Tablet 24 Hr Take 1 tablet (250 mg total) by mouth at bedtime.  0    acetaminophen 325 MG Oral Tab Take 1 tablet (325 mg total) by mouth every 6 (six) hours as needed for Pain.  0    melatonin 5 MG Oral Cap Take 1 capsule (5 mg total) by mouth nightly.      folic acid 400 MCG Oral Tab Take 1 tablet (400 mcg total) by mouth in the evening.      lamoTRIgine 100 MG Oral Tab Take 1 tablet (100 mg total) by mouth every morning.

## 2025-06-25 NOTE — DISCHARGE INSTRUCTIONS
Plastic Surgery Home Care Instructions      We hope you were pleased with your care at [facility].  We wish you the best outcome and overall experience with your operation.  These instructions will help to minimize pain, optimize healing, and improve the likelihood of a successful result.    What To Expect  Swelling and discomfort in surgical area is expected.  Temporary areas of numbness are typical in the early weeks following your procedure. Normalization of sensation can typically take up to several months following the operation.    Bandages (Dressing)  Wear abdominal binder at all times including at night when you sleep.  Leave foam under abdominal binder in place.  You can change the drain dressings if you need to (if they get wet). You will be given extra supplies from the hospital    Drain Care  Empty your drains at 8 am and 8 pm each day  Record each drain separately and use the drain sheet given to you to record the drainage. Follow the instructions on the drain sheet.  Wash your hands before and after emptying your drains  Strip drain tubing before emptying  If your drain dressing gets wet or you notice moisture on the drain dressing, remove dressing, clean with alcohol and apply a new biopatch and tegederm.  Bring drain sheet with you to your first office visit    Bathing/Showers  DO NOT get surgical area wet  No baths, swimming, or hot tubs until you receive medical permission    Pain Medication: Norco  Take one or two tablets every six hours as needed for pain.  Do not take narcotics, if you do not have pain.  Keep stools soft.  Take a stool softener (Metamucil, Milk of Magnesia, Colace).  Eating fruit will also help to prevent constipation    Over-The-Counter Medication  Non-prescription anti-inflammatory medications can also help to ease the pain.  You can take Aleve or ibuprofen starting 48 hours after surgery  Take as directed on the bottle  Drink a full glass of water with the  medication    Home Medication  Resume your home medications as instructed  Do not resume herbal medications for two weeks    Diet  Resume your normal diet    Activity  No strenuous activity or heavy lifting (no lifting over 5 pounds)  No activities that involve your abdominal muscles  You can go up and down the stairs as tolerated.    You cannot return to work, if your work requires strenuous activity.  You cannot return to physical exercise, sports, or gym workouts until you are allowed to participate in strenuous activity.    Driving  Do not drive, if you are taking pain medication.    Return to Work or School  You can return to work when you are not taking pain medication, if your work does not involve strenuous activity.  Contact the office, if you need a medical note.     Follow-up Appointment  Our office will call you to set up a time  Call the office for an appointment in two days if you cannot remember the appointment details.    Verify your appointment date, day, time, and location.  At your 1st postoperative office visit:  Your drains will be examined, wounds will be evaluated, healing assessed, and any additional concerns and instructions will be discussed.    Questions or Concerns  Call Dr. Szymanski's office if you experience severe pain not controlled by pain medication, swelling, numbness, tingling, bleeding, fever, or other concerns.   If he is not available, another physician will be able to address your concerns.    Josh   Thank you for coming to [facility] for your operation.  The nurses and the anesthesiologist try very hard to make sure you receive the best care possible.  Your trust in them is greatly appreciated.      Thanks so much,  QASIM Oconnell Dr., APRN Denise Vigilia, APRN      Post Op Instructions    Thank you for choosing the Surgical Oncology team at [facility].  Managing Your Pain  Take your medications as directed  and as needed. You may also take 1000 mg of acetaminophen (Tylenol®) every 6 hours as needed for pain. If taking hydrocodone (Norco) do not take additional acetaminophen (Tylenol).  Call our office if the medication prescribed for you doesn't ease your pain.  Don't drive or drink alcohol while you're taking prescription pain medication  Pain medication should help you resume your normal activities. Take enough medication to do your exercises comfortably. However, it's normal for your pain to increase a little as you start to be more active.  Keep track of when you take your pain medication. It works best 30 to 45 minutes after you take it. Taking it when your pain first begins is better than waiting for the pain to get worse.  Pain medication may cause constipation  Managing Constipation  Go to the bathroom at the same time every day.   Exercise. Walking is an excellent form of exercise.  Drink 8 (8-ounce) glasses (2 liters) of liquids daily, if you can. Drink water, juices (such as prune juice), soups, ice cream shakes, and other drinks that don't have caffeine.   If you haven't had a bowel movement in 3 days, call our office  Caring for Your Incision  Please follow plastic surgery incisional care instructions  Showering  You may shower 48 hours after surgery. When you shower, use mild soap to gently wash your incision. After you shower, pat the area dry with a clean towel. Don't rub over your incision. Leave your incision uncovered, unless there's drainage.  Avoid tub baths until your surgeon says it's okay.  Eating and Drinking  You may resume a regular diet when you go home. You may not be able to eat as much as you did before your surgery. Try to eat 4 to 6 small meals a day.  Drink plenty of liquids. Try to drink 8 (8-ounce) glasses of liquids every day. Don't drink alcohol until you check with your surgeon.  Activity and Exercise  Remember, recovery after surgery takes several weeks. It is common to feel  tired or fatigued. Rest as needed.   Doing aerobic exercise, such as walking and stair climbing, will help you gain strength and feel better. Gradually increase the distance you walk. Rest or stop as needed.  Don't lift anything heavier than 10 pounds for at least 8 weeks after your surgery or until your surgeon says it's okay.   Avoid strenuous activity and exercises until your surgeon says it's okay.  Ask your surgeon when it is okay for you to drive.   Ask your surgeon when you can return to work.  When to Call:  Let us know if you experience the following symptoms:  Fever of 100.4° F (38°C) or higher  Cloudy or smelly drainage from the incision site  The skin around your incision is warm/red/swollen  Sudden increase in pain or new pain  Shaking chills  Fast pulse  Shortness of breath or chest pain  Nausea or vomiting.   Diarrhea  Constipation that isn't relieved in 3 days  Signs of a bladder infection (urinating more often than usual, burning while urinating, bleeding or hesitancy while urinating).  Any new or unexplained symptoms    Our office will contact you for a follow up appointment.     Please arrive at the following location:  [facility]: [address] [address]  [facility]: [address] [place] [address]  Please call us at [phone] if you are unable to make it to your appointment or if you have any questions.       Sometimes managing your health at home requires assistance.  The [facility] team has recognized your preference to use [facility].  They can be reached by phone at [phone].  The fax number for your reference is [phone]..  A representative from the home health agency will contact you or your family to schedule your first visit.

## 2025-06-25 NOTE — BRIEF OP NOTE
Pre-Operative Diagnosis: Hernia [K46.9]     Post-Operative Diagnosis: Hernia [K46.9]      Procedure Performed:   Exploratory Laparotomy, Lysis of Adhesions (Shashi); Hernia repair with ADM    Surgeons and Role:  Panel 1:     * Tyrone Danielle MD - Primary  Panel 2:     * Jose Raul Szymanski MD - Primary    Assistant(s):  PA: Bernie Redd PA-C  APN: Lynne Bhatti APRN     Surgical Findings: Nl     Specimen: Hernia Sac     Estimated Blood Loss: Blood Output: 35 mL (6/25/2025 12:06 PM)      Jose Raul Szymanski MD  6/25/2025  12:45 PM

## 2025-06-25 NOTE — OPERATIVE REPORT
[facility]    PATIENT'S NAME: KY VASQUEZ   ATTENDING PHYSICIAN: Tyrone Danielle MD   OPERATING PHYSICIAN: Jose Raul Szymanski M.D.   PATIENT ACCOUNT#:   [id number]    LOCATION:  PACU  PACU 4 [facility] 10  MEDICAL RECORD #:   [id number]       YOB: 1955  ADMISSION DATE:       06/25/2025      OPERATION DATE:  06/25/2025    OPERATIVE REPORT    PREOPERATIVE DIAGNOSIS:  Ventral incisional hernia.  POSTOPERATIVE DIAGNOSIS:  Ventral incisional hernia.  PROCEDURE:  Abdominal wall reconstruction with component separation and placement of Strattice acellular dermal matrix.      ASSISTANT:  ASTON Gonzalez    ANESTHESIA:  General.    ESTIMATED BLOOD LOSS:  25 mL.      COMPLICATIONS:  None.    INDICATIONS:  The patient is a 70-year-old male with a history of a hemicolectomy.  The patient developed a ventral incisional hernia and was referred for plastic surgery evaluation for abdominal wall reconstruction.  We discussed abdominal wall reconstruction with component separation and placement of acellular dermal matrix.    OPERATIVE TECHNIQUE:  Informed consent was obtained from the patient.  The risks, benefits, and alternatives were reviewed with the patient preoperatively.  He expressed understanding and wished to proceed.  The patient was marked in the preoperative holding area.  He was then taken to the operating room by Dr. Danielle's team where he underwent exploratory laparotomy and lysis of adhesions.  Once Dr. Danielle's portion of the procedure was completed, I entered the room, and the plastic surgery portion of the procedure began.  The wound was inspected.  The hernia defect measured approximately 15 cm in length by 5 cm in width.  Primary approximation could not be performed without undue tension on the closure.  Hence, a component separation was undertaken.  Subcutaneous flaps were elevated off the abdominal wall with electrocautery, preserving the rectus perforators where possible.   Next, the left-sided linea semilunaris was identified.  The transversalis fascia was then released linearly with electrocautery.  After doing so, a primary approximation could be achieved without undue tension.  Next, a sheet of 16 x 20 Strattice acellular dermal matrix was brought on the field and prepared in saline.  The Strattice was placed in an underlay fashion utilizing #1 Prolene mattress sutures which were tied down, taking care not to encroach on the intra-abdominal contents.  Next, primary repair of the fascia was performed in the midline with #1 Prolene figure-of-eight sutures and then a running #1 Prolene suture.  A subfascial drain was placed prior to the fascial closure.  The subcutaneous drain was exited through a lateral stab incision and sutured to the skin with 3-0 nylon suture.  Quilting sutures of 3-0 Vicryl were then placed.  The incisions were closed with 2-0 Vicryl deep sutures, 3-0 Vicryl deep dermal sutures, and 4-0 Monocryl subcuticular suture.  Dermabond and Steri-Strips were placed.  Biopatch and Tegaderm were placed on the drains.  TopiFoam and an abdominal binder were placed.  The patient awakened, extubated, and taken to the recovery area in stable condition.  There were no operative complications.  All needle, sponge, and instrument counts were correct at the end of the procedure.     Dictated By Jose Raul Szymanski M.D.  d: 06/25/2025 12:54:53  t: 06/25/2025 13:23:58  Job [id number]  [facility]/

## 2025-06-25 NOTE — HISTORICAL OFFICE NOTE
[facility]  [address], [address] [place] [address]  [phone]      Josh Jones  Progress Note  Demographics:  Name: Josh Jones YOB: 1955  Age: 69, Male Medical Record No: [id number]  Visited Date/Time: 03/21/2024 01:10 PM    Chief Complaints  consult  History of Present Illness  Patient is a 69-year-old male with a history of neuroendocrine GI tumor s/p surgical resection, mechanical AVR around 2001, nonobstructive CAD, HTN, HLD, and dilated ascending aorta measuring 4.9 cm who presents establish care.  Last couple years had a coronary CTA which noted 50 to 70% LAD stenosis, FFR negative both in 2022 and again in 2023.  His ascending aorta was noted to be dilated measuring 4.9 cm unchanged between the 2.  In general reports feeling well, does have some unchanged exertional dyspnea but is otherwise physically active and feels well exercising.  Denies any chest pain, palpitations, edema, dizziness, or syncope.    Cardiac history:  S/p mechanical AVR 2001  Ascending aortic aneurysm 4.9 cm  Nonobstructive CAD  HTN  HLD  Cardiac risk factors Never smoked  Past Medical History  1.CAD native (coronary artery disease)  2.Ascending aorta dilatation  3.Abnormal nuclear stress test  4.Calcification of coronary artery  5.CORRIGAN (dyspnea on exertion)  6.History of mechanical aortic valve replacement  7.Paroxysmal supraventricular tachycardia  8.Encounter for education about injection  9.Pure hypercholesterolemia  Past Surgical History  1.History of mechanical aortic valve replacement  Family History  1. Mother - Family history of heart disease - FHx  No family history of heart disease.  Social History  Smoking status Never smoked  Tobacco usage - No (Non-smoker for personal reasons (finding))  Patient lives at home alone.  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  He previously worked for [employer] years ago in the past.  Denies  any tobacco, alcohol, illicit drug use.  Review of systems  Cardiovascular No history of Chest pain, Palpitations and Edema  Respiratory No history of SOB  Neurological No history of Numbness and Limb weakness  Physical Examination  Constitutional 96%o2sat  Vitals Left Arm Sitting  / 62 mmHg, Pulse rate 58 bpm, Height in 6' 2\", BMI: 32.1, Weight in 250 lbs (or) 113 kgs and BSA : 2.46 cc/m²  General Appearance No Acute Distress and Normal Body habitus  Cardiovascular   EKG/Other abnormalities  GENERAL: in no apparent distress  HEENT: Normal  NECK: no JVD, normal carotid pulses without bruits. No lymphadenopathy  LUNGS: normal excursion, clear to auscultation bilaterally  HEART: RRR, nl S1/S2, no gallop, no murmur, no rub  ABD: soft, nontender, nondistended, normal bowel sounds  EXTREMITIES: no cyanosis, clubbing or edema  SKIN: warm, dry, normal turgor  NEURO: alert, oriented x3, symmetrical face, no dysarthria, no focal deficits  PSYCH: cooperative, calm  Allergies  1.Cat Hair Extract(Reaction:, Severity:Mild)  2.Dog Epithelium(Reaction:, Severity:Mild)  Medications (Info obtained by: Verbal)  1.Cobalamin Combinations (B-12) 100-5000 MCG SL Tab, Place 500 mcg under the tongue daily.  2.divalproex (DEPAKOTE) 250 MG delayed release EC tablet, Take 250 mg by mouth daily.  3.divalproex (DEPAKOTE) 500 MG delayed release EC tablet, Take 500 mg by mouth 2 times daily.  4.folic acid (FOLATE) 400 MCG tablet, Take 400 mcg by mouth daily.  5.lamoTRIgine (LAMICTAL) 100 MG tablet, Take 100 mg by mouth daily.  6.Melatonin 5 MG Cap, Take 5 mg by mouth nightly.  7.metoPROLOL tartrate (LOPRESSOR) 25 MG tablet, Take 25 mg by mouth 2 times daily. 1 tab AM, 1/2 tab PM  8.Multiple Vitamin (TAB-A-AIDA) Tab, Take 1 tablet by mouth daily.  9.Probiotic 3 billion cell capsule, Take 1 capsule orally once a day.  10.sildenafil (VIAGRA) 100 MG tablet, Take 100 mg by mouth as needed.  11.warfarin 7.5 mg tablet, Take 1-2 tablets orally once  in the evening as directed by the warfarin clinic .  Impression  1.CAD native (coronary artery disease)  2.Ascending aorta dilatation  3.History of mechanical aortic valve replacement  Assessment & Plan  S/p mechanical AVR 2001  -Unchanged exertional dyspnea, no chest discomfort  - Continue warfarin with goal INR 2-3    Ascending aortic aneurysm 4.9 cm  - As noted on 2 prior coronary CTA's, has not had any CT scans prior to this to compare  - Unclear if he ever had aortic root repair or graft at the time of his AVR in 2001  - Will repeat CT thoracic aorta now to reassess size and determine rate of growth  - Will need routine surveillance CT given size of aneurysm  - Continue metoprolol tartrate 25 mg twice daily    Nonobstructive CAD  - With 50-70% stenosis LAD on prior CTA, FFR negative  - Unchanged exertional dyspnea, no chest discomfort  - Long discussion regarding benefit from lipid-lowering therapy, apparently had been statins in the past but cannot tolerate.  Patient declined both statin and nonstatin options.  - Continue warfarin with goal INR 2-3    HTN  - Normotensive today and at home  - Continue with metoprolol tartrate 25 mg twice daily    HLD  - , triglycerides 199 on 3/21/2023  - Patient declined lipid-lowering therapy as above    Plan  CT thoracic aorta  Follow-up me after completion above testing  Future appointments  1.Referral Visit - Bernie Doss ([email or web address]) : (Today)  Miscellaneous  1.Reviewed myocardial perfusion imaging with the patient.  2.Weight monitoring (regime/therapy)  Nurses documentation  refills: None   Assistive devices: none  Upcoming sx or procedures: none   Patient instructions  Plan  CT thoracic aorta  Follow-up me after completion above testing  Lab Details  INR  03/16/2024 12:00:00 AM  INR 2.4 2-3 RATIO N F  PROTHROMBIN TIME (PT)  06/27/2023 01:14:04 PM  PROTIME 21.5 11.6-14.8 seconds H F  INR 1.88 0.85-1.16 H F  Diagnostics Details  Exercise  Myocardial Perfusion Imaging 01/07/2022  1.Stress EKG is abnormal. 3-4 mm asymptomatic ST segement depression    1.This is a normal perfusion study, no perfusion defects noted.    2.The left ventricular cavity is noted to be normal on the stress study. The left ventricular ejection fraction was calculated to be 54% and left ventricular global function is normal.    Care Providers: Kaiser Triana MD, Lauren MONTALVO and India Lopez  Electronically Authenticated by  Kaiser Triana MD  03/21/2024 02:56:06 PM  Disclaimer: Components of this note were documented using voice recognition system and are subject to errors not corrected at proofreading. Contact the author of this note for any clarifications.

## 2025-06-25 NOTE — ANESTHESIA PREPROCEDURE EVALUATION
PRE-OP EVALUATION    Patient Name: Josh Jones    Admit Diagnosis: Hernia [K46.9]    Pre-op Diagnosis: Hernia [K46.9]    Exploratory Laparotomy, Lysis of Adhesions (Shashi)    Anesthesia Procedure: Exploratory Laparotomy, Lysis of Adhesions (Shashi) (Abdomen)  Hernia repair with component separation and acellular dermal matrix (Stephon) (Abdomen)    Surgeons and Role:  Panel 1:     * Tyrone Danielle MD - Primary  Panel 2:     * Jose Raul Szymanski MD - Primary    Pre-op vitals reviewed.  Temp: 97.8 °F (36.6 °C)  Pulse: 59  Resp: 16  BP: 118/78  SpO2: 97 %  Body mass index is 28.12 kg/m².    Current medications reviewed.  Hospital Medications:  Current Medications[1]    Outpatient Medications:   Prescriptions Prior to Admission[2]    Allergies: Cat hair extract and Dog epithelium      Anesthesia Evaluation    Patient summary reviewed.    Anesthetic Complications  (-) history of anesthetic complications         GI/Hepatic/Renal    Negative GI/hepatic/renal ROS.  (-) GERD                           Cardiovascular      ECG reviewed.  Exercise tolerance: good     MET: >4      (+) hypertension     (+) CAD  (-) past MI      (+) valvular problems/murmurs (S/P sternotomy and AVR in 2000) and asymptomatic    (+) dysrhythmias and SVT and atrial fibrillation  (-) CHF  (-) angina     (-) CORRIGAN  (-) orthopnea  (-) PND     Endo/Other    Negative endo/other ROS.  (-) diabetes                            Pulmonary    Negative pulmonary ROS.           (-) shortness of breath  (-) recent URI          Neuro/Psych    Negative neuro/psych ROS.                                  Past Surgical History[3]  Social Hx on file[4]  History   Drug Use Unknown     Available pre-op labs reviewed.  Lab Results   Component Value Date    WBC 5.4 06/13/2025    RBC 4.24 06/13/2025    HGB 13.5 06/13/2025    HCT 40.4 06/13/2025    MCV 95.3 06/13/2025    MCH 31.8 06/13/2025    MCHC 33.4 06/13/2025    RDW 13.3 06/13/2025    .0 06/13/2025     Lab  Results   Component Value Date     06/13/2025    K 4.3 06/13/2025     06/13/2025    CO2 25.0 06/13/2025    BUN 17 06/13/2025    CREATSERUM 0.88 06/13/2025    GLU 92 06/13/2025    CA 9.4 06/13/2025     Lab Results   Component Value Date    INR 1.08 06/25/2025         Airway      Mallampati: II  Mouth opening: >3 FB  TM distance: > 6 cm  Neck ROM: full Cardiovascular    Cardiovascular exam normal.  Rhythm: regular  Rate: normal  (+) murmur   Dental  Comment: Right upper rear temp crown not loose           Pulmonary    Pulmonary exam normal.  Breath sounds clear to auscultation bilaterally.          (-) rales     Other findings                ECG rhythm:   Normal sinus     ----------------------------------------------------------------------------     Conclusions:     1. Left ventricle: The cavity size was normal. Wall thickness was normal.      Systolic function was normal. The estimated ejection fraction was 60-65%,      by visual assessment. No diagnostic evidence for regional wall motion      abnormalities.   2. Aortic valve: A mechanical prosthetic valve is present. The prosthesis      diskhad a normal range of motion. The sewing ring appeared normal. The      peak systolic velocity was 3m/sec. The mean systolic gradient was 21mm      Hg. The valve area (VTI) was 1.96cm^2. The valve area (VTI) index was      0.85cm^2/m^2.   3. Aortic root: The aortic root was 4.8cm diameter.   4. Ascending aorta: The ascending aorta was 5.0cm diameter.   5. Pulmonary arteries: Systolic pressure could not be accurately estimated.   6. Pericardium, extracardiac: There was a left pleural effusion.   *   Prepared and electronically signed by   Kwan Milton MD   05/27/2023 13:16   ----------------------------------------------------------------------------       ASA: 3   Plan: general  NPO status verified and patient meets guidelines.  Patient has taken beta blockers in last 24 hours.        Plan/risks discussed  with: patient  Use of blood product(s) discussed with: patient    Consented to blood products.        We discussed GA w/ETT and possible scratchy throat and rarely dental damage.  We discussed analgesic plan and PONV prophylaxis.  We discussed placement of arterial line and other PIV prn.  The patient's questions were answered and consent was attained.            [1]    [Transfer Hold] acetaminophen (Tylenol Extra Strength) tab 1,000 mg  1,000 mg Oral Once    lactated ringers infusion   Intravenous Continuous    [COMPLETED] heparin (Porcine) 5000 UNIT/ML injection 5,000 Units  5,000 Units Subcutaneous Once    [COMPLETED] ceFAZolin (Ancef) 2g in 10mL IV syringe premix  2 g Intravenous Once   [2]   Medications Prior to Admission   Medication Sig Dispense Refill Last Dose/Taking    divalproex  MG Oral Tablet 24 Hr Take 2 tablets (1,000 mg total) by mouth at bedtime.   6/24/2025 at 11:00 PM    metoprolol tartrate 25 MG Oral Tab TAKE 1 TABLET BY MOUTH EVERY MORNING AND TAKE 1/2 TABLET BY MOUTH EVERY EVENING 135 tablet 2 6/25/2025 at  4:00 AM    warfarin 7.5 MG Oral Tab Take 1 tablet (7.5 mg total) by mouth nightly.   6/20/2025    Pyridoxine HCl (VITAMIN B-6 OR) Take 1 tablet by mouth daily.   6/21/2025    Cyanocobalamin (VITAMIN B-12 OR) Take 1 tablet by mouth daily.   6/21/2025    Sildenafil Citrate 100 MG Oral Tab Take 1 tablet (100 mg total) by mouth daily as needed for Erectile Dysfunction. 30 tablet 0 Taking As Needed    Multiple Vitamin (VITAMIN E/FOLIC ACID/B-6/B-12 OR) Take 1 tablet by mouth daily.   6/21/2025    divalproex  MG Oral Tablet 24 Hr Take 1 tablet (250 mg total) by mouth at bedtime.  0 6/24/2025 at 11:00 PM    acetaminophen 325 MG Oral Tab Take 1 tablet (325 mg total) by mouth every 6 (six) hours as needed for Pain.  0 Past Week    melatonin 5 MG Oral Cap Take 1 capsule (5 mg total) by mouth nightly.   6/24/2025 at  9:00 PM    folic acid 400 MCG Oral Tab Take 1 tablet (400 mcg total) by  mouth in the evening.   6/24/2025 at  7:00 PM    lamoTRIgine 100 MG Oral Tab Take 1 tablet (100 mg total) by mouth every morning.   6/25/2025 at  4:00 AM   [3]   Past Surgical History:  Procedure Laterality Date    Cath transcatheter aortic valve replacement  2001    [facility] [place] [place]     Colectomy  06/18/2020    Colon resection due to diverticulitis    Colectomy  05/2023    Underwent robotic assisted right hemicolectomy 5/22    Colonoscopy screening - referral N/A 06/18/2020    Procedure: COLONOSCOPY-SCREENING;  Surgeon: Rahul Lagos MD;  Location: [facility] MAIN OR    Valve repair     [4]   Social History  Socioeconomic History    Marital status: Single   Occupational History    Occupation: Teacher   Tobacco Use    Smoking status: Never     Passive exposure: Past    Smokeless tobacco: Never   Vaping Use    Vaping status: Never Used   Substance and Sexual Activity    Alcohol use: No    Drug use: Not Currently     Types: Cannabis

## 2025-06-25 NOTE — ANESTHESIA POSTPROCEDURE EVALUATION
[facility]    Josh Jones Patient Status:  Inpatient   Age/Gender 70 year old male MRN [id number]   Location [facility] POST ANESTHESIA CARE UNIT Attending Tyrone Danielle MD   Hosp Day # 0 PCP MERVAT PHAM       Anesthesia Post-op Note    Exploratory Laparotomy, Lysis of Adhesions (Dahdaleh)    Procedure Summary       Date: 06/25/25 Room / Location:  MAIN OR [address] [place] [address] MAIN OR    Anesthesia Start: 0843 Anesthesia Stop: 1237    Procedures:       Exploratory Laparotomy, Lysis of Adhesions (Lindaaledavid) (Abdomen)      Hernia repair with component separation and acellular dermal matrix (Stephon) (Abdomen) Diagnosis:       Hernia      (Hernia [K46.9])    Surgeons: Tyrone Danielle MD; Jose Raul Szymanski MD Anesthesiologist: Derik Aguero MD    Anesthesia Type: regional, general ASA Status: 3            Anesthesia Type: regional, general    Vitals Value Taken Time   /77 06/25/25 13:05   Temp 98.8 °F (37.1 °C) 06/25/25 12:35   Pulse 61 06/25/25 13:09   Resp 16 06/25/25 13:09   SpO2 100 % 06/25/25 13:09   Vitals shown include unfiled device data.        Patient Location: PACU    Anesthesia Type: general    Airway Patency: patent and extubated    Postop Pain Control: adequate    Mental Status: mildly sedated but able to meaningfully participate in the post-anesthesia evaluation    Nausea/Vomiting: none    Cardiopulmonary/Hydration status: stable euvolemic    Complications: no apparent anesthesia related complications    Postop vital signs: stable    Dental Exam: Unchanged from Preop    Patient to be discharged from PACU when criteria met.

## 2025-06-25 NOTE — H&P
No change in Dr. Goyal's H&P from 6/5. We reviewed the plan for exploratory laparotomy and abdominal wall reconstruction with component separation and biosynthetic mesh.     Tyrone Danielle MD  Complex General Surgical Oncology  System Medical Director of Surgical Services  [facility]

## 2025-06-25 NOTE — ANESTHESIA PROCEDURE NOTES
Airway  Date/Time: 6/25/2025 8:52 AM  Reason: elective    Airway not difficult    General Information and Staff   Patient location during procedure: OR  Anesthesiologist: Derik Aguero MD  Performed: anesthesiologist   Performed by: Derik Aguero MD  Authorized by: Derik Aguero MD        Indications and Patient Condition  Indications for airway management: anesthesia  Sedation level: deep      Preoxygenated: yesPatient position: sniffing    Mask difficulty assessment: 1 - vent by mask    Final Airway Details    Final airway type: endotracheal airway    Successful airway: ETT  Cuffed: yes   Successful intubation technique: Video laryngoscopy  Facilitating devices/methods: intubating stylet  Endotracheal tube insertion site: oral  Blade: GlideScope  Blade size: #4  ETT size (mm): 7.5    Cormack-Lehane Classification: grade I - full view of glottis  Placement verified by: capnometry   Cuff volume (mL): 8  Measured from: lips  ETT to lips (cm): 23  Number of attempts at approach: 1  Number of other approaches attempted: 0    Additional Comments  No pressure on right upper back temp crown

## 2025-06-25 NOTE — PLAN OF CARE
Oriented to room  Call light within reach  Navigator admission completed    AAO X 4, VSS  On 1 L of O2 NC  NSR on tele.  No SOB or N/V  SCD's are on hamilton Legs  IV fluid infusing per order  Clear liquids. Tolerating diet  FARIDEH drains x 2 intact with sanguinous drainage  Singleton catheter intact. Adequate output noted  Comfort and safety precautions maintained  Abdominal incision C/D/I. Topifoam and Abd binder applied  Patient and family updated on plan of care and

## 2025-06-26 ENCOUNTER — PATIENT MESSAGE (OUTPATIENT)
Dept: SURGERY | Facility: CLINIC | Age: 70
End: 2025-06-26

## 2025-06-26 LAB
ANION GAP SERPL CALC-SCNC: 7 MMOL/L (ref 0–18)
ANION GAP SERPL CALC-SCNC: 7 MMOL/L (ref 0–18)
APTT PPP: 25.6 SECONDS (ref 23–36)
APTT PPP: 46.4 SECONDS (ref 23–36)
ATRIAL RATE: 60 BPM
BASOPHILS # BLD AUTO: 0.03 X10(3) UL (ref 0–0.2)
BASOPHILS NFR BLD AUTO: 0.4 %
BUN BLD-MCNC: 9 MG/DL (ref 9–23)
BUN BLD-MCNC: 9 MG/DL (ref 9–23)
CALCIUM BLD-MCNC: 8.6 MG/DL (ref 8.7–10.6)
CALCIUM BLD-MCNC: 8.6 MG/DL (ref 8.7–10.6)
CHLORIDE SERPL-SCNC: 105 MMOL/L (ref 98–112)
CHLORIDE SERPL-SCNC: 105 MMOL/L (ref 98–112)
CO2 SERPL-SCNC: 28 MMOL/L (ref 21–32)
CO2 SERPL-SCNC: 29 MMOL/L (ref 21–32)
CREAT BLD-MCNC: 0.79 MG/DL (ref 0.7–1.3)
CREAT BLD-MCNC: 0.8 MG/DL (ref 0.7–1.3)
EGFRCR SERPLBLD CKD-EPI 2021: 95 ML/MIN/1.73M2 (ref 60–?)
EGFRCR SERPLBLD CKD-EPI 2021: 96 ML/MIN/1.73M2 (ref 60–?)
EOSINOPHIL # BLD AUTO: 0 X10(3) UL (ref 0–0.7)
EOSINOPHIL NFR BLD AUTO: 0 %
ERYTHROCYTE [DISTWIDTH] IN BLOOD BY AUTOMATED COUNT: 13.4 %
ERYTHROCYTE [DISTWIDTH] IN BLOOD BY AUTOMATED COUNT: 13.8 %
GLUCOSE BLD-MCNC: 126 MG/DL (ref 70–99)
GLUCOSE BLD-MCNC: 128 MG/DL (ref 70–99)
HCT VFR BLD AUTO: 36.7 % (ref 39–53)
HCT VFR BLD AUTO: 37 % (ref 39–53)
HGB BLD-MCNC: 12.6 G/DL (ref 13–17.5)
HGB BLD-MCNC: 12.7 G/DL (ref 13–17.5)
IMM GRANULOCYTES # BLD AUTO: 0.02 X10(3) UL (ref 0–1)
IMM GRANULOCYTES NFR BLD: 0.2 %
INR BLD: 1.15 (ref 0.8–1.2)
LYMPHOCYTES # BLD AUTO: 0.67 X10(3) UL (ref 1–4)
LYMPHOCYTES NFR BLD AUTO: 8.2 %
MAGNESIUM SERPL-MCNC: 1.7 MG/DL (ref 1.6–2.6)
MCH RBC QN AUTO: 32.1 PG (ref 26–34)
MCH RBC QN AUTO: 32.3 PG (ref 26–34)
MCHC RBC AUTO-ENTMCNC: 34.1 G/DL (ref 31–37)
MCHC RBC AUTO-ENTMCNC: 34.6 G/DL (ref 31–37)
MCV RBC AUTO: 92.7 FL (ref 80–100)
MCV RBC AUTO: 94.9 FL (ref 80–100)
MONOCYTES # BLD AUTO: 1.15 X10(3) UL (ref 0.1–1)
MONOCYTES NFR BLD AUTO: 14.1 %
NEUTROPHILS # BLD AUTO: 6.3 X10 (3) UL (ref 1.5–7.7)
NEUTROPHILS # BLD AUTO: 6.3 X10(3) UL (ref 1.5–7.7)
NEUTROPHILS NFR BLD AUTO: 77.1 %
OSMOLALITY SERPL CALC.SUM OF ELEC: 290 MOSM/KG (ref 275–295)
OSMOLALITY SERPL CALC.SUM OF ELEC: 292 MOSM/KG (ref 275–295)
P AXIS: 64 DEGREES
P-R INTERVAL: 244 MS
PHOSPHATE SERPL-MCNC: 2.6 MG/DL (ref 2.4–5.1)
PLATELET # BLD AUTO: 141 10(3)UL (ref 150–450)
PLATELET # BLD AUTO: 142 10(3)UL (ref 150–450)
POTASSIUM SERPL-SCNC: 3.7 MMOL/L (ref 3.5–5.1)
POTASSIUM SERPL-SCNC: 3.8 MMOL/L (ref 3.5–5.1)
PROTHROMBIN TIME: 14.9 SECONDS (ref 11.6–14.8)
Q-T INTERVAL: 414 MS
QRS DURATION: 92 MS
QTC CALCULATION (BEZET): 414 MS
R AXIS: 8 DEGREES
RBC # BLD AUTO: 3.9 X10(6)UL (ref 3.8–5.8)
RBC # BLD AUTO: 3.96 X10(6)UL (ref 3.8–5.8)
SODIUM SERPL-SCNC: 140 MMOL/L (ref 136–145)
SODIUM SERPL-SCNC: 141 MMOL/L (ref 136–145)
T AXIS: 69 DEGREES
TROPONIN I SERPL HS-MCNC: 4 NG/L (ref ?–53)
VENTRICULAR RATE: 60 BPM
WBC # BLD AUTO: 8.1 X10(3) UL (ref 4–11)
WBC # BLD AUTO: 8.2 X10(3) UL (ref 4–11)

## 2025-06-26 PROCEDURE — 99232 SBSQ HOSP IP/OBS MODERATE 35: CPT | Performed by: STUDENT IN AN ORGANIZED HEALTH CARE EDUCATION/TRAINING PROGRAM

## 2025-06-26 RX ORDER — ONDANSETRON 2 MG/ML
4 INJECTION INTRAMUSCULAR; INTRAVENOUS EVERY 6 HOURS PRN
Status: CANCELLED | OUTPATIENT
Start: 2025-06-26

## 2025-06-26 RX ORDER — SODIUM CHLORIDE 9 MG/ML
INJECTION, SOLUTION INTRAVENOUS CONTINUOUS
Status: CANCELLED | OUTPATIENT
Start: 2025-06-26

## 2025-06-26 RX ORDER — HEPARIN SODIUM AND DEXTROSE 10000; 5 [USP'U]/100ML; G/100ML
1000 INJECTION INTRAVENOUS ONCE
Status: COMPLETED | OUTPATIENT
Start: 2025-06-26 | End: 2025-06-26

## 2025-06-26 RX ORDER — ACETAMINOPHEN 500 MG
1000 TABLET ORAL EVERY 8 HOURS
Status: DISCONTINUED | OUTPATIENT
Start: 2025-06-26 | End: 2025-06-29

## 2025-06-26 RX ORDER — SODIUM CHLORIDE 9 MG/ML
INJECTION, SOLUTION INTRAVENOUS CONTINUOUS
Status: DISCONTINUED | OUTPATIENT
Start: 2025-06-26 | End: 2025-06-29

## 2025-06-26 RX ORDER — NALOXONE HYDROCHLORIDE 0.4 MG/ML
0.08 INJECTION, SOLUTION INTRAMUSCULAR; INTRAVENOUS; SUBCUTANEOUS
Status: DISCONTINUED | OUTPATIENT
Start: 2025-06-26 | End: 2025-06-29

## 2025-06-26 RX ORDER — POTASSIUM CHLORIDE 1500 MG/1
40 TABLET, EXTENDED RELEASE ORAL ONCE
Status: COMPLETED | OUTPATIENT
Start: 2025-06-26 | End: 2025-06-26

## 2025-06-26 RX ORDER — DIPHENHYDRAMINE HYDROCHLORIDE 50 MG/ML
12.5 INJECTION, SOLUTION INTRAMUSCULAR; INTRAVENOUS EVERY 4 HOURS PRN
Status: DISCONTINUED | OUTPATIENT
Start: 2025-06-26 | End: 2025-06-29

## 2025-06-26 RX ORDER — HEPARIN SODIUM 1000 [USP'U]/ML
5000 INJECTION, SOLUTION INTRAVENOUS; SUBCUTANEOUS ONCE
Status: COMPLETED | OUTPATIENT
Start: 2025-06-26 | End: 2025-06-26

## 2025-06-26 RX ORDER — HEPARIN SODIUM AND DEXTROSE 10000; 5 [USP'U]/100ML; G/100ML
INJECTION INTRAVENOUS CONTINUOUS
Status: DISCONTINUED | OUTPATIENT
Start: 2025-06-26 | End: 2025-07-03

## 2025-06-26 RX ORDER — NALOXONE HYDROCHLORIDE 0.4 MG/ML
0.08 INJECTION, SOLUTION INTRAMUSCULAR; INTRAVENOUS; SUBCUTANEOUS
Status: CANCELLED | OUTPATIENT
Start: 2025-06-26

## 2025-06-26 RX ORDER — WARFARIN SODIUM 7.5 MG/1
7.5 TABLET ORAL NIGHTLY
Status: DISCONTINUED | OUTPATIENT
Start: 2025-06-26 | End: 2025-06-29

## 2025-06-26 RX ORDER — ONDANSETRON 2 MG/ML
4 INJECTION INTRAMUSCULAR; INTRAVENOUS EVERY 6 HOURS PRN
Status: DISCONTINUED | OUTPATIENT
Start: 2025-06-26 | End: 2025-06-29

## 2025-06-26 RX ORDER — DIPHENHYDRAMINE HYDROCHLORIDE 50 MG/ML
12.5 INJECTION, SOLUTION INTRAMUSCULAR; INTRAVENOUS EVERY 4 HOURS PRN
Status: CANCELLED | OUTPATIENT
Start: 2025-06-26

## 2025-06-26 RX ORDER — MAGNESIUM OXIDE 400 MG/1
400 TABLET ORAL ONCE
Status: DISCONTINUED | OUTPATIENT
Start: 2025-06-26 | End: 2025-07-03

## 2025-06-26 NOTE — PLAN OF CARE
Assumed care at 1930  A&Ox4, 1L, Tele-NSR  PRN pain medication given  IVF infusing   Tolerating clears  FARIDEH drains x2 with sanguinois drainage  Up 1 w/walker to bathroom  Patient updated on POC, all questions answered.

## 2025-06-26 NOTE — PLAN OF CARE
Assumed care at 0730  A/O x4   RA   PRN pain meds and PCA  Denies nausea and vomiting   Soft diet   PT/OT worked w/ pt   Singleton removed   Heparin gtt infusing per order   FARIDEH drain intact   Straight cath x1   IVF stopped per order   Call light within reach.Fall precautions in place  Pt and family updated on POC. All questions answered

## 2025-06-26 NOTE — OCCUPATIONAL THERAPY NOTE
OCCUPATIONAL THERAPY EVALUATION - INPATIENT     Room Number: 7611/7611-A  Evaluation Date: 6/26/2025  Type of Evaluation: Initial  Presenting Problem: Ventral incisional hernia, 6/25 Exploratory Laparotomy, Lysis of Adhesions,  Abdominal wall reconstruction with component separation and placement of Strattice acellular dermal matrix    Physician Order: IP Consult to Occupational Therapy  Reason for Therapy: ADL/IADL Dysfunction and Discharge Planning    OCCUPATIONAL THERAPY ASSESSMENT   Patient is currently functioning near baseline with toileting and lower body dressing. Prior to admission, patient's baseline is independent.  Patient is requiring minimum assistance as a result of the following impairments: decreased endurance. Occupational Therapy will continue to follow for duration of hospitalization.    Patient will benefit from continued skilled OT Services for duration of hospitalization, however, given the patient is functioning near baseline level do not anticipate skilled therapy needs at discharge     History Related to Current Admission: Patient is a 70 year old male admitted on 6/25/2025 with Presenting Problem: Ventral incisional hernia, 6/25 Exploratory Laparotomy, Lysis of Adhesions,  Abdominal wall reconstruction with component separation and placement of Strattice acellular dermal matrix. Co-Morbidities : paroxysmal atrial fibrillation, hypertension, hyperlipidemia, bipolar disorder      EVALUATION SESSION:  Patient Start of Session: supine    FUNCTIONAL TRANSFER ASSESSMENT  Sit to Stand: Edge of Bed  Edge of Bed: Contact Guard Assist    BED MOBILITY  Supine to Sit : Minimal Assist    COGNITION  Overall Cognitive Status:  WFL - within functional limits    Upper Extremity   ROM: within functional limits   Strength: within functional limits     EDUCATION PROVIDED  Patient Education : Role of Occupational Therapy; Plan of Care; Functional Transfer Techniques; Surgical Precautions; Posture/Positioning;  Proper Body Mechanics  Patient's Response to Education: Verbalized Understanding    Equipment used: rw     Therapist comments: min A log rolling, education provided on above noted topics.  Cga to stand and to ambulate in hallway. 2 drains pinned to abdominal binder.       Patient End of Session: Up in chair, Needs met, Call light within reach, All patient questions and concerns addressed, RN aware of session/findings    OCCUPATIONAL PROFILE    HOME SITUATION  Type of Home: Apartment  Home Layout: One level, Elevator  Lives With: Alone    Toilet and Equipment: Standard height toilet                Drives: Yes       Prior Level of Function: independent with ADL and IADL.      PAIN ASSESSMENT  Rating: Unable to rate  Location: surgical site  Management Techniques: Repositioning, Body mechanics    OBJECTIVE  Precautions: Abdominal protective strategies, Drain(s)  Fall Risk: Standard fall risk    ASSESSMENTS    AM-PAC ‘6-Clicks’ Inpatient Daily Activity Short Form  -   Putting on and taking off regular lower body clothing?: A Little  -   Bathing (including washing, rinsing, drying)?: A Little  -   Toileting, which includes using toilet, bedpan or urinal? : A Little  -   Putting on and taking off regular upper body clothing?: None  -   Taking care of personal grooming such as brushing teeth?: None  -   Eating meals?: None    AM-PAC Score:  Score: 21  Approx Degree of Impairment: 32.79%  Standardized Score (AM-PAC Scale): 44.27    PLAN     OT Treatment Plan: Energy conservation/work simplification techniques, Balance activities, ADL training, Functional transfer training, Patient/Family education, Equipment eval/education, Compensatory technique education  Rehab Potential : Fair  Frequency: 3x/week  Number of Visits to Meet Established Goals: 2    ADL Goals   Patient will perform lower body dressing:  with supervision  Patient will perform toileting: with supervision    Functional Transfer Goals  Patient will transfer to  toilet:  with supervision    Patient Evaluation Complexity Level:   Occupational Profile/Medical History LOW - Brief history including review of medical or therapy records    Specific performance deficits impacting engagement in ADL/IADL LOW  1 - 3 performance deficits    Client Assessment/Performance Deficits MODERATE - Comorbidities and min to mod modifications of tasks    Clinical Decision Making LOW - Analysis of occupational profile, problem-focused assessments, limited treatment options    Overall Complexity LOW     OT Session Time: 20 minutes  Self-Care Home Management:  minutes  Therapeutic Activity: 8 minutes

## 2025-06-26 NOTE — PROGRESS NOTES
[facility]  Progress Note    Josh Jones Patient Status:  Inpatient    1/15/1955 MRN [id number]   Former[facility] 7NE-A Attending Tyrone Danielle MD   Hosp Day # 1 PCP MERVAT PHAM     POD 1 abdominal wall re-construction for ventral incisional hernia  Hx neuroendocrine tumor of small bowel pT2, N1 s/p robotic assisted right hemicolectomy 23    Subjective:  Patient doing well today, sitting up in chair. Denies nausea, vomiting. Tolerating diet. Ambulating in hallway with PT. States he is having a lot pain with movement/activity that is not well controlled with prn oxy or dilaudid. - flatus or BM but does endorse \"grumblings\" like he feels he may pass gas soon. Voiding freely without difficulty.   VSS    Objective/Physical Exam:  General: Alert, orientated x3.  Cooperative.  No apparent distress.  Vital Signs:  Blood pressure 119/70, pulse 61, temperature 98.1 °F (36.7 °C), temperature source Oral, resp. rate 15, height 1.88 m (6' 2\"), weight 99.3 kg (219 lb), SpO2 92%.  Wt Readings from Last 3 Encounters:   25 99.3 kg (219 lb)   25 101.5 kg (223 lb 12.8 oz)   25 105.3 kg (232 lb 3.2 oz)     Lungs: No respiratory distress.  Cardiac: Regular rate and rhythm.   Abdomen:  Soft, mildly distended, moderately  tender, with no rebound or guarding.  No peritoneal signs.   Extremities:  No lower extremity edema noted.    Incision: Clean, dry, intact, no erythema or drainage noted. Steri strips intact with topifoam and abdominal binder in place.  FARIDEH superior: SS output  FARIDEH inferior: SS output    Intake/Output:    Intake/Output Summary (Last 24 hours) at 2025 1342  Last data filed at 2025 1006  Gross per 24 hour   Intake --   Output 1980 ml   Net -1980 ml     I/O last 3 completed shifts:  In:  [I.V.:1800; IV PIGGYBACK:110]  Out:  [Urine:1800; Drains:170; Blood:35]  I/O this shift:  In: -   Out: 300 [Urine:300]    Medications: Scheduled  Medications[1]    Labs:  Lab Results   Component Value Date    WBC 8.1 06/26/2025    HGB 12.6 06/26/2025    HCT 37.0 06/26/2025    .0 06/26/2025     Lab Results   Component Value Date     06/26/2025    K 3.8 06/26/2025     06/26/2025    CO2 28.0 06/26/2025    BUN 9 06/26/2025    CREATSERUM 0.80 06/26/2025     06/26/2025    CA 8.6 06/26/2025     Lab Results   Component Value Date    PT 25.7 (H) 09/06/2019    PT 16.5 (H) 07/08/2019    PT 34.9 (H) 04/12/2019    INR 1.15 06/26/2025    INR 1.08 06/25/2025    INR 1.64 (H) 07/05/2023         Assessment  Problem List[2]      POD 1 abdominal wall re-construction for ventral incisional hernia  Hgb 12.6, stable  WBC 8.1  Patient doing well postoperatively      Plan:  - No acute surgical issues  - Discontinue wilkes  - Diet: soft low fiber  - Pain: Initiate PCA, continue sched tylenol  - Fluids: Discontinue  - DVT prophylaxis: Heparin gtt and warfarin (AVR) per cardiology management for therapeutic INR goal  - GI prophylaxis: famotidine  - PT/OT to evaluate and treat  - Appreciate medical team recommendations  - Encourage IS and ambulation  - Replace electrolytes per protocol  Dispo: CTU7    Rounded with ASTON Eng  Department of Surgical Oncology  [facility]  [address] [place]  [address]  [facility]  [address] , [address] [place] [address]  T: [phone]  F: [phone]        Quality:  DVT Mechanical Prophylaxis:   SCDs, Early ambuation  DVT Pharmacologic Prophylaxis   Medication    heparin (Porcine) 55394 units/250mL infusion ACS/AFIB CONTINUOUS    warfarin (Coumadin) tab 7.5 mg                Code Status: Full Code  Wilkes: Wilkes catheter in place  Wilkes Duration (in days): 2  Central line:    TARIQ: 6/26/2025        ASTON Green  6/26/2025  1:42 PM               [1]    warfarin  7.5 mg Oral Nightly    magnesium oxide  400 mg Oral Once    lamoTRIgine  100 mg Oral QAM     melatonin  5 mg Oral Nightly    famotidine  20 mg Oral BID    Or    famotidine  20 mg Intravenous BID    magnesium oxide  400 mg Oral Daily    acetaminophen  1,000 mg Oral Q8H AMADO    docusate sodium  100 mg Oral Daily    metoprolol tartrate  25 mg Oral Daily Beta Blocker    divalproex ER  250 mg Oral Nightly    divalproex ER  1,000 mg Oral Nightly    metoprolol tartrate  12.5 mg Oral Nightly    folic acid  400 mcg Oral QPM   [2]   Patient Active Problem List  Diagnosis    Requires lifelong warfarin therapy    Paroxysmal atrial fibrillation (HCC)    Recurrent HSV (herpes simplex virus)    Cyclothymia    Allergic rhinitis    Benign prostatic hyperplasia with incomplete bladder emptying    History of diverticulitis    H/O mechanical aortic valve replacement    Paroxysmal supraventricular tachycardia (HCC)    Verruca plantaris    Diverticulosis    Ascending aorta dilation    Arteriosclerosis of coronary artery    Bipolar affective (HCC)    Neuroendocrine neoplasm of small intestine (HCC)    Anticoagulation management encounter    Hernia

## 2025-06-26 NOTE — TELEPHONE ENCOUNTER
Rounded on patient earlier today.  Dr. Danielle addressed patient's anticoagulation questions.  Patient will remain on heparin drip and warfarin for goal of therapeutic INR before discharge.  All patient questions answered.  Patient will call with any further concerns or questions.

## 2025-06-26 NOTE — CONSULTS
[facility]  Report of Consultation    Josh Jones Patient Status:  Inpatient    1/15/1955 MRN [id number]   [facility] 7NE-A Attending Tyrone Danielle MD   Hosp Day # 1 PCP MERVAT PHAM     Reason for Consultation:  Hx AVR    History of Present Illness:  Josh Jones is a a(n) 70 year old male s/p hernia repair.  Hx of mechanical AVR in . Bridged with Lovenox pre-procedure.    Denies chest pain or shortness of breath.    Some abdominal spasms    History:  Past Medical History[1]  Past Surgical History[2]  Family History[3]   reports that he has never smoked. He has been exposed to tobacco smoke. He has never used smokeless tobacco. He reports that he does not currently use drugs after having used the following drugs: Cannabis. He reports that he does not drink alcohol.    Allergies:  Allergies[4]    Medications:  Current Hospital Medications[5]    Review of Systems:  All systems were reviewed and are negative except as described above in HPI.    Physical Exam:  Blood pressure 124/72, pulse 63, temperature 98.3 °F (36.8 °C), temperature source Oral, resp. rate 14, height 6' 2\" (1.88 m), weight 219 lb (99.3 kg), SpO2 95%.  Temp (24hrs), Av.1 °F (36.7 °C), Min:97.7 °F (36.5 °C), Max:98.8 °F (37.1 °C)    Wt Readings from Last 3 Encounters:   25 219 lb (99.3 kg)   25 223 lb 12.8 oz (101.5 kg)   25 232 lb 3.2 oz (105.3 kg)       Telemetry: SR  General: Alert and oriented in no apparent distress.  HEENT: No focal deficits.  Neck: No JVD, carotids 2+ no bruits.  Cardiac: Regular rate and rhythm, S1 normal; S2 crisp clikc, no murmur, rub or gallop.  Lungs: Clear without wheezes, rales, rhonchi or dullness.  Normal excursions and effort.  Abdomen: Soft, non-tender.   Extremities: Without clubbing, cyanosis or edema.  Peripheral pulses are 2+.  Neurologic: Alert and oriented, normal affect.  Skin: Warm and dry.     Laboratories and Data:  Diagnostics:      Labs:    Lab Results   Component Value Date    WBC 8.2 06/26/2025    RBC 3.96 06/26/2025    HGB 12.7 06/26/2025    HCT 36.7 06/26/2025    MCV 92.7 06/26/2025    MCH 32.1 06/26/2025    MCHC 34.6 06/26/2025    RDW 13.4 06/26/2025    .0 06/26/2025     Lab Results   Component Value Date    PT 25.7 (H) 09/06/2019    PT 16.5 (H) 07/08/2019    PT 34.9 (H) 04/12/2019    INR 1.15 06/26/2025    INR 1.08 06/25/2025    INR 1.64 (H) 07/05/2023       Assessment/Plan:  Problem List[6]    Mechanical AVR   - Cont IV heparin   - Start warfarin tonight at home dose    2. S/p Hernia repair   - Per surgery    3. PAF   - Remains SR      Som Rajput MD  6/26/2025  6:51 AM         [1]   Past Medical History:   Actinic keratosis    AF (paroxysmal atrial fibrillation) (HCC)    Bicuspid aortic valve    s/p AVR    Bipolar affective (HCC)    Cyclothymia    Sees Dr. Bravo     Diverticulitis    High blood pressure    High cholesterol    Hx of motion sickness    Hyperammonemia (HCC)    Due to Depakote     Pneumonia due to organism    S/P AVR (aortic valve replacement)    SVT (supraventricular tachycardia) (HCC)    Visual impairment    Readers   [2]   Past Surgical History:  Procedure Laterality Date    Cath transcatheter aortic valve replacement  2001    [facility]     Colectomy  06/18/2020    Colon resection due to diverticulitis    Colectomy  05/2023    Underwent robotic assisted right hemicolectomy 5/22    Colonoscopy screening - referral N/A 06/18/2020    Procedure: COLONOSCOPY-SCREENING;  Surgeon: Rahul Lagos MD;  Location: [facility] MAIN OR    Valve repair     [3]   Family History  Problem Relation Age of Onset    Stroke Mother     Cancer Father         lung cancer    Cancer Sister    [4]   Allergies  Allergen Reactions    Cat Hair Extract ASTHMA    Dog Epithelium WHEEZING   [5]   Current Facility-Administered Medications:     heparin (Porcine) 1000 UNIT/ML injection - BOLUS IV 5,000 Units, 5,000 Units,  Intravenous, Once    heparin (Porcine) 57989 units/250 mL infusion (ACS/AFIB) INITIAL DOSE, 1,000 Units/hr, Intravenous, Once    heparin (Porcine) 44764 units/250mL infusion ACS/AFIB CONTINUOUS, 200-3,000 Units/hr, Intravenous, Continuous    warfarin (Coumadin) tab 7.5 mg, 7.5 mg, Oral, Nightly    lamoTRIgine (LaMICtal) tab 100 mg, 100 mg, Oral, QAM    melatonin tab 5 mg, 5 mg, Oral, Nightly    lactated ringers infusion, 2 mL/kg/hr, Intravenous, Continuous    polyethylene glycol (PEG 3350) (Miralax) 17 g oral packet 17 g, 17 g, Oral, Daily PRN    sennosides (Senokot) tab 17.2 mg, 17.2 mg, Oral, Nightly PRN    famotidine (Pepcid) tab 20 mg, 20 mg, Oral, BID **OR** famotidine (Pepcid) 20 mg/2mL injection 20 mg, 20 mg, Intravenous, BID    magnesium oxide (Mag-Ox) tab 400 mg, 400 mg, Oral, Daily    oxyCODONE immediate release partial tab 2.5 mg, 2.5 mg, Oral, Q4H PRN **OR** oxyCODONE immediate release tab 5 mg, 5 mg, Oral, Q4H PRN    acetaminophen (Tylenol) 160 MG/5ML oral liquid 1,000 mg, 1,000 mg, Oral, Q8H AMADO    lactated ringers infusion, , Intravenous, Continuous    acetaminophen (Tylenol) tab 650 mg, 650 mg, Oral, Q6H PRN **OR** HYDROcodone-acetaminophen (Norco) 5-325 MG per tab 1 tablet, 1 tablet, Oral, Q6H PRN **OR** HYDROcodone-acetaminophen (Norco) 5-325 MG per tab 2 tablet, 2 tablet, Oral, Q6H PRN    HYDROmorphone (Dilaudid) 1 MG/ML injection 0.2 mg, 0.2 mg, Intravenous, Q2H PRN **OR** HYDROmorphone (Dilaudid) 1 MG/ML injection 0.4 mg, 0.4 mg, Intravenous, Q2H PRN **OR** HYDROmorphone (Dilaudid) 1 MG/ML injection 0.8 mg, 0.8 mg, Intravenous, Q2H PRN    docusate sodium (Colace) cap 100 mg, 100 mg, Oral, Daily    ondansetron (Zofran) tab 8 mg, 8 mg, Oral, Q6H PRN **OR** ondansetron (Zofran) 4 MG/2ML injection 8 mg, 8 mg, Intravenous, Q6H PRN    diphenhydrAMINE (Benadryl) cap/tab 25 mg, 25 mg, Oral, Q6H PRN **OR** diphenhydrAMINE (Benadryl) 50 mg/mL  injection 25 mg, 25 mg, Intravenous, Q6H PRN    ceFAZolin  (Ancef) 2g in 10mL IV syringe premix, 2 g, Intravenous, Q8H    metoprolol tartrate (Lopressor) tab 25 mg, 25 mg, Oral, Daily Beta Blocker    divalproex ER (Depakote ER) 24 hr tab 250 mg, 250 mg, Oral, Nightly    divalproex ER (Depakote ER) 24 hr tab 1,000 mg, 1,000 mg, Oral, Nightly    metoprolol tartrate (Lopressor) partial tab 12.5 mg, 12.5 mg, Oral, Nightly    folic acid (Folvite) tab 400 mcg, 400 mcg, Oral, QPM  [6]   Patient Active Problem List  Diagnosis    Requires lifelong warfarin therapy    Paroxysmal atrial fibrillation (HCC)    Recurrent HSV (herpes simplex virus)    Cyclothymia    Allergic rhinitis    Benign prostatic hyperplasia with incomplete bladder emptying    History of diverticulitis    H/O mechanical aortic valve replacement    Paroxysmal supraventricular tachycardia (HCC)    Verruca plantaris    Diverticulosis    Ascending aorta dilation    Arteriosclerosis of coronary artery    Bipolar affective (HCC)    Neuroendocrine neoplasm of small intestine (HCC)    Anticoagulation management encounter    Hernia

## 2025-06-26 NOTE — CM/SW NOTE
06/26/25 1200   CM/SW Referral Data   Referral Source Social Work (self-referral)   Reason for Referral Discharge planning   Informant Patient   Medical Hx   Does patient have an established PCP? Yes   Patient Info   Patient's Current Mental Status at Time of Assessment Alert;Oriented   Patient's Home Environment Condo/Apt with elevator   Patient lives with Alone;Other   Patient Status Prior to Admission   Independent with ADLs and Mobility Yes   Discharge Needs   Anticipated D/C needs Home health care   Choice of Post-Acute Provider   Informed patient of right to choose their preferred provider Yes   List of appropriate post-acute services provided to patient/family with quality data Yes     Anticipated therapy need: Home with Home Healthcare. SW met with pt to complete assessment and discuss DC planning needs.    Pt is a 69 y/o male admitted for s/p abd wall reconstruction with plastics and surgical oncology. Pt agreeable to HH for DC, orders entered. Pt requested walker for home, SW notified therapy. Pt denied any other DME needs.     SW to follow up and provide HH choice list.    Addendum 1430:  SW left list of accepting HH at bedside.    SW/CM to remain available for dc planning, and/or additional need for support.    Uvaldo GERMAIN, STEPHENIE  Discharge Planner  [id number]

## 2025-06-26 NOTE — PROGRESS NOTES
Plastic Surgery Progress Note    Josh Jones is a 70 year old male POD#1 s/p abdominal wall reconstruction with component separation and placement of Strattice acellular dermal matrix with Dr. Danielle and Dr. Szymanski. Pain is not controlled. Patient denies fever/chills, nausea/vomiting, shortness of breath, calf pain. 24 hour drain output was 170cc.     Hgb 12.6 today.    /71 (BP Location: Left arm)   Pulse 68   Temp 98.3 °F (36.8 °C) (Oral)   Resp 16   Ht 1.88 m (6' 2\")   Wt 99.3 kg (219 lb)   SpO2 93%   BMI 28.12 kg/m²     Physical examination:   Awake and alert  Abdominal incisions c/d/I, steri strips in place, soft and appropriately tender, Topifoam and abdominal binder in place  Drain cdi, with serosanguinous drainage  Singleton in place    Assessment: POD#1 s/p abdominal wall reconstruction with component separation and placement of Strattice acellular dermal matrix. Doing well. C/o post op pain.    Plan:   Abdominal incision clean, dry, and intact - continue to monitor, continue Topifoam and abdominal binder  Fall precautions  Pain management  Continue monitoring  VTE prophylaxis  Anticoagulation per cardiology, heparin IV, warfarin tonight  PT/OT eval today to assist with ambulation      ASTON Jurado  6/26/2025  9:24 AM

## 2025-06-26 NOTE — PROGRESS NOTES
[facility]   part of [facility]     Hospitalist Progress Note     Josh oJnes Patient Status:  Inpatient    1/15/1955 MRN [id number]   [facility] 7NE-A Attending Tyrone Danielle MD   Hosp Day # 1 PCP MERVAT PHAM     Chief Complaint: abdominal pain     Subjective:     Patient w abdominal pain.       Objective:    Review of Systems:   A comprehensive review of systems was completed; pertinent positive and negatives stated in subjective.    Vital signs:  Temp:  [97.7 °F (36.5 °C)-98.8 °F (37.1 °C)] 98.3 °F (36.8 °C)  Pulse:  [59-72] 68  Resp:  [12-30] 16  BP: (119-153)/(58-95) 127/70  SpO2:  [93 %-99 %] 93 %    Physical Exam:    General: No acute distress  Respiratory: No wheezes, no rhonchi  Cardiovascular: S1, S2, regular rate and rhythm  Abdomen: Soft, Non-tender, non-distended, positive bowel sounds  Neuro: No new focal deficits.   Extremities: No edema      Diagnostic Data:    Labs:  Recent Labs   Lab 25  0752 25  0511 25  0519 25  0716   WBC  --  8.2  --  8.1   HGB  --  12.7*  --  12.6*   MCV  --  92.7  --  94.9   PLT  --  142.0*  --  141.0*   INR 1.08  --  1.15  --        Recent Labs   Lab 25  0511 25  0716   * 128*   BUN 9 9   CREATSERUM 0.79 0.80   CA 8.6* 8.6*    140   K 3.7 3.8    105   CO2 29.0 28.0       Estimated Glomerular Filtration Rate: 95 mL/min/1.73m2 (result from lab).    No results for input(s): \"TROP\", \"TROPHS\", \"CK\" in the last 168 hours.    Recent Labs   Lab 25  0752 25  0519   PTP 14.1 14.9*   INR 1.08 1.15                  Microbiology    No results found for this visit on 25.      Imaging: Reviewed in Epic.    Medications: Scheduled Medications[1]    Assessment & Plan:      #Ventral incisional hernia  S/p abd wall re-construction with plastics and surg onc  Post op care per surgonc  CLD, IVF  Lovenox for DVT proph  Pain control  DC wilkes      #SVT  #Paroxysmal a. Fib  #Bicuspid  aortic valve s/p AVR  #Bipolar d/o   Patient on warfarin, has been bridged with Lovenox post procedure  Cardiology consult   Resume metoprolol     #Bipolar d/o  Aisha Degroot MD    Supplementary Documentation:     Quality:  DVT Mechanical Prophylaxis:   SCDs, Early ambuation  DVT Pharmacologic Prophylaxis   Medication    heparin (Porcine) 32359 units/250mL infusion ACS/AFIB CONTINUOUS    warfarin (Coumadin) tab 7.5 mg                Code Status: Full Code  Singleton: Singleton catheter in place  Singleton Duration (in days): 2  Central line:    TARIQ: 6/26/2025    Discharge is dependent on: clinical   At this point Mr. Jones is expected to be discharge to: home     The 21st Century Cures Act makes medical notes like these available to patients in the interest of transparency. Please be advised this is a medical document. Medical documents are intended to carry relevant information, facts as evident, and the clinical opinion of the practitioner. The medical note is intended as peer to peer communication and may appear blunt or direct. It is written in medical language and may contain abbreviations or verbiage that are unfamiliar.              **Certification      PHYSICIAN Certification of Need for Inpatient Hospitalization - Initial Certification    Patient will require inpatient services that will reasonably be expected to span two midnight's based on the clinical documentation in H+P.   Based on patients current state of illness, I anticipate that, after discharge, patient will require TBD.            [1]    warfarin  7.5 mg Oral Nightly    magnesium oxide  400 mg Oral Once    lamoTRIgine  100 mg Oral QAM    melatonin  5 mg Oral Nightly    famotidine  20 mg Oral BID    Or    famotidine  20 mg Intravenous BID    magnesium oxide  400 mg Oral Daily    acetaminophen  1,000 mg Oral Q8H AMADO    docusate sodium  100 mg Oral Daily    metoprolol tartrate  25 mg Oral Daily Beta Blocker    divalproex ER  250 mg  Oral Nightly    divalproex ER  1,000 mg Oral Nightly    metoprolol tartrate  12.5 mg Oral Nightly    folic acid  400 mcg Oral QPM

## 2025-06-26 NOTE — PHYSICAL THERAPY NOTE
PHYSICAL THERAPY EVALUATION - INPATIENT     Room Number: 7611/7611-A  Evaluation Date: 6/26/2025  Type of Evaluation: Initial  Physician Order: PT Eval and Treat    Presenting Problem: s/p Abdominal wall reconstruction with component separation and placement of Strattice acellular dermal matrix. for Ventral incisional hernia 6/25  Co-Morbidities : paroxysmal atrial fibrillation, hypertension, hyperlipidemia, bipolar disorder  Reason for Therapy: Mobility Dysfunction and Discharge Planning    PHYSICAL THERAPY ASSESSMENT   Patient is a 70 year old male admitted 6/25/2025 for planned procedure as noted above.  Prior to admission, patient's baseline is indep.  Patient is currently functioning below baseline with bed mobility, transfers, and gait.  Patient is requiring contact guard assist and minimal assist as a result of the following impairments: pain and medical status.  Physical Therapy will continue to follow for duration of hospitalization.    Patient will benefit from continued skilled PT Services at discharge to promote prior level of function.  Anticipate patient will return home with home health PT.    PLAN DURING HOSPITALIZATION  Nursing Mobility Recommendation : 1 Assist  PT Device Recommendation: Rolling walker  PT Treatment Plan: Bed mobility, Body mechanics, Coordination, Endurance, Energy conservation, Patient education, Family education, Gait training, Range of motion, Neuromuscular re-educate, Strengthening, Transfer training, Balance training  Rehab Potential : Good  Frequency (Obs): 3-5x/week     CURRENT GOALS    Goal #1 Patient is able to demonstrate supine - sit EOB @ level: supervision     Goal #2 Patient is able to demonstrate transfers EOB to/from Chair/Wheelchair at assistance level: supervision     Goal #3 Patient is able to ambulate 150 feet with assist device: walker - rolling at assistance level: supervision     Goal #4    Goal #5    Goal #6    Goal Comments: Goals established on  6/26/2025      PHYSICAL THERAPY MEDICAL/SOCIAL HISTORY  History related to current admission: Patient is a 70 year old male admitted on 6/25/2025: Presented for planned procedure s/p Abdominal wall reconstruction with component separation and placement of Strattice acellular dermal matrix. for Ventral incisional hernia 6/25    HOME SITUATION  Type of Home: Apartment  Home Layout: One level, Elevator                     Lives With: Alone    Drives: Yes          Prior Level of [place]: indep, denies hx of falls     SUBJECTIVE  \" I can't move\"     OBJECTIVE  Precautions: Abdominal protective strategies, Drain(s)  Fall Risk: Standard fall risk    WEIGHT BEARING RESTRICTION     PAIN ASSESSMENT  Rating: Unable to rate  Location: post op abdomen  Management Techniques: Activity promotion, Body mechanics, Repositioning    COGNITION  Overall Cognitive Status:  WFL - within functional limits    RANGE OF MOTION AND STRENGTH ASSESSMENT  Upper extremity ROM and strength are within functional limits     Lower extremity ROM is within functional limits     Lower extremity strength is within functional limits     BALANCE  Static Sitting: Good  Dynamic Sitting: Good  Static Standing: Fair -  Dynamic Standing: Fair -    ADDITIONAL TESTS                                    ACTIVITY TOLERANCE           BP: 127/70  BP Location: Right arm  BP Method: Automatic  Patient Position: Sitting    O2 WALK       NEUROLOGICAL FINDINGS                        AM-PAC '6-Clicks' INPATIENT SHORT FORM - BASIC MOBILITY  How much difficulty does the patient currently have...  Patient Difficulty: Turning over in bed (including adjusting bedclothes, sheets and blankets)?: None   Patient Difficulty: Sitting down on and standing up from a chair with arms (e.g., wheelchair, bedside commode, etc.): A Little   Patient Difficulty: Moving from lying on back to sitting on the side of the bed?: A Little   How much help from another person does the patient  currently need...   Help from Another: Moving to and from a bed to a chair (including a wheelchair)?: A Little   Help from Another: Need to walk in hospital room?: A Little   Help from Another: Climbing 3-5 steps with a railing?: A Little     AM-PAC Score:  Raw Score: 19   Approx Degree of Impairment: 41.77%   Standardized Score (AM-PAC Scale): 45.44   CMS Modifier (G-Code): CK    FUNCTIONAL ABILITY STATUS  Gait Assessment   Functional Mobility/Gait Assessment  Gait Assistance: Contact guard assist  Distance (ft): 125  Assistive Device: Rolling walker  Pattern:  (narrow DIXON)    Skilled Therapy Provided     Bed Mobility:  Rolling: CGA  Supine to sit: min A *assist provided from sidelying to sitting     Sit to supine: NT     Transfer Mobility:  Sit to stand: CGA   Stand to sit: CGA  Gait = CGA    Therapist's Comments: Discussed case with RN prior to session initiation. Pt agreeable to participation in therapy w/ education and encouragement. Gait belt donned for out of bed mobility. pt educated on abdominal protective strategies, discussed body mechanics and activity modifications including bed mobility - log roll technique able to perform successfully on 2nd trial.  Pt able to ambulate with device with gait training and postural re-ed with education on EC and pacing techniques. Encouraged TID ambulation with staff and OOBTC x3/day as tolerated.         Exercise/Education Provided:  Bed mobility  Body mechanics  Energy conservation  Functional activity tolerated  Gait training  Posture  Transfer training    Patient End of Session: Up in chair, Needs met, Call light within reach, RN aware of session/findings, All patient questions and concerns addressed, Hospital anti-slip socks, Alarm set      Patient Evaluation Complexity Level:  History Low - no personal factors and/or co-morbidities   Examination of body systems Low -  addressing 1-2 elements   Clinical Presentation Low- Stable   Clinical Decision Making Low  Complexity       PT Session Time: 24 minutes  Gait Training: 10 minutes  Therapeutic Activity: 5 minutes  Neuromuscular Re-education:  minutes  Therapeutic Exercise:  minutes

## 2025-06-27 LAB
APTT PPP: 45.8 SECONDS (ref 23–36)
APTT PPP: 52.4 SECONDS (ref 23–36)
APTT PPP: 54.4 SECONDS (ref 23–36)
BASOPHILS # BLD AUTO: 0.05 X10(3) UL (ref 0–0.2)
BASOPHILS NFR BLD AUTO: 0.5 %
BLOOD TYPE BARCODE: 9500
EOSINOPHIL # BLD AUTO: 0.05 X10(3) UL (ref 0–0.7)
EOSINOPHIL NFR BLD AUTO: 0.5 %
ERYTHROCYTE [DISTWIDTH] IN BLOOD BY AUTOMATED COUNT: 13.6 %
HCT VFR BLD AUTO: 37.7 % (ref 39–53)
HGB BLD-MCNC: 12.7 G/DL (ref 13–17.5)
IMM GRANULOCYTES # BLD AUTO: 0.03 X10(3) UL (ref 0–1)
IMM GRANULOCYTES NFR BLD: 0.3 %
INR BLD: 1.16 (ref 0.8–1.2)
LYMPHOCYTES # BLD AUTO: 1.33 X10(3) UL (ref 1–4)
LYMPHOCYTES NFR BLD AUTO: 13.7 %
MAGNESIUM SERPL-MCNC: 1.7 MG/DL (ref 1.6–2.6)
MCH RBC QN AUTO: 32.1 PG (ref 26–34)
MCHC RBC AUTO-ENTMCNC: 33.7 G/DL (ref 31–37)
MCV RBC AUTO: 95.2 FL (ref 80–100)
MONOCYTES # BLD AUTO: 1.26 X10(3) UL (ref 0.1–1)
MONOCYTES NFR BLD AUTO: 13 %
NEUTROPHILS # BLD AUTO: 6.96 X10 (3) UL (ref 1.5–7.7)
NEUTROPHILS # BLD AUTO: 6.96 X10(3) UL (ref 1.5–7.7)
NEUTROPHILS NFR BLD AUTO: 72 %
PLATELET # BLD AUTO: 137 10(3)UL (ref 150–450)
PLATELETS.RETICULATED NFR BLD AUTO: 6.5 % (ref 0–7)
POTASSIUM SERPL-SCNC: 3.8 MMOL/L (ref 3.5–5.1)
PROTHROMBIN TIME: 14.9 SECONDS (ref 11.6–14.8)
RBC # BLD AUTO: 3.96 X10(6)UL (ref 3.8–5.8)
UNIT VOLUME: 350 ML
WBC # BLD AUTO: 9.7 X10(3) UL (ref 4–11)

## 2025-06-27 PROCEDURE — 99232 SBSQ HOSP IP/OBS MODERATE 35: CPT | Performed by: STUDENT IN AN ORGANIZED HEALTH CARE EDUCATION/TRAINING PROGRAM

## 2025-06-27 RX ORDER — POTASSIUM CHLORIDE 1500 MG/1
40 TABLET, EXTENDED RELEASE ORAL ONCE
Status: COMPLETED | OUTPATIENT
Start: 2025-06-27 | End: 2025-06-27

## 2025-06-27 NOTE — PROGRESS NOTES
Progress Note  Josh Jones Patient Status:  Inpatient    1/15/1955 MRN [id number]   Location [facility] 7NE-A Attending Tyrone Danielle MD   Hosp Day # 2 PCP MERVAT PHAM     Subjective:  Up in chair, feeling well overall. No complaints.     Objective:  /65 (BP Location: Left arm)   Pulse 65   Temp 98 °F (36.7 °C) (Oral)   Resp 18   Ht 6' 2\" (1.88 m)   Wt 219 lb (99.3 kg)   SpO2 92%   BMI 28.12 kg/m²     Telemetry: nsr      Intake/Output:    Intake/Output Summary (Last 24 hours) at 2025 1327  Last data filed at 2025 0732  Gross per 24 hour   Intake 25.2 ml   Output 1040 ml   Net -1014.8 ml       Last 3 Weights   25 0727 219 lb (99.3 kg)   25 1549 220 lb (99.8 kg)   25 1156 223 lb 12.8 oz (101.5 kg)   25 1500 232 lb 3.2 oz (105.3 kg)       Labs:  Recent Labs   Lab 25  0511 25  0716 25  0559   * 128*  --    BUN 9 9  --    CREATSERUM 0.79 0.80  --    EGFRCR 96 95  --    CA 8.6* 8.6*  --     140  --    K 3.7 3.8 3.8    105  --    CO2 29.0 28.0  --      Recent Labs   Lab 25  0511 25  0716 25  0559   RBC 3.96 3.90 3.96   HGB 12.7* 12.6* 12.7*   HCT 36.7* 37.0* 37.7*   MCV 92.7 94.9 95.2   MCH 32.1 32.3 32.1   MCHC 34.6 34.1 33.7   RDW 13.4 13.8 13.6   NEPRELIM 6.30  --  6.96   WBC 8.2 8.1 9.7   .0* 141.0* 137.0*         Recent Labs   Lab 25  1408   TROPHS 4       Review of Systems   Cardiovascular: Negative.    Respiratory: Negative.         Physical Exam:    Gen: alert, oriented x 3, NAD  Heent: pupils equal, reactive. Mucous membranes moist.   Neck: no jvd  Cardiac: regular rate and rhythm, normal S1,S2, +click  Lungs: CTA  Abd: soft, NT/ND +bs. Binder in place  Ext: no edema  Skin: Warm, dry  Neuro: No focal deficits        Medications:    Scheduled Medications[1]  Medication Infusions[2]        Assessment:  Pod 2 s/p abdominal wall reconstruction for ventral incisional  hernia-6/25/25  Per surgery  Mechanical AVR 2001  On iv heparin and warfarin.   INR goal 2-3  Historically preserved LVEF  PAF-maintaining NSR  Hx neuroendocrine GI tumor s/p surgical resection  Non-obstructive CAD by CCTA  Negative FFR in 2023  Monitoring ascending aneurysm as OP. On BB.   Htn-controlled  HL-intolerant to statins and declined additional tx      Plan:  INR 1.16 today. Coumadin resumed last night. Cont home dosing. Daily INR. Heparin gtt until therapeutic.   Cont bb.   Post op management per surgery.    Plan of care discussed with patient, RN.    Elaine Couch, APRN  6/27/2025  1:27 PM           [1]    warfarin  7.5 mg Oral Nightly    magnesium oxide  400 mg Oral Once    acetaminophen  1,000 mg Oral Q8H    lamoTRIgine  100 mg Oral QAM    melatonin  5 mg Oral Nightly    famotidine  20 mg Oral BID    Or    famotidine  20 mg Intravenous BID    magnesium oxide  400 mg Oral Daily    docusate sodium  100 mg Oral Daily    metoprolol tartrate  25 mg Oral Daily Beta Blocker    divalproex ER  250 mg Oral Nightly    divalproex ER  1,000 mg Oral Nightly    metoprolol tartrate  12.5 mg Oral Nightly    folic acid  400 mcg Oral QPM   [2]    continuous dose heparin 1,300 Units/hr (06/27/25 0907)    sodium chloride 10 mL/hr at 06/26/25 1426    HYDROmorphone in sodium chloride 0.9%

## 2025-06-27 NOTE — CM/SW NOTE
SW met with pt to obtain HH choice, reports still reviewing. Encouraged pt to update RN prior to DC once decision made. Pt in agreement, reports no further questions at this time.    SW/CM to remain available for dc planning, and/or additional need for support.    Uvaldo GERMAIN, ALLANW  Discharge Planner  [id number]

## 2025-06-27 NOTE — PROGRESS NOTES
Some pain. Tolerating PO. No BM. Voiding  Drains 60cc in 24hrs  Hgb 12.7  Awake and alert  Abdominal incisions with intact steristrips  No ecchymosis  Drain effluent serous  A/P:  POD#2   Doing well  Plan continue Heparin/warfarin  Ambulate with abdominal binder

## 2025-06-27 NOTE — PLAN OF CARE
Assumed care at 0700  Patient alert, oriented x4  Dilaudid PCA and PO tylenol for pain  Potassium replaced per protocol  Tolerating diet  Denies nausea  Ambulating in halls throughout day  Heparin drip infusing per protocol  FARIDEH drains with ss/bloody output. Bleeding around drain #2. Dressing changed. Plastics on call paged    Awaiting therapeutic INR

## 2025-06-27 NOTE — PROGRESS NOTES
[facility]   part of [facility]     Hospitalist Progress Note     Josh Jones Patient Status:  Inpatient    1/15/1955 MRN [id number]   [facility] 7NE-A Attending Tyrone Danielle MD   Hosp Day # 2 PCP MERVAT PHAM     Chief Complaint: abdominal pain     Subjective:     Patient having some pain- PCA pump.      Objective:    Review of Systems:   A comprehensive review of systems was completed; pertinent positive and negatives stated in subjective.    Vital signs:  Temp:  [97.5 °F (36.4 °C)-98.2 °F (36.8 °C)] 98 °F (36.7 °C)  Pulse:  [60-66] 65  Resp:  [15-18] 18  BP: ()/(61-71) 115/65  SpO2:  [92 %-94 %] 92 %    Physical Exam:    General: No acute distress  Respiratory: No wheezes, no rhonchi  Cardiovascular: S1, S2, regular rate and rhythm  Abdomen: Soft, Non-tender, non-distended, positive bowel sounds  Neuro: No new focal deficits.   Extremities: No edema      Diagnostic Data:    Labs:  Recent Labs   Lab 25  0752 25  0511 25  0519 25  0716 25  0559   WBC  --  8.2  --  8.1 9.7   HGB  --  12.7*  --  12.6* 12.7*   MCV  --  92.7  --  94.9 95.2   PLT  --  142.0*  --  141.0* 137.0*   INR 1.08  --  1.15  --  1.16       Recent Labs   Lab 25  0511 25  0716 25  0559   * 128*  --    BUN 9 9  --    CREATSERUM 0.79 0.80  --    CA 8.6* 8.6*  --     140  --    K 3.7 3.8 3.8    105  --    CO2 29.0 28.0  --        Estimated Glomerular Filtration Rate: 95 mL/min/1.73m2 (result from lab).    Recent Labs   Lab 25  1408   TROPHS 4       Recent Labs   Lab 25  0752 25  0519 25  0559   PTP 14.1 14.9* 14.9*   INR 1.08 1.15 1.16                  Microbiology    No results found for this visit on 25.      Imaging: Reviewed in Epic.    Medications: Scheduled Medications[1]    Assessment & Plan:      #Ventral incisional hernia  S/p abd wall re-construction with plastics and surg onc  Post op care per  surgonc  CLD, IVF  Lovenox for DVT proph  Pain control: PCA  DC wilkes      #SVT  #Paroxysmal a. Fib  BB    #Bicuspid aortic valve s/p AVR  Coumadin/ heparin bridge  Monitor INR      #Bipolar d/o  Aisha Degroot MD    Supplementary Documentation:     Quality:  DVT Mechanical Prophylaxis:   SCDs, Early ambuation  DVT Pharmacologic Prophylaxis   Medication    heparin (Porcine) 28019 units/250mL infusion ACS/AFIB CONTINUOUS    warfarin (Coumadin) tab 7.5 mg                Code Status: Full Code  Wilkes: No urinary catheter in place  Wilkes Duration (in days): 2  Central line:    TARIQ:     Discharge is dependent on: clinical   At this point Mr. Jones is expected to be discharge to: home     The 21st Century Cures Act makes medical notes like these available to patients in the interest of transparency. Please be advised this is a medical document. Medical documents are intended to carry relevant information, facts as evident, and the clinical opinion of the practitioner. The medical note is intended as peer to peer communication and may appear blunt or direct. It is written in medical language and may contain abbreviations or verbiage that are unfamiliar.              **Certification      PHYSICIAN Certification of Need for Inpatient Hospitalization - Initial Certification    Patient will require inpatient services that will reasonably be expected to span two midnight's based on the clinical documentation in H+P.   Based on patients current state of illness, I anticipate that, after discharge, patient will require TBD.            [1]    warfarin  7.5 mg Oral Nightly    magnesium oxide  400 mg Oral Once    acetaminophen  1,000 mg Oral Q8H    lamoTRIgine  100 mg Oral QAM    melatonin  5 mg Oral Nightly    famotidine  20 mg Oral BID    Or    famotidine  20 mg Intravenous BID    magnesium oxide  400 mg Oral Daily    docusate sodium  100 mg Oral Daily    metoprolol tartrate  25 mg Oral Daily Beta Blocker     divalproex ER  250 mg Oral Nightly    divalproex ER  1,000 mg Oral Nightly    metoprolol tartrate  12.5 mg Oral Nightly    folic acid  400 mcg Oral QPM

## 2025-06-27 NOTE — PLAN OF CARE
Assumed care 1900, 6/26/, NOC. Midline dressing, FARIDEH mgmt, PCA for pain control, slept intermittently, needs met

## 2025-06-27 NOTE — PROGRESS NOTES
[facility]  Progress Note    Josh Jones Patient Status:  Inpatient    1/15/1955 MRN [id number]   [facility] 7NE-A Attending Tyrone Danielle MD   Hosp Day # 2 PCP MERVAT PHAM     POD 2 abdominal wall re-construction for ventral incisional hernia  Hx neuroendocrine tumor of small bowel pT2, N1 s/p robotic assisted right hemicolectomy 23    Subjective:  Patient sitting up in chair this morning. Ate all of his breakfast without issue. Denies nausea, vomiting. States his pain is much better controlled with the PCA. No gas or BM yet, endorses belching and \"grumbings\" in his abdomen. Planning to go for a walk this morning in the hallway. FARIDEH drains in place. Patient has abdominal binder on which also helps with his pain.  VSS    Objective/Physical Exam:  General: Alert, orientated x3.  Cooperative.  No apparent distress.  Vital Signs:  Blood pressure 124/71, pulse 63, temperature 98.2 °F (36.8 °C), temperature source Oral, resp. rate 16, height 1.88 m (6' 2\"), weight 99.3 kg (219 lb), SpO2 94%.  Wt Readings from Last 3 Encounters:   25 99.3 kg (219 lb)   25 101.5 kg (223 lb 12.8 oz)   25 105.3 kg (232 lb 3.2 oz)     Lungs: No respiratory distress.  Cardiac: Regular rate and rhythm.   Abdomen:  Soft, mildly distended, moderately  tender, with no rebound or guarding.  No peritoneal signs.   Extremities:  No lower extremity edema noted.    Incision: Clean, dry, intact, no erythema or drainage noted. Steri strips intact with topifoam and abdominal binder in place.  FARIDEH superior: SS output  FARIDEH inferior: SS output    Intake/Output:    Intake/Output Summary (Last 24 hours) at 2025 0840  Last data filed at 2025 0732  Gross per 24 hour   Intake 25.2 ml   Output 1390 ml   Net -1364.8 ml     I/O last 3 completed shifts:  In: 9.2 [I.V.:9.2]  Out:  [Urine:; Drains:140]  I/O this shift:  In: 16 [I.V.:16]  Out: 230 [Urine:200; Drains:30]    Medications: Scheduled  Medications[1]    Labs:  Lab Results   Component Value Date    WBC 9.7 06/27/2025    HGB 12.7 06/27/2025    HCT 37.7 06/27/2025    .0 06/27/2025     Lab Results   Component Value Date    K 3.8 06/27/2025     Lab Results   Component Value Date    PT 25.7 (H) 09/06/2019    PT 16.5 (H) 07/08/2019    PT 34.9 (H) 04/12/2019    INR 1.16 06/27/2025    INR 1.15 06/26/2025    INR 1.08 06/25/2025         Assessment  Problem List[2]      POD 2 abdominal wall re-construction for ventral incisional hernia  Hgb 12.7, stable  WBC 9.7  Patient doing well postoperatively      Plan:  - No acute surgical issues  - Diet: soft low fiber  - Pain: PCA, continue sched tylenol  - DVT prophylaxis: Heparin gtt and warfarin (AVR) per cardiology management for therapeutic INR goal; 1.16 today  - GI prophylaxis: famotidine  - PT/OT to evaluate and treat  - Appreciate medical team recommendations  - Encourage IS and ambulation  - Replace electrolytes per protocol  Dispo: CTU7    Discussed with Dr. Shashi Beatty, ASTON  Department of Surgical Oncology  [facility]  [address] [place], [place]  [id number] [place] [address]  [address] [place] [address]  T: [phone]  F: [phone]        Quality:  DVT Mechanical Prophylaxis:   SCDs, Early ambuation  DVT Pharmacologic Prophylaxis   Medication    heparin (Porcine) 30285 units/250mL infusion ACS/AFIB CONTINUOUS    warfarin (Coumadin) tab 7.5 mg                Code Status: Full Code  Singleton: No urinary catheter in place  Singleton Duration (in days): 2  Central line:    TARIQ:                    [1]    potassium chloride  40 mEq Oral Once    warfarin  7.5 mg Oral Nightly    magnesium oxide  400 mg Oral Once    acetaminophen  1,000 mg Oral Q8H    lamoTRIgine  100 mg Oral QAM    melatonin  5 mg Oral Nightly    famotidine  20 mg Oral BID    Or    famotidine  20 mg Intravenous BID    magnesium oxide  400 mg Oral Daily    docusate sodium  100 mg Oral  Daily    metoprolol tartrate  25 mg Oral Daily Beta Blocker    divalproex ER  250 mg Oral Nightly    divalproex ER  1,000 mg Oral Nightly    metoprolol tartrate  12.5 mg Oral Nightly    folic acid  400 mcg Oral QPM   [2]   Patient Active Problem List  Diagnosis    Requires lifelong warfarin therapy    Paroxysmal atrial fibrillation (HCC)    Recurrent HSV (herpes simplex virus)    Cyclothymia    Allergic rhinitis    Benign prostatic hyperplasia with incomplete bladder emptying    History of diverticulitis    H/O mechanical aortic valve replacement    Paroxysmal supraventricular tachycardia (HCC)    Verruca plantaris    Diverticulosis    Ascending aorta dilation    Arteriosclerosis of coronary artery    Bipolar affective (HCC)    Neuroendocrine neoplasm of small intestine (HCC)    Anticoagulation management encounter    Hernia

## 2025-06-28 LAB
ANION GAP SERPL CALC-SCNC: 8 MMOL/L (ref 0–18)
APTT PPP: 52.2 SECONDS (ref 23–36)
BASOPHILS # BLD AUTO: 0.04 X10(3) UL (ref 0–0.2)
BASOPHILS NFR BLD AUTO: 0.4 %
BUN BLD-MCNC: 15 MG/DL (ref 9–23)
CALCIUM BLD-MCNC: 8.8 MG/DL (ref 8.7–10.6)
CHLORIDE SERPL-SCNC: 105 MMOL/L (ref 98–112)
CO2 SERPL-SCNC: 26 MMOL/L (ref 21–32)
CREAT BLD-MCNC: 0.88 MG/DL (ref 0.7–1.3)
EGFRCR SERPLBLD CKD-EPI 2021: 93 ML/MIN/1.73M2 (ref 60–?)
EOSINOPHIL # BLD AUTO: 0.22 X10(3) UL (ref 0–0.7)
EOSINOPHIL NFR BLD AUTO: 2.5 %
ERYTHROCYTE [DISTWIDTH] IN BLOOD BY AUTOMATED COUNT: 13.8 %
GLUCOSE BLD-MCNC: 101 MG/DL (ref 70–99)
HCT VFR BLD AUTO: 33 % (ref 39–53)
HGB BLD-MCNC: 11.4 G/DL (ref 13–17.5)
IMM GRANULOCYTES # BLD AUTO: 0.02 X10(3) UL (ref 0–1)
IMM GRANULOCYTES NFR BLD: 0.2 %
INR BLD: 1.21 (ref 0.8–1.2)
LYMPHOCYTES # BLD AUTO: 1.17 X10(3) UL (ref 1–4)
LYMPHOCYTES NFR BLD AUTO: 13.1 %
MCH RBC QN AUTO: 32 PG (ref 26–34)
MCHC RBC AUTO-ENTMCNC: 34.5 G/DL (ref 31–37)
MCV RBC AUTO: 92.7 FL (ref 80–100)
MONOCYTES # BLD AUTO: 1.12 X10(3) UL (ref 0.1–1)
MONOCYTES NFR BLD AUTO: 12.6 %
NEUTROPHILS # BLD AUTO: 6.34 X10 (3) UL (ref 1.5–7.7)
NEUTROPHILS # BLD AUTO: 6.34 X10(3) UL (ref 1.5–7.7)
NEUTROPHILS NFR BLD AUTO: 71.2 %
OSMOLALITY SERPL CALC.SUM OF ELEC: 289 MOSM/KG (ref 275–295)
PLATELET # BLD AUTO: 144 10(3)UL (ref 150–450)
POTASSIUM SERPL-SCNC: 3.8 MMOL/L (ref 3.5–5.1)
POTASSIUM SERPL-SCNC: 3.8 MMOL/L (ref 3.5–5.1)
PROTHROMBIN TIME: 15.5 SECONDS (ref 11.6–14.8)
RBC # BLD AUTO: 3.56 X10(6)UL (ref 3.8–5.8)
SODIUM SERPL-SCNC: 139 MMOL/L (ref 136–145)
WBC # BLD AUTO: 8.9 X10(3) UL (ref 4–11)

## 2025-06-28 PROCEDURE — 99232 SBSQ HOSP IP/OBS MODERATE 35: CPT | Performed by: STUDENT IN AN ORGANIZED HEALTH CARE EDUCATION/TRAINING PROGRAM

## 2025-06-28 RX ORDER — POLYETHYLENE GLYCOL 3350 17 G/17G
17 POWDER, FOR SOLUTION ORAL DAILY
Status: DISCONTINUED | OUTPATIENT
Start: 2025-06-28 | End: 2025-07-03

## 2025-06-28 RX ORDER — POTASSIUM CHLORIDE 1500 MG/1
40 TABLET, EXTENDED RELEASE ORAL ONCE
Status: COMPLETED | OUTPATIENT
Start: 2025-06-28 | End: 2025-06-28

## 2025-06-28 NOTE — PROGRESS NOTES
Progress Note  Josh Jones Patient Status:  Inpatient    1/15/1955 MRN [id number]   Location [facility] 7NE-A Attending Tyrone Danielle MD   Hosp Day # 3 PCP MERVAT PHAM     Subjective:  Up in chair, feeling well overall. No complaints.     Objective:  /69 (BP Location: Left arm)   Pulse 64   Temp 97.7 °F (36.5 °C) (Temporal)   Resp 16   Ht 6' 2\" (1.88 m)   Wt 219 lb (99.3 kg)   SpO2 95%   BMI 28.12 kg/m²     Telemetry: nsr      Intake/Output:    Intake/Output Summary (Last 24 hours) at 2025 1346  Last data filed at 2025 1100  Gross per 24 hour   Intake 491 ml   Output 860 ml   Net -369 ml       Last 3 Weights   25 0727 219 lb (99.3 kg)   25 1549 220 lb (99.8 kg)   25 1156 223 lb 12.8 oz (101.5 kg)   25 1500 232 lb 3.2 oz (105.3 kg)       Labs:  Recent Labs   Lab 25  0511 25  0716 25  0559 25  0526   * 128*  --  101*   BUN 9 9  --  15   CREATSERUM 0.79 0.80  --  0.88   EGFRCR 96 95  --  93   CA 8.6* 8.6*  --  8.8    140  --  139   K 3.7 3.8 3.8 3.8  3.8    105  --  105   CO2 29.0 28.0  --  26.0     Recent Labs   Lab 25  0511 25  0716 25  0559 25  0526   RBC 3.96 3.90 3.96 3.56*   HGB 12.7* 12.6* 12.7* 11.4*   HCT 36.7* 37.0* 37.7* 33.0*   MCV 92.7 94.9 95.2 92.7   MCH 32.1 32.3 32.1 32.0   MCHC 34.6 34.1 33.7 34.5   RDW 13.4 13.8 13.6 13.8   NEPRELIM 6.30  --  6.96 6.34   WBC 8.2 8.1 9.7 8.9   .0* 141.0* 137.0* 144.0*         Recent Labs   Lab 25  1408   TROPHS 4       Review of Systems   Cardiovascular: Negative.    Respiratory: Negative.         Physical Exam:    Gen: alert, oriented x 3, NAD  Heent: pupils equal, reactive. Mucous membranes moist.   Neck: no jvd  Cardiac: regular rate and rhythm, normal S1,S2, +click  Lungs: CTA  Abd: soft, NT/ND +bs. Binder in place  Ext: no edema  Skin: Warm, dry  Neuro: No focal deficits        Medications:    Scheduled  Medications[1]  Medication Infusions[2]        Assessment:  Pod 3 s/p abdominal wall reconstruction for ventral incisional hernia-6/25/25  Per surgery  Mechanical AVR 2001  On iv heparin and warfarin.   INR goal 2-3  Historically preserved LVEF  PAF-maintaining NSR  Hx neuroendocrine GI tumor s/p surgical resection  Non-obstructive CAD by CCTA  Negative FFR in 2023  Monitoring ascending aneurysm as OP. On BB.   Htn-controlled  HL-intolerant to statins and declined additional tx      Plan:  INR 1.21 today. Cont coumadin. Heparin gtt until INR therapeutic. Has clinton stable on dose for some time.   Cont bb.   Post op management per surgery.    Plan of care discussed with patient, RN.    Elaine Couch, APRN  6/28/2025  1:46 PM             [1]    polyethylene glycol (PEG 3350)  17 g Oral Daily    warfarin  7.5 mg Oral Nightly    magnesium oxide  400 mg Oral Once    acetaminophen  1,000 mg Oral Q8H    lamoTRIgine  100 mg Oral QAM    melatonin  5 mg Oral Nightly    famotidine  20 mg Oral BID    Or    famotidine  20 mg Intravenous BID    magnesium oxide  400 mg Oral Daily    docusate sodium  100 mg Oral Daily    metoprolol tartrate  25 mg Oral Daily Beta Blocker    divalproex ER  250 mg Oral Nightly    divalproex ER  1,000 mg Oral Nightly    metoprolol tartrate  12.5 mg Oral Nightly    folic acid  400 mcg Oral QPM   [2]    continuous dose heparin 1,300 Units/hr (06/28/25 0153)    sodium chloride 10 mL/hr at 06/26/25 1426    HYDROmorphone in sodium chloride 0.9%

## 2025-06-28 NOTE — PLAN OF CARE
Assumed care 1900, 6/27, NOC. Flatus (+), drain mgmt, voiding @ goal, FARIDEH drains intact, trending PT/INR, heparin GTT, needs met

## 2025-06-28 NOTE — PROGRESS NOTES
Plastic Surgery Progress Note    Josh Jones is a 70 year old male POD#3 s/p exploratory laparotomy, lysis of adhesions (Dr. Danielle) and abdominal wall reconstruction with component separation and placement of Strattice acellular dermal matrix (Dr. Szymanski).  Pain well controlled. Patient denies fever/chills, nausea/vomiting, difficulty with urination. Started passing flatus this AM. 24 hour drain output was 60cc.  Hgb 11.4     /77 (BP Location: Left arm)   Pulse 74   Temp 98.7 °F (37.1 °C) (Temporal)   Resp 16   Ht 1.88 m (6' 2\")   Wt 99.3 kg (219 lb)   SpO2 93%   BMI 28.12 kg/m²     Physical examination:   Awake and alert  Abdominal incision with steri strips in place. No wound drainage  Abdomen soft, mildly distended and appropriately tender to palpation  No ecchymosis  Drain sites CDI. Minimal drainage on gauze. No active drainage.  Drain effluent serosanguinous    Assessment: POD#3 s/p exploratory laparotomy, lysis of adhesions (Dr. Danielle) and abdominal wall reconstruction with component separation and placement of Strattice acellular dermal matrix (Dr. Szymanski). Doing well.    Plan:   Continue with activity restrictions   Continue with abdominal binder at all times  Continue drain care  Encouraged IS  Continue pain management- transition to oral pain medication as able  Plan continue heparin/warfarin  Medical management per surgical oncology, cardiology and hospitalist    RODRIGO Urbina  6/28/2025  10:01 AM

## 2025-06-28 NOTE — PROGRESS NOTES
Patient A/Ox4, vitals stable. Patient is on Dilaudid PCA. Educated to try and control pain with oral medications. Abdominal binder in place for midline abdominal incision. Two FARIDEH drains to left abdomen. Leaking at site and dressings changed this morning with plastic surgery PA. Heparin gtt therapeutic.

## 2025-06-28 NOTE — PROGRESS NOTES
[facility]  Progress Note    Josh Jones Patient Status:  Inpatient    1/15/1955 MRN [id number]   Location [facility] 7NE-A Attending Tyrone Danielle MD   Hosp Day # 3 PCP MERVAT PHAM     POD 3 abdominal wall re-construction for ventral incisional hernia  Hx neuroendocrine tumor of small bowel pT2, N1 s/p robotic assisted right hemicolectomy 23    Subjective:  Continue to have pain requiring PCA. Has not tried PO oxy very much. Inquiring about advancing to regular diet. Passing gas, no bowel movement. Denies nausea or vomiting. Feels abdominal binder it too tight.     Objective/Physical Exam:  General: Alert, orientated x3.  Cooperative.  No apparent distress.  Vital Signs:  Blood pressure 119/73, pulse 70, temperature 98.5 °F (36.9 °C), temperature source Oral, resp. rate 14, height 1.88 m (6' 2\"), weight 99.3 kg (219 lb), SpO2 93%.  Wt Readings from Last 3 Encounters:   25 99.3 kg (219 lb)   25 101.5 kg (223 lb 12.8 oz)   25 105.3 kg (232 lb 3.2 oz)     Lungs: No respiratory distress.  Cardiac: Regular rate and rhythm.   Abdomen:  Soft, mildly distended, mildly tender, with no rebound or guarding.  No peritoneal signs.   Extremities:  No lower extremity edema noted.    Incision: Clean, dry, intact, no erythema or drainage noted. Steri strips intact with topifoam and abdominal binder in place.  FARIDEH superior: SS output  FARIDEH inferior: SS output    Intake/Output:    Intake/Output Summary (Last 24 hours) at 2025 0754  Last data filed at 2025 0000  Gross per 24 hour   Intake 17 ml   Output 680 ml   Net -663 ml     I/O last 3 completed shifts:  In: 42.2 [I.V.:42.2]  Out: 1410 [Urine:1350; Drains:60]  No intake/output data recorded.    Medications: Scheduled Medications[1]    Labs:  Lab Results   Component Value Date    WBC 8.9 2025    HGB 11.4 2025    HCT 33.0 2025    .0 2025     Lab Results   Component Value Date     2025     K 3.8 06/28/2025    K 3.8 06/28/2025     06/28/2025    CO2 26.0 06/28/2025    BUN 15 06/28/2025    CREATSERUM 0.88 06/28/2025     06/28/2025    CA 8.8 06/28/2025     Lab Results   Component Value Date    PT 25.7 (H) 09/06/2019    PT 16.5 (H) 07/08/2019    PT 34.9 (H) 04/12/2019    INR 1.21 (H) 06/28/2025    INR 1.16 06/27/2025    INR 1.15 06/26/2025         Assessment  Problem List[2]      POD 3 abdominal wall re-construction for ventral incisional hernia  Hgb 11.4, stable  WBC 8.9  Patient doing well postoperatively      Plan:  - No acute surgical issues  - Diet: Adv to regular diet  - Continue bowel regimen  - Pain: PCA, sched tylenol, oxy prn. Encouraged limiting dilaudid use  - DVT prophylaxis: Heparin gtt and warfarin (AVR) per cardiology management for therapeutic INR goal; 1.21 today  - GI prophylaxis: famotidine  - PT/OT to evaluate and treat  - Appreciate medical team recommendations  - Encourage IS and ambulation  - Replace electrolytes per protocol  Dispo: CTU7    Discussed with RODRIGO Yang        Quality:  DVT Mechanical Prophylaxis:   SCDs, Early ambuation  DVT Pharmacologic Prophylaxis   Medication    heparin (Porcine) 37982 units/250mL infusion ACS/AFIB CONTINUOUS    warfarin (Coumadin) tab 7.5 mg                Code Status: Full Code  Singleton: No urinary catheter in place  Singleton Duration (in days): 2  Central line:    TARIQ:                    [1]    potassium chloride  40 mEq Oral Once    warfarin  7.5 mg Oral Nightly    magnesium oxide  400 mg Oral Once    acetaminophen  1,000 mg Oral Q8H    lamoTRIgine  100 mg Oral QAM    melatonin  5 mg Oral Nightly    famotidine  20 mg Oral BID    Or    famotidine  20 mg Intravenous BID    magnesium oxide  400 mg Oral Daily    docusate sodium  100 mg Oral Daily    metoprolol tartrate  25 mg Oral Daily Beta Blocker    divalproex ER  250 mg Oral Nightly    divalproex ER  1,000 mg Oral Nightly    metoprolol tartrate  12.5 mg  Oral Nightly    folic acid  400 mcg Oral QPM   [2]   Patient Active Problem List  Diagnosis    Requires lifelong warfarin therapy    Paroxysmal atrial fibrillation (HCC)    Recurrent HSV (herpes simplex virus)    Cyclothymia    Allergic rhinitis    Benign prostatic hyperplasia with incomplete bladder emptying    History of diverticulitis    H/O mechanical aortic valve replacement    Paroxysmal supraventricular tachycardia (HCC)    Verruca plantaris    Diverticulosis    Ascending aorta dilation    Arteriosclerosis of coronary artery    Bipolar affective (HCC)    Neuroendocrine neoplasm of small intestine (HCC)    Anticoagulation management encounter    Hernia

## 2025-06-28 NOTE — PROGRESS NOTES
[facility]   part of [facility]     Hospitalist Progress Note     Josh Jones Patient Status:  Inpatient    1/15/1955 MRN [id number]   [facility] 7NE-A Attending Tyrone Danielle MD   Hosp Day # 3 PCP MERVAT PHAM     Chief Complaint: abdominal pain     Subjective:     Patient ambulated. No BM.       Objective:    Review of Systems:   A comprehensive review of systems was completed; pertinent positive and negatives stated in subjective.    Vital signs:  Temp:  [98 °F (36.7 °C)-98.7 °F (37.1 °C)] 98.7 °F (37.1 °C)  Pulse:  [63-74] 74  Resp:  [14-16] 16  BP: (101-124)/(63-77) 124/77  SpO2:  [90 %-93 %] 93 %    Physical Exam:    General: No acute distress  Respiratory: No wheezes, no rhonchi  Cardiovascular: S1, S2, regular rate and rhythm  Abdomen: Soft, Non-tender, non-distended, positive bowel sounds  Neuro: No new focal deficits.   Extremities: No edema      Diagnostic Data:    Labs:  Recent Labs   Lab 25  0752 25  0511 25  0519 25  0716 25  0559 25  0526   WBC  --  8.2  --  8.1 9.7 8.9   HGB  --  12.7*  --  12.6* 12.7* 11.4*   MCV  --  92.7  --  94.9 95.2 92.7   PLT  --  142.0*  --  141.0* 137.0* 144.0*   INR 1.08  --  1.15  --  1.16 1.21*       Recent Labs   Lab 25  0511 25  0716 25  0559 25  0526   * 128*  --  101*   BUN 9 9  --  15   CREATSERUM 0.79 0.80  --  0.88   CA 8.6* 8.6*  --  8.8    140  --  139   K 3.7 3.8 3.8 3.8  3.8    105  --  105   CO2 29.0 28.0  --  26.0       Estimated Glomerular Filtration Rate: 93 mL/min/1.73m2 (result from lab).    Recent Labs   Lab 25  1408   TROPHS 4       Recent Labs   Lab 25  0519 25  0559 25  0526   PTP 14.9* 14.9* 15.5*   INR 1.15 1.16 1.21*                  Microbiology    No results found for this visit on 25.      Imaging: Reviewed in Epic.    Medications: Scheduled Medications[1]    Assessment & Plan:      #Ventral  incisional hernia  S/p abd wall re-construction with plastics and surg onc  Post op care per surgonc  CLD, IVF  Lovenox for DVT proph  Pain control: PCA  DC wilkes      #SVT  #Paroxysmal a. Fib  BB    #Bicuspid aortic valve s/p AVR  Coumadin/ heparin bridge  Monitor INR      #Bipolar d/o  Aisha Degroot MD    Supplementary Documentation:     Quality:  DVT Mechanical Prophylaxis:   SCDs, Early ambuation  DVT Pharmacologic Prophylaxis   Medication    heparin (Porcine) 76340 units/250mL infusion ACS/AFIB CONTINUOUS    warfarin (Coumadin) tab 7.5 mg                Code Status: Full Code  Wilkes: No urinary catheter in place  Wilkes Duration (in days): 2  Central line:    TARIQ:     Discharge is dependent on: clinical   At this point Mr. Jones is expected to be discharge to: home     The 21st Century Cures Act makes medical notes like these available to patients in the interest of transparency. Please be advised this is a medical document. Medical documents are intended to carry relevant information, facts as evident, and the clinical opinion of the practitioner. The medical note is intended as peer to peer communication and may appear blunt or direct. It is written in medical language and may contain abbreviations or verbiage that are unfamiliar.              **Certification      PHYSICIAN Certification of Need for Inpatient Hospitalization - Initial Certification    Patient will require inpatient services that will reasonably be expected to span two midnight's based on the clinical documentation in H+P.   Based on patients current state of illness, I anticipate that, after discharge, patient will require TBD.            [1]    polyethylene glycol (PEG 3350)  17 g Oral Daily    warfarin  7.5 mg Oral Nightly    magnesium oxide  400 mg Oral Once    acetaminophen  1,000 mg Oral Q8H    lamoTRIgine  100 mg Oral QAM    melatonin  5 mg Oral Nightly    famotidine  20 mg Oral BID    Or    famotidine  20 mg  Intravenous BID    magnesium oxide  400 mg Oral Daily    docusate sodium  100 mg Oral Daily    metoprolol tartrate  25 mg Oral Daily Beta Blocker    divalproex ER  250 mg Oral Nightly    divalproex ER  1,000 mg Oral Nightly    metoprolol tartrate  12.5 mg Oral Nightly    folic acid  400 mcg Oral QPM

## 2025-06-29 LAB
ANION GAP SERPL CALC-SCNC: 9 MMOL/L (ref 0–18)
APTT PPP: 64.3 SECONDS (ref 23–36)
BASOPHILS # BLD AUTO: 0.03 X10(3) UL (ref 0–0.2)
BASOPHILS NFR BLD AUTO: 0.5 %
BUN BLD-MCNC: 14 MG/DL (ref 9–23)
CALCIUM BLD-MCNC: 9.1 MG/DL (ref 8.7–10.6)
CHLORIDE SERPL-SCNC: 106 MMOL/L (ref 98–112)
CO2 SERPL-SCNC: 25 MMOL/L (ref 21–32)
CREAT BLD-MCNC: 0.85 MG/DL (ref 0.7–1.3)
EGFRCR SERPLBLD CKD-EPI 2021: 93 ML/MIN/1.73M2 (ref 60–?)
EOSINOPHIL # BLD AUTO: 0.24 X10(3) UL (ref 0–0.7)
EOSINOPHIL NFR BLD AUTO: 4.3 %
ERYTHROCYTE [DISTWIDTH] IN BLOOD BY AUTOMATED COUNT: 13.8 %
GLUCOSE BLD-MCNC: 104 MG/DL (ref 70–99)
HCT VFR BLD AUTO: 33.2 % (ref 39–53)
HGB BLD-MCNC: 11.5 G/DL (ref 13–17.5)
IMM GRANULOCYTES # BLD AUTO: 0.01 X10(3) UL (ref 0–1)
IMM GRANULOCYTES NFR BLD: 0.2 %
INR BLD: 1.22 (ref 0.8–1.2)
LYMPHOCYTES # BLD AUTO: 1 X10(3) UL (ref 1–4)
LYMPHOCYTES NFR BLD AUTO: 17.9 %
MCH RBC QN AUTO: 32 PG (ref 26–34)
MCHC RBC AUTO-ENTMCNC: 34.6 G/DL (ref 31–37)
MCV RBC AUTO: 92.5 FL (ref 80–100)
MONOCYTES # BLD AUTO: 0.83 X10(3) UL (ref 0.1–1)
MONOCYTES NFR BLD AUTO: 14.9 %
NEUTROPHILS # BLD AUTO: 3.47 X10 (3) UL (ref 1.5–7.7)
NEUTROPHILS # BLD AUTO: 3.47 X10(3) UL (ref 1.5–7.7)
NEUTROPHILS NFR BLD AUTO: 62.2 %
OSMOLALITY SERPL CALC.SUM OF ELEC: 291 MOSM/KG (ref 275–295)
PLATELET # BLD AUTO: 157 10(3)UL (ref 150–450)
POTASSIUM SERPL-SCNC: 4 MMOL/L (ref 3.5–5.1)
POTASSIUM SERPL-SCNC: 4 MMOL/L (ref 3.5–5.1)
PROTHROMBIN TIME: 15.5 SECONDS (ref 11.6–14.8)
RBC # BLD AUTO: 3.59 X10(6)UL (ref 3.8–5.8)
SODIUM SERPL-SCNC: 140 MMOL/L (ref 136–145)
WBC # BLD AUTO: 5.6 X10(3) UL (ref 4–11)

## 2025-06-29 RX ORDER — HYDROMORPHONE HYDROCHLORIDE 1 MG/ML
0.2 INJECTION, SOLUTION INTRAMUSCULAR; INTRAVENOUS; SUBCUTANEOUS EVERY 2 HOUR PRN
Refills: 0 | Status: DISCONTINUED | OUTPATIENT
Start: 2025-06-29 | End: 2025-07-03

## 2025-06-29 RX ORDER — HYDROMORPHONE HYDROCHLORIDE 1 MG/ML
0.4 INJECTION, SOLUTION INTRAMUSCULAR; INTRAVENOUS; SUBCUTANEOUS EVERY 2 HOUR PRN
Refills: 0 | Status: DISCONTINUED | OUTPATIENT
Start: 2025-06-29 | End: 2025-07-03

## 2025-06-29 NOTE — PLAN OF CARE
Assumed care 1900, 6/28, NOC. Promoted PO intake for pain control, patient discontinued IV for PCA -> will transition to PO. Heparin GTT, needs met

## 2025-06-29 NOTE — PLAN OF CARE
Pt Aox4  Pt complains of incisional pain    -prn given  RA  NSR  Pt tolerating diet   Pt walking around unit with family and tolerating   Heparin gtt per protocol  All of pt and family question answered

## 2025-06-29 NOTE — PROGRESS NOTES
[facility] Pharmacy Dosing Service  Warfarin (Coumadin) Initial Dosing    Josh Jones is a 70 year old patient for whom pharmacy has been consulted to dose warfarin (COUMADIN) for Bileaflet mechanical valve in aortic position by Elaine CLANCY.  Based on this indication, goal INR is 2-3.    Pertinent Patient Medical History:  Mechanical valve in aortic position  Potential Drug Interactions:  none    Latest INR:   Recent Labs     06/29/25  0546   INR 1.22*       Other Anticoagulants:  heparin drip  Home regimen:  7.5 mg nightly     Inpatient Dosing History  Date 6/26 6/27 6/28 6/29   INR 1.15 1.16 1.21 1.22   Coumadin dose 7.5 mg 7.5 mg 7.5 mg        Based on above -  1.  For today, Give warfarin (COUMADIN)  9 mg at 2100 tonight    2.  PT/INR ordered daily while on warfarin    3.  Pharmacy will continue to follow.  We appreciate the opportunity to assist in the care of this patient.    Beverly Aguirre, PharmD  6/29/2025  1:58 PM

## 2025-06-29 NOTE — PROGRESS NOTES
Ambulating. Tolerating PO. + flatus. No BM  AVSS  Hgb 11.5  INR 1.22  Awake and alert  Incisions c/d/I. No ecchymosis  Abdomen soft, mildly distended, appropriately tender  A/P:  POD #4  Await full return of bowel fxn  Await therapeutic coumadin  Ambulate

## 2025-06-29 NOTE — PROGRESS NOTES
[facility]  Progress Note    Josh Jones Patient Status:  Inpatient    1/15/1955 MRN [id number]   Location [facility] 7NE-A Attending Tyrone Danielle MD   Hosp Day # 4 PCP MERVAT PHAM     POD 4 abdominal wall re-construction for ventral incisional hernia  Hx neuroendocrine tumor of small bowel pT2, N1 s/p robotic assisted right hemicolectomy 23    Subjective:  Accidentally pulled IV with PCA overnight and has been OK since then without it. Tried oxy yesterday but notes it made him dizzy. Willing to try Norco today. Denies nausea or vomiting. Passing gas, no bowel movement. Feels abdomen is distended, but no more than yesterday.     Objective/Physical Exam:  General: Alert, orientated x3.  Cooperative.  No apparent distress.  Vital Signs:  Blood pressure 130/79, pulse 59, temperature 97.8 °F (36.6 °C), temperature source Oral, resp. rate 18, height 1.88 m (6' 2\"), weight 99.3 kg (219 lb), SpO2 95%.  Wt Readings from Last 3 Encounters:   25 99.3 kg (219 lb)   25 101.5 kg (223 lb 12.8 oz)   25 105.3 kg (232 lb 3.2 oz)     Lungs: No respiratory distress.  Cardiac: Regular rate and rhythm on tele  Abdomen:  Soft, stable mildly distended, non-tender, with no rebound or guarding.  No peritoneal signs.   Extremities:  No lower extremity edema noted.    Incision: Clean, dry, intact, no erythema or drainage noted. Steri strips intact with topifoam and abdominal binder in place.  FARIDEH superior: SS output  FARIDEH inferior: SS output    Intake/Output:    Intake/Output Summary (Last 24 hours) at 2025 0746  Last data filed at 2025 0733  Gross per 24 hour   Intake 480 ml   Output 1230 ml   Net -750 ml     I/O last 3 completed shifts:  In: 497 [P.O.:460; I.V.:37]  Out: 1730 [Urine:1700; Drains:30]  No intake/output data recorded.    Medications: Scheduled Medications[1]    Labs:  Lab Results   Component Value Date    WBC 5.6 2025    HGB 11.5 2025    HCT 33.2 2025     .0 06/29/2025     Lab Results   Component Value Date     06/29/2025    K 4.0 06/29/2025    K 4.0 06/29/2025     06/29/2025    CO2 25.0 06/29/2025    BUN 14 06/29/2025    CREATSERUM 0.85 06/29/2025     06/29/2025    CA 9.1 06/29/2025     Lab Results   Component Value Date    PT 25.7 (H) 09/06/2019    PT 16.5 (H) 07/08/2019    PT 34.9 (H) 04/12/2019    INR 1.22 (H) 06/29/2025    INR 1.21 (H) 06/28/2025    INR 1.16 06/27/2025         Assessment  Problem List[2]      POD 4 abdominal wall re-construction for ventral incisional hernia  Hgb 11.5, stable  WBC 5.6  Patient doing well postoperatively      Plan:  - No acute surgical issues  - Diet: regular diet  - Continue bowel regimen  - Pain: IV for PCA pulled out overnight, has been doing ok since, will hold off on restarting for now, dilaudid PRN. Stop scheduled tylenol, trial Norco. Stop oxy as made patient dizzy.   - DVT prophylaxis: Heparin gtt and warfarin (AVR) per cardiology management for therapeutic INR goal; 1.22 today  - GI prophylaxis: famotidine  - PT/OT to evaluate and treat  - Appreciate medical team recommendations  - Encourage IS and ambulation  - Replace electrolytes per protocol  Dispo: CTU7    Discussed with Dr. Shashi Mello PA        Quality:  DVT Mechanical Prophylaxis:   SCDs, Early ambuation  DVT Pharmacologic Prophylaxis   Medication    heparin (Porcine) 21895 units/250mL infusion ACS/AFIB CONTINUOUS    warfarin (Coumadin) tab 7.5 mg                Code Status: Full Code  Singleton: No urinary catheter in place  Singleton Duration (in days): 2  Central line:    TARIQ:                    [1]    polyethylene glycol (PEG 3350)  17 g Oral Daily    warfarin  7.5 mg Oral Nightly    magnesium oxide  400 mg Oral Once    acetaminophen  1,000 mg Oral Q8H    lamoTRIgine  100 mg Oral QAM    melatonin  5 mg Oral Nightly    famotidine  20 mg Oral BID    Or    famotidine  20 mg Intravenous BID    magnesium oxide  400 mg Oral Daily     docusate sodium  100 mg Oral Daily    metoprolol tartrate  25 mg Oral Daily Beta Blocker    divalproex ER  250 mg Oral Nightly    divalproex ER  1,000 mg Oral Nightly    metoprolol tartrate  12.5 mg Oral Nightly    folic acid  400 mcg Oral QPM   [2]   Patient Active Problem List  Diagnosis    Requires lifelong warfarin therapy    Paroxysmal atrial fibrillation (HCC)    Recurrent HSV (herpes simplex virus)    Cyclothymia    Allergic rhinitis    Benign prostatic hyperplasia with incomplete bladder emptying    History of diverticulitis    H/O mechanical aortic valve replacement    Paroxysmal supraventricular tachycardia (HCC)    Verruca plantaris    Diverticulosis    Ascending aorta dilation    Arteriosclerosis of coronary artery    Bipolar affective (HCC)    Neuroendocrine neoplasm of small intestine (HCC)    Anticoagulation management encounter    Hernia

## 2025-06-29 NOTE — PROGRESS NOTES
Progress Note  Josh Jones Patient Status:  Inpatient    1/15/1955 MRN [id number]   Location [facility] 7NE-A Attending Tyrone Danielle MD   Hosp Day # 4 PCP MERVAT PHAM     Subjective:  Up in chair, feeling well overall. No complaints.     Objective:  /74 (BP Location: Left arm)   Pulse 56   Temp 98 °F (36.7 °C) (Oral)   Resp 18   Ht 6' 2\" (1.88 m)   Wt 219 lb (99.3 kg)   SpO2 95%   BMI 28.12 kg/m²     Telemetry: nsr      Intake/Output:    Intake/Output Summary (Last 24 hours) at 2025 1116  Last data filed at 2025 0733  Gross per 24 hour   Intake 6 ml   Output 1050 ml   Net -1044 ml       Last 3 Weights   25 0727 219 lb (99.3 kg)   25 1549 220 lb (99.8 kg)   25 1156 223 lb 12.8 oz (101.5 kg)   25 1500 232 lb 3.2 oz (105.3 kg)       Labs:  Recent Labs   Lab 25  0716 25  0559 25  0526 25  0546   *  --  101* 104*   BUN 9  --  15 14   CREATSERUM 0.80  --  0.88 0.85   EGFRCR 95  --  93 93   CA 8.6*  --  8.8 9.1     --  139 140   K 3.8 3.8 3.8  3.8 4.0  4.0     --  105 106   CO2 28.0  --  26.0 25.0     Recent Labs   Lab 25  0559 25  0526 25  0546   RBC 3.96 3.56* 3.59*   HGB 12.7* 11.4* 11.5*   HCT 37.7* 33.0* 33.2*   MCV 95.2 92.7 92.5   MCH 32.1 32.0 32.0   MCHC 33.7 34.5 34.6   RDW 13.6 13.8 13.8   NEPRELIM 6.96 6.34 3.47   WBC 9.7 8.9 5.6   .0* 144.0* 157.0         Recent Labs   Lab 25  1408   TROPHS 4       Review of Systems   Cardiovascular: Negative.    Respiratory: Negative.         Physical Exam:    Gen: alert, oriented x 3, NAD  Heent: pupils equal, reactive. Mucous membranes moist.   Neck: no jvd  Cardiac: regular rate and rhythm, normal S1,S2, +click  Lungs: CTA  Abd: soft, NT/ND +bs. Binder in place  Ext: no edema  Skin: Warm, dry  Neuro: No focal deficits        Medications:    Scheduled Medications[1]  Medication Infusions[2]        Assessment:  Pod 3 s/p abdominal  wall reconstruction for ventral incisional hernia-6/25/25  Per surgery  Mechanical AVR 2001  On iv heparin and warfarin.   INR goal 2-3  Historically preserved LVEF  PAF-maintaining NSR  Hx neuroendocrine GI tumor s/p surgical resection  Non-obstructive CAD by CCTA  Negative FFR in 2023  Monitoring ascending aneurysm as OP. On BB.   Htn-controlled  HL-intolerant to statins and declined additional tx      Plan:  INR 1.22 today. Cont coumadin. Heparin gtt until INR therapeutic. Pharmacy to assist with dosing.  Cont bb.   Post op management per surgery.    Plan of care discussed with patient, RN.    Elaine Couch, APRN  6/29/2025  11:16 AM               [1]    polyethylene glycol (PEG 3350)  17 g Oral Daily    warfarin  7.5 mg Oral Nightly    magnesium oxide  400 mg Oral Once    lamoTRIgine  100 mg Oral QAM    melatonin  5 mg Oral Nightly    famotidine  20 mg Oral BID    Or    famotidine  20 mg Intravenous BID    magnesium oxide  400 mg Oral Daily    docusate sodium  100 mg Oral Daily    metoprolol tartrate  25 mg Oral Daily Beta Blocker    divalproex ER  250 mg Oral Nightly    divalproex ER  1,000 mg Oral Nightly    metoprolol tartrate  12.5 mg Oral Nightly    folic acid  400 mcg Oral QPM   [2]    continuous dose heparin 1,300 Units/hr (06/28/25 2053)

## 2025-06-29 NOTE — PROGRESS NOTES
[facility] Pharmacy Dosing Service  Warfarin (Coumadin) Initial Dosing    Josh Jones is a 70 year old patient for whom pharmacy has been consulted to dose warfarin (COUMADIN) for Bileaflet mechanical valve in aortic position by Elaine CLANCY.  Based on this indication, goal INR is 2-3.    Pertinent Patient Medical History:  Mechanical valve in aortic position  Potential Drug Interactions:  none    Latest INR:   Recent Labs     06/29/25  0546   INR 1.22*       Other Anticoagulants:  heparin drip  Home regimen:  7.5 mg nightly     Inpatient Dosing History  Date 6/26 6/27 6/28 6/29   INR 1.15 1.16 1.21 1.22   Coumadin dose 7.5 mg 7.5 mg 7.5 mg 9 mg  Pharmacy consulted  to dose       Based on above -  1.  For today, Give warfarin (COUMADIN)  9 mg at 2100 tonight    2.  PT/INR ordered daily while on warfarin    3.  Pharmacy will continue to follow.  We appreciate the opportunity to assist in the care of this patient.    Beverly Aguirre, PharmD  6/29/2025  1:58 PM

## 2025-06-30 LAB
ANION GAP SERPL CALC-SCNC: 11 MMOL/L (ref 0–18)
APTT PPP: 63.3 SECONDS (ref 23–36)
BASOPHILS # BLD AUTO: 0.03 X10(3) UL (ref 0–0.2)
BASOPHILS NFR BLD AUTO: 0.6 %
BUN BLD-MCNC: 13 MG/DL (ref 9–23)
CALCIUM BLD-MCNC: 9.1 MG/DL (ref 8.7–10.6)
CHLORIDE SERPL-SCNC: 106 MMOL/L (ref 98–112)
CO2 SERPL-SCNC: 25 MMOL/L (ref 21–32)
CREAT BLD-MCNC: 0.82 MG/DL (ref 0.7–1.3)
EGFRCR SERPLBLD CKD-EPI 2021: 94 ML/MIN/1.73M2 (ref 60–?)
EOSINOPHIL # BLD AUTO: 0.41 X10(3) UL (ref 0–0.7)
EOSINOPHIL NFR BLD AUTO: 8.4 %
ERYTHROCYTE [DISTWIDTH] IN BLOOD BY AUTOMATED COUNT: 13.6 %
GLUCOSE BLD-MCNC: 92 MG/DL (ref 70–99)
HCT VFR BLD AUTO: 32.5 % (ref 39–53)
HGB BLD-MCNC: 11 G/DL (ref 13–17.5)
IMM GRANULOCYTES # BLD AUTO: 0.01 X10(3) UL (ref 0–1)
IMM GRANULOCYTES NFR BLD: 0.2 %
INR BLD: 1.34 (ref 0.8–1.2)
LYMPHOCYTES # BLD AUTO: 1.21 X10(3) UL (ref 1–4)
LYMPHOCYTES NFR BLD AUTO: 24.8 %
MCH RBC QN AUTO: 31.9 PG (ref 26–34)
MCHC RBC AUTO-ENTMCNC: 33.8 G/DL (ref 31–37)
MCV RBC AUTO: 94.2 FL (ref 80–100)
MONOCYTES # BLD AUTO: 0.82 X10(3) UL (ref 0.1–1)
MONOCYTES NFR BLD AUTO: 16.8 %
NEUTROPHILS # BLD AUTO: 2.39 X10 (3) UL (ref 1.5–7.7)
NEUTROPHILS # BLD AUTO: 2.39 X10(3) UL (ref 1.5–7.7)
NEUTROPHILS NFR BLD AUTO: 49.2 %
OSMOLALITY SERPL CALC.SUM OF ELEC: 294 MOSM/KG (ref 275–295)
PLATELET # BLD AUTO: 161 10(3)UL (ref 150–450)
POTASSIUM SERPL-SCNC: 4 MMOL/L (ref 3.5–5.1)
PROTHROMBIN TIME: 16.7 SECONDS (ref 11.6–14.8)
RBC # BLD AUTO: 3.45 X10(6)UL (ref 3.8–5.8)
SODIUM SERPL-SCNC: 142 MMOL/L (ref 136–145)
WBC # BLD AUTO: 4.9 X10(3) UL (ref 4–11)

## 2025-06-30 PROCEDURE — 99232 SBSQ HOSP IP/OBS MODERATE 35: CPT | Performed by: STUDENT IN AN ORGANIZED HEALTH CARE EDUCATION/TRAINING PROGRAM

## 2025-06-30 RX ORDER — WARFARIN SODIUM 5 MG/1
10 TABLET ORAL
Status: COMPLETED | OUTPATIENT
Start: 2025-06-30 | End: 2025-06-30

## 2025-06-30 NOTE — PROGRESS NOTES
Progress Note  Josh Jones Patient Status:  Inpatient    1/15/1955 MRN [id number]   Location [facility] 7NE-A Attending Tyrone Danielle MD   Hosp Day # 5 PCP MERVAT PHAM     Subjective:  Up in chair, feeling well overall. No complaints. Endorses some constipation. Tolerating AC well.     Objective:  /69 (BP Location: Left arm)   Pulse 62   Temp 97.7 °F (36.5 °C) (Oral)   Resp 18   Ht 6' 2\" (1.88 m)   Wt 219 lb (99.3 kg)   SpO2 93%   BMI 28.12 kg/m²     Telemetry: SR / SB with occasional PVC present.       Intake/Output:    Intake/Output Summary (Last 24 hours) at 2025 0941  Last data filed at 2025 0858  Gross per 24 hour   Intake --   Output 1525 ml   Net -1525 ml       Last 3 Weights   25 0727 219 lb (99.3 kg)   25 1549 220 lb (99.8 kg)   25 1156 223 lb 12.8 oz (101.5 kg)   25 1500 232 lb 3.2 oz (105.3 kg)       Labs:  Recent Labs   Lab 25  0526 25  0546 25  0540   * 104* 92   BUN 15 14 13   CREATSERUM 0.88 0.85 0.82   EGFRCR 93 93 94   CA 8.8 9.1 9.1    140 142   K 3.8  3.8 4.0  4.0 4.0    106 106   CO2 26.0 25.0 25.0     Recent Labs   Lab 25  0526 25  0546 25  0540   RBC 3.56* 3.59* 3.45*   HGB 11.4* 11.5* 11.0*   HCT 33.0* 33.2* 32.5*   MCV 92.7 92.5 94.2   MCH 32.0 32.0 31.9   MCHC 34.5 34.6 33.8   RDW 13.8 13.8 13.6   NEPRELIM 6.34 3.47 2.39   WBC 8.9 5.6 4.9   .0* 157.0 161.0         Recent Labs   Lab 25  1408   TROPHS 4       Review of Systems   Cardiovascular: Negative.    Respiratory: Negative.         Physical Exam:    Gen: alert, oriented x 3, NAD  Heent: pupils equal, reactive. Mucous membranes moist.   Neck: no jvd  Cardiac: regular rate and rhythm, normal S1,S2, +click  Lungs: CTA  Abd: soft, Binder in place  Ext: no edema  Skin: Warm, dry  Neuro: No focal deficits      Medications:    Scheduled Medications[1]  Medication Infusions[2]    Assessment:  s/p abdominal  wall reconstruction for ventral incisional hernia-6/25/25  Per surgery post op management  Mechanical AVR 2001  On iv heparin and warfarin.   INR goal 2-3  Historically preserved LVEF  PAF-maintaining NSR  Hx neuroendocrine GI tumor s/p surgical resection  Non-obstructive CAD by CCTA  Negative FFR in 2023  Monitoring ascending aneurysm as OP. On BB.   Htn-controlled  HL-intolerant to statins and declined additional tx      Plan:  INR 1.34 today. Cont coumadin. Heparin gtt until INR therapeutic. Pharmacy to assist with dosing.  Cont bb.   Post op management per surgery.    Huy Couch MD   Advanced Heart Failure and Transplant Cardiology   [facility] ([facility])     L2         [1]    warfarin  10 mg Oral Once at night    polyethylene glycol (PEG 3350)  17 g Oral Daily    magnesium oxide  400 mg Oral Once    lamoTRIgine  100 mg Oral QAM    melatonin  5 mg Oral Nightly    famotidine  20 mg Oral BID    Or    famotidine  20 mg Intravenous BID    magnesium oxide  400 mg Oral Daily    docusate sodium  100 mg Oral Daily    metoprolol tartrate  25 mg Oral Daily Beta Blocker    divalproex ER  250 mg Oral Nightly    divalproex ER  1,000 mg Oral Nightly    metoprolol tartrate  12.5 mg Oral Nightly    folic acid  400 mcg Oral QPM   [2]    continuous dose heparin 1,300 Units/hr (06/29/25 1524)

## 2025-06-30 NOTE — PROGRESS NOTES
Ambulating. Tolerating PO. + flatus. No BM  AVSS  Hgb 11.0  INR 1.34  Awake and alert  Incisions c/d/I. No ecchymosis  Abdomen soft, mildly distended, appropriately tender  A/P:  POD #4  Await full return of bowel fxn  Await therapeutic coumadin  Ambulate  Cleared to discharge from plastics once INR is therapeutic

## 2025-06-30 NOTE — CM/SW NOTE
CM followed up with patient at bedside. [facility]. Reserved in aidin and Marisa/liaison notified.     Siobhan Amaral RN, BSN

## 2025-06-30 NOTE — PLAN OF CARE
Assumed pt care at 1930  A&Ox4  NSR on tele  Hep gtts infusing per protocol  2 FARIDEH drains intact  Midline incision intact  Pain managed w/ Norco  Call light in reach  Bed in low position

## 2025-06-30 NOTE — SPIRITUAL CARE NOTE
Spiritual Care Visit Note    Patient Name: Josh Jones Date of Spiritual Care Visit: 25   : 1/15/1955 Primary Dx: <principal problem not specified>       Referred By:      Spiritual Care Taxonomy:    Intended Effects: Demonstrate caring and concern    Methods: Offer support, Collaborate with care team member         Visit Type/Summary:     - Spiritual Care: Consulted with RN prior to visit. Offered empathic listening and emotional support. Dontrell declined a  visit at this time.  remains available as needed for follow up.    Spiritual Care support can be requested via an Epic consult. For urgent/immediate needs, please contact the On Call  at: Edward: ext [phone]

## 2025-06-30 NOTE — PROGRESS NOTES
Pharmacy Dosing Service: Warfarin (Coumadin)  Josh Jones is a 70 year old male for whom pharmacy has been dosing warfarin (Coumadin). Goal INR is 2-3    Recent Labs   Lab 06/26/25  0519 06/27/25  0559 06/28/25  0526 06/29/25  0546 06/30/25  0540   INR 1.15 1.16 1.21* 1.22* 1.34*     Consulted by: Elaine CLANCY   Indication: Mechanical valve in aortic position (INR goal 2 -3)  Potential Drug Interactions:  Divalproex and Famotidine (both may result in increased warfarin exposure, increased INR, and an increased risk of bleeding)  Other Anticoagulants:  Heparin gtt until INR therapeutic   Home regimen (if applicable):  Warfarin 7.5mg qhs    Inpatient Dosing History  Date 6/26 6/27 6/28 6/29 6/30   INR 1.15 1.16 1.21 1.22 1.34   Coumadin dose 7.5 mg 7.5 mg 7.5 mg 9 mg  Pharmacy consulted  to dose         Based on above -  1.  For today, Give warfarin (COUMADIN) 10 mg at 2100 tonight  2   PT/INR ordered daily while on coumadin  3.  Pharmacy will continue to follow.  We appreciate the opportunity to assist in his care.    Audrey Stallings, PharmD  6/30/2025  7:56 AM

## 2025-06-30 NOTE — PROGRESS NOTES
[facility]   part of [facility]     Hospitalist Progress Note     Josh Jones Patient Status:  Inpatient    1/15/1955 MRN [id number]   [facility] 7NE-A Attending Tyrone Danielle MD   Hosp Day # 5 PCP MERVAT PHAM     Chief Complaint: abdominal pain     Subjective:     Patient off PCA. Pain controlled. Tolerating PO.       Objective:    Review of Systems:   A comprehensive review of systems was completed; pertinent positive and negatives stated in subjective.    Vital signs:  Temp:  [97.5 °F (36.4 °C)-98.2 °F (36.8 °C)] 97.7 °F (36.5 °C)  Pulse:  [59-64] 62  Resp:  [16-18] 18  BP: (121-133)/(69-82) 127/69  SpO2:  [93 %-96 %] 93 %    Physical Exam:    General: No acute distress  Respiratory: No wheezes, no rhonchi  Cardiovascular: S1, S2, regular rate and rhythm  Abdomen: Soft, Non-tender, non-distended, positive bowel sounds  Neuro: No new focal deficits.   Extremities: No edema      Diagnostic Data:    Labs:  Recent Labs   Lab 25  0519 25  0716 25  0559 25  0526 25  0546 25  0540   WBC  --  8.1 9.7 8.9 5.6 4.9   HGB  --  12.6* 12.7* 11.4* 11.5* 11.0*   MCV  --  94.9 95.2 92.7 92.5 94.2   PLT  --  141.0* 137.0* 144.0* 157.0 161.0   INR 1.15  --  1.16 1.21* 1.22* 1.34*       Recent Labs   Lab 25  0526 25  0546 25  0540   * 104* 92   BUN 15 14 13   CREATSERUM 0.88 0.85 0.82   CA 8.8 9.1 9.1    140 142   K 3.8  3.8 4.0  4.0 4.0    106 106   CO2 26.0 25.0 25.0       Estimated Glomerular Filtration Rate: 94 mL/min/1.73m2 (result from lab).    Recent Labs   Lab 25  1408   TROPHS 4       Recent Labs   Lab 25  0526 25  0546 25  0540   PTP 15.5* 15.5* 16.7*   INR 1.21* 1.22* 1.34*                  Microbiology    No results found for this visit on 25.      Imaging: Reviewed in Epic.    Medications: Scheduled Medications[1]    Assessment & Plan:      #Ventral incisional hernia  S/p abd  wall re-construction with plastics and surg onc  Post op care per surgonc  CLD, IVF  Lovenox for DVT proph  Pain control: PCA  DC wilkes      #SVT  #Paroxysmal a. Fib  BB    #Bicuspid aortic valve s/p AVR  Coumadin/ heparin bridge  Monitor INR -      #Bipolar d/o  Aisha Degroot MD    Supplementary Documentation:     Quality:  DVT Mechanical Prophylaxis:   SCDs, Early ambuation  DVT Pharmacologic Prophylaxis   Medication    warfarin (Coumadin) tab 10 mg    heparin (Porcine) 45886 units/250mL infusion ACS/AFIB CONTINUOUS                Code Status: Full Code  Wilkes: No urinary catheter in place  Wilkes Duration (in days): 2  Central line:    TARIQ:     Discharge is dependent on: clinical   At this point Mr. Jones is expected to be discharge to: home     The 21st Century Cures Act makes medical notes like these available to patients in the interest of transparency. Please be advised this is a medical document. Medical documents are intended to carry relevant information, facts as evident, and the clinical opinion of the practitioner. The medical note is intended as peer to peer communication and may appear blunt or direct. It is written in medical language and may contain abbreviations or verbiage that are unfamiliar.              **Certification      PHYSICIAN Certification of Need for Inpatient Hospitalization - Initial Certification    Patient will require inpatient services that will reasonably be expected to span two midnight's based on the clinical documentation in H+P.   Based on patients current state of illness, I anticipate that, after discharge, patient will require TBD.            [1]    warfarin  10 mg Oral Once at night    polyethylene glycol (PEG 3350)  17 g Oral Daily    magnesium oxide  400 mg Oral Once    lamoTRIgine  100 mg Oral QAM    melatonin  5 mg Oral Nightly    famotidine  20 mg Oral BID    Or    famotidine  20 mg Intravenous BID    magnesium oxide  400 mg Oral Daily     docusate sodium  100 mg Oral Daily    metoprolol tartrate  25 mg Oral Daily Beta Blocker    divalproex ER  250 mg Oral Nightly    divalproex ER  1,000 mg Oral Nightly    metoprolol tartrate  12.5 mg Oral Nightly    folic acid  400 mcg Oral QPM

## 2025-06-30 NOTE — HOME CARE LIAISON
[facility] Spoke w/ patient who is agreeable with [facility]. Contact information placed on AVS.

## 2025-06-30 NOTE — PLAN OF CARE
Assumed care at 0730  A&Ox4, VSS, up with standby assist   Norco given for pain   Tolerating diet   FARIDEH x2 intact   Midline incision intact   Heparin gtt infusing per protocol   Patient and family updated on POC   All questions answered   Call light within reach

## 2025-07-01 LAB
APTT PPP: 55.1 SECONDS (ref 23–36)
INR BLD: 1.51 (ref 0.8–1.2)
PROTHROMBIN TIME: 18.4 SECONDS (ref 11.6–14.8)

## 2025-07-01 PROCEDURE — 99232 SBSQ HOSP IP/OBS MODERATE 35: CPT | Performed by: STUDENT IN AN ORGANIZED HEALTH CARE EDUCATION/TRAINING PROGRAM

## 2025-07-01 RX ORDER — SENNOSIDES 8.6 MG
8.6 TABLET ORAL 2 TIMES DAILY
Status: DISCONTINUED | OUTPATIENT
Start: 2025-07-01 | End: 2025-07-03

## 2025-07-01 NOTE — PROGRESS NOTES
Progress Note  Josh Jones Patient Status:  Inpatient    1/15/1955 MRN [id number]   Location [facility] 7NE-A Attending Tyrone Danielle MD   Hosp Day # 6 PCP MERVAT PHAM     Subjective:  Up in chair, feeling well overall. No complaints. Endorses some constipation. Tolerating AC well. PT levels rising to goal. Still has not had BM despite medical therapy.     Objective:  /79 (BP Location: Left arm)   Pulse 65   Temp 98.3 °F (36.8 °C) (Oral)   Resp 16   Ht 6' 2\" (1.88 m)   Wt 219 lb (99.3 kg)   SpO2 96%   BMI 28.12 kg/m²     Telemetry: SR / SB with occasional PVC present.       Intake/Output:    Intake/Output Summary (Last 24 hours) at 2025 0924  Last data filed at 2025 1000  Gross per 24 hour   Intake 200 ml   Output 250 ml   Net -50 ml       Last 3 Weights   25 0727 219 lb (99.3 kg)   25 1549 220 lb (99.8 kg)   25 1156 223 lb 12.8 oz (101.5 kg)   25 1500 232 lb 3.2 oz (105.3 kg)       Labs:  Recent Labs   Lab 25  0526 25  0546 25  0540   * 104* 92   BUN 15 14 13   CREATSERUM 0.88 0.85 0.82   EGFRCR 93 93 94   CA 8.8 9.1 9.1    140 142   K 3.8  3.8 4.0  4.0 4.0    106 106   CO2 26.0 25.0 25.0     Recent Labs   Lab 25  0526 25  0546 25  0540   RBC 3.56* 3.59* 3.45*   HGB 11.4* 11.5* 11.0*   HCT 33.0* 33.2* 32.5*   MCV 92.7 92.5 94.2   MCH 32.0 32.0 31.9   MCHC 34.5 34.6 33.8   RDW 13.8 13.8 13.6   NEPRELIM 6.34 3.47 2.39   WBC 8.9 5.6 4.9   .0* 157.0 161.0         Recent Labs   Lab 25  1408   TROPHS 4       Review of Systems   Cardiovascular: Negative.    Respiratory: Negative.         Physical Exam:    Gen: alert, oriented x 3, NAD  Heent: pupils equal, reactive. Mucous membranes moist.   Neck: no jvd  Cardiac: regular rate and rhythm, normal S1,S2, +click  Lungs: CTA  Abd: soft, Binder in place  Ext: no edema  Skin: Warm, dry  Neuro: No focal deficits      Medications:    Scheduled  Medications[1]  Medication Infusions[2]    Assessment:  s/p abdominal wall reconstruction for ventral incisional hernia-6/25/25  Per surgery post op management  Mechanical AVR 2001  On iv heparin and warfarin.   INR goal 2-3  Historically preserved LVEF  PAF-maintaining NSR  Hx neuroendocrine GI tumor s/p surgical resection  Non-obstructive CAD by CCTA  Negative FFR in 2023  Monitoring ascending aneurysm as OP. On BB.   Htn-controlled  HL-intolerant to statins and declined additional tx      Plan:  INR 1.51 today. Cont coumadin. Heparin gtt until INR therapeutic. Pharmacy to assist with dosing.  Cont bb.   Post op management per surgery.    Huy Couch MD   Advanced Heart Failure and Transplant Cardiology   [facility] ([facility])     L2         [1]    warfarin  11 mg Oral Once at night    polyethylene glycol (PEG 3350)  17 g Oral Daily    magnesium oxide  400 mg Oral Once    lamoTRIgine  100 mg Oral QAM    melatonin  5 mg Oral Nightly    famotidine  20 mg Oral BID    Or    famotidine  20 mg Intravenous BID    magnesium oxide  400 mg Oral Daily    docusate sodium  100 mg Oral Daily    metoprolol tartrate  25 mg Oral Daily Beta Blocker    divalproex ER  250 mg Oral Nightly    divalproex ER  1,000 mg Oral Nightly    metoprolol tartrate  12.5 mg Oral Nightly    folic acid  400 mcg Oral QPM   [2]    continuous dose heparin 1,300 Units/hr (07/01/25 0858)

## 2025-07-01 NOTE — PROGRESS NOTES
[facility]   part of [facility]     Hospitalist Progress Note     Josh Jones Patient Status:  Inpatient    1/15/1955 MRN [id number]   [facility] 7NE-A Attending Tyrone Danielle MD   Hosp Day # 6 PCP MERVAT PHAM     Chief Complaint: abdominal pain     Subjective:     Pt constipated. Norco controlling pain.      Objective:    Review of Systems:   A comprehensive review of systems was completed; pertinent positive and negatives stated in subjective.    Vital signs:  Temp:  [97.6 °F (36.4 °C)-98.3 °F (36.8 °C)] 97.6 °F (36.4 °C)  Pulse:  [57-77] 62  Resp:  [16-18] 18  BP: (109-139)/(63-89) 122/68  SpO2:  [92 %-96 %] 92 %    Physical Exam:    General: No acute distress  Respiratory: No wheezes, no rhonchi  Cardiovascular: S1, S2, regular rate and rhythm  Abdomen: Soft, Non-tender, non-distended, positive bowel sounds  Neuro: No new focal deficits.   Extremities: No edema      Diagnostic Data:    Labs:  Recent Labs   Lab 25  0716 25  0559 25  0526 25  0546 25  0540 25  0558   WBC 8.1 9.7 8.9 5.6 4.9  --    HGB 12.6* 12.7* 11.4* 11.5* 11.0*  --    MCV 94.9 95.2 92.7 92.5 94.2  --    .0* 137.0* 144.0* 157.0 161.0  --    INR  --  1.16 1.21* 1.22* 1.34* 1.51*       Recent Labs   Lab 25  0526 25  0546 25  0540   * 104* 92   BUN 15 14 13   CREATSERUM 0.88 0.85 0.82   CA 8.8 9.1 9.1    140 142   K 3.8  3.8 4.0  4.0 4.0    106 106   CO2 26.0 25.0 25.0       Estimated Glomerular Filtration Rate: 94 mL/min/1.73m2 (result from lab).    Recent Labs   Lab 25  1408   TROPHS 4       Recent Labs   Lab 25  0546 25  0540 25  0558   PTP 15.5* 16.7* 18.4*   INR 1.22* 1.34* 1.51*                  Microbiology    No results found for this visit on 25.      Imaging: Reviewed in Epic.    Medications: Scheduled Medications[1]    Assessment & Plan:      #Ventral incisional hernia  S/p abd wall  re-construction with plastics and surg onc  Post op care per surgonc  CLD, IVF  Lovenox for DVT proph  Pain control: PCA  DC wilkes      #SVT  #Paroxysmal a. Fib  BB    #Bicuspid aortic valve s/p AVR  Coumadin/ heparin bridge  Monitor INR -      #Bipolar d/o  Aisha Degroot MD    Supplementary Documentation:     Quality:  DVT Mechanical Prophylaxis:   SCDs, Early ambuation  DVT Pharmacologic Prophylaxis   Medication    warfarin (Coumadin) tab 11 mg    heparin (Porcine) 16128 units/250mL infusion ACS/AFIB CONTINUOUS                Code Status: Full Code  Wilkes: No urinary catheter in place  Wilkes Duration (in days): 2  Central line:    TARIQ:     Discharge is dependent on: clinical   At this point Mr. Jones is expected to be discharge to: home     The 21st Century Cures Act makes medical notes like these available to patients in the interest of transparency. Please be advised this is a medical document. Medical documents are intended to carry relevant information, facts as evident, and the clinical opinion of the practitioner. The medical note is intended as peer to peer communication and may appear blunt or direct. It is written in medical language and may contain abbreviations or verbiage that are unfamiliar.              **Certification      PHYSICIAN Certification of Need for Inpatient Hospitalization - Initial Certification    Patient will require inpatient services that will reasonably be expected to span two midnight's based on the clinical documentation in H+P.   Based on patients current state of illness, I anticipate that, after discharge, patient will require TBD.            [1]    warfarin  11 mg Oral Once at night    sennosides  8.6 mg Oral BID    polyethylene glycol (PEG 3350)  17 g Oral Daily    magnesium oxide  400 mg Oral Once    lamoTRIgine  100 mg Oral QAM    melatonin  5 mg Oral Nightly    famotidine  20 mg Oral BID    Or    famotidine  20 mg Intravenous BID    magnesium  oxide  400 mg Oral Daily    metoprolol tartrate  25 mg Oral Daily Beta Blocker    divalproex ER  250 mg Oral Nightly    divalproex ER  1,000 mg Oral Nightly    metoprolol tartrate  12.5 mg Oral Nightly    folic acid  400 mcg Oral QPM

## 2025-07-01 NOTE — PLAN OF CARE
Assumed pt care at 1930  A&Ox4, able to make needs known  NSR on tele  Pain managed w/ Norco  Hep gtts infusing per protocol  1x w/ walker, walked the halls few times  Call light in reach  Bed in low position

## 2025-07-01 NOTE — PLAN OF CARE
Assumed care at 0730  A&Ox4, VSS, up with standby assist   Norco given for pain   Tolerating diet   Heparin gtt infusing per protocol   FARIDEH x2 intact   Midline incision intact   Patient and family updated on POC   All questions answered   Call light within reach

## 2025-07-01 NOTE — PROGRESS NOTES
Plastic Surgery Progress Note    Josh Jones is a 70 year old male POD#6 s/p abdominal wall reconstruction with component separation and placement of Strattice acellular dermal matrix with Dr. Danielle and Dr. Szymanski.     /68 (BP Location: Left arm)   Pulse 62   Temp 97.6 °F (36.4 °C) (Oral)   Resp 18   Ht 1.88 m (6' 2\")   Wt 99.3 kg (219 lb)   SpO2 92%   BMI 28.12 kg/m²     Physical examination:   Awake and alert  Abd soft and appropriately tender  Abdominal incisions c/d/I, steri strips in place  Left abd drain sites (2) with old drainage but intact  Drain effluent serosanguinous    Assessment: POD#6 s/p abdominal wall reconstruction with component separation and placement of Strattice acellular dermal matrix . Doing well. Feeling better and moving better today. Pain is controlled.    Plan:   Awaiting therapeutic INR. Continue heparin - Coumadin bridge  INR today 1.51   Hg - 11 yesterday  Continue ambulation in halls and room as tolerated  Reports constipation, continue stool softeners and laxative  Encouraged adequate hydration and diet  Continue pain management    Case reviewed with RN and patient  Patient cleared for discharge from plastic surgery standpoint once INR is therapeutic  Reviewed importance of wearing abd binder at all times and at discharge  RN to review drain care  Patient to monitor drain output and empty drains BID  Follow up in our office after discharge  All questions answered today    ASTON Jurado  7/1/2025  3:09 PM

## 2025-07-01 NOTE — PROGRESS NOTES
[facility] Pharmacy Dosing Service  Warfarin (Coumadin) Subsequent Dosing    Josh Jones is a 70 year old patient for whom pharmacy is dosing warfarin (Coumadin). Goal INR is 2-3    Recent Labs   Lab 06/27/25  0559 06/28/25  0526 06/29/25  0546 06/30/25  0540 07/01/25  0558   INR 1.16 1.21* 1.22* 1.34* 1.51*     Consulted by: Elaine CLANCY   Indication: Mechanical valve in aortic position (INR goal 2 -3)  Potential Drug Interactions:  Divalproex and Famotidine (both may result in increased warfarin exposure, increased INR, and an increased risk of bleeding)  Other Anticoagulants:  Heparin gtt until INR therapeutic   Home regimen (if applicable):  Warfarin 7.5mg qhs     Inpatient Dosing History  Date 6/26 6/27 6/28 6/29 6/30 7/1   INR 1.15 1.16 1.21 1.22 1.34 1.51   Coumadin dose 7.5 mg 7.5 mg 7.5 mg 9 mg  Pharmacy consulted  to dose  10 mg        Based on above -  1.  For today, Give warfarin (COUMADIN) 11 mg at 2100 tonight  2   PT/INR ordered daily while on warfarin  3.  Pharmacy will continue to follow.  We appreciate the opportunity to assist in the care of this patient.    Audrey Stallings, PharmD  7/1/2025  7:48 AM

## 2025-07-01 NOTE — OPERATIVE REPORT
Date of operation 6/25/2025    Preoperative Diagnosis:  Intra-abdominal adhesions in patient scheduled for ventral hernia repair with planned component separation    Postoperative Diagnosis:  Same    Procedure Performed:  Exploratory laparotomy, lysis of adhesions    Surgeon:  Tyrone Danielle MD    Assistants:  PA: Bernie Redd PA-C  APN: Lynne Bhatti APRN     Anesthesia:  General endotracheal    Estimated Blood Loss:  Minimal    Specimens:  None    Complications:  None    Disposition:  Stable to PACU    Indications for Surgery:  The patient is a 70-year-old male with a large, symptomatic ventral hernia and a history of multiple prior abdominal operations. He was evaluated by plastic surgery and scheduled for formal abdominal wall reconstruction with component separation. Given concerns for dense intra-abdominal adhesions and risk of visceral injury during hernia repair, an initial exploratory laparotomy with planned lysis of adhesions was requested to facilitate a safe dissection plane prior to reconstruction.    Procedure in Detail:  After induction of general anesthesia, the patient was positioned supine and all pressure points were padded. The abdomen was prepped and draped in the usual sterile fashion. Timeout was performed.    A midline laparotomy incision was made through the previous scar. Dense adhesions were encountered immediately beneath the fascia involving the omentum, small bowel, and portions of the colon adherent to the anterior abdominal wall. Adhesiolysis was performed using sharp and blunt dissection and electrocautery, with careful attention to avoid enterotomy or visceral injury.    Dissection was carried laterally and circumferentially to fully mobilize the abdominal contents away from the anterior abdominal wall. The field was irrigated and inspected for hemostasis, which was excellent. The bowel appeared viable throughout.    At this point, the case was handed off to the  plastic surgery team for abdominal wall reconstruction with component separation and definitive hernia repair.    The patient tolerated the procedure well and was transferred to the PACU in stable condition.    Tyrone Danielle MD  Complex General Surgical Oncology  System Medical Director of Surgical Services  [facility]

## 2025-07-02 LAB
ANION GAP SERPL CALC-SCNC: 7 MMOL/L (ref 0–18)
APTT PPP: 55.3 SECONDS (ref 23–36)
APTT PPP: 76.9 SECONDS (ref 23–36)
BUN BLD-MCNC: 12 MG/DL (ref 9–23)
CALCIUM BLD-MCNC: 9 MG/DL (ref 8.7–10.6)
CHLORIDE SERPL-SCNC: 106 MMOL/L (ref 98–112)
CO2 SERPL-SCNC: 26 MMOL/L (ref 21–32)
CREAT BLD-MCNC: 0.86 MG/DL (ref 0.7–1.3)
EGFRCR SERPLBLD CKD-EPI 2021: 93 ML/MIN/1.73M2 (ref 60–?)
ERYTHROCYTE [DISTWIDTH] IN BLOOD BY AUTOMATED COUNT: 13.6 %
GLUCOSE BLD-MCNC: 108 MG/DL (ref 70–99)
HCT VFR BLD AUTO: 32.3 % (ref 39–53)
HGB BLD-MCNC: 11.2 G/DL (ref 13–17.5)
INR BLD: 1.89 (ref 0.8–1.2)
MCH RBC QN AUTO: 32 PG (ref 26–34)
MCHC RBC AUTO-ENTMCNC: 34.7 G/DL (ref 31–37)
MCV RBC AUTO: 92.3 FL (ref 80–100)
OSMOLALITY SERPL CALC.SUM OF ELEC: 288 MOSM/KG (ref 275–295)
PLATELET # BLD AUTO: 197 10(3)UL (ref 150–450)
POTASSIUM SERPL-SCNC: 4.1 MMOL/L (ref 3.5–5.1)
PROTHROMBIN TIME: 21.9 SECONDS (ref 11.6–14.8)
RBC # BLD AUTO: 3.5 X10(6)UL (ref 3.8–5.8)
SODIUM SERPL-SCNC: 139 MMOL/L (ref 136–145)
WBC # BLD AUTO: 6.9 X10(3) UL (ref 4–11)

## 2025-07-02 PROCEDURE — 99232 SBSQ HOSP IP/OBS MODERATE 35: CPT | Performed by: STUDENT IN AN ORGANIZED HEALTH CARE EDUCATION/TRAINING PROGRAM

## 2025-07-02 RX ORDER — HYDROCODONE BITARTRATE AND ACETAMINOPHEN 5; 325 MG/1; MG/1
1-2 TABLET ORAL EVERY 6 HOURS PRN
Qty: 20 TABLET | Refills: 0 | Status: SHIPPED | OUTPATIENT
Start: 2025-07-02

## 2025-07-02 NOTE — PROGRESS NOTES
[facility] Pharmacy Dosing Service  Warfarin (Coumadin) Subsequent Dosing    Josh Jones is a 70 year old patient for whom pharmacy is dosing warfarin (Coumadin). Goal INR is 2-3    Recent Labs   Lab 06/28/25  0526 06/29/25  0546 06/30/25  0540 07/01/25  0558 07/02/25  0634   INR 1.21* 1.22* 1.34* 1.51* 1.89*       Consulted by:  ASTON Armendariz  Indication:  mechanical aortic valve replacement  Potential Drug Interactions:  Depakote  Other Anticoagulants:  IV heparin  Home regimen:  warfarin 7.5 mg nightly    Inpatient Dosing History:    Date 6/25 6/26 6/27 6/28 6/29 6/30   INR 1.08 1.15 1.16 1.21 1.22 1.34   Coumadin dose - 7.5 mg 7.5 mg 7.5 mg 9 mg 10 mg          Date 7/1 7/2       INR 1.51 1.89       Coumadin dose 11 mg           Based on above -  1.  For today, Give warfarin (COUMADIN) 9 mg at 2100 tonight  2   PT/INR ordered daily while on warfarin  3.  Pharmacy will continue to follow.  We appreciate the opportunity to assist in the care of this patient.    Zohra Simon, PharmD  7/2/2025  11:49 AM

## 2025-07-02 NOTE — PROGRESS NOTES
Plastic Surgery Progress Note    Josh Jones is a 70 year old male POD#7 s/p exploratory laparotomy, lysis of adhesions (Dr. Danielle) and  abdominal wall reconstruction with component separation and placement of Strattice acellular dermal matrix (Dr. Szymanski).  Patient denies any acute events or problems overnight. Pain controlled (5/10). Patient denies fever/chills, nausea/vomiting, shortness of breath, calf pain, difficulty with urination. Patient reports having a bowel movement last night.     24 hour drain output was 5 mL.   INR- 1.89  Hgb: 11.2    /69 (BP Location: Left arm)   Pulse 56   Temp 97.6 °F (36.4 °C) (Oral)   Resp 18   Ht 1.88 m (6' 2\")   Wt 99.3 kg (219 lb)   SpO2 94%   BMI 28.12 kg/m²     Physical examination:   Awake and alert  Abdominal incision is clean, dry, and intact  Abdomen is soft and appropriately tender  Left abdominal drain sites clean, dry, and intact with serosanguinous fluid present    Assessment: POD#7 s/p exploratory laparotomy, lysis of adhesions (Dr. Danielle) and  abdominal wall reconstruction with component separation and placement of Strattice acellular dermal matrix (Dr. Szymanski). . Doing well.    Plan:   Clear to discharge from plastic surgery perspective once INR in therapeutic range  Continue ambulation  Binder to be work at all time  1 drain was removed from the left abdomen today. Patient tolerated this well   Follow-up with plastic surgery office once discharged- our office will call to set this up.     Lynne Bhatti, ASTON  7/2/2025  11:47 AM

## 2025-07-02 NOTE — PLAN OF CARE
Assumed care at 1930  Alert and oriented x4   NSR on tele and on RA  Pain in abd, norco given   Hep gtt infusing per order  Wolf's intact, see flowsheets   Wolf dressing changed, see flowsheets   Ambulated halls  Up ad jana   Call light w/in reach

## 2025-07-02 NOTE — DIETARY NOTE
[facility]   part of [facility]   CLINICAL NUTRITION    Josh Jones     Admitting diagnosis:  Hernia [K46.9]  Hernia    Ht: 188 cm (6' 2\")  Wt: 99.3 kg (219 lb).   Body mass index is 28.12 kg/m².  IBW: 86.4 kg    Wt Readings from Last 6 Encounters:   06/25/25 99.3 kg (219 lb)   05/02/25 101.5 kg (223 lb 12.8 oz)   12/13/24 106.1 kg (234 lb)   06/10/24 109.9 kg (242 lb 4.8 oz)   10/20/23 105.7 kg (233 lb)   07/03/23 97.5 kg (215 lb)        Labs/Meds reviewed    Diet:       Procedures    Regular/General diet Is Patient on Accuchecks? Yes     Percent Meals Eaten (last 3 days)       Date/Time Percent Meals Eaten (%)    06/30/25 1000 75 %    07/01/25 1015 75 %            Pt chart reviewed d/t LOS.  Patient reports good appetite at this time.  Nursing notes reports Percent Meals Eaten (%): 75 % intake for last meal.  Tolerating po diet without diarrhea, emesis, or constipation.   No significant weight changes noted.     PMH includes diverticulitis, HTN, Bipolar d/o.  Pt screened for LOS.  POD# 7 for exlap w/ JILLIAN and ventral hernia repair.  Pt tolerating 75% of meals per EMR.  RN notes pt eating well and family bringing in food from outside.  No n/v/d reported. Last BM 7/1 (Soft).  Per EMR review - pt has lost about 15#/6% wt x 6 mo (not significant per ASPEN guidelines).  Continue to monitor.  Pt has ONS ordered BID to support healing post op.  RD will continue to monitor and support as appropriate.    Patient is at low nutrition risk at this time.    Please consult if patient status changes or nutrition issues arise.    Fely Hightower RD, LDN, [facility]  Clinical Dietitian  Phone [phone]

## 2025-07-02 NOTE — PROGRESS NOTES
Progress Note  Josh Jones Patient Status:  Inpatient    1/15/1955 MRN [id number]   Location [facility] 7NE-A Attending Tyrone Danielle MD   Hosp Day # 7 PCP MERVAT PHAM     Subjective:  In chair on exam. No acute distress.     Objective:  /69 (BP Location: Left arm)   Pulse 56   Temp 97.6 °F (36.4 °C) (Oral)   Resp 18   Ht 6' 2\" (1.88 m)   Wt 219 lb (99.3 kg)   SpO2 94%   BMI 28.12 kg/m²     Telemetry: nsr      Intake/Output:    Intake/Output Summary (Last 24 hours) at 2025 1103  Last data filed at 2025 0620  Gross per 24 hour   Intake --   Output 5 ml   Net -5 ml       Last 3 Weights   25 0727 219 lb (99.3 kg)   25 1549 220 lb (99.8 kg)   25 1156 223 lb 12.8 oz (101.5 kg)   25 1500 232 lb 3.2 oz (105.3 kg)       Labs:  Recent Labs   Lab 25  0546 25  0540 25  0634   * 92 108*   BUN 14 13 12   CREATSERUM 0.85 0.82 0.86   EGFRCR 93 94 93   CA 9.1 9.1 9.0    142 139   K 4.0  4.0 4.0 4.1    106 106   CO2 25.0 25.0 26.0     Recent Labs   Lab 25  0526 25  0546 25  0540 25  0634   RBC 3.56* 3.59* 3.45* 3.50*   HGB 11.4* 11.5* 11.0* 11.2*   HCT 33.0* 33.2* 32.5* 32.3*   MCV 92.7 92.5 94.2 92.3   MCH 32.0 32.0 31.9 32.0   MCHC 34.5 34.6 33.8 34.7   RDW 13.8 13.8 13.6 13.6   NEPRELIM 6.34 3.47 2.39  --    WBC 8.9 5.6 4.9 6.9   .0* 157.0 161.0 197.0         Recent Labs   Lab 25  1408   TROPHS 4       Review of Systems   Cardiovascular: Negative.    Respiratory: Negative.         Physical Exam:    Gen: alert, oriented x 3, NAD  Heent: pupils equal, reactive. Mucous membranes moist.   Neck: no jvd  Cardiac: regular rate and rhythm, normal S1,S2, +click  Lungs: CTA  Abd: soft, NT/ND +bs  Ext: no edema  Skin: Warm, dry  Neuro: No focal deficits        Medications:    Scheduled Medications[1]  Medication Infusions[2]        Assessment:  s/p abdominal wall reconstruction for ventral incisional  hernia-6/25/25  Per surgery post op management  Mechanical AVR 2001  On iv heparin and warfarin.   INR goal 2-3  Historically preserved LVEF  Patient had hemicolectomy in 2023 with post op hemorrhagic shock. He attributes this to lovenox and is hesitant about lovenox bridging. He has been bridged many times since, including prior to procedure this admission.  PAF-maintaining NSR  Hx neuroendocrine GI tumor s/p surgical resection  Non-obstructive CAD by CCTA  Negative FFR in 2023  Monitoring ascending aneurysm as OP. On BB.   Htn-controlled  HL-intolerant to statins and declined additional tx    Plan:  Remains on heparin/coumadin. Pharmacy dosing. INR 1.89 today. Suspect will be at goal tomorrow. We discussed the option of discharge today with lovenox. Patient prefers not to bridge with lovenox as above. He has a home INR machine. Patient would like to stay until tomorrow. If INR>2.0, will not need bridging. If INR<2.0, will consider lovenox bridging. Will need new script as pre-op he had 5 doses.   Cont bb.   Post op management per surgery.     Plan of care discussed with patient, RN.    ASTON Guerra  7/2/2025  11:03 AM      Note reviewed and labs reviewed. Agree with above assessment and plan. MDM per me.  70-year-old male with mechanical AVR that was implanted in 2001.  Presented to the hospital for abdominal wall reconstruction for ventral incisional hernia.  Awaiting for INR to reach therapeutic range.  INR is 1.89 today.  As patient does not want to take Lovenox after discharge, we will have to wait till INR target is reached.  Will follow.        Mick Goyal DO  Cardiologist  [facility]  7/2/2025 2:04 PM      Note to the patient: The 21st Century Cures Act makes medical notes like these available to patients in the interest of transparency. However, be advised that this is a medical document. It is intended as peer to peer communication. It is written in medical language and  may contain abbreviations or verbiage that are unfamiliar. It may appear blunt or direct. Medical documents are intended to carry relevant information, facts as evident, and clinical opinion of the practitioner.     Disclaimer: Components of this note were documented using voice recognition system and are subject to errors not corrected at proofreading. Contact the author of this note for any clarifications.            [1]    sennosides  8.6 mg Oral BID    polyethylene glycol (PEG 3350)  17 g Oral Daily    magnesium oxide  400 mg Oral Once    lamoTRIgine  100 mg Oral QAM    melatonin  5 mg Oral Nightly    famotidine  20 mg Oral BID    Or    famotidine  20 mg Intravenous BID    magnesium oxide  400 mg Oral Daily    metoprolol tartrate  25 mg Oral Daily Beta Blocker    divalproex ER  250 mg Oral Nightly    divalproex ER  1,000 mg Oral Nightly    metoprolol tartrate  12.5 mg Oral Nightly    folic acid  400 mcg Oral QPM   [2]    continuous dose heparin 1,150 Units/hr (07/02/25 0720)

## 2025-07-02 NOTE — PROGRESS NOTES
[facility]   part of [facility]     Hospitalist Progress Note     Josh Jones Patient Status:  Inpatient    1/15/1955 MRN [id number]   [facility] 7NE-A Attending Tyrone Danielle MD   Hosp Day # 7 PCP MERVAT PHAM     Chief Complaint: abdominal pain     Subjective:     Pt had BM overnight. Pain improved.      Objective:    Review of Systems:   A comprehensive review of systems was completed; pertinent positive and negatives stated in subjective.    Vital signs:  Temp:  [97.6 °F (36.4 °C)-98.8 °F (37.1 °C)] 97.9 °F (36.6 °C)  Pulse:  [56-74] 63  Resp:  [14-18] 18  BP: (114-131)/(68-74) 131/73  SpO2:  [94 %-96 %] 94 %    Physical Exam:    General: No acute distress  Respiratory: No wheezes, no rhonchi  Cardiovascular: S1, S2, regular rate and rhythm  Abdomen: Soft, Non-tender, non-distended, positive bowel sounds  Neuro: No new focal deficits.   Extremities: No edema      Diagnostic Data:    Labs:  Recent Labs   Lab 25  0559 25  0526 25  0546 25  0540 25  0558 25  0634   WBC 9.7 8.9 5.6 4.9  --  6.9   HGB 12.7* 11.4* 11.5* 11.0*  --  11.2*   MCV 95.2 92.7 92.5 94.2  --  92.3   .0* 144.0* 157.0 161.0  --  197.0   INR 1.16 1.21* 1.22* 1.34* 1.51* 1.89*       Recent Labs   Lab 25  0546 25  0540 25  0634   * 92 108*   BUN 14 13 12   CREATSERUM 0.85 0.82 0.86   CA 9.1 9.1 9.0    142 139   K 4.0  4.0 4.0 4.1    106 106   CO2 25.0 25.0 26.0       Estimated Glomerular Filtration Rate: 93 mL/min/1.73m2 (result from lab).    Recent Labs   Lab 25  1408   TROPHS 4       Recent Labs   Lab 25  0540 25  0558 25  0634   PTP 16.7* 18.4* 21.9*   INR 1.34* 1.51* 1.89*                  Microbiology    No results found for this visit on 25.      Imaging: Reviewed in Epic.    Medications: Scheduled Medications[1]    Assessment & Plan:      #Ventral incisional hernia  S/p abd wall re-construction  with plastics and surg onc  Post op care per surgonc  Lovenox for DVT proph  Pain control: PCA  DC wilkes      #SVT  #Paroxysmal a. Fib  BB    #Bicuspid aortic valve s/p AVR  Coumadin/ heparin bridge  Monitor INR -      #Bipolar d/o  Aisha Degroot MD    Supplementary Documentation:     Quality:  DVT Mechanical Prophylaxis:   SCDs, Early ambuation  DVT Pharmacologic Prophylaxis   Medication    warfarin (Coumadin) tab 9 mg    heparin (Porcine) 85540 units/250mL infusion ACS/AFIB CONTINUOUS                Code Status: Full Code  Wilkes: No urinary catheter in place  Wilkes Duration (in days): 2  Central line:    TARIQ:     Discharge is dependent on: clinical   At this point Mr. Jones is expected to be discharge to: home     The 21st Century Cures Act makes medical notes like these available to patients in the interest of transparency. Please be advised this is a medical document. Medical documents are intended to carry relevant information, facts as evident, and the clinical opinion of the practitioner. The medical note is intended as peer to peer communication and may appear blunt or direct. It is written in medical language and may contain abbreviations or verbiage that are unfamiliar.              **Certification      PHYSICIAN Certification of Need for Inpatient Hospitalization - Initial Certification    Patient will require inpatient services that will reasonably be expected to span two midnight's based on the clinical documentation in H+P.   Based on patients current state of illness, I anticipate that, after discharge, patient will require TBD.            [1]    warfarin  9 mg Oral Once at night    sennosides  8.6 mg Oral BID    polyethylene glycol (PEG 3350)  17 g Oral Daily    magnesium oxide  400 mg Oral Once    lamoTRIgine  100 mg Oral QAM    melatonin  5 mg Oral Nightly    famotidine  20 mg Oral BID    Or    famotidine  20 mg Intravenous BID    magnesium oxide  400 mg Oral Daily     metoprolol tartrate  25 mg Oral Daily Beta Blocker    divalproex ER  250 mg Oral Nightly    divalproex ER  1,000 mg Oral Nightly    metoprolol tartrate  12.5 mg Oral Nightly    folic acid  400 mcg Oral QPM

## 2025-07-03 VITALS
WEIGHT: 219 LBS | DIASTOLIC BLOOD PRESSURE: 79 MMHG | BODY MASS INDEX: 28.11 KG/M2 | SYSTOLIC BLOOD PRESSURE: 122 MMHG | OXYGEN SATURATION: 92 % | HEART RATE: 71 BPM | RESPIRATION RATE: 18 BRPM | HEIGHT: 74 IN | TEMPERATURE: 99 F

## 2025-07-03 LAB
APTT PPP: 82.2 SECONDS (ref 23–36)
INR BLD: 2.2 (ref 0.8–1.2)
PROTHROMBIN TIME: 24.7 SECONDS (ref 11.6–14.8)

## 2025-07-03 PROCEDURE — 99239 HOSP IP/OBS DSCHRG MGMT >30: CPT | Performed by: STUDENT IN AN ORGANIZED HEALTH CARE EDUCATION/TRAINING PROGRAM

## 2025-07-03 RX ORDER — WARFARIN SODIUM 7.5 MG/1
7.5 TABLET ORAL NIGHTLY
Status: SHIPPED | COMMUNITY
Start: 2025-07-03

## 2025-07-03 NOTE — PLAN OF CARE
Assumed care at 1930  Alert and oriented x4   NSR on tele and on RA  Pain in abd, norco given   Hep gtt infusing per order  Wolf intact, see flowsheets    Ambulated halls  Up ad jana   Call light w/in reach

## 2025-07-03 NOTE — PLAN OF CARE
Assumed care at 0730  A&Ox4, VSS, up adlib   Norco given for pain   Tolerating diet   FARIDEH #2 removed by plastics   FARIDEH #1 intact   Midline incision intact   Heparin gtt infusing per protcol   Patient updated on POC   All questions answered   Call light within reach

## 2025-07-03 NOTE — CM/SW NOTE
07/03/25 1000   Discharge disposition   Expected discharge disposition Home-Health   Post Acute Care Provider Residential

## 2025-07-03 NOTE — PLAN OF CARE
INR 2.2 today.  Historically patient was on coumadin 7.5mg at bedtime, and therapeutic.  Rarely required additional 1/2 tab once a week. Will dc on coumadin 7.5mg at bedtime once again.  He shouldcheck INR tomorrow to ensure contiued therapuetic INR.  If subtherapeutic can take additional 1/2 tabo f coumadin to total 11.25mg tomorrow.

## 2025-07-03 NOTE — PROGRESS NOTES
Assumed patient care 0730  Patient alert and oriented X4  On room air, VSS, NSR  on tele  Patient c/o HA 7/10, managed with PRN Norco with effectiveness  Up ad jana in room  Tolerating low fiber soft diet  Patient and family at bedside updated on plan of care   Pt re-educated and teach back seen in caring for drains.   Pt and family had a lot of questions and all were answered.   Clarified warfarin dosage for tonight, pt verbally agreed.  Pt cleared by consults for discharge  Pharmacy completed meds to beds with patient     NURSING DISCHARGE NOTE  All discharge documentation including AVS, follow ups, and medications reviewed with patient and family, and verbalized understanding. Signs and symptoms when to call 911 discussed with patient and family at bedside, verbalized understanding.   Discharged Home via Wheelchair.  Accompanied by Family member  Belongings taken by patient/family.

## 2025-07-03 NOTE — PROGRESS NOTES
[facility] Dosing Service  Warfarin (Coumadin) Subsequent Dosing    Josh Jones is a 70 year old patient for whom pharmacy is dosing warfarin (Coumadin). Goal INR is 2-3    Recent Labs   Lab 06/29/25  0546 06/30/25  0540 07/01/25  0558 07/02/25  0634 07/03/25  0555   INR 1.22* 1.34* 1.51* 1.89* 2.20*     Consulted by:  ASTON Armendariz  Indication:  mechanical aortic valve replacement  Potential Drug Interactions:  Depakote  Other Anticoagulants:  IV heparin  Home regimen:  warfarin 7.5 mg nightly    Inpatient Dosing History:     Date 6/25 6/26 6/27 6/28 6/29 6/30   INR 1.08 1.15 1.16 1.21 1.22 1.34   Coumadin dose - 7.5 mg 7.5 mg 7.5 mg 9 mg 10 mg                                                                           Date 7/1 7/2 7/3          INR 1.51 1.89  2.2         Coumadin dose 11 mg  9 mg  9 mg                         Based on above -  1.  For today, Give warfarin (COUMADIN) 9 mg at 2100 tonight  2   PT/INR ordered daily while on warfarin  3.  Pharmacy will continue to follow.  We appreciate the opportunity to assist in the care of this patient.    Anupama Guaman, Rhianna  7/3/2025  8:52 AM

## 2025-07-03 NOTE — DISCHARGE SUMMARY
[facility]     Josh Jones Patient Status:  Inpatient    1/15/1955 MRN [id number]   Location [facility] 7NE-A Attending Tyrone Danielle MD   Hosp Day # 8 PCP MERVAT PHAM     Date of Admission: 2025  Date of Discharge:   7/3/2025    Discharge Disposition: Home or Self Care    Discharge Diagnosis:  #Ventral incisional hernia  S/p abd wall re-construction with plastics and surg onc  Post op care per surgon  Lovenox for DVT proph  Pain control: PCA  DC wilkes      #SVT  #Paroxysmal a. Fib  BB     #Bicuspid aortic valve s/p AVR  Coumadin/ heparin bridge  Monitor INR -      #Bipolar d/o  Lamictal, Depakote     History of Present Illness:   Per Dr Danielle    Brief Synopsis:   Pt was admitted for reconstruction of ventral hernia  via plastic and surg onc. Hospital stay prolonged due to need for thx INR 2-3 for pt AVR. DC home INR 2.2     Lace+ Score: 33  59-90 High Risk  29-58 Medium Risk  0-28   Low Risk       TCM Follow-Up Recommendation:  LACE > 58: High Risk of readmission after discharge from the hospital.      Procedures during hospitalization:   As above     Incidental or significant findings and recommendations (brief descriptions):  no    Lab/Test results pending at Discharge:   no    Consultants:  Cards, Hosp, Surgery- plastics and surg Onc     Discharge Medication List:     Discharge Medications        START taking these medications        Instructions Prescription details   HYDROcodone-acetaminophen 5-325 MG Tabs  Commonly known as: Norco      Take 1-2 tablets by mouth every 6 (six) hours as needed for Pain.   Quantity: 20 tablet  Refills: 0            CHANGE how you take these medications        Instructions Prescription details   warfarin 7.5 MG Tabs  Commonly known as: Coumadin  What changed: additional instructions      Take 1 tablet (7.5 mg total) by mouth nightly. Check INR , If INR < 2, take coumadin 11.25mg (1 1/2 tabs)   Refills: 0             CONTINUE taking these medications        Instructions Prescription details   acetaminophen 325 MG Tabs  Commonly known as: Tylenol      Take 1 tablet (325 mg total) by mouth every 6 (six) hours as needed for Pain.   Refills: 0     divalproex  MG Tb24  Commonly known as: Depakote ER      Take 1 tablet (250 mg total) by mouth at bedtime.   Refills: 0     divalproex  MG Tb24  Commonly known as: Depakote ER      Take 2 tablets (1,000 mg total) by mouth at bedtime.   Refills: 0     folic acid 400 MCG Tabs  Commonly known as: Folvite      Take 1 tablet (400 mcg total) by mouth in the evening.   Refills: 0     lamoTRIgine 100 MG Tabs  Commonly known as: LaMICtal      Take 1 tablet (100 mg total) by mouth every morning.   Refills: 0     melatonin 5 MG Caps      Take 1 capsule (5 mg total) by mouth nightly.   Refills: 0     metoprolol tartrate 25 MG Tabs  Commonly known as: Lopressor      TAKE 1 TABLET BY MOUTH EVERY MORNING AND TAKE 1/2 TABLET BY MOUTH EVERY EVENING   Quantity: 135 tablet  Refills: 2     Sildenafil Citrate 100 MG Tabs  Commonly known as: VIAGRA      Take 1 tablet (100 mg total) by mouth daily as needed for Erectile Dysfunction.   Quantity: 30 tablet  Refills: 0     VITAMIN B-12 OR      Take 1 tablet by mouth daily.   Refills: 0     VITAMIN B-6 OR      Take 1 tablet by mouth daily.   Refills: 0     VITAMIN E/FOLIC ACID/B-6/B-12 OR      Take 1 tablet by mouth daily.   Refills: 0               Where to Get Your Medications        These medications were sent to [place], [place] - [address], [address] [phone], [phone]  [address], [facility] [address]      Phone: [phone]   HYDROcodone-acetaminophen 5-325 MG Tabs         [place]    Follow-up appointment:   Wade Goyal  [address]  [address]  [facility] [address] [phone]    Follow up in 1 week(s)      Tyrone Danielle MD  [address]   [address]  [address]  [phone]    Follow up  Follow up on 7/8 at 10 am  [address]   [address]  [place] [address]    Kaiser Triana MD  [address]  [address]  [facility] [address]  [phone]    Follow up on 7/7/2025  2:20pm.    Lynne Bhatti APRN  [address]  [facility] [address]  [phone]    Follow up on 7/8/2025  For wound re-check    Appointments for Next 30 Days 7/3/2025 - 8/2/2025        Date Arrival Time Visit Type Length Department Provider     7/8/2025  9:00 AM  CLINICAL POST OP VISIT [4050] 30 min [facility] Lynne Bhatti APRN    Patient Instructions:         Location Instructions:     \"Your appointment is located at [address]  in [place], [place].&nbsp; Please park in the Yellow lot and enter through the [facility] entrance. Then proceed to [address]. Masks are optional for all patients and visitors, unless otherwise indicated. Note: A $50 fee will be charged for missed appointments or same-day cancellations. Please provide 24 hours' notice if you need to cancel or reschedule your appointment.\"               7/8/2025 10:00 AM  CLINICAL POST OP VISIT [4050] 15 min [facility] Tyrone Danielle MD    Patient Instructions:         Location Instructions:     \"Your appointment is located at [address]  in [place], [place].&nbsp; Please park in the Yellow lot and enter through the [facility] entrance. Then proceed to [address]. Masks are optional for all patients and visitors, unless otherwise indicated. Note: A $50 fee will be charged for missed appointments or same-day cancellations. Please provide 24 hours' notice if you need to cancel or reschedule your appointment.\"                      Vital signs:  Temp:  [97.7 °F (36.5 °C)-98.5 °F (36.9 °C)] 98.5 °F (36.9 °C)  Pulse:  [56-73] 71  Resp:  [16-18] 18  BP: (117-128)/(62-79) 122/79  SpO2:  [92 %-94 %] 92 %    Physical Exam:    General: No acute distress    Lungs: clear to auscultation  Cardiovascular: S1, S2  Abdomen: Soft      -----------------------------------------------------------------------------------------------  PATIENT DISCHARGE INSTRUCTIONS: See electronic chart    Tiffany Cheema MD    Total time spent on discharge plannin minutes     The  Cent[employer] Cures Act makes medical notes like these available to patients in the interest of transparency. Please be advised this is a medical document. Medical documents are intended to carry relevant information, facts as evident, and the clinical opinion of the practitioner. The medical note is intended as peer to peer communication and may appear blunt or direct. It is written in medical language and may contain abbreviations or verbiage that are unfamiliar.

## 2025-07-07 ENCOUNTER — TELEPHONE (OUTPATIENT)
Dept: SURGERY | Facility: CLINIC | Age: 70
End: 2025-07-07

## 2025-07-07 ENCOUNTER — TELEPHONE (OUTPATIENT)
Facility: CLINIC | Age: 70
End: 2025-07-07

## 2025-07-07 NOTE — TELEPHONE ENCOUNTER
Contacted patient to address questions sent via [employer] over the weekend.  Advised patient against biking or any strenuous exercise at this time. Patient verbalized understanding.   Patient stated his constipation has resolved.  Inquired about patient's drain care. He stated the home health nurse came by and provided him with a measuring cup and he has been able to measure his drain output now. He has not had much output.   No additional questions or concerns at this time. His pain is well controlled. He overall feels well. Encouraged him to call back with any questions or concerns.

## 2025-07-07 NOTE — TELEPHONE ENCOUNTER
Post op call made to patient. Patient states he is doing good. Denies fevers, no nausea or vomiting. States pain is 4, controlled with PRN medication. Patient is tolerating activity without complaints. No concerns with surgical sites. Wound care instructions provided.    Post op appointment scheduled with Dr. Danielle on 7/8 at 2 pm.    Patient agrees to call if any problems or concerns.

## 2025-07-08 ENCOUNTER — OFFICE VISIT (OUTPATIENT)
Facility: CLINIC | Age: 70
End: 2025-07-08
Payer: MEDICARE

## 2025-07-08 ENCOUNTER — TELEPHONE (OUTPATIENT)
Facility: CLINIC | Age: 70
End: 2025-07-08

## 2025-07-08 VITALS
SYSTOLIC BLOOD PRESSURE: 113 MMHG | WEIGHT: 220 LBS | RESPIRATION RATE: 16 BRPM | DIASTOLIC BLOOD PRESSURE: 68 MMHG | HEART RATE: 113 BPM | BODY MASS INDEX: 28 KG/M2 | OXYGEN SATURATION: 97 %

## 2025-07-08 DIAGNOSIS — K43.2 VENTRAL INCISIONAL HERNIA: Primary | ICD-10-CM

## 2025-07-08 PROCEDURE — 99024 POSTOP FOLLOW-UP VISIT: CPT

## 2025-07-08 NOTE — PROGRESS NOTES
Josh Jones is a 70 year old male who presents today for a follow-up after  abdominal wall reconstruction with component separation and placement of Strattice acellular dermal matrix (Dr. Szymanski).     He denies fever and chills. He denies nausea, vomiting, diarrhea or constipation.   His pain is controlled with the use of Tylenol.      Physical Exam     Abdomen: Incision is clean, dry, intact.  There is no evidence of hematoma or seroma.  Abdomen is soft and appropriately tender.  Nondistended.  Left drain site is clean, dry, intact with serous fluid present.    There were no vitals filed for this visit.      Assessment and Plan     Josh Jones is doing well s/p abdominal wall reconstruction with component separation and placement of Strattice acellular dermal matrix (Dr. Szymanski).     Patient is here today for wound check and drain removal.  The remaining left abdominal drain was removed today due to low volume output.  The patient tolerated this well.  A dressing of Neosporin, 2 x 2, Tegaderm was placed.    The abdominal incision was redressed with new Steri-Strips today.  New TopiFoam and abdominal binder was placed.  Compression activity guidelines were reviewed with the patient.  He is following up with Dr. Danielle's team later today.  He is also following up with Dr. Szymanski on 8-12.  He was encouraged to contact the office with any additional questions or concerns.    Questions were answered. Patient understands.     The 21st Century Cures Act makes medical notes like these available to patients in the interest of transparency. Please be advised this is a medical document. Medical documents are intended to carry relevant information, facts as evident, and the clinical opinion of the practitioner. The medical note is intended as peer to peer communication and may appear blunt or direct. It is written in medical language and may contain abbreviations or verbiage that are unfamiliar.     Lynne  Davy, ASTON  7/8/2025  1:48 PM

## 2025-07-08 NOTE — TELEPHONE ENCOUNTER
Received message from Rudy, PT with [facility] stating patient is declining PT services and [facility] is discharging patient. Dr. Danielle notified, states noted, patient scheduled to be seen in clinic today 7/8/25.

## 2025-07-09 NOTE — PROGRESS NOTES
[facility]    Patient Name:  Josh Jones   YOB: 1955   Gender:  Male   Appt Date: 7/8/2025   Provider:  Tyrone Danielle MD   Insurance:  MEDICARE PART B ONLY     PATIENT PROVIDERS  Referring Provider: Dr. Lagos    Primary Care Provider:MERVAT PHAM   Address: [address] [place] [address]   Phone #: [phone]       CHIEF COMPLAINT  Chief Complaint   Patient presents with    Post-Op        PROBLEMS  Reviewed   Patient Active Problem List   Diagnosis    Requires lifelong warfarin therapy    Paroxysmal atrial fibrillation (HCC)    Recurrent HSV (herpes simplex virus)    Cyclothymia    Allergic rhinitis    Benign prostatic hyperplasia with incomplete bladder emptying    History of diverticulitis    H/O mechanical aortic valve replacement    Paroxysmal supraventricular tachycardia (HCC)    Verruca plantaris    Diverticulosis    Ascending aorta dilation    Arteriosclerosis of coronary artery    Bipolar affective (HCC)    Neuroendocrine neoplasm of small intestine (HCC)    Anticoagulation management encounter    Hernia    Ventral incisional hernia        History of Present Illness:  Patient here today for postoperative visit s/sp exploratory laparotomy, lysis of adhesions, hernia repair with component separation and acellular dermal matrix. Patient states pain has improved and he is feeling well. Denies fever, chills, nausea, vomiting. Eating and drinking well. Having regular bowel movements. Midline incision site healing nicely. Topifoam and abdominal binder in place. Patient is following with Dr. Szymanski for incisional care.      Oncologic history:  Patient initially presented with recurrent small bowel obstruction, first 11/2021 and second 4/2022. This was thought to be secondary to adhesions after patient partial sigmoid colectomy (for diverticulitis) with Dr. Lagos in June 2020. However, due to patient's recurrent obstruction, CT  enterography ordered to check for intraluminal pathology. CT completed 11/18/2022 demonstrates a focus of wall thickening in distal ileum 6cm from ileocecal junction measuring 15 x 14 x 11mm. This was several centimeters downstream from bowel caliber transition demonstrated from inpatient CT for obstruction. Follow up MRI enterography 3/14/2023 demonstrates a 1.8cm intraluminal nodule in the distal ileum approximately 6-8cm proximal to the ileocecal valve. MRI notes a small adjacent nodule within the mesentery which is probably a normal-sized lymph node.  5/22/2023: Robotic assisted right hemicolectomy. Pathology: neuroendocrine tumor grade 1 with 5/21 lymph nodes involved. Stage pT2, N1. Course complicated by hemorrhagic shock requiring ex lap and dialysis, but ultimately with renal recovery  10/13/2023: MRI A/P without evidence of recurrent or metastatic disease.  Patient developed ventral hernia. MRI on 4/25/2025 showing extensive ventral abdominal scarring along the midline with shana-incisional herniation and/or scarring along the superior margin. Ultimately, patient taken to the OR on 6/25/2025 for exploratory laparotomy, lysis of adhesions, hernia repair with component separation and acellular dermal matrix, joint case with Dr. Szymansik. Patient tolerated procedure well.      Vital Signs:  /68 (BP Location: Right arm, Patient Position: Sitting)   Pulse 113   Resp 16   Wt 99.8 kg (220 lb)   SpO2 97%   BMI 28.25 kg/m²      Medications Reviewed:    Current Outpatient Medications:     warfarin 7.5 MG Oral Tab, Take 1 tablet (7.5 mg total) by mouth nightly. Check INR Friday 7/4, If INR < 2, take coumadin 11.25mg (1 1/2 tabs), Disp: , Rfl:     HYDROcodone-acetaminophen 5-325 MG Oral Tab, Take 1-2 tablets by mouth every 6 (six) hours as needed for Pain., Disp: 20 tablet, Rfl: 0    divalproex  MG Oral Tablet 24 Hr, Take 2 tablets (1,000 mg total) by mouth at bedtime., Disp: , Rfl:     metoprolol  tartrate 25 MG Oral Tab, TAKE 1 TABLET BY MOUTH EVERY MORNING AND TAKE 1/2 TABLET BY MOUTH EVERY EVENING, Disp: 135 tablet, Rfl: 2    Pyridoxine HCl (VITAMIN B-6 OR), Take 1 tablet by mouth daily., Disp: , Rfl:     Cyanocobalamin (VITAMIN B-12 OR), Take 1 tablet by mouth daily., Disp: , Rfl:     Sildenafil Citrate 100 MG Oral Tab, Take 1 tablet (100 mg total) by mouth daily as needed for Erectile Dysfunction., Disp: 30 tablet, Rfl: 0    Multiple Vitamin (VITAMIN E/FOLIC ACID/B-6/B-12 OR), Take 1 tablet by mouth daily., Disp: , Rfl:     divalproex  MG Oral Tablet 24 Hr, Take 1 tablet (250 mg total) by mouth at bedtime., Disp: , Rfl: 0    acetaminophen 325 MG Oral Tab, Take 1 tablet (325 mg total) by mouth every 6 (six) hours as needed for Pain., Disp: , Rfl: 0    melatonin 5 MG Oral Cap, Take 1 capsule (5 mg total) by mouth nightly., Disp: , Rfl:     folic acid 400 MCG Oral Tab, Take 1 tablet (400 mcg total) by mouth in the evening., Disp: , Rfl:     lamoTRIgine 100 MG Oral Tab, Take 1 tablet (100 mg total) by mouth every morning., Disp: , Rfl:      Allergies Reviewed:  Allergies   Allergen Reactions    Cat Hair Extract ASTHMA    Dog Epithelium WHEEZING    Lactose DIARRHEA        History:  Reviewed:  Past Medical History:    Actinic keratosis    AF (paroxysmal atrial fibrillation) (HCC)    Bicuspid aortic valve    s/p AVR    Bipolar affective (HCC)    Cyclothymia    Sees Dr. Bravo     Diverticulitis    High blood pressure    High cholesterol    Hx of motion sickness    Hyperammonemia (HCC)    Due to Depakote     Pneumonia due to organism    S/P AVR (aortic valve replacement)    SVT (supraventricular tachycardia) (HCC)    Visual impairment    Readers      Reviewed:  Past Surgical History:   Procedure Laterality Date    Cath transcatheter aortic valve replacement  2001    [facility]     Colectomy  06/18/2020    Colon resection due to diverticulitis    Colectomy  05/2023    Underwent robotic  assisted right hemicolectomy 5/22    Colonoscopy screening - referral N/A 06/18/2020    Procedure: COLONOSCOPY-SCREENING;  Surgeon: Rahul Lagos MD;  Location: [facility] MAIN OR    Valve repair        Reviewed Social History:  Social History     Socioeconomic History    Marital status: Single   Occupational History    Occupation: Teacher   Tobacco Use    Smoking status: Never     Passive exposure: Past    Smokeless tobacco: Never   Vaping Use    Vaping status: Never Used   Substance and Sexual Activity    Alcohol use: No    Drug use: Not Currently     Types: Cannabis      Reviewed:  Family History   Problem Relation Age of Onset    Stroke Mother     Cancer Father         lung cancer    Cancer Sister         Review of Systems:  Review of Systems   Constitutional:  Negative for activity change, appetite change, chills, fatigue, fever and unexpected weight change.   HENT:  Negative for mouth sores, nosebleeds and trouble swallowing.    Eyes:  Negative for discharge and redness.   Respiratory:  Negative for cough, shortness of breath and wheezing.    Cardiovascular:  Negative for chest pain.   Gastrointestinal:  Negative for abdominal distention, abdominal pain, blood in stool, nausea and vomiting.   Genitourinary:  Negative for difficulty urinating and dysuria.   Musculoskeletal:  Negative for back pain and gait problem.   Skin:  Negative for color change.   Allergic/Immunologic: Negative for immunocompromised state.   Neurological:  Negative for speech difficulty, weakness and numbness.   Psychiatric/Behavioral:  Negative for confusion.         Physical Examination:  Physical Exam  Constitutional:       General: He is not in acute distress.     Appearance: He is well-developed. He is not diaphoretic.   HENT:      Head: Normocephalic and atraumatic.   Eyes:      General: No scleral icterus.        Right eye: No discharge.         Left eye: No discharge.      Conjunctiva/sclera: Conjunctivae normal.      Pupils: Pupils  are equal, round, and reactive to light.   Neck:      Thyroid: No thyromegaly.   Cardiovascular:      Rate and Rhythm: Normal rate and regular rhythm.      Heart sounds: No murmur heard.  Pulmonary:      Effort: Pulmonary effort is normal. No respiratory distress.      Breath sounds: Normal breath sounds. No wheezing.   Abdominal:      General: Bowel sounds are normal. There is no distension.      Palpations: Abdomen is soft. There is no mass.      Tenderness: There is no abdominal tenderness. There is no guarding or rebound.      Hernia: No hernia is present.          Comments: Midline ICS: healing nicely, steri strips intact.   Musculoskeletal:         General: Normal range of motion.      Cervical back: Normal range of motion and neck supple.   Skin:     General: Skin is warm and dry.   Neurological:      Mental Status: He is alert and oriented to person, place, and time.          Study Result    Narrative   PROCEDURE: MRI ABDOMEN+PELVIS (ALL W+WO) (CPT=74183/79293)     COMPARISON: [facility], MRI ABDOMEN+PELVIS (ALL W+WO) (CPT=74183/90580), 10/13/2023, 2:20 PM.  [facility], MRA CHEST (CPT=71555), 4/25/2025, 12:48 PM.  [facility], CT ENTEROGRAPHY ABD PEL W  CONTRAST SH (CPT-98768), 11/18/2022, 12:35 PM.  [facility], CT ABDOMEN + PELVIS (ALL CONTRAST ONLY) (CPT=74160/01160), 2/10/2020, 6:10 PM.  [facility], CT ABDOMEN + PELVIS (CONTRAST ONLY) (CPT=74177), 5/23/2023, 8:07 PM.    [facility], CT ABDOMEN + PELVIS (CONTRAST ONLY) (CPT=74177), 2/22/2021, 3:03 PM.  [facility], CT ABDOMEN + PELVIS (CONTRAST ONLY) (CPT=74177), 4/22/2020, 11:28 AM.  [facility], CT ABDOMEN + PELVIS (CONTRAST ONLY) (CPT=74177), 4/03/2020, 1:15 PM.  [facility], CT ABDOMEN+PELVIS (CPT=74176), 2/06/2025, 5:26 PM.  [facility],  CT ABDOMEN+PELVIS (CPT=74176), 5/28/2023, 4:03  PM.  [facility], CT ABDOMEN+PELVIS (CPT=74176), 4/07/2022, 9:30 PM.  [facility], MRI ABDOMEN+PELVIS (ALL W+WO) (CPT=74183/45270), 6/07/2024, 9:45 AM.     INDICATIONS: Neuroendocrine neoplasm of small intestine; oncology follow-up encounter (Z09, D3A.8).     TECHNIQUE: Multiplanar multisequence pre-and postcontrast MRI images of the abdomen and pelvis were obtained for evaluation using IV gadolinium. Dynamic multiphase delayed imaging was performed.    FINDINGS:  COMMENT: Overall examination quality is substantially compromised by patient motion artifact.  LUNG BASES: There is susceptibility artifact related to an aortic valvular prosthesis. Please see separately dictated report for the MRA of the chest for further details.  LIVER: Multiple well-circumscribed T2 hyperintense cystic lesions are seen, some of which exhibit thin internal septations. No suspicious mural nodularity or enhancing elements are detected.    BILIARY: Cholelithiasis is present. There is no intraluminal dilatation, gallbladder wall thickening, or pericholecystic inflammatory stranding. No intrahepatic or extrahepatic biliary dilatation is apparent.    PANCREAS: There is preservation of normal signal intensity on precontrast T1-weighted, fat-saturated images. No focal mass or main pancreatic duct dilatation is seen. There is no evidence of pancreatitis.  SPLEEN: No enlargement. A small splenule is present.    ADRENALS: Unremarkable, without focal mass or enlargement.    KIDNEYS: No hydronephrosis or solid masses are apparent. In the interpolar-inferior polar aspect of the right kidney, there is a 1.1 x 1.2 x 1.1 cm lesion with thin internal septations, not significantly changed. This exhibits perceptible enhancement.  Numerous well-circumscribed T2 hyperintense lesions are seen in both kidneys. These do not enhance and likely represent cysts.  GI TRACT: Postoperative  changes of right hemicolectomy and subtotal sigmoid colectomy. There is susceptibility artifact. Scattered colonic diverticula are present in the descending and sigmoid colon. There is no colonic wall thickening or pericolonic fat   stranding.  BLADDER: Urinary bladder is distended with fluid/urine without wall thickening or enhancing lesion.  PELVIC ORGANS: Pelvic organs are normal for age.  VASCULATURE: The portal vein, IVC, splenic, hepatic, renal veins, and mesenteric veins are patent.    LYMPH NODES: No lymphadenopathy.    ABDOMINAL WALL: Periumbilical scarring is demonstrated. Foci of susceptibility artifact in the anterior abdominal wall are likely related to prior operative intervention. Again seen is left paramedian shana-incisional herniation bowel or scar. Loops of  bowel are closely apposed to the ventral abdominal wall which may reflect underlying adhesion formation.    BONES: Suboptimally assessed by scan protocol, but without grossly apparent bony lesion or fracture. There is susceptibility artifact related to median sternotomy wires.       OTHER: Gynecomastia is partially visualized. Small volume free pelvic fluid is present. No loculated fluid collections are appreciated.                  Impression   CONCLUSION:  1. No evidence of intra-abdominal/intrapelvic malignancy/metastatic disease.     2. There is extensive ventral abdominal scarring along the midline with shana-incisional herniation and/or scarring along the superior margin.     3. Uncomplicated distal colonic diverticulosis.     4. Cholelithiasis without MR evidence of acute complication.       5. Minimally complex right renal cyst (Bosniak IIF), grossly unchanged.     6. Lesser incidental findings as above.           Document Review:  Surgical Pathology - 5/22/2023  Final Diagnosis:      Ileum, vermiform appendix and right colon; robot-assisted right hemicolectomy:  Infiltrating neuroendocrine tumor, grade 1, measuring 14 mm in maximum  dimension.  Tumor penetrates the muscularis propria.  Lipomatous ileocecal valve.  Vermiform appendix, negative for tumor.  Uninvolved ileal mucosa with no significant histopathological change.  Colonic mucosa with mild submucosal congestion.  Small mesenteric neuroendocrine tumor nodule (10 mm in maximum dimension).  Five of twenty one lymph nodes involved by tumor (5/21).  Largest tumor focus involving lymph node measures 2.5 mm.  Focal extracapsular extension is present.  Pericolonic adipose tissue with single discontinuous tumor deposit measuring 4.5 mm.  All inked margins are free of tumor.  Preliminary pathologic staging pT2, N1     Comment:  For  purposes, the diagnosis is reviewed by another pathologist concurs with the above diagnosis.     A limited panel of immunohistochemical stains (appropriate control reactivity) performed for further characterization.  Tumor cells show diffuse positivity by synaptophysin and chromogranin.  Ki-67 highlights proliferative index and is estimated to be <3%.  These findings support the diagnosis of neuroendocrine tumor, grade 1.  Clinical correlation with close clinical follow-up is recommended.        Assessment / Plan:  S/p exploratory laparotomy, lysis of adhesions with ventral hernia repair with acellular dermal matrix   Hx Neuroendocrine tumor of small bowel status post right hemicolectomy   Doing well post operatively  Patient following with Dr. Szymanski   Patient may follow up on as needed basis       ASTON Green  Department of Surgical Oncology  [facility]  [address] [place]  [address]  [facility]  [address] [place] [address]  T: [phone]  F: [phone]          Follow Up:  No follow-ups on file.

## 2025-07-10 ENCOUNTER — DOCUMENTATION ONLY (OUTPATIENT)
Dept: SURGERY | Facility: CLINIC | Age: 70
End: 2025-07-10

## 2025-07-11 DIAGNOSIS — K43.2 INCISIONAL HERNIA, WITHOUT OBSTRUCTION OR GANGRENE: Primary | ICD-10-CM

## 2025-07-11 RX ORDER — HYDROCODONE BITARTRATE AND ACETAMINOPHEN 5; 325 MG/1; MG/1
1-2 TABLET ORAL EVERY 6 HOURS PRN
Qty: 20 TABLET | Refills: 0 | Status: SHIPPED | OUTPATIENT
Start: 2025-07-11

## 2025-07-21 ENCOUNTER — TELEPHONE (OUTPATIENT)
Facility: CLINIC | Age: 70
End: 2025-07-21

## 2025-07-21 ENCOUNTER — DOCUMENTATION ONLY (OUTPATIENT)
Facility: CLINIC | Age: 70
End: 2025-07-21

## 2025-07-21 NOTE — TELEPHONE ENCOUNTER
Pt sent IB, regarding exercise, advised pt  walking is the most exercise we would recommend at this point. Please refrain from any strenuous exercise until you are evaluated by Dr. Szymanski on 8/12. Per last note on 07.13.25. Pt verbally understood and was appreciative of the call back.

## 2025-08-12 ENCOUNTER — OFFICE VISIT (OUTPATIENT)
Facility: CLINIC | Age: 70
End: 2025-08-12

## 2025-08-12 DIAGNOSIS — K43.2 VENTRAL INCISIONAL HERNIA: Primary | ICD-10-CM

## 2025-08-12 PROCEDURE — 99024 POSTOP FOLLOW-UP VISIT: CPT | Performed by: SURGERY

## (undated) DIAGNOSIS — Z02.9 ENCOUNTERS FOR UNSPECIFIED ADMINISTRATIVE PURPOSE: ICD-10-CM

## (undated) DEVICE — SUT PDS II 1-0 XLH Z881G

## (undated) DEVICE — SUT SILK 0 A306H

## (undated) DEVICE — FENESTRATED BIPOLAR FORCEPS: Brand: ENDOWRIST

## (undated) DEVICE — SUT SILK 3-0 SA64H

## (undated) DEVICE — 3M™ IOBAN™ 2 ANTIMICROBIAL INCISE DRAPE 6650EZ: Brand: IOBAN™ 2

## (undated) DEVICE — DRAIN INCS 7MM 20CMX7MM SIL

## (undated) DEVICE — SUT PDS II 1 96IN TP-1 ABSRB VLT L65MM 1/2

## (undated) DEVICE — ROBOTIC: Brand: MEDLINE INDUSTRIES, INC.

## (undated) DEVICE — MARYLAND JAW OPEN SEALER/DIVIDER COATED 23CM: Brand: LIGASURE

## (undated) DEVICE — SUT SILK 3-0 SH C013D

## (undated) DEVICE — ENDOPATH ECHELON ENDOSCOPIC LINEAR CUTTER RELOADS, BLUE, 60MM: Brand: ECHELON ENDOPATH

## (undated) DEVICE — COLUMN DRAPE

## (undated) DEVICE — TROCAR: Brand: KII SHIELDED BLADED ACCESS SYSTEM

## (undated) DEVICE — TROCARS: Brand: KII® BLUNT TIP ACCESS SYSTEM

## (undated) DEVICE — SUTURE VICRYL 0

## (undated) DEVICE — SOL NACL IRRIG 0.9% 1000ML BTL

## (undated) DEVICE — BLADE 11 SHRP BP SS SRG STRL

## (undated) DEVICE — MARKER SKIN PREP-RESISTANT ST: Brand: MEDLINE INDUSTRIES, INC.

## (undated) DEVICE — CANNULA SEAL

## (undated) DEVICE — HIPEC PACK: Brand: MEDLINE INDUSTRIES, INC.

## (undated) DEVICE — CLIP MED INTNL HMCLP TNTLM

## (undated) DEVICE — SUT VICRYL 0 J906G

## (undated) DEVICE — MEDI-VAC NON-CONDUCTIVE SUCTION TUBING: Brand: CARDINAL HEALTH

## (undated) DEVICE — ENDOPATH ECHELON ENDOSCOPIC LINEAR CUTTER RELOADS, WHITE, 60MM: Brand: ECHELON ENDOPATH

## (undated) DEVICE — CLIP HEMOLOK LARGE PURPLE

## (undated) DEVICE — SUT MCRYL 4-0 27IN ABSRB UD 19MM PS-2 3/8

## (undated) DEVICE — GAMMEX® PI HYBRID SIZE 7, STERILE POWDER-FREE SURGICAL GLOVE, POLYISOPRENE AND NEOPRENE BLEND: Brand: GAMMEX

## (undated) DEVICE — HARMONIC 1100 SHEARS, 20CM SHAFT LENGTH: Brand: HARMONIC

## (undated) DEVICE — SYSTEM SURGYPAD VELCRO 36IN

## (undated) DEVICE — A P RESECTION: Brand: MEDLINE INDUSTRIES, INC.

## (undated) DEVICE — SUTURE VICRYL 0 UR-6

## (undated) DEVICE — BINDER ABD UNISX 9IN 62IN L AND XL UNIV

## (undated) DEVICE — TROCAR: Brand: KII FIOS FIRST ENTRY

## (undated) DEVICE — ANTIBACTERIAL UNDYED BRAIDED (POLYGLACTIN 910), SYNTHETIC ABSORBABLE SUTURE: Brand: COATED VICRYL

## (undated) DEVICE — SUT PROL 1-0 30IN CTX NABSRB BLU L30MM 1/2 CI

## (undated) DEVICE — TIP COVER ACCESSORY

## (undated) DEVICE — GAMMEX® PI HYBRID SIZE 7.5, STERILE POWDER-FREE SURGICAL GLOVE, POLYISOPRENE AND NEOPRENE BLEND: Brand: GAMMEX

## (undated) DEVICE — SUTURE PDS II 0 Z991G

## (undated) DEVICE — SUT ETHILON 3-0 PS-2 1669H

## (undated) DEVICE — LIGASURE LAP MARYLAND 37CM

## (undated) DEVICE — DERMABOND CLOSURE 0.7ML TOPICL

## (undated) DEVICE — SUT SILK 3-0 SH K832H

## (undated) DEVICE — DRAPE FLD WRM W44XL66IN C6L FOR INTRATEMP SYS

## (undated) DEVICE — 3M™ BAIR HUGGER® UNDERBODY BLANKET, FULL ACCESS, 10 PER CASE 63500: Brand: BAIR HUGGER™

## (undated) DEVICE — STAPLER 60 RELOAD BLUE: Brand: SUREFORM

## (undated) DEVICE — SUT SILK 2-0 SH K833H

## (undated) DEVICE — BLADE 24 SS SRG STRL

## (undated) DEVICE — WECK HORIZON MED BLUE CLIP DISP

## (undated) DEVICE — APPLICATOR CHLORAPREP 26ML

## (undated) DEVICE — ABSORBABLE HEMOSTAT (OXIDIZED REGENERATED CELLULOSE, U.S.P.): Brand: SURGICEL

## (undated) DEVICE — DRAPE,UNDRBUT,WHT GRAD PCH,CAPPORT,20/CS: Brand: MEDLINE

## (undated) DEVICE — SLIM BODY SKIN STAPLER: Brand: APPOSE ULC

## (undated) DEVICE — REDUCER: Brand: ENDOWRIST

## (undated) DEVICE — SPONGE: SPECIALTY PEANUT XR 100/CS: Brand: MEDICAL ACTION INDUSTRIES

## (undated) DEVICE — Device

## (undated) DEVICE — DRAIN RESERVOIR RELIAVAC 100CC

## (undated) DEVICE — SUTURE SILK 2-0 SA85H

## (undated) DEVICE — GLOVE SUR 7.5 SENSICARE PI PIP CRM PWD F

## (undated) DEVICE — VIOLET BRAIDED (POLYGLACTIN 910), SYNTHETIC ABSORBABLE SUTURE: Brand: COATED VICRYL

## (undated) DEVICE — PAD ALC 43.5X24IN PREP  LF

## (undated) DEVICE — VESSEL SEALER EXTEND: Brand: ENDOWRIST

## (undated) DEVICE — SLEEVE COMPR MD KNEE LEN SGL USE KENDALL SCD

## (undated) DEVICE — SUTURE SILK 2-0 SH

## (undated) DEVICE — CLIP SM INTNL HMCLP TNTLM ESCP

## (undated) DEVICE — SUTURE PASSOR WITH GUIDE

## (undated) DEVICE — GOWN SURG AERO BLUE PERF LG

## (undated) DEVICE — SUT PROLENE 3-0 SH 8522H

## (undated) DEVICE — SUT PROLENE 2-0 SH 8533H

## (undated) DEVICE — PERMANENT CAUTERY HOOK: Brand: ENDOWRIST

## (undated) DEVICE — PACK DRAPE HEAD/NECK STER

## (undated) DEVICE — 35 ML SYRINGE REGULAR TIP: Brand: MONOJECT

## (undated) DEVICE — GLOVE SUR 6.5 SENSICARE PI PIP CRM PWD F

## (undated) DEVICE — DRAPE SLUSH WARMER 44X66

## (undated) DEVICE — SIGMOIDOSCOPIC SUCTION INSTRUMENT 18 FR W/WINGED CAP CONTROL AND 6 FOOT (1.8M) TUBING: Brand: SIGMOIDOSCOPIC

## (undated) DEVICE — YANKAUER SUCTION INSTRUMENT NO CONTROL VENT, BULB TIP, CLEAR: Brand: YANKAUER

## (undated) DEVICE — ABSORBABLE WOUND CLOSURE DEVICE: Brand: V-LOC 90

## (undated) DEVICE — SURGICEL FIBULAR 2X4

## (undated) DEVICE — DRAPE CASSETTE X-RAY

## (undated) DEVICE — AEGIS 1" DISK 4MM HOLE, PEEL OPEN: Brand: MEDLINE

## (undated) DEVICE — SUT SILK 2-0 SA85H

## (undated) DEVICE — SUT PERMA- 3-0 18IN SH NABSRB BLK CR 26MM

## (undated) DEVICE — INTENDED TO BE USED TO OCCLUDE, RETRACT AND IDENTIFY ARTERIES, VEINS, TENDONS AND NERVES IN SURGICAL PROCEDURES: Brand: STERION®  VESSEL LOOP

## (undated) DEVICE — LEGGINGS SRG 48X31IN CUF STRL

## (undated) DEVICE — CLIP LG INTNL HMCLP TNTLM ESCP

## (undated) DEVICE — LINE MNTR ADLT SET O2 INTMD

## (undated) DEVICE — SEAL

## (undated) DEVICE — APPLIER LICACLIP MCA MED 23.8

## (undated) DEVICE — ELECTRO LUBE IS A SINGLE PATIENT USE DEVICE THAT IS INTENDED TO BE USED ON ELECTROSURGICAL ELECTRODES TO REDUCE STICKING.: Brand: KEY SURGICAL ELECTRO LUBE

## (undated) DEVICE — FLEXIBLE YANKAUER,MEDIUM TIP, NO VACUUM CONTROL: Brand: ARGYLE

## (undated) DEVICE — SPONGE PREMIERPRO 7X18X18

## (undated) DEVICE — TOWEL SURG OR 17X30IN BLUE

## (undated) DEVICE — SYRINGE MED 10ML LL TIP W/O SFTY DISP

## (undated) DEVICE — LAPAROTOMY SPONGE - RF AND X-RAY DETECTABLE PRE-WASHED: Brand: SITUATE

## (undated) DEVICE — SUT PROLENE 4-0 RB-1 8557H

## (undated) DEVICE — SINGLE-USE SNARE: Brand: CAPTIVATOR™ COLD

## (undated) DEVICE — TRAY FOLEY BDX 16F STATLOCK

## (undated) DEVICE — RETAINER ORGAN M W5.875XL9.125IN ABD RADPQ

## (undated) DEVICE — STAPLER 60 RELOAD WHITE: Brand: SUREFORM

## (undated) DEVICE — [HIGH FLOW INSUFFLATOR,  DO NOT USE IF PACKAGE IS DAMAGED,  KEEP DRY,  KEEP AWAY FROM SUNLIGHT,  PROTECT FROM HEAT AND RADIOACTIVE SOURCES.]: Brand: PNEUMOSURE

## (undated) DEVICE — MEDI-VAC NON-CONDUCTIVE SUCTION TUBING 6MM X 1.8M (6FT.) L: Brand: CARDINAL HEALTH

## (undated) DEVICE — Device: Brand: CUSTOM PROCEDURE KIT

## (undated) DEVICE — CURAD NONADHERANT PAD 3X8

## (undated) DEVICE — SUT ETHLN 3-0 18IN PS-2 NABSRB BLK 19MM 3/8 C

## (undated) DEVICE — EXOFIN PRECISION PEN HIGH VISCOSITY TOPICAL SKIN ADHESIVE: Brand: EXOFIN PRECISION PEN, 1G

## (undated) DEVICE — SUTURE CHROMIC 2-0 801H

## (undated) DEVICE — Device: Brand: DEFENDO AIR/WATER/SUCTION AND BIOPSY VALVE

## (undated) DEVICE — ARM DRAPE

## (undated) DEVICE — SUT PDS II 3-0 SH Z316H

## (undated) DEVICE — Device: Brand: JELCO

## (undated) DEVICE — PROXIMATE RELOADABLE LINEAR CUTTER WITH SAFETY LOCK-OUT, 75MM: Brand: PROXIMATE

## (undated) DEVICE — SUT PERMA- 3-0 30IN SH NABSRB BLK 26MM 1/2

## (undated) DEVICE — DISPOSABLE SUCTION/IRRIGATOR TUBE SET: Brand: AHTO

## (undated) DEVICE — TRAY CATH 16FR F INCL BARDX IC COMPLT CARE

## (undated) DEVICE — DRAPE SHEET LAPCHOLE 124X100X7

## (undated) DEVICE — SURGICAL SUCTION CONNECTING TUBE WITH MALE CONNECTOR AND SUCTION CLAMP, 2 FT. LONG (.6 M), 5 MM I.D.: Brand: CONMED

## (undated) DEVICE — BLADELESS OBTURATOR: Brand: WECK VISTA

## (undated) DEVICE — GLOVE SUR 8 SENSICARE PI PIP CRM PWD F

## (undated) DEVICE — 3M™ IOBAN™ 2 ANTIMICROBIAL INCISE DRAPE 6651EZ: Brand: IOBAN™ 2

## (undated) DEVICE — TRAY SURESTEP 16 BARDEX DRAIN

## (undated) DEVICE — ECHELON FLEX POWERED PLUS ARTICULATING ENDOSCOPIC LINEAR CUTTER , 60MM: Brand: ECHELON FLEX

## (undated) DEVICE — SUTURE ETHILON 3-0 669H

## (undated) DEVICE — UNDYED BRAIDED (POLYGLACTIN 910), SYNTHETIC ABSORBABLE SUTURE: Brand: COATED VICRYL

## (undated) DEVICE — SOLUTION IRRIG 1000ML 0.9% NACL USP BTL

## (undated) DEVICE — SUT SILK 0 SH K834H

## (undated) DEVICE — STAPLER 60: Brand: SUREFORM

## (undated) DEVICE — SUT COAT VCRL 2-0 27IN SH ABSRB UD 26MM 1/2

## (undated) DEVICE — PROXIMATE SKIN STAPLERS (35 WIDE) CONTAINS 35 STAINLESS STEEL STAPLES (FIXED HEAD): Brand: PROXIMATE

## (undated) DEVICE — GOWN,PREVENTION PLUS,L,ST,24/CS: Brand: MEDLINE

## (undated) DEVICE — LAPAROSCOPIC ACCESS SYSTEM: Brand: ALEXIS LAPAROSCOPIC SYSTEM

## (undated) DEVICE — SUTURE VICRYL 0 J906G

## (undated) DEVICE — SUT ETHIBOND 2-0 SH X833H

## (undated) DEVICE — SUT VICRYL 0 CT-1 J340H

## (undated) DEVICE — YANKAUER,POOLE TIP,STERILE,50/CS: Brand: MEDLINE

## (undated) DEVICE — LAPAROVUE VISIBILITY SYSTEM LAPAROSCOPIC SOLUTIONS: Brand: LAPAROVUE

## (undated) DEVICE — SHEET,DRAPE,40X58,STERILE: Brand: MEDLINE

## (undated) DEVICE — GAMMEX® NON-LATEX PI ORTHO SIZE 7.5, STERILE POLYISOPRENE POWDER-FREE SURGICAL GLOVE: Brand: GAMMEX

## (undated) DEVICE — TROCAR: Brand: KII® SLEEVE

## (undated) DEVICE — SUTURE MONOCRYL 3-0 Y497G

## (undated) DEVICE — SOLUTION  .9 3000ML

## (undated) DEVICE — PROXIMATE RH ROTATING HEAD SKIN STAPLERS (35 WIDE) CONTAINS 35 STAINLESS STEEL STAPLES: Brand: PROXIMATE

## (undated) DEVICE — GLOVE SUR 7 SENSICARE PI PIP CRM PWD F

## (undated) DEVICE — PROVIDES A STERILE INTERFACE BETWEEN THE OPERATING ROOM SURGICAL LAMPS (NON-STERILE) AND THE SURGEON OR NURSE (STERILE).: Brand: STERION®CLAMP COVER FABRIC

## (undated) DEVICE — SUT PROL 3-0 36IN SH NABSRB BLU 26MM 1/2 CIR

## (undated) DEVICE — SUTURE SILK 3-0 C017D

## (undated) DEVICE — SOL  .9 1000ML BTL

## (undated) DEVICE — SOLUTION ENDOSCOPIC ANTI-FOG NON-TOXIC NON-ABRASIVE 6 CUBIC CENTIMETER WITH RADIOPAQUE ADHESIVE-BACKED SPONGE STERILE NOT MADE WITH NATURAL RUBBER LATEX MEDICHOICE: Brand: MEDICHOICE

## (undated) DEVICE — ACCESS PLATFORM FOR MINIMALLY INVASIVE SURGERY.: Brand: GELPORT® LAPAROSCOPIC  SYSTEM

## (undated) DEVICE — GOWN,SIRUS,FABRNF,XL,20/CS: Brand: MEDLINE

## (undated) NOTE — LETTER
[address] [place], [place], [address]       TO:     FAX NUMBER:      FROM:      FAX NUMBER:      REGARDING: CLEARANCE TO [place]               Dr. Ifrah Solarse mutual patient,                          (:             ) has an order for a PROCEDURE      to be scheduled at [facility] with the Interventional Radiologist.     Requesting for clearance to stop                             for     DAYS      prior to procedure.        _____Yes, ok to Stop                        ________No, Do not Stop     If no specify reason____________________________________________________________           __________________________________________________________________  MD Peng Marrero                                                                DATE/TIME

## (undated) NOTE — LETTER
Fredi Baldwin [address], [facility]  [address]  INFORMED CONSENT FOR TRANSFUSION OF BLOOD OR BLOOD PRODUCTS  My physician has informed me of the nature, purpose, benefits and risks of transfusion for blood and blood components that he/she may deem necessary during my treatment or hospitalization. He/she has also discussed alternatives to receiving blood from the voluntary blood supply with me, such as self-donation (autologous) and directed donation (blood donated by family or friends to be used specifically for me). I further understand that while the [address] [place] [address] will attempt to supply any autologous or directed donor blood prior to transfusion of blood from the routine blood supply, medical circumstances may require that other or additional blood components may be required for my care. In giving consent, I acknowledge that my physician has also informed me that despite careful screening and testing in accordance with national and regional regulations, there is still a small risk of transmission of infectious agents including hepatitis, HIV-1/2, cytomegalovirus and other viruses or diseases as yet unknown for which licensed definitive screening tests do not currently exist. Additionally, my physician has informed me of the potential for transfusion reactions not related to an infectious agent. [  ]  Check here for Recurring Outpatient Transfusion Therapy (valid for 1 year) In addition to the above, my physician has informed me that I shall receive numerous transfusions over a period of time and that these can lead to other increased risks. I hereby authorize the transfusion of blood and/or blood products to me as deemed necessary and ordered by physicians participating in my care.  My physician has given me the opportunity to ask questions and any questions asked have been answered to my satisfaction  __________________________________________ ______________________________________________  (Signature of Patient)                                                            (Responsible party in case of Minor,                                                                                                 Incompetent, or unconscious Patient)  ___________________________________________       ________ ______________________________________  (Relationship to Patient)                                                       (Signature of Witness)  ______________________________________________________________________________________________   (Date)                                                                           (Time)  REFUSAL OF CONSENT FOR BLOOD TRANSFUSIONS   Sign only if Refusing   [  ] I understand refusal of blood or blood products as deemed necessary by my physician may have serious consequences to my condition to include possible death.  I hereby assume responsibility for my refusal and release the hospital, its personnel, and my physicians from any responsibility for the consequences of my refusal.    ________________________________________________________________________________  (Signature of Patient)                                                         (Responsible Party/Relationship to Patient)    ________________________________________________________________________________  (Signature of Witness)                                                       (Date/Time)     Patient Name: Katarina Motley     : 1/15/1955                 Printed: 2023      Medical Record #: [id number]                                 Page 1 of 1

## (undated) NOTE — LETTER
[address], [place]  Authorization for Invasive Procedures  Date: 05/24/23          Time: 10:45    I hereby authorize Dr. Padmini Hicks , my physician and his/her assistants (if applicable), which may include medical students, residents, and/or fellows, to perform the following surgical operation/ procedure and administer such anesthesia as may be determined necessary by my physician: Central line placement  on Jennifer Anderson  2. I recognize that during the surgical operation/procedure, unforeseen conditions may necessitate additional or different procedures than those listed above. I, therefore, further authorize and request that the above-named surgeon, assistants, or designees perform such procedures as are, in their judgment, necessary and desirable. 3.   My surgeon/physician has discussed prior to my surgery the potential benefits, risks and side effects of this procedure; the likelihood of achieving goals; and potential problems that might occur during recuperation. They also discussed reasonable alternatives to the procedure, including risks, benefits, and side effects related to the alternatives and risks related to not receiving this procedure. I have had all my questions answered and I acknowledge that no guarantee has been made as to the result that may be obtained. 4.   Should the need arise during my operation/procedure, which includes change of level of care prior to discharge, I also consent to the administration of blood and/or blood products. Further, I understand that despite careful testing and screening of blood or blood products by collecting agencies, I may still be subject to ill effects as a result of receiving a blood transfusion and/or blood products.   The following are some, but not all, of the potential risks that can occur: fever and allergic reactions, hemolytic reactions, transmission of diseases such as Hepatitis, AIDS and Cytomegalovirus (CMV) and fluid overload. In the event that I wish to have an autologous transfusion of my own blood, or a directed donor transfusion, I will discuss this with my physician. Check only if Refusing Blood or Blood Products  I understand refusal of blood or blood products as deemed necessary by my physician may have serious consequences to my condition to include possible death. I hereby assume responsibility for my refusal and release the hospital, its personnel, and my physicians from any responsibility for the consequences of my refusal.         o  Refuse         5. I authorize the use of any specimen, organs, tissues, body parts or foreign objects that may be removed from my body during the operation/procedure for diagnosis, research or teaching purposes and their subsequent disposal by hospital authorities. I also authorize the release of specimen test results and/or written reports to my treating physician on the hospital medical staff or other referring or consulting physicians involved in my care, at the discretion of the Pathologist or my treating physician. 6.   I consent to the photographing or videotaping of the operations or procedures to be performed, including appropriate portions of my body for medical, scientific, or educational purposes, provided my identity is not revealed by the pictures or by descriptive texts accompanying them. If the procedure has been photographed/videotaped, the surgeon will obtain the original picture, image, videotape or CD. The hospital will not be responsible for storage, release or maintenance of the picture, image, tape or CD.    7.   I consent to the presence of a  or observers in the operating room as deemed necessary by my physician or their designees. 8.   I recognize that in the event my procedure results in extended X-Ray/fluoroscopy time, I may develop a skin reaction. 9.  If I have a Do Not Attempt Resuscitation (DNAR) order in place, that status will be suspended while in the operating room, procedural suite, and during the recovery period unless otherwise explicitly stated by me (or a person authorized to consent on my behalf). The surgeon or my attending physician will determine when the applicable recovery period ends for purposes of reinstating the DNAR order. 10. Patients having a sterilization procedure: I understand that if the procedure is successful the results will be permanent and it will therefore be impossible for me to inseminate, conceive, or bear children. I also understand that the procedure is intended to result in sterility, although the result has not been guaranteed. 11. I acknowledge that my physician has explained sedation/analgesia administration to me including the risk and benefits I consent to the administration of sedation/analgesia as may be necessary or desirable in the judgment of my physician. I CERTIFY THAT I HAVE READ AND FULLY UNDERSTAND THE ABOVE CONSENT TO OPERATION and/or OTHER PROCEDURE.        ____________________________________       _________________________________      ______________________________  Signature of Patient         Signature of Responsible Person        Printed Name of Responsible Person        ____________________________________      _________________________________      ______________________________       Signature of Witness          Relationship to Patient                       Date                                       Time    Patient Name: Roland Stevens     : 1/15/1955                 Printed: May 24, 2023      Medical Record #: [id number]                      Page 1 of 2          STATEMENT OF PHYSICIAN My signature below affirms that prior to the time of the procedure; I have explained to the patient and/or his/her legal representative, the risks and benefits involved in the proposed treatment and any reasonable alternative to the proposed treatment.  I have also explained the risks and benefits involved in refusal of the proposed treatment and alternatives to the proposed treatment and have answered the patient's questions.  If I have a significant financial interest in a co-management agreement or a significant financial interest in any product or implant, or other significant relationship used in this procedure/surgery, I have disclosed this and had a discussion with my patient.     _______________________________________________________________ _____________________________  Meseret Ohara of Physician)                                                                                         (Date)                                   (Time)    Patient Name: Rogelio Giordano     : 1/15/1955                 Printed: May 24, 2023      Medical Record #: [id number]                      Page 2 of 2

## (undated) NOTE — LETTER
[address], [place]  Authorization for Invasive Procedures  Date: 5/24/2023           Time: 1131    I hereby authorize Dr. Kate Christie, my physician and his/her assistants (if applicable), which may include medical students, residents, and/or fellows, to perform the following surgical operation/ procedure and administer such anesthesia as may be determined necessary by my physician: temporary dialysis catheter insertion on Forest Adler  2. I recognize that during the surgical operation/procedure, unforeseen conditions may necessitate additional or different procedures than those listed above. I, therefore, further authorize and request that the above-named surgeon, assistants, or designees perform such procedures as are, in their judgment, necessary and desirable. 3.   My surgeon/physician has discussed prior to my surgery the potential benefits, risks and side effects of this procedure; the likelihood of achieving goals; and potential problems that might occur during recuperation. They also discussed reasonable alternatives to the procedure, including risks, benefits, and side effects related to the alternatives and risks related to not receiving this procedure. I have had all my questions answered and I acknowledge that no guarantee has been made as to the result that may be obtained. 4.   Should the need arise during my operation/procedure, which includes change of level of care prior to discharge, I also consent to the administration of blood and/or blood products. Further, I understand that despite careful testing and screening of blood or blood products by collecting agencies, I may still be subject to ill effects as a result of receiving a blood transfusion and/or blood products.   The following are some, but not all, of the potential risks that can occur: fever and allergic reactions, hemolytic reactions, transmission of diseases such as Hepatitis, AIDS and Cytomegalovirus (CMV) and fluid overload. In the event that I wish to have an autologous transfusion of my own blood, or a directed donor transfusion, I will discuss this with my physician. Check only if Refusing Blood or Blood Products  I understand refusal of blood or blood products as deemed necessary by my physician may have serious consequences to my condition to include possible death. I hereby assume responsibility for my refusal and release the hospital, its personnel, and my physicians from any responsibility for the consequences of my refusal.         o  Refuse         5. I authorize the use of any specimen, organs, tissues, body parts or foreign objects that may be removed from my body during the operation/procedure for diagnosis, research or teaching purposes and their subsequent disposal by hospital authorities. I also authorize the release of specimen test results and/or written reports to my treating physician on the hospital medical staff or other referring or consulting physicians involved in my care, at the discretion of the Pathologist or my treating physician. 6.   I consent to the photographing or videotaping of the operations or procedures to be performed, including appropriate portions of my body for medical, scientific, or educational purposes, provided my identity is not revealed by the pictures or by descriptive texts accompanying them. If the procedure has been photographed/videotaped, the surgeon will obtain the original picture, image, videotape or CD. The hospital will not be responsible for storage, release or maintenance of the picture, image, tape or CD.    7.   I consent to the presence of a  or observers in the operating room as deemed necessary by my physician or their designees. 8.   I recognize that in the event my procedure results in extended X-Ray/fluoroscopy time, I may develop a skin reaction. 9.  If I have a Do Not Attempt Resuscitation (DNAR) order in place, that status will be suspended while in the operating room, procedural suite, and during the recovery period unless otherwise explicitly stated by me (or a person authorized to consent on my behalf). The surgeon or my attending physician will determine when the applicable recovery period ends for purposes of reinstating the DNAR order. 10. Patients having a sterilization procedure: I understand that if the procedure is successful the results will be permanent and it will therefore be impossible for me to inseminate, conceive, or bear children. I also understand that the procedure is intended to result in sterility, although the result has not been guaranteed. 11. I acknowledge that my physician has explained sedation/analgesia administration to me including the risk and benefits I consent to the administration of sedation/analgesia as may be necessary or desirable in the judgment of my physician.     I CERTIFY THAT I HAVE READ AND FULLY UNDERSTAND THE ABOVE CONSENT TO OPERATION and/or OTHER PROCEDURE.        ____________________________________       _________________________________      ______________________________  Signature of Patient         Signature of Responsible Person        Printed Name of Responsible Person        ____________________________________      _________________________________      ______________________________       Signature of Witness          Relationship to Patient                       Date                                       Time    Patient Name: Fadi Lucia     : 1/15/1955                 Printed: May 24, 2023      Medical Record #: [id number]                      Page 1 of 2          [place] My signature below affirms that prior to the time of the procedure; I have explained to the patient and/or his/her legal representative, the risks and benefits involved in the proposed treatment and any reasonable alternative to the proposed treatment. I have also explained the risks and benefits involved in refusal of the proposed treatment and alternatives to the proposed treatment and have answered the patient's questions.  If I have a significant financial interest in a co-management agreement or a significant financial interest in any product or implant, or other significant relationship used in this procedure/surgery, I have disclosed this and had a discussion with my patient.     _______________________________________________________________ _____________________________  Radha Coke of Physician)                                                                                         (Date)                                   (Time)    Patient Name: Durga Lozano     : 1/15/1955                 Printed: May 24, 2023      Medical Record #: [id number]                      Page 2 of 2

## (undated) NOTE — LETTER
11/26/18        [address]  [address] [facility] [place] [address]      Dear Cleemncia Alcaraz,    [facility] records indicate that you have outstanding lab work and or testing that was ordered for you and has not yet been completed:    Ammonia,Serum    To p

## (undated) NOTE — LETTER
Karlos Smiley  1/15/1955    A patient of your clinic was recently seen by the hospitalists at [facility], and was instructed to Check INR daily, he can stop the lovenox once INR has been over 2 for  24hrs.  Follow-up with coumadin clinic w

## (undated) NOTE — Clinical Note
TCM call completed. The patient did mention ongoing abdominal pain post-op. The patient did agree to contact Dr. Marianne Monae office if pain worsens or fails to improve. A TE was sent to triage for an appointment request. Thank you.

## (undated) NOTE — LETTER
Hospital Discharge Documentation  Patient is scheduled for a hospital follow up appointment with you on 2/19/20.  Please note the INR 5.6 on admission,  no signs of bleeding,Goal INR 2-3 trend down to 3.7 at discharge, Patient was instructed to continue to on oral antibiotics which he claims was improving his discomfort however 2 days ago experienced sharp pain for which he followed up with his primary care physician at which time a CT scan was ordered revealing presence acute diverticulitis involving proxim surgical intervention. (4) Patient is discharged home. Physical Examination:  Vital Signs:  Blood pressure 112/73, pulse 58, temperature 97.3 °F (36.3 °C), temperature source Oral, resp. rate 16, weight 223 lb 3.2 oz (101.2 kg), SpO2 96 %.      GENERA WBC 5.6 02/13/2020    HGB 12.8 (L) 02/13/2020    HCT 39.1 02/13/2020    .0 02/13/2020    CREATSERUM 0.74 02/13/2020    BUN 3 (L) 02/13/2020     02/13/2020    K 4.1 02/13/2020     02/13/2020    CO2 27.0 02/13/2020    GLU 96 02/13/2020 Quantity:  42 tablet  Refills:  0     metRONIDAZOLE 500 MG Tabs  Commonly known as:  FLAGYL      Take 1 tablet (500 mg total) by mouth 3 (three) times daily for 14 days.    Stop taking on:  February 27, 2020  Quantity:  42 tablet  Refills:  0        CONTINU [facility], [address] [address] [address] [facility] [address]    Phone:  [phone]   · Ciprofloxacin HCl 500 MG Tabs  · metRONIDAZOLE 500 MG Tabs         Follow up Visits  Chelle Guerrero, [address] [facility] [address]

## (undated) NOTE — LETTER
02/20/19        [address]  [facility]      Dear Franko Martinez,    [facility] records indicate that you have outstanding lab work and or testing that was ordered for you and has not yet been completed:   Ammonia, Plasma      A

## (undated) NOTE — LETTER
05/23/22        [address] [place] [address]      Dear Heather Banda,    [facility] records indicate that you have outstanding lab work and or testing that was ordered for you and has not yet been completed:   AMMONIA, PLASMA  Please call Central scheduling at [phone] to schedule this test order    To provide you with the best possible care, please complete these orders at your earliest convenience. If you have recently completed these orders please disregard this letter.      If you have any questions please call the office at [phone]    Thank you,   Romayne Guise

## (undated) NOTE — LETTER
[address], [place]  Authorization for Invasive Procedures  Date: 06/01/2023           Time:16:08    I hereby authorize Dr Quin Naik , my physician and his/her assistants (if applicable), which may include medical students, residents, and/or fellows, to perform the following surgical operation/ procedure and administer such anesthesia as may be determined necessary by my physician:  Tunneled dialysis catheter placement on Aftab [place]  2. I recognize that during the surgical operation/procedure, unforeseen conditions may necessitate additional or different procedures than those listed above. I, therefore, further authorize and request that the above-named surgeon, assistants, or designees perform such procedures as are, in their judgment, necessary and desirable. 3.   My surgeon/physician has discussed prior to my surgery the potential benefits, risks and side effects of this procedure; the likelihood of achieving goals; and potential problems that might occur during recuperation. They also discussed reasonable alternatives to the procedure, including risks, benefits, and side effects related to the alternatives and risks related to not receiving this procedure. I have had all my questions answered and I acknowledge that no guarantee has been made as to the result that may be obtained. 4.   Should the need arise during my operation/procedure, which includes change of level of care prior to discharge, I also consent to the administration of blood and/or blood products. Further, I understand that despite careful testing and screening of blood or blood products by collecting agencies, I may still be subject to ill effects as a result of receiving a blood transfusion and/or blood products.   The following are some, but not all, of the potential risks that can occur: fever and allergic reactions, hemolytic reactions, transmission of diseases such as Hepatitis, AIDS and Cytomegalovirus (CMV) and fluid overload. In the event that I wish to have an autologous transfusion of my own blood, or a directed donor transfusion, I will discuss this with my physician. Check only if Refusing Blood or Blood Products  I understand refusal of blood or blood products as deemed necessary by my physician may have serious consequences to my condition to include possible death. I hereby assume responsibility for my refusal and release the hospital, its personnel, and my physicians from any responsibility for the consequences of my refusal.         o  Refuse         5. I authorize the use of any specimen, organs, tissues, body parts or foreign objects that may be removed from my body during the operation/procedure for diagnosis, research or teaching purposes and their subsequent disposal by hospital authorities. I also authorize the release of specimen test results and/or written reports to my treating physician on the hospital medical staff or other referring or consulting physicians involved in my care, at the discretion of the Pathologist or my treating physician. 6.   I consent to the photographing or videotaping of the operations or procedures to be performed, including appropriate portions of my body for medical, scientific, or educational purposes, provided my identity is not revealed by the pictures or by descriptive texts accompanying them. If the procedure has been photographed/videotaped, the surgeon will obtain the original picture, image, videotape or CD. The hospital will not be responsible for storage, release or maintenance of the picture, image, tape or CD.    7.   I consent to the presence of a  or observers in the operating room as deemed necessary by my physician or their designees. 8.   I recognize that in the event my procedure results in extended X-Ray/fluoroscopy time, I may develop a skin reaction. 9.  If I have a Do Not Attempt Resuscitation (DNAR) order in place, that status will be suspended while in the operating room, procedural suite, and during the recovery period unless otherwise explicitly stated by me (or a person authorized to consent on my behalf). The surgeon or my attending physician will determine when the applicable recovery period ends for purposes of reinstating the DNAR order. 10. Patients having a sterilization procedure: I understand that if the procedure is successful the results will be permanent and it will therefore be impossible for me to inseminate, conceive, or bear children. I also understand that the procedure is intended to result in sterility, although the result has not been guaranteed. 11. I acknowledge that my physician has explained sedation/analgesia administration to me including the risk and benefits I consent to the administration of sedation/analgesia as may be necessary or desirable in the judgment of my physician.     I CERTIFY THAT I HAVE READ AND FULLY UNDERSTAND THE ABOVE CONSENT TO OPERATION and/or OTHER PROCEDURE.        ____________________________________       _________________________________      ______________________________  Signature of Patient         Signature of Responsible Person        Printed Name of Responsible Person        ____________________________________      _________________________________      ______________________________       Signature of Witness          Relationship to Patient                       Date                                       Time    Patient Name: Rebeca Pereyra     : 1/15/1955                 Printed: 2023      Medical Record #: [id number]                      Page 1 of 2          [place] My signature below affirms that prior to the time of the procedure; I have explained to the patient and/or his/her legal representative, the risks and benefits involved in the proposed treatment and any reasonable alternative to the proposed treatment. I have also explained the risks and benefits involved in refusal of the proposed treatment and alternatives to the proposed treatment and have answered the patient's questions.  If I have a significant financial interest in a co-management agreement or a significant financial interest in any product or implant, or other significant relationship used in this procedure/surgery, I have disclosed this and had a discussion with my patient.     _______________________________________________________________ _____________________________  Elenora Frame of Physician)                                                                                         (Date)                                   (Time)    Patient Name: Kin Mitchell     : 1/15/1955                 Printed: 2023      Medical Record #: [id number]                      Page 2 of 2

## (undated) NOTE — Clinical Note
TCM call completed today. A TE was sent to the office for an appointment request and INR orders with [facility] that is needed today or tomorrow. Thank you.

## (undated) NOTE — LETTER
TO:     FAX NUMBER:      FROM:      FAX NUMBER:      REGARDING: CLEARANCE TO Kevin Luu mutual patient,                                    (:             ) has an order for a     PROCEDURE to be scheduled at [facility] with the Interventional Radiologist.     Requesting for clearance to stop                    for      DAYS prior to procedure.        _____Yes, ok to Stop                        ________No, Do not Stop     If no specify reason____________________________________________________________           __________________________________________________________________  MD Dearl Parents                                                                DATE/TIME

## (undated) NOTE — LETTER
Mally Pennington  1/15/1955    A patient of your clinic was recently seen by the hospitalists at [place] [facility], and will be following up with you on 2/19/20 for follow up appt.  Please note the INR 5.6 on admission,  no signs of bleeding,Goal INR

## (undated) NOTE — LETTER
[address]  [place], [place], [address]       TO:     FAX NUMBER:      FROM: Interventional Radiology/[facility]     FAX NUMBER: [phone]     REGARDING: Carolina Erickson mutual patient, NAME, (:             ) has an order for a PROCEDURE      to be scheduled at [facility]      with the Interventional Radiologist.     Requesting for clearance to stop MEDICATION for  DAYS      prior to procedure.        _____Yes, ok to Stop                        ________No, Do not Stop     If no specify reason____________________________________________________________           __________________________________________________________________  MD Raul Orourke                                                                DATE/TIME

## (undated) NOTE — Clinical Note
Pt declined HFU and plans to CB. Pt stated he has an appt with Dr. Katelynn Costello and with Cardiology. Pt declined to review medication list but is taking Warfarin 7.5 mg daily and next check for INR is Friday. Thank you! See outreach for further details.

## (undated) NOTE — LETTER
Naomi José Manuel  1/15/1955    A patient of your clinic was recently seen by the hospitalists at [facility], and Instructed to take 10 mg Coumadin tonight and follow up with [facility] in the AM. Pt reports that he takes his INR at home and reports to the [facility] coumadin clinic via phone. The patient's last INR values were, as follows:    Lab Results   Component Value Date    INR 1.77 (H) 04/11/2022    INR 1.58 (H) 04/10/2022    INR 1.91 (H) 04/09/2022     The patient's discharge Coumadin dosing is as follows:    warfarin 10 MG Tabs  Next dose due: tonight  Take 1 tablet (10 mg total) by mouth once for 1 dose. Stop taking on: April 11, 2022    Please contact us if you have any questions.     Evette Simms RN  04/12/22

## (undated) NOTE — LETTER
[address], [place], [address]       TO:     FAX NUMBER:      FROM: Interventional Radiology/[facility]     FAX NUMBER: [phone]     REGARDING: Ant Solares mutual patient, NAME, (:             ) has an order for a PROCEDURE      to be scheduled at [facility]      with the Interventional Radiologist.     Requesting for clearance to stop MEDICATION for  DAYS      prior to procedure.        _____Yes, ok to Stop                        ________No, Do not Stop     If no specify reason____________________________________________________________           __________________________________________________________________  MD Peng Marrero                                                                DATE/TIME

## (undated) NOTE — LETTER
TO:     FAX NUMBER:     FROM: Interventional Radiology/[facility]    FAX NUMBER: [phone]    REGARDING: CLEARANCE TO HOLD MEDICATIONS FOR PROCEDURE          DR. ________          Our mutual patient, _____________ ,(:__________)has an order for a_________to be     scheduled at [facility] with Dr Michael Barnett.     Requesting for clearance to stop_____ for        days prior to     procedure.      _____Yes, ok to Stop                        ________No, Do not Stop    If no specify reason____________________________________________________________        __________________________________________________________________  MD Lindsey Rhoades                                                                DATE/TIME

## (undated) NOTE — LETTER
[address] [place] [address] Rd, [place], [place]     AUTHORIZATION FOR SURGICAL OPERATION OR PROCEDURE    I hereby authorize Dr. Rasta Dvae MD, my Physician(s) and whomever may be designated as the doctor's Assistant, to perform the foll 4. I consent to the photographing of procedure(s) to be performed for the purposes of advancing medicine, science and/or education, provided my identity is not revealed.  If the procedure has been videotaped, the physician/surgeon will obtain the original v (Witness signature)                                                                                                  (Date)                                (Time)  STATEMENT OF PHYSICIAN My signature below affirms that prior to the time of the procedure;  I

## (undated) NOTE — LETTER
[address] [place] [address] Rd, [place], [place]     AUTHORIZATION FOR SURGICAL OPERATION OR PROCEDURE    I hereby authorize Dr. Ellen Carty MD, my Physician(s) and whomever may be designated as the doctor's Assistant, to perform the follo 4. I consent to the photographing of procedure(s) to be performed for the purposes of advancing medicine, science and/or education, provided my identity is not revealed.  If the procedure has been videotaped, the physician/surgeon will obtain the original v (Witness signature)                                                                                                  (Date)                                (Time)  STATEMENT OF PHYSICIAN My signature below affirms that prior to the time of the procedure;  I

## (undated) NOTE — LETTER
[address] [facility]    Consent for Coronary CT Angiography    Your doctor has recommended that you have a Coronary CT Angiography procedure. Coronary CT Angiography is a diagnostic procedure using computed tomography to scan the chest and coronary arteries. It is a non-invasive technique that allows clear visualization of narrowed and blocked arteries. Blockage in these arteries may lead to heart attack or stroke. You will lie on a table that slides slowly into a large circular opening called the CT gantry. The procedure will be performed by the CT Staff, including a nurse, a technologist, and a patient assistant. The staff will be with you throughout the entire procedure to explain each step, make you as comfortable as possible, and answer all of your questions. The staff will take a series of images of your heart and chest to help define the area to be imaged. You will be asked to hold your breath for a short time as each series of images is performed and acquired. You may receive medication called a beta blocker that will slow your hear rate down. This is needed so we can obtain better images of your heart. You may also receive a nitroglycerine spray under your tongue. This medication is given to dilate the arteries for better picture quality. The nitroglycerine may cause a drop in blood pressure. During the procedure you will receive an injection of a contrast material, which assists the physician in highlighting the anatomy of your heart. You may experience a warm sensation through your body and a metallic taste in your mouth. In rare circumstances, a rash and/or hives may occur. It's very important to inform your care giver of any changes you may feel or experience. The medication and the contrast material used as part of your procedure are all deemed very safe, however there is always an element of risk to a patient when taking medication.  Allergic reactions to medication can range from very minor to very serious leading to a life threatening situation or even death. Please be sure to communicate any allergy you may have to your caregiver immediately. The information that is obtained during your testing will be treated as privileged and confidential. The information obtained will be given to your doctor and may be used for insurance purposes or to track and trend data as part of  with your privacy retained. Ximena Agrawal, have read and understand the above information. I voluntarily agree and consent to have the Coronary CT Angiography (CTA) Exam. Furthermore, no guarantees or assurances have been given by anyone as to the results that may be obtained from this procedure. Any questions that may have occurred to me have been answered to my satisfaction.       Signature of Patient: __________________________Date: ___________Time: _______      Patient Name: Elvia Hubbard    : 1/15/1955      Printed: February 15, 2022      Medical Record #: [id number]

## (undated) NOTE — LETTER
Hospital Discharge Documentation  Please phone to schedule a hospital follow up appointment.     From: [address] Hospitalist[facility] Office  Phone: [phone]    Patient discharged time/date: 6/22/2020  3:30 PM  Patient discharge disposition:  Home or Self Pulmonary:  clear to auscultation bilaterally  Cardiovascular: S1, S2 normal, no murmur, click, rub or gallop, regular rate and rhythm  Abdominal: soft, non-tender; bowel sounds normal; no masses,  no organomegaly  Extremities: extremities normal, atraumat Take 1 tablet (5 mg total) by mouth every 8 (eight) hours as needed. Quantity:  15 tablet  Refills:  0        CHANGE how you take these medications      Instructions Prescription details   Metoprolol Succinate ER 25 MG Tb24  Commonly known as:   Toprol X Take 238 g by mouth once. 64oz night before surgery   Refills:  0     PROBIOTIC ACIDOPHILUS OR      Take by mouth daily. Refills:  0     Vitamin B-12 1000 MCG Tabs  Commonly known as:  VITAMIN B12      Take 1,000 mcg by mouth daily.    Refills:  0     Wa